# Patient Record
Sex: FEMALE | Race: WHITE | NOT HISPANIC OR LATINO | Employment: PART TIME | URBAN - METROPOLITAN AREA
[De-identification: names, ages, dates, MRNs, and addresses within clinical notes are randomized per-mention and may not be internally consistent; named-entity substitution may affect disease eponyms.]

---

## 2021-11-15 ENCOUNTER — OFFICE VISIT (OUTPATIENT)
Dept: FAMILY MEDICINE CLINIC | Facility: CLINIC | Age: 71
End: 2021-11-15
Payer: COMMERCIAL

## 2021-11-15 ENCOUNTER — TELEPHONE (OUTPATIENT)
Dept: ADMINISTRATIVE | Facility: OTHER | Age: 71
End: 2021-11-15

## 2021-11-15 ENCOUNTER — TELEPHONE (OUTPATIENT)
Dept: FAMILY MEDICINE CLINIC | Facility: CLINIC | Age: 71
End: 2021-11-15

## 2021-11-15 VITALS
DIASTOLIC BLOOD PRESSURE: 76 MMHG | RESPIRATION RATE: 16 BRPM | BODY MASS INDEX: 27.87 KG/M2 | WEIGHT: 147.6 LBS | HEART RATE: 68 BPM | TEMPERATURE: 97.8 F | OXYGEN SATURATION: 96 % | HEIGHT: 61 IN | SYSTOLIC BLOOD PRESSURE: 128 MMHG

## 2021-11-15 DIAGNOSIS — M25.571 BILATERAL ANKLE PAIN, UNSPECIFIED CHRONICITY: ICD-10-CM

## 2021-11-15 DIAGNOSIS — E11.40 TYPE 2 DIABETES MELLITUS WITH CHRONIC PAINFUL DIABETIC NEUROPATHY (HCC): Primary | ICD-10-CM

## 2021-11-15 DIAGNOSIS — J43.9 PULMONARY EMPHYSEMA, UNSPECIFIED EMPHYSEMA TYPE (HCC): ICD-10-CM

## 2021-11-15 DIAGNOSIS — M25.572 BILATERAL ANKLE PAIN, UNSPECIFIED CHRONICITY: ICD-10-CM

## 2021-11-15 DIAGNOSIS — I25.10 CORONARY ARTERY DISEASE INVOLVING NATIVE CORONARY ARTERY OF NATIVE HEART WITHOUT ANGINA PECTORIS: ICD-10-CM

## 2021-11-15 DIAGNOSIS — E78.49 OTHER HYPERLIPIDEMIA: ICD-10-CM

## 2021-11-15 PROBLEM — I27.20 PULMONARY HYPERTENSION (HCC): Status: ACTIVE | Noted: 2019-03-22

## 2021-11-15 PROBLEM — E78.00 PURE HYPERCHOLESTEROLEMIA: Status: ACTIVE | Noted: 2019-03-22

## 2021-11-15 PROBLEM — K51.90 ULCERATIVE COLITIS (HCC): Status: ACTIVE | Noted: 2021-11-15

## 2021-11-15 PROBLEM — I10 HYPERTENSION: Status: ACTIVE | Noted: 2021-11-15

## 2021-11-15 PROBLEM — I25.5 ISCHEMIC CARDIOMYOPATHY: Status: ACTIVE | Noted: 2019-03-22

## 2021-11-15 PROBLEM — M81.0 OSTEOPOROSIS: Status: ACTIVE | Noted: 2021-11-15

## 2021-11-15 LAB — SL AMB POCT HEMOGLOBIN AIC: 9 (ref ?–6.5)

## 2021-11-15 PROCEDURE — 1160F RVW MEDS BY RX/DR IN RCRD: CPT | Performed by: FAMILY MEDICINE

## 2021-11-15 PROCEDURE — 3725F SCREEN DEPRESSION PERFORMED: CPT | Performed by: FAMILY MEDICINE

## 2021-11-15 PROCEDURE — 3052F HG A1C>EQUAL 8.0%<EQUAL 9.0%: CPT | Performed by: FAMILY MEDICINE

## 2021-11-15 PROCEDURE — 83036 HEMOGLOBIN GLYCOSYLATED A1C: CPT | Performed by: FAMILY MEDICINE

## 2021-11-15 PROCEDURE — 4010F ACE/ARB THERAPY RXD/TAKEN: CPT | Performed by: FAMILY MEDICINE

## 2021-11-15 PROCEDURE — 3008F BODY MASS INDEX DOCD: CPT | Performed by: FAMILY MEDICINE

## 2021-11-15 PROCEDURE — 99204 OFFICE O/P NEW MOD 45 MIN: CPT | Performed by: FAMILY MEDICINE

## 2021-11-15 RX ORDER — LOSARTAN POTASSIUM 50 MG/1
50 TABLET ORAL DAILY
COMMUNITY
End: 2021-11-15 | Stop reason: CLARIF

## 2021-11-15 RX ORDER — PREGABALIN 100 MG/1
175 CAPSULE ORAL 2 TIMES DAILY
COMMUNITY
Start: 2021-09-17 | End: 2021-11-15 | Stop reason: SDUPTHER

## 2021-11-15 RX ORDER — CLOPIDOGREL BISULFATE 75 MG/1
75 TABLET ORAL DAILY
COMMUNITY

## 2021-11-15 RX ORDER — INSULIN GLARGINE 100 [IU]/ML
33 INJECTION, SOLUTION SUBCUTANEOUS 2 TIMES DAILY
COMMUNITY
End: 2022-08-09

## 2021-11-15 RX ORDER — ASPIRIN 81 MG/1
81 TABLET ORAL DAILY
COMMUNITY

## 2021-11-15 RX ORDER — CLONIDINE HYDROCHLORIDE 0.1 MG/1
0.1 TABLET ORAL 2 TIMES DAILY
COMMUNITY
Start: 2021-09-09 | End: 2022-02-28 | Stop reason: SDUPTHER

## 2021-11-15 RX ORDER — CARVEDILOL 25 MG/1
25 TABLET ORAL 2 TIMES DAILY
COMMUNITY
Start: 2021-09-09

## 2021-11-15 RX ORDER — FOLIC ACID 1 MG/1
1 TABLET ORAL DAILY
COMMUNITY
End: 2022-03-30

## 2021-11-15 RX ORDER — VALSARTAN 320 MG/1
320 TABLET ORAL DAILY
COMMUNITY
End: 2021-11-15 | Stop reason: SDUPTHER

## 2021-11-15 RX ORDER — PHENOL 1.4 %
600 AEROSOL, SPRAY (ML) MUCOUS MEMBRANE DAILY
COMMUNITY

## 2021-11-15 RX ORDER — TOLTERODINE 4 MG/1
4 CAPSULE, EXTENDED RELEASE ORAL DAILY
COMMUNITY
Start: 2021-09-17 | End: 2022-03-30 | Stop reason: SDUPTHER

## 2021-11-15 RX ORDER — PREGABALIN 75 MG/1
75 CAPSULE ORAL 2 TIMES DAILY
Qty: 60 CAPSULE | Refills: 0 | Status: SHIPPED | OUTPATIENT
Start: 2021-11-15 | End: 2021-12-28 | Stop reason: SDUPTHER

## 2021-11-15 RX ORDER — SITAGLIPTIN AND METFORMIN HYDROCHLORIDE 1000; 50 MG/1; MG/1
50-1000 TABLET, FILM COATED ORAL 2 TIMES DAILY
COMMUNITY
Start: 2021-09-14 | End: 2022-08-09

## 2021-11-15 RX ORDER — ATORVASTATIN CALCIUM 40 MG/1
40 TABLET, FILM COATED ORAL DAILY
COMMUNITY
End: 2022-03-30 | Stop reason: SDUPTHER

## 2021-11-15 RX ORDER — GLIMEPIRIDE 2 MG/1
2 TABLET ORAL DAILY
COMMUNITY
End: 2022-08-04

## 2021-12-28 DIAGNOSIS — M25.572 BILATERAL ANKLE PAIN, UNSPECIFIED CHRONICITY: ICD-10-CM

## 2021-12-28 DIAGNOSIS — M25.571 BILATERAL ANKLE PAIN, UNSPECIFIED CHRONICITY: ICD-10-CM

## 2021-12-28 LAB
ALBUMIN SERPL-MCNC: 4.5 G/DL (ref 3.8–4.8)
ALBUMIN/GLOB SERPL: 1.7 {RATIO} (ref 1.2–2.2)
ALP SERPL-CCNC: 109 IU/L (ref 44–121)
ALT SERPL-CCNC: 22 IU/L (ref 0–32)
AST SERPL-CCNC: 20 IU/L (ref 0–40)
BILIRUB SERPL-MCNC: 0.2 MG/DL (ref 0–1.2)
BUN SERPL-MCNC: 28 MG/DL (ref 8–27)
BUN/CREAT SERPL: 19 (ref 12–28)
CALCIUM SERPL-MCNC: 10.7 MG/DL (ref 8.7–10.3)
CHLORIDE SERPL-SCNC: 103 MMOL/L (ref 96–106)
CHOLEST SERPL-MCNC: 143 MG/DL (ref 100–199)
CO2 SERPL-SCNC: 24 MMOL/L (ref 20–29)
CREAT SERPL-MCNC: 1.48 MG/DL (ref 0.57–1)
GLOBULIN SER-MCNC: 2.7 G/DL (ref 1.5–4.5)
GLUCOSE SERPL-MCNC: 185 MG/DL (ref 65–99)
HDLC SERPL-MCNC: 40 MG/DL
LDLC SERPL CALC-MCNC: 67 MG/DL (ref 0–99)
MICRODELETION SYND BLD/T FISH: NORMAL
MICRODELETION SYND BLD/T FISH: NORMAL
POTASSIUM SERPL-SCNC: 6.5 MMOL/L (ref 3.5–5.2)
PROT SERPL-MCNC: 7.2 G/DL (ref 6–8.5)
SL AMB EGFR AFRICAN AMERICAN: 41 ML/MIN/1.73
SL AMB EGFR NON AFRICAN AMERICAN: 36 ML/MIN/1.73
SL AMB VLDL CHOLESTEROL CALC: 36 MG/DL (ref 5–40)
SODIUM SERPL-SCNC: 138 MMOL/L (ref 134–144)
TRIGL SERPL-MCNC: 221 MG/DL (ref 0–149)

## 2021-12-28 RX ORDER — PREGABALIN 75 MG/1
75 CAPSULE ORAL 2 TIMES DAILY
Qty: 60 CAPSULE | Refills: 0 | Status: SHIPPED | OUTPATIENT
Start: 2021-12-28 | End: 2022-01-27 | Stop reason: SDUPTHER

## 2021-12-29 ENCOUNTER — APPOINTMENT (OUTPATIENT)
Dept: LAB | Facility: CLINIC | Age: 71
End: 2021-12-29
Payer: COMMERCIAL

## 2021-12-29 ENCOUNTER — TELEPHONE (OUTPATIENT)
Dept: FAMILY MEDICINE CLINIC | Facility: CLINIC | Age: 71
End: 2021-12-29

## 2021-12-29 DIAGNOSIS — E87.5 HYPERKALEMIA: ICD-10-CM

## 2021-12-29 LAB
ALBUMIN SERPL BCP-MCNC: 3.9 G/DL (ref 3.5–5)
ALP SERPL-CCNC: 102 U/L (ref 46–116)
ALT SERPL W P-5'-P-CCNC: 37 U/L (ref 12–78)
ANION GAP SERPL CALCULATED.3IONS-SCNC: 2 MMOL/L (ref 4–13)
AST SERPL W P-5'-P-CCNC: 29 U/L (ref 5–45)
BILIRUB SERPL-MCNC: 0.49 MG/DL (ref 0.2–1)
BUN SERPL-MCNC: 31 MG/DL (ref 5–25)
CALCIUM SERPL-MCNC: 10.4 MG/DL (ref 8.3–10.1)
CHLORIDE SERPL-SCNC: 105 MMOL/L (ref 100–108)
CO2 SERPL-SCNC: 27 MMOL/L (ref 21–32)
CREAT SERPL-MCNC: 1.48 MG/DL (ref 0.6–1.3)
GFR SERPL CREATININE-BSD FRML MDRD: 35 ML/MIN/1.73SQ M
GLUCOSE SERPL-MCNC: 211 MG/DL (ref 65–140)
POTASSIUM SERPL-SCNC: 5.8 MMOL/L (ref 3.5–5.3)
PROT SERPL-MCNC: 7.7 G/DL (ref 6.4–8.2)
SODIUM SERPL-SCNC: 134 MMOL/L (ref 136–145)

## 2021-12-29 PROCEDURE — 80053 COMPREHEN METABOLIC PANEL: CPT

## 2021-12-29 PROCEDURE — 36415 COLL VENOUS BLD VENIPUNCTURE: CPT

## 2022-01-03 ENCOUNTER — APPOINTMENT (OUTPATIENT)
Dept: LAB | Facility: CLINIC | Age: 72
End: 2022-01-03
Payer: COMMERCIAL

## 2022-01-03 DIAGNOSIS — E87.5 HYPERKALEMIA: ICD-10-CM

## 2022-01-03 LAB
ANION GAP SERPL CALCULATED.3IONS-SCNC: 4 MMOL/L (ref 4–13)
BUN SERPL-MCNC: 35 MG/DL (ref 5–25)
CALCIUM SERPL-MCNC: 9.9 MG/DL (ref 8.3–10.1)
CHLORIDE SERPL-SCNC: 105 MMOL/L (ref 100–108)
CO2 SERPL-SCNC: 25 MMOL/L (ref 21–32)
CREAT SERPL-MCNC: 1.61 MG/DL (ref 0.6–1.3)
GFR SERPL CREATININE-BSD FRML MDRD: 32 ML/MIN/1.73SQ M
GLUCOSE SERPL-MCNC: 317 MG/DL (ref 65–140)
POTASSIUM SERPL-SCNC: 5.6 MMOL/L (ref 3.5–5.3)
SODIUM SERPL-SCNC: 134 MMOL/L (ref 136–145)

## 2022-01-03 PROCEDURE — 36415 COLL VENOUS BLD VENIPUNCTURE: CPT

## 2022-01-03 PROCEDURE — 80048 BASIC METABOLIC PNL TOTAL CA: CPT

## 2022-01-27 DIAGNOSIS — M25.571 BILATERAL ANKLE PAIN, UNSPECIFIED CHRONICITY: ICD-10-CM

## 2022-01-27 DIAGNOSIS — E11.40 TYPE 2 DIABETES MELLITUS WITH CHRONIC PAINFUL DIABETIC NEUROPATHY (HCC): ICD-10-CM

## 2022-01-27 DIAGNOSIS — M25.572 BILATERAL ANKLE PAIN, UNSPECIFIED CHRONICITY: ICD-10-CM

## 2022-01-27 RX ORDER — PREGABALIN 75 MG/1
75 CAPSULE ORAL 2 TIMES DAILY
Qty: 60 CAPSULE | Refills: 0 | Status: SHIPPED | OUTPATIENT
Start: 2022-01-27 | End: 2022-02-28 | Stop reason: SDUPTHER

## 2022-01-27 RX ORDER — VALSARTAN 320 MG/1
320 TABLET ORAL DAILY
Qty: 90 TABLET | Refills: 1 | Status: SHIPPED | OUTPATIENT
Start: 2022-01-27 | End: 2022-04-08 | Stop reason: SDUPTHER

## 2022-02-28 ENCOUNTER — OFFICE VISIT (OUTPATIENT)
Dept: FAMILY MEDICINE CLINIC | Facility: CLINIC | Age: 72
End: 2022-02-28
Payer: COMMERCIAL

## 2022-02-28 ENCOUNTER — APPOINTMENT (OUTPATIENT)
Dept: LAB | Facility: CLINIC | Age: 72
End: 2022-02-28
Payer: COMMERCIAL

## 2022-02-28 VITALS
DIASTOLIC BLOOD PRESSURE: 62 MMHG | BODY MASS INDEX: 27.38 KG/M2 | HEART RATE: 68 BPM | TEMPERATURE: 97.7 F | SYSTOLIC BLOOD PRESSURE: 120 MMHG | RESPIRATION RATE: 16 BRPM | WEIGHT: 145 LBS | HEIGHT: 61 IN

## 2022-02-28 DIAGNOSIS — N18.31 STAGE 3A CHRONIC KIDNEY DISEASE (HCC): ICD-10-CM

## 2022-02-28 DIAGNOSIS — I10 HYPERTENSION, UNSPECIFIED TYPE: ICD-10-CM

## 2022-02-28 DIAGNOSIS — M25.572 BILATERAL ANKLE PAIN, UNSPECIFIED CHRONICITY: ICD-10-CM

## 2022-02-28 DIAGNOSIS — R21 RASH: ICD-10-CM

## 2022-02-28 DIAGNOSIS — M25.571 BILATERAL ANKLE PAIN, UNSPECIFIED CHRONICITY: ICD-10-CM

## 2022-02-28 DIAGNOSIS — Z00.00 MEDICARE ANNUAL WELLNESS VISIT, INITIAL: ICD-10-CM

## 2022-02-28 DIAGNOSIS — E87.5 HYPERKALEMIA: ICD-10-CM

## 2022-02-28 DIAGNOSIS — L85.3 DRY SKIN: Primary | ICD-10-CM

## 2022-02-28 DIAGNOSIS — E11.40 TYPE 2 DIABETES MELLITUS WITH CHRONIC PAINFUL DIABETIC NEUROPATHY (HCC): ICD-10-CM

## 2022-02-28 DIAGNOSIS — E78.00 PURE HYPERCHOLESTEROLEMIA: ICD-10-CM

## 2022-02-28 LAB
ANION GAP SERPL CALCULATED.3IONS-SCNC: 7 MMOL/L (ref 4–13)
BUN SERPL-MCNC: 41 MG/DL (ref 5–25)
CALCIUM SERPL-MCNC: 10.3 MG/DL (ref 8.3–10.1)
CHLORIDE SERPL-SCNC: 104 MMOL/L (ref 100–108)
CO2 SERPL-SCNC: 23 MMOL/L (ref 21–32)
CREAT SERPL-MCNC: 1.81 MG/DL (ref 0.6–1.3)
GFR SERPL CREATININE-BSD FRML MDRD: 28 ML/MIN/1.73SQ M
GLUCOSE SERPL-MCNC: 363 MG/DL (ref 65–140)
POTASSIUM SERPL-SCNC: 6.1 MMOL/L (ref 3.5–5.3)
SL AMB POCT HEMOGLOBIN AIC: 9.9 (ref ?–6.5)
SODIUM SERPL-SCNC: 134 MMOL/L (ref 136–145)

## 2022-02-28 PROCEDURE — 83036 HEMOGLOBIN GLYCOSYLATED A1C: CPT | Performed by: FAMILY MEDICINE

## 2022-02-28 PROCEDURE — 99214 OFFICE O/P EST MOD 30 MIN: CPT | Performed by: FAMILY MEDICINE

## 2022-02-28 PROCEDURE — 36415 COLL VENOUS BLD VENIPUNCTURE: CPT

## 2022-02-28 PROCEDURE — G0438 PPPS, INITIAL VISIT: HCPCS | Performed by: FAMILY MEDICINE

## 2022-02-28 PROCEDURE — 80048 BASIC METABOLIC PNL TOTAL CA: CPT

## 2022-02-28 RX ORDER — INSULIN GLARGINE 100 [IU]/ML
30 INJECTION, SOLUTION SUBCUTANEOUS 2 TIMES DAILY
COMMUNITY
Start: 2022-01-19 | End: 2022-02-28

## 2022-02-28 RX ORDER — PREGABALIN 75 MG/1
75 CAPSULE ORAL 2 TIMES DAILY
Qty: 60 CAPSULE | Refills: 0 | Status: SHIPPED | OUTPATIENT
Start: 2022-02-28 | End: 2022-03-29

## 2022-02-28 RX ORDER — CLONIDINE HYDROCHLORIDE 0.1 MG/1
0.1 TABLET ORAL 2 TIMES DAILY
Qty: 180 TABLET | Refills: 2 | Status: SHIPPED | OUTPATIENT
Start: 2022-02-28 | End: 2022-08-04

## 2022-02-28 RX ORDER — PEN NEEDLE, DIABETIC 29 G X1/2"
NEEDLE, DISPOSABLE MISCELLANEOUS 4 TIMES DAILY
Qty: 200 EACH | Refills: 2 | Status: SHIPPED | OUTPATIENT
Start: 2022-02-28

## 2022-02-28 RX ORDER — INSULIN LISPRO 100 [IU]/ML
INJECTION, SOLUTION INTRAVENOUS; SUBCUTANEOUS
Qty: 15 ML | Refills: 11 | Status: SHIPPED | OUTPATIENT
Start: 2022-02-28 | End: 2022-08-09

## 2022-02-28 RX ORDER — FLUCONAZOLE 150 MG/1
150 TABLET ORAL
Qty: 2 TABLET | Refills: 0 | Status: SHIPPED | OUTPATIENT
Start: 2022-02-28 | End: 2022-03-04

## 2022-02-28 RX ORDER — TOLTERODINE TARTRATE 1 MG/1
TABLET, EXTENDED RELEASE ORAL
COMMUNITY
End: 2022-02-28

## 2022-02-28 NOTE — PROGRESS NOTES
7 Assessment and Plan:     Problem List Items Addressed This Visit        Endocrine    Type 2 diabetes mellitus with chronic painful diabetic neuropathy (HCC)    Relevant Medications    insulin lispro (HumaLOG KwikPen) 100 units/mL injection pen    Insulin Pen Needle (UltiCare Mini Pen Needles) 31G X 6 MM MISC    Other Relevant Orders    Ambulatory referral to Endocrinology    Ambulatory referral to Diabetic Education       Cardiovascular and Mediastinum    Hypertension    Relevant Medications    cloNIDine (CATAPRES) 0 1 mg tablet       Other    Pure hypercholesterolemia      Other Visit Diagnoses     Dry skin    -  Primary    Relevant Orders    Ambulatory Referral to Dermatology    Bilateral ankle pain, unspecified chronicity        Relevant Medications    pregabalin (LYRICA) 75 mg capsule    Rash        Relevant Medications    fluconazole (DIFLUCAN) 150 mg tablet    Stage 3a chronic kidney disease (HCC)        Hyperkalemia        Relevant Orders    Basic metabolic panel           Preventive health issues were discussed with patient, and age appropriate screening tests were ordered as noted in patient's After Visit Summary  Personalized health advice and appropriate referrals for health education or preventive services given if needed, as noted in patient's After Visit Summary       History of Present Illness:     Patient presents for Medicare Annual Wellness visit    Patient Care Team:  Klye Thompson MD as PCP - General (Family Medicine)  Teresa Villarreal MD as PCP - [address] [place] [address] (RTE)     Problem List:     Patient Active Problem List   Diagnosis    Coronary artery disease involving native coronary artery of native heart without angina pectoris    Emphysema, unspecified ([address] )    Ischemic cardiomyopathy    Hypertension    [facility]    Pure hypercholesterolemia    Pulmonary hypertension ([facility]    Other hyperlipidemia    Osteoporosis    Type 2 diabetes mellitus with chronic painful diabetic neuropathy [facility])      Past Medical and Surgical History:     Past Medical History:   Diagnosis Date    Diabetes mellitus [facility] [facility]     Neuropathy      Past Surgical History:   Procedure Laterality Date    ANKLE ARTHROPLASTY        Family History:     Family History   Problem Relation Age of Onset    Stroke Mother     Heart disease Father       Social History:     Social History     Socioeconomic History    Marital status: Single     Spouse name: None    Number of children: None    Years of education: None    Highest education level: None   Occupational History    None   Tobacco Use    Smoking status: Former Smoker    Smokeless tobacco: Never Used   Substance and Sexual Activity    Alcohol use:  Yes     Alcohol/week: 1 0 standard drink     Types: 1 Cans of beer per week    Drug use: Never    Sexual activity: Not Currently   Other Topics Concern    None   Social History Narrative    None     Social Determinants of Health     Financial Resource Strain: Not on file   Food Insecurity: Not on file   Transportation Needs: Not on file   Physical Activity: Not on file   Stress: Not on file   Social Connections: Not on file   Intimate Partner Violence: Not on file   Housing Stability: Not on file      Medications and Allergies:     Current Outpatient Medications   Medication Sig Dispense Refill    aspirin (ECOTRIN LOW STRENGTH) 81 mg EC tablet Take 81 mg by mouth daily      atorvastatin (LIPITOR) 40 mg tablet Take 40 mg by mouth daily      calcium carbonate (OS-JAKOB) 600 MG tablet Take 600 mg by mouth daily      carvedilol (COREG) 25 mg tablet Take 25 mg by mouth 2 (two) times a day      cloNIDine (CATAPRES) 0 1 mg tablet Take 1 tablet (0 1 mg total) by mouth 2 (two) times a day 180 tablet 2    clopidogrel (PLAVIX) 75 mg tablet Take 75 mg by mouth daily      Empagliflozin 10 MG TABS Take 10 mg by mouth daily      folic acid (FOLVITE) 1 mg tablet Take 1 mg by mouth daily      glimepiride (AMARYL) 2 mg tablet Take 2 mg by mouth daily      insulin glargine (LANTUS) 100 units/mL subcutaneous injection Inject 30 Units under the skin 2 (two) times a day      Janumet  MG per tablet Take 50-1,000 tablets by mouth 2 (two) times a day      pregabalin (LYRICA) 75 mg capsule Take 1 capsule (75 mg total) by mouth 2 (two) times a day 60 capsule 0    tolterodine (DETROL LA) 4 mg 24 hr capsule Take 4 mg by mouth daily      valsartan (DIOVAN) 320 MG tablet Take 1 tablet (320 mg total) by mouth daily 90 tablet 1    fluconazole (DIFLUCAN) 150 mg tablet Take 1 tablet (150 mg total) by mouth every 3 (three) days for 2 doses 2 tablet 0    insulin lispro (HumaLOG KwikPen) 100 units/mL injection pen Please check you Blood Sugars prior to meals and follow instructions below 150-200 1 unit,201-250 2 units;251-300 3 units;301-350 4 units;351-400 5 units;451-450 6 units;500+ 7 units 15 mL 11    Insulin Pen Needle (UltiCare Mini Pen Needles) 31G X 6 MM MISC Use 4 (four) times a day 200 each 2     No current facility-administered medications for this visit  Allergies   Allergen Reactions    Other Rash      Immunizations:     Immunization History   Administered Date(s) Administered    COVID-19 MODERNA VACC 0 5 ML IM 03/10/2021, 04/07/2021, 10/29/2021    INFLUENZA 10/29/2021      Health Maintenance:         Topic Date Due    Hepatitis C Screening  Never done    Breast Cancer Screening: Mammogram  Never done    Colorectal Cancer Screening  01/18/2021         Topic Date Due    Pneumococcal Vaccine: 65+ Years (1 of 2 - PPSV23) Never done    DTaP,Tdap,and Td Vaccines (1 - Tdap) Never done      Medicare Health Risk Assessment:     /62 (BP Location: Left arm, Patient Position: Sitting, Cuff Size: Adult)   Pulse 68   Temp 97 7 °F (36 5 °C) (Temporal)   Resp 16   Ht 5' 1" (1 549 m)   Wt 65 8 kg (145 lb)   BMI 27 40 kg/m²      T[place]on is here for her Initial Wellness visit       Health Risk Assessment:   Patient rates overall health as fair  Patient feels that their physical health rating is slightly better  Patient is satisfied with their life  Eyesight was rated as same  Hearing was rated as same  Patient feels that their emotional and mental health rating is same  Patients states they are sometimes angry  Patient states they are sometimes unusually tired/fatigued  Pain experienced in the last 7 days has been some  Patient's pain rating has been 5/10  Patient states that she has experienced no weight loss or gain in last 6 months  Depression Screening:   PHQ-2 Score: 1      Fall Risk Screening: In the past year, patient has experienced: no history of falling in past year      Urinary Incontinence Screening:   Patient has not leaked urine accidently in the last six months  Home Safety:  Patient does not have trouble with stairs inside or outside of their home  Patient has working smoke alarms and has working carbon monoxide detector  Home safety hazards include: none  Nutrition:   Current diet is Diabetic  Medications:   Patient is not currently taking any over-the-counter supplements  Patient is able to manage medications       Activities of Daily Living (ADLs)/Instrumental Activities of Daily Living (IADLs):   Walk and transfer into and out of bed and chair?: Yes  Dress and groom yourself?: Yes    Bathe or shower yourself?: Yes    Do your laundry/housekeeping?: Yes  Manage your money, pay your bills and track your expenses?: Yes  Make your own meals?: Yes    Do your own shopping?: Yes    Previous Hospitalizations:   Any hospitalizations or ED visits within the last 12 months?: No      Advance Care Planning:   Living will: No    Durable POA for healthcare: No    Advanced directive: No      Cognitive Screening:   Provider or family/friend/caregiver concerned regarding cognition?: No    PREVENTIVE SCREENINGS      Cardiovascular Screening:    General: Screening Not Indicated and History Lipid Disorder Diabetes Screening:     General: Screening Not Indicated and History Diabetes      Colorectal Cancer Screening:     General: Risks and Benefits Discussed and Screening Current      Breast Cancer Screening:     General: Patient Declines      Cervical Cancer Screening:    General: Screening Not Indicated      Osteoporosis Screening:    General: Screening Not Indicated, History Osteoporosis and Patient Declines      Abdominal Aortic Aneurysm (AAA) Screening:        General: Screening Not Indicated      Lung Cancer Screening:     General: Screening Not Indicated      Hepatitis C Screening:    General: Screening Not Indicated    Screening, Brief Intervention, and Referral to Treatment (SBIRT)    Screening  Typical number of drinks in a day: 1  Typical number of drinks in a week: 6  Interpretation: Low risk drinking behavior      Single Item Drug Screening:  How often have you used an illegal drug (including marijuana) or a prescription medication for non-medical reasons in the past year? never    Single Item Drug Screen Score: 0  Interpretation: Negative screen for possible drug use disorder      Panchito Rogers MD

## 2022-02-28 NOTE — PROGRESS NOTES
[facility]  DM Type 2 check  ASSESSMENT/PLAN  Marcela Tellez is a 73 y o  female presents to the office for     Diagnoses and all orders for this visit:    Dry skin  -     Ambulatory Referral to Dermatology; Future    Bilateral ankle pain, unspecified chronicity  -     pregabalin (LYRICA) 75 mg capsule; Take 1 capsule (75 mg total) by mouth 2 (two) times a day    Rash  -     fluconazole (DIFLUCAN) 150 mg tablet; Take 1 tablet (150 mg total) by mouth every 3 (three) days for 2 doses    Hypertension, unspecified type  -     cloNIDine (CATAPRES) 0 1 mg tablet; Take 1 tablet (0 1 mg total) by mouth 2 (two) times a day    Type 2 diabetes mellitus with chronic painful diabetic neuropathy (HCC)  -     insulin lispro (HumaLOG KwikPen) 100 units/mL injection pen; Please check you Blood Sugars prior to meals and follow instructions below 150-200 1 unit,201-250 2 units;251-300 3 units;301-350 4 units;351-400 5 units;451-450 6 units;500+ 7 units  -     Ambulatory referral to Endocrinology; Future  -     Ambulatory referral to Diabetic Education; Future  -     Insulin Pen Needle (UltiCare Mini Pen Needles) 31G X 6 MM MISC; Use 4 (four) times a day  -     POCT hemoglobin A1c    Stage 3a chronic kidney disease (HCC)    Pure hypercholesterolemia    Hyperkalemia  -     Basic metabolic panel; Future    Other orders  -     Discontinue: Lantus SoloStar 100 units/mL injection pen;  Inject 30 Units under the skin 2 (two) times a day (Patient not taking: Reported on 2/28/2022 )  -     Discontinue: tolterodine (DETROL) 1 mg tablet; tolterodine Take No date recorded No form recorded No frequency recorded No route recorded No set duration recorded No set duration amount recorded active No dosage strength recorded No dosage strength units of measure recorded (Patient not taking: Reported on 2/28/2022)        1) Diabetes Type 2  - Controlled/ Uncontrolled  -Most recent lab testing:   Results from last 6 Months   Lab Units 01/03/22  0924 12/29/21  0929 12/27/21  0823 11/15/21  1117   HEMOGLOBIN A1C   --   --   --  9 0*   LDL CALC mg/dL  --   --  67  --    CREATININE mg/dL 1 61*   < > 1 48*  --    EGFR ml/min/1 73sq m 32   < >  --   --     < > = values in this interval not displayed  - increase Lantus to 33 units twice a day/start Humalog  Did advise the patient of the risk of taking fast acting  But I do believe it is necessary at this time  Patient understands the risk of having such elevated glucose  - Opthamology: Will be due in the future  - Podiatry/ Foot exam:  Up-to-date  - Vaccines:  Immunization History   Administered Date(s) Administered    COVID-19 MODERNA VACC 0 5 ML IM 03/10/2021, 04/07/2021, 10/29/2021    INFLUENZA 10/29/2021   - Diabetes education:Encourage the patient to continue lifestyle changes  2  Hyperlipidemia continue medications as prescribed  3  Stage III A chronic kidney disease repeat potassium levels  If it continues to be elevated will be referred to Nephrology  4  Hypertension stable  5  Diflucan ordered  6  Dry skin:  Referred to dermatology  7  Refilled Lyrica    Future Appointments   Date Time Provider Martina Raymundo   3/15/2022 11:15 AM Kelly Snellen, MD [facility]        Disposition: Return to the clinic in 2 weeks           SUBJECTIVE  CC: Follow-up (labs) and Medicare Wellness Visit (AWV)      HPI:  Jane Painting is a 73 y o  female presenting to the office for Diabetes check with Chronic conditions check  Patient states that she has been sort of used to having her A1c in the nines  She states that she is worried but does not get too concerned any more  Patient does not check her sugars on a regular basis  Does believe that she has a history of being on Humalog at 1 point but has not been on it in a while  Patient states that 36 units of Lantus twice a day and of causing her to have hypoglycemia    She does have a history of a diabetic coma from having sugars high of a 800  She fears this  Patient states that her levels have improved because she was constantly taking Gatorade and Powerade which was full of sugar  Patient's blood pressure is always well been controlled  Patient has dry skin over her scalp that she would like to discuss  She also has a rash in her vaginal area that is itchy  She states that is not a rash is more like her hair follicles are very sensitive to touch  She would like a refill on her Lyrica if possible    Review of Systems   Constitutional: Negative for activity change, appetite change, chills, fatigue and fever  HENT: Negative for congestion  Respiratory: Negative for cough, chest tightness and shortness of breath  Cardiovascular: Negative for chest pain and leg swelling  Gastrointestinal: Negative for abdominal distention, abdominal pain, constipation, diarrhea, nausea and vomiting  Skin: Positive for rash  All other systems reviewed and are negative        Historical Information   The patient history was reviewed as follows:    Past Medical History:   Diagnosis Date    Diabetes mellitus [facility] [address] )     Neuropathy      Past Surgical History:   Procedure Laterality Date    ANKLE ARTHROPLASTY       Family History   Problem Relation Age of Onset    Stroke Mother     Heart disease Father       Social History   Social History     Substance and Sexual Activity   Alcohol Use Yes    Alcohol/week: 1 0 standard drink    Types: 1 Cans of beer per week     Social History     Substance and Sexual Activity   Drug Use Never     Social History     Tobacco Use   Smoking Status Former Smoker   Smokeless Tobacco Never Used       Medications:     Current Outpatient Medications:     aspirin (ECOTRIN LOW STRENGTH) 81 mg EC tablet, Take 81 mg by mouth daily, Disp: , Rfl:     atorvastatin (LIPITOR) 40 mg tablet, Take 40 mg by mouth daily, Disp: , Rfl:     calcium carbonate (OS-JAKOB) 600 MG tablet, Take 600 mg by mouth daily, Disp: , Rfl:     carvedilol (COREG) 25 mg tablet, Take 25 mg by mouth 2 (two) times a day, Disp: , Rfl:     cloNIDine (CATAPRES) 0 1 mg tablet, Take 1 tablet (0 1 mg total) by mouth 2 (two) times a day, Disp: 180 tablet, Rfl: 2    clopidogrel (PLAVIX) 75 mg tablet, Take 75 mg by mouth daily, Disp: , Rfl:     Empagliflozin 10 MG TABS, Take 10 mg by mouth daily, Disp: , Rfl:     folic acid (FOLVITE) 1 mg tablet, Take 1 mg by mouth daily, Disp: , Rfl:     glimepiride (AMARYL) 2 mg tablet, Take 2 mg by mouth daily, Disp: , Rfl:     insulin glargine (LANTUS) 100 units/mL subcutaneous injection, Inject 30 Units under the skin 2 (two) times a day, Disp: , Rfl:     Janumet  MG per tablet, Take 50-1,000 tablets by mouth 2 (two) times a day, Disp: , Rfl:     pregabalin (LYRICA) 75 mg capsule, Take 1 capsule (75 mg total) by mouth 2 (two) times a day, Disp: 60 capsule, Rfl: 0    tolterodine (DETROL LA) 4 mg 24 hr capsule, Take 4 mg by mouth daily, Disp: , Rfl:     valsartan (DIOVAN) 320 MG tablet, Take 1 tablet (320 mg total) by mouth daily, Disp: 90 tablet, Rfl: 1    fluconazole (DIFLUCAN) 150 mg tablet, Take 1 tablet (150 mg total) by mouth every 3 (three) days for 2 doses, Disp: 2 tablet, Rfl: 0    insulin lispro (HumaLOG KwikPen) 100 units/mL injection pen, Please check you Blood Sugars prior to meals and follow instructions below 150-200 1 unit,201-250 2 units;251-300 3 units;301-350 4 units;351-400 5 units;451-450 6 units;500+ 7 units, Disp: 15 mL, Rfl: 11    Insulin Pen Needle (UltiCare Mini Pen Needles) 31G X 6 MM MISC, Use 4 (four) times a day, Disp: 200 each, Rfl: 2  Allergies   Allergen Reactions    Other Rash       OBJECTIVE    Vitals:   Vitals:    02/28/22 1040   BP: 120/62   BP Location: Left arm   Patient Position: Sitting   Cuff Size: Adult   Pulse: 68   Resp: 16   Temp: 97 7 °F (36 5 °C)   TempSrc: Temporal   Weight: 65 8 kg (145 lb)   Height: 5' 1" (1 549 m)           Physical Exam  Vitals reviewed  Constitutional:       Appearance: She is well-developed  HENT:      Head: Normocephalic and atraumatic  Eyes:      Conjunctiva/sclera: Conjunctivae normal       Pupils: Pupils are equal, round, and reactive to light  Cardiovascular:      Rate and Rhythm: Normal rate and regular rhythm  Heart sounds: Normal heart sounds  Pulmonary:      Effort: Pulmonary effort is normal  No respiratory distress  Breath sounds: Normal breath sounds  Musculoskeletal:         General: Normal range of motion  Cervical back: Normal range of motion and neck supple  Skin:     General: Skin is warm  Capillary Refill: Capillary refill takes less than 2 seconds  Neurological:      Mental Status: She is alert and oriented to person, place, and time                 Neeta Toure MD  [place] [id number]

## 2022-02-28 NOTE — PATIENT INSTRUCTIONS
Please check you Blood Sugars prior to meals and follow instructions below  150-200 1 unit  201-250 2 units  251-300 3 units  301-350 4 units  351-400 5 units  451-450 6 units  500+ 7 units        Medicare Preventive Visit Patient Instructions  Thank you for completing your Welcome to Medicare Visit or Medicare Annual Wellness Visit today  Your next wellness visit will be due in one year (3/1/2023)  The screening/preventive services that you may require over the next 5-10 years are detailed below  Some tests may not apply to you based off risk factors and/or age  Screening tests ordered at today's visit but not completed yet may show as past due  Also, please note that scanned in results may not display below  Preventive Screenings:  Service Recommendations Previous Testing/Comments   Colorectal Cancer Screening  * Colonoscopy    * Fecal Occult Blood Test (FOBT)/Fecal Immunochemical Test (FIT)  * Fecal DNA/Cologuard Test  * Flexible Sigmoidoscopy Age: 54-65 years old   Colonoscopy: every 10 years (may be performed more frequently if at higher risk)  OR  FOBT/FIT: every 1 year  OR  Cologuard: every 3 years  OR  Sigmoidoscopy: every 5 years  Screening may be recommended earlier than age 48 if at higher risk for colorectal cancer  Also, an individualized decision between you and your healthcare provider will decide whether screening between the ages of 74-80 would be appropriate  Colonoscopy: 03/22/2019  FOBT/FIT: Not on file  Cologuard: Not on file  Sigmoidoscopy: Not on file          Breast Cancer Screening Age: 36 years old  Frequency: every 1-2 years  Not required if history of left and right mastectomy Mammogram: Not on file        Cervical Cancer Screening Between the ages of 21-29, pap smear recommended once every 3 years  Between the ages of 33-67, can perform pap smear with HPV co-testing every 5 years     Recommendations may differ for women with a history of total hysterectomy, cervical cancer, or abnormal pap smears in past  Pap Smear: Not on file    Screening Not Indicated   Hepatitis C Screening Once for adults born between 1945 and 1965  More frequently in patients at high risk for Hepatitis C Hep C Antibody: Not on file        Diabetes Screening 1-2 times per year if you're at risk for diabetes or have pre-diabetes Fasting glucose: No results in last 5 years   A1C: 9 0    Screening Not Indicated  History Diabetes   Cholesterol Screening Once every 5 years if you don't have a lipid disorder  May order more often based on risk factors  Lipid panel: 12/27/2021    Screening Not Indicated  History Lipid Disorder     Other Preventive Screenings Covered by Medicare:  1  Abdominal Aortic Aneurysm (AAA) Screening: covered once if your at risk  You're considered to be at risk if you have a family history of AAA  2  Lung Cancer Screening: covers low dose CT scan once per year if you meet all of the following conditions: (1) Age 50-69; (2) No signs or symptoms of lung cancer; (3) Current smoker or have quit smoking within the last 15 years; (4) You have a tobacco smoking history of at least 30 pack years (packs per day multiplied by number of years you smoked); (5) You get a written order from a healthcare provider  3  Glaucoma Screening: covered annually if you're considered high risk: (1) You have diabetes OR (2) Family history of glaucoma OR (3)  aged 48 and older OR (3)  American aged 72 and older  3  Osteoporosis Screening: covered every 2 years if you meet one of the following conditions: (1) You're estrogen deficient and at risk for osteoporosis based off medical history and other findings; (2) Have a vertebral abnormality; (3) On glucocorticoid therapy for more than 3 months; (4) Have primary hyperparathyroidism; (5) On osteoporosis medications and need to assess response to drug therapy  · Last bone density test (DXA Scan): Not on file    5  HIV Screening: covered annually if you're between the age of 12-76  Also covered annually if you are younger than 13 and older than 72 with risk factors for HIV infection  For pregnant patients, it is covered up to 3 times per pregnancy  Immunizations:  Immunization Recommendations   Influenza Vaccine Annual influenza vaccination during flu season is recommended for all persons aged >= 6 months who do not have contraindications   Pneumococcal Vaccine (Prevnar and Pneumovax)  * Prevnar = PCV13  * Pneumovax = PPSV23   Adults 25-60 years old: 1-3 doses may be recommended based on certain risk factors  Adults 72 years old: Prevnar (PCV13) vaccine recommended followed by Pneumovax (PPSV23) vaccine  If already received PPSV23 since turning 65, then PCV13 recommended at least one year after PPSV23 dose  Hepatitis B Vaccine 3 dose series if at intermediate or high risk (ex: diabetes, end stage renal disease, liver disease)   Tetanus (Td) Vaccine - COST NOT COVERED BY MEDICARE PART B Following completion of primary series, a booster dose should be given every 10 years to maintain immunity against tetanus  Td may also be given as tetanus wound prophylaxis  Tdap Vaccine - COST NOT COVERED BY MEDICARE PART B Recommended at least once for all adults  For pregnant patients, recommended with each pregnancy  Shingles Vaccine (Shingrix) - COST NOT COVERED BY MEDICARE PART B  2 shot series recommended in those aged 48 and above     Health Maintenance Due:      Topic Date Due    Hepatitis C Screening  Never done    Breast Cancer Screening: Mammogram  Never done    Colorectal Cancer Screening  01/18/2021     Immunizations Due:      Topic Date Due    Pneumococcal Vaccine: 65+ Years (1 of 2 - PPSV23) Never done    DTaP,Tdap,and Td Vaccines (1 - Tdap) Never done     Advance Directives   What are advance directives? Advance directives are legal documents that state your wishes and plans for medical care   These plans are made ahead of time in case you lose your ability to make decisions for yourself  Advance directives can apply to any medical decision, such as the treatments you want, and if you want to donate organs  What are the types of advance directives? There are many types of advance directives, and each state has rules about how to use them  You may choose a combination of any of the following:  · Living will: This is a written record of the treatment you want  You can also choose which treatments you do not want, which to limit, and which to stop at a certain time  This includes surgery, medicine, IV fluid, and tube feedings  · Durable power of  for healthcare [facility]): This is a written record that states who you want to make healthcare choices for you when you are unable to make them for yourself  This person, called a proxy, is usually a family member or a friend  You may choose more than 1 proxy  · Do not resuscitate (DNR) order:  A DNR order is used in case your heart stops beating or you stop breathing  It is a request not to have certain forms of treatment, such as CPR  A DNR order may be included in other types of advance directives  · Medical directive: This covers the care that you want if you are in a coma, near death, or unable to make decisions for yourself  You can list the treatments you want for each condition  Treatment may include pain medicine, surgery, blood transfusions, dialysis, IV or tube feedings, and a ventilator (breathing machine)  · Values history: This document has questions about your views, beliefs, and how you feel and think about life  This information can help others choose the care that you would choose  Why are advance directives important? An advance directive helps you control your care  Although spoken wishes may be used, it is better to have your wishes written down  Spoken wishes can be misunderstood, or not followed  Treatments may be given even if you do not want them   An advance directive may make it easier for your family to make difficult choices about your care  Weight Management   Why it is important to manage your weight:  Being overweight increases your risk of health conditions such as heart disease, high blood pressure, type 2 diabetes, and certain types of cancer  It can also increase your risk for osteoarthritis, sleep apnea, and other respiratory problems  Aim for a slow, steady weight loss  Even a small amount of weight loss can lower your risk of health problems  How to lose weight safely:  A safe and healthy way to lose weight is to eat fewer calories and get regular exercise  You can lose up about 1 pound a week by decreasing the number of calories you eat by 500 calories each day  Healthy meal plan for weight management:  A healthy meal plan includes a variety of foods, contains fewer calories, and helps you stay healthy  A healthy meal plan includes the following:  · Eat whole-grain foods more often  A healthy meal plan should contain fiber  Fiber is the part of grains, fruits, and vegetables that is not broken down by your body  Whole-grain foods are healthy and provide extra fiber in your diet  Some examples of whole-grain foods are whole-wheat breads and pastas, oatmeal, brown rice, and bulgur  · Eat a variety of vegetables every day  Include dark, leafy greens such as spinach, kale, desiree greens, and mustard greens  Eat yellow and orange vegetables such as carrots, sweet potatoes, and winter squash  · Eat a variety of fruits every day  Choose fresh or canned fruit (canned in its own juice or light syrup) instead of juice  Fruit juice has very little or no fiber  · Eat low-fat dairy foods  Drink fat-free (skim) milk or 1% milk  Eat fat-free yogurt and low-fat cottage cheese  Try low-fat cheeses such as mozzarella and other reduced-fat cheeses  · Choose meat and other protein foods that are low in fat  Choose beans or other legumes such as split peas or lentils   Choose fish, skinless poultry (chicken or turkey), or lean cuts of red meat (beef or pork)  Before you cook meat or poultry, cut off any visible fat  · Use less fat and oil  Try baking foods instead of frying them  Add less fat, such as margarine, sour cream, regular salad dressing and mayonnaise to foods  Eat fewer high-fat foods  Some examples of high-fat foods include french fries, doughnuts, ice cream, and cakes  · Eat fewer sweets  Limit foods and drinks that are high in sugar  This includes candy, cookies, regular soda, and sweetened drinks  Exercise:  Exercise at least 30 minutes per day on most days of the week  Some examples of exercise include walking, biking, dancing, and swimming  You can also fit in more physical activity by taking the stairs instead of the elevator or parking farther away from stores  Ask your healthcare provider about the best exercise plan for you  © Copyright [employer] 2018 Information is for End User's use only and may not be sold, redistributed or otherwise used for commercial purposes   All illustrations and images included in CareNotes® are the copyrighted property of [employer]  or [address] [place] [address]

## 2022-03-01 ENCOUNTER — TELEPHONE (OUTPATIENT)
Dept: FAMILY MEDICINE CLINIC | Facility: CLINIC | Age: 72
End: 2022-03-01

## 2022-03-01 NOTE — TELEPHONE ENCOUNTER
Patient is aware you want her to see nephrology but right now she is seeing a gynecologist and can not do that right now tc/cma----- Message from Lizzie Yost MD sent at 3/1/2022  8:11 AM EST -----  Advise the patient that her potassium remains HIGH, therefore I need her to be seen by Nephrology to help coordinate care  Please see if she is OK with us trying to get her in ASAP with them

## 2022-03-01 NOTE — TELEPHONE ENCOUNTER
Dr Mitra Gonzalez:    Patient called to let you know that she thinks the reason her potassium is high is because she drinks diet snapple  She will start drinking more water and less iced tea

## 2022-03-03 NOTE — TELEPHONE ENCOUNTER
I know that she has a lot of follow ups   But I am very concerned given high levels of Potassium, so I would prefer her to see kidney ASAP, even if its virtual  Can we please help arrange this for her with Dr Ganesh Syed team if she says yes

## 2022-03-24 ENCOUNTER — RA CDI HCC (OUTPATIENT)
Dept: OTHER | Facility: HOSPITAL | Age: 72
End: 2022-03-24

## 2022-03-24 NOTE — PROGRESS NOTES
[address]  coding opportunities     E11 22 and E11 65     Chart Reviewed number of suggestions sent to Provider: 2     Patients Insurance     Medicare Insurance: Medicare

## 2022-03-30 ENCOUNTER — LAB (OUTPATIENT)
Dept: LAB | Facility: CLINIC | Age: 72
End: 2022-03-30
Payer: COMMERCIAL

## 2022-03-30 ENCOUNTER — OFFICE VISIT (OUTPATIENT)
Dept: FAMILY MEDICINE CLINIC | Facility: CLINIC | Age: 72
End: 2022-03-30
Payer: COMMERCIAL

## 2022-03-30 VITALS
RESPIRATION RATE: 16 BRPM | BODY MASS INDEX: 28.13 KG/M2 | HEART RATE: 70 BPM | WEIGHT: 149 LBS | DIASTOLIC BLOOD PRESSURE: 70 MMHG | HEIGHT: 61 IN | TEMPERATURE: 97.8 F | SYSTOLIC BLOOD PRESSURE: 150 MMHG

## 2022-03-30 DIAGNOSIS — E87.5 HYPERKALEMIA: ICD-10-CM

## 2022-03-30 DIAGNOSIS — I10 HYPERTENSION, UNSPECIFIED TYPE: ICD-10-CM

## 2022-03-30 DIAGNOSIS — E78.00 PURE HYPERCHOLESTEROLEMIA: Primary | ICD-10-CM

## 2022-03-30 DIAGNOSIS — N32.81 OVERACTIVE BLADDER: ICD-10-CM

## 2022-03-30 DIAGNOSIS — K51.919 ULCERATIVE COLITIS WITH COMPLICATION, UNSPECIFIED LOCATION (HCC): ICD-10-CM

## 2022-03-30 DIAGNOSIS — E11.40 TYPE 2 DIABETES MELLITUS WITH CHRONIC PAINFUL DIABETIC NEUROPATHY (HCC): ICD-10-CM

## 2022-03-30 PROCEDURE — 3008F BODY MASS INDEX DOCD: CPT | Performed by: FAMILY MEDICINE

## 2022-03-30 PROCEDURE — 99214 OFFICE O/P EST MOD 30 MIN: CPT | Performed by: FAMILY MEDICINE

## 2022-03-30 RX ORDER — ATORVASTATIN CALCIUM 40 MG/1
40 TABLET, FILM COATED ORAL DAILY
Qty: 90 TABLET | Refills: 2 | Status: SHIPPED | OUTPATIENT
Start: 2022-03-30

## 2022-03-30 RX ORDER — INSULIN GLARGINE 100 [IU]/ML
30 INJECTION, SOLUTION SUBCUTANEOUS 2 TIMES DAILY
COMMUNITY
Start: 2022-03-21 | End: 2022-03-30

## 2022-03-30 RX ORDER — PENICILLIN V POTASSIUM 500 MG/1
TABLET ORAL
COMMUNITY
Start: 2022-03-30 | End: 2022-08-04

## 2022-03-30 RX ORDER — TOLTERODINE 4 MG/1
4 CAPSULE, EXTENDED RELEASE ORAL DAILY
Qty: 90 CAPSULE | Refills: 2 | Status: SHIPPED | OUTPATIENT
Start: 2022-03-30

## 2022-03-30 RX ORDER — SULFASALAZINE 500 MG/1
500 TABLET, DELAYED RELEASE ORAL 4 TIMES DAILY
Qty: 120 TABLET | Refills: 0 | Status: SHIPPED | OUTPATIENT
Start: 2022-03-30 | End: 2022-08-09

## 2022-03-30 NOTE — PROGRESS NOTES
Mendel Larsen 1950 female MRN: [id number]    FAMILY PRACTICE OFFICE VISIT  [facility] - [address]      ASSESSMENT/PLAN  Mendle Larsen is a 73 y o  female presents to the office for    Diagnoses and all orders for this visit:    Pure hypercholesterolemia  -     atorvastatin (LIPITOR) 40 mg tablet; Take 1 tablet (40 mg total) by mouth daily    Hyperkalemia  -     Basic metabolic panel; Future    Hypertension, unspecified type    Type 2 diabetes mellitus with chronic painful diabetic neuropathy (HCC)    Overactive bladder  -     tolterodine (DETROL LA) 4 mg 24 hr capsule; Take 1 capsule (4 mg total) by mouth daily    Ulcerative colitis with complication, unspecified location (HCC)  -     sulfaSALAzine (AZULFIDINE-EN) 500 mg EC tablet; Take 1 tablet (500 mg total) by mouth 4 (four) times a day    Other orders  -     Discontinue: Lantus SoloStar 100 units/mL injection pen; Inject 30 Units under the skin 2 (two) times a day (Patient not taking: Reported on 3/30/2022 )  -     penicillin V potassium (VEETID) 500 mg tablet     will send for repeat BMP given that the patient was just recently taken office prolactin  I did advise her that this was not on her medication list   We have done extensive medication review and have added medications that she did not demonstrate her last appointments  Ulcerative colitis gave her 1 month supply until she is able to be seen by her specialist   Refilled her overactive bladder that has been controlled on the medication  Type 2 diabetes improving reviewed numbers for the patient  Hypertension currently slightly elevated  Would recommend her to see the cardiologist for direction given that he discontinued her spironolactone with no other additional meds             Future Appointments   Date Time Provider Martina Raymundo   6/13/2022  9:20 AM MD [facility]          SUBJECTIVE  CC: Follow-up (diabetes see BS log)      HPI:  Henri Rosa is a 73 y o  female who presents for a follow-up appointment  Patient just recently went to Cardiology who discontinued office prolactin  Patient did not have this medication on her list   Patient would like a refill of her overactive bladder  Was advised that is prolactin was the cause of her potassium elevation  Patient would like a refill on all her medications  States that her blood sugars have improved since she has been modifying her diet  Blood pressure was slightly elevated today  States that her cardiologist will be monitoring this  Would like a refill of her ulcerative colitis medication if possible  Hyperlipidemia taking her medications as prescribed  Review of Systems   Constitutional: Negative for activity change, appetite change, chills, fatigue and fever  HENT: Negative for congestion  Respiratory: Negative for cough, chest tightness and shortness of breath  Cardiovascular: Negative for chest pain and leg swelling  Gastrointestinal: Negative for abdominal distention, abdominal pain, constipation, diarrhea, nausea and vomiting  All other systems reviewed and are negative        Historical Information   The patient history was reviewed as follows:  Past Medical History:   Diagnosis Date    [facility]     Neuropathy          Medications:     Current Outpatient Medications:     aspirin (ECOTRIN LOW STRENGTH) 81 mg EC tablet, Take 81 mg by mouth daily, Disp: , Rfl:     atorvastatin (LIPITOR) 40 mg tablet, Take 1 tablet (40 mg total) by mouth daily, Disp: 90 tablet, Rfl: 2    calcium carbonate (OS-JAKOB) 600 MG tablet, Take 600 mg by mouth daily, Disp: , Rfl:     carvedilol (COREG) 25 mg tablet, Take 25 mg by mouth 2 (two) times a day, Disp: , Rfl:     cloNIDine (CATAPRES) 0 1 mg tablet, Take 1 tablet (0 1 mg total) by mouth 2 (two) times a day, Disp: 180 tablet, Rfl: 2    clopidogrel (PLAVIX) 75 mg tablet, Take 75 mg by mouth daily, Disp: , Rfl:     Empagliflozin 10 MG TABS, Take 10 mg by mouth daily, Disp: , Rfl:     glimepiride (AMARYL) 2 mg tablet, Take 2 mg by mouth daily, Disp: , Rfl:     insulin glargine (LANTUS) 100 units/mL subcutaneous injection, Inject 33 Units under the skin 2 (two) times a day , Disp: , Rfl:     insulin lispro (HumaLOG KwikPen) 100 units/mL injection pen, Please check you Blood Sugars prior to meals and follow instructions below 150-200 1 unit,201-250 2 units;251-300 3 units;301-350 4 units;351-400 5 units;451-450 6 units;500+ 7 units, Disp: 15 mL, Rfl: 11    Insulin Pen Needle (UltiCare Mini Pen Needles) 31G X 6 MM MISC, Use 4 (four) times a day, Disp: 200 each, Rfl: 2    Janumet  MG per tablet, Take 50-1,000 tablets by mouth 2 (two) times a day, Disp: , Rfl:     penicillin V potassium (VEETID) 500 mg tablet, , Disp: , Rfl:     pregabalin (LYRICA) 75 mg capsule, Take 1 capsule by mouth twice daily, Disp: 60 capsule, Rfl: 5    tolterodine (DETROL LA) 4 mg 24 hr capsule, Take 1 capsule (4 mg total) by mouth daily, Disp: 90 capsule, Rfl: 2    valsartan (DIOVAN) 320 MG tablet, Take 1 tablet (320 mg total) by mouth daily, Disp: 90 tablet, Rfl: 1    sulfaSALAzine (AZULFIDINE-EN) 500 mg EC tablet, Take 1 tablet (500 mg total) by mouth 4 (four) times a day, Disp: 120 tablet, Rfl: 0    Allergies   Allergen Reactions    Other Rash       OBJECTIVE  Vitals:   Vitals:    03/30/22 1141   BP: 150/70   BP Location: Left arm   Patient Position: Sitting   Cuff Size: Adult   Pulse: 70   Resp: 16   Temp: 97 8 °F (36 6 °C)   TempSrc: Temporal   Weight: 67 6 kg (149 lb)   Height: 5' 1" (1 549 m)         Physical Exam  Vitals reviewed  Constitutional:       Appearance: She is well-developed  HENT:      Head: Normocephalic and atraumatic  Eyes:      Conjunctiva/sclera: Conjunctivae normal       Pupils: Pupils are equal, round, and reactive to light     Cardiovascular:      Rate and Rhythm: Normal rate and regular rhythm  Heart sounds: Normal heart sounds  Pulmonary:      Effort: Pulmonary effort is normal  No respiratory distress  Breath sounds: Normal breath sounds  Musculoskeletal:         General: Normal range of motion  Cervical back: Normal range of motion and neck supple  Skin:     General: Skin is warm  Capillary Refill: Capillary refill takes less than 2 seconds  Neurological:      Mental Status: She is alert and oriented to person, place, and time                      Meredith Salguero MD,   [facility]  3/30/2022

## 2022-03-30 NOTE — PATIENT INSTRUCTIONS
10% - bad control"> 10% - bad control,Hemoglobin A1c (HbA1c) greater than 10% indicating poor diabetic control,Haemoglobin A1c greater than 10% indicating poor diabetic control">   Diabetes Mellitus Type 2 in Adults, Ambulatory Care   GENERAL INFORMATION:   Diabetes mellitus type 2  is a disease that affects how your body uses glucose (sugar)  Insulin helps move sugar out of the blood so it can be used for energy  Normally, when the blood sugar level increases, the pancreas makes more insulin  Type 2 diabetes develops because either the body cannot make enough insulin, or it cannot use the insulin correctly  After many years, your pancreas may stop making insulin  Common symptoms include the following:   · More hunger or thirst than usual     · Frequent urination     · Weight loss without trying     · Blurred vision  Seek immediate care for the following symptoms:   · Severe abdominal pain, or pain that spreads to your back  You may also be vomiting  · Trouble staying awake or focusing    · Shaking or sweating    · Blurred or double vision    · Breath has a fruity, sweet smell    · Breathing is deep and labored, or rapid and shallow    · Heartbeat is fast and weak  Treatment for diabetes mellitus type 2  includes keeping your blood sugar at a normal level  You must eat the right foods, and exercise regularly  You may also need medicine if you cannot control your blood sugar level with nutrition and exercise  Manage diabetes mellitus type 2:   · Check your blood sugar level  You will be taught how to check a small drop of blood in a glucose monitor  Ask your healthcare provider when and how often to check during the day  Ask your healthcare provider what your blood sugar levels should be when you check them  · Keep track of carbohydrates (sugar and starchy foods)  Your blood sugar level can get too high if you eat too many carbohydrates   Your dietitian will help you plan meals and snacks that have the right amount of carbohydrates  · Eat low-fat foods  Some examples are skinless chicken and low-fat milk  · Eat less sodium (salt)  Some examples of high-sodium foods to limit are soy sauce, potato chips, and soup  Do not add salt to food you cook  Limit your use of table salt  · Eat high-fiber foods  Foods that are a good source of fiber include vegetables, whole grain bread, and beans  · Limit alcohol  Alcohol affects your blood sugar level and can make it harder to manage your diabetes  Women should limit alcohol to 1 drink a day  Men should limit alcohol to 2 drinks a day  A drink of alcohol is 12 ounces of beer, 5 ounces of wine, or 1½ ounces of liquor  · Get regular exercise  Exercise can help keep your blood sugar level steady, decrease your risk of heart disease, and help you lose weight  Exercise for at least 30 minutes, 5 days a week  Include muscle strengthening activities 2 days each week  Work with your healthcare provider to create an exercise plan  · Check your feet each day  for injuries or open sores  Ask your healthcare provider for activities you can do if you have an open sore  · Quit smoking  If you smoke, it is never too late to quit  Smoking can worsen the problems that may occur with diabetes  Ask your healthcare provider for information about how to stop smoking if you are having trouble quitting  · Ask about your weight:  Ask healthcare providers if you need to lose weight, and how much to lose  Ask them to help you with a weight loss program  Even a 10 to 15 pound weight loss can help you manage your blood sugar level  · Carry medical alert identification  Wear medical alert jewelry or carry a card that says you have diabetes  Ask your healthcare provider where to get these items  · Ask about vaccines  Diabetes puts you at risk of serious illness if you get the flu, pneumonia, or hepatitis   Ask your healthcare provider if you should get a flu, pneumonia, or hepatitis B vaccine, and when to get the vaccine  Follow up with your healthcare provider as directed:  Write down your questions so you remember to ask them during your visits  CARE AGREEMENT:   You have the right to help plan your care  Learn about your health condition and how it may be treated  Discuss treatment options with your caregivers to decide what care you want to receive  You always have the right to refuse treatment  The above information is an  only  It is not intended as medical advice for individual conditions or treatments  Talk to your doctor, nurse or pharmacist before following any medical regimen to see if it is safe and effective for you  © 2014 [address] is for End User's use only and may not be sold, redistributed or otherwise used for commercial purposes  All illustrations and images included in CareNotes® are the copyrighted property of A D A M , Inc  or Adria Lanier

## 2022-04-04 ENCOUNTER — APPOINTMENT (OUTPATIENT)
Dept: LAB | Facility: CLINIC | Age: 72
End: 2022-04-04
Payer: COMMERCIAL

## 2022-04-04 DIAGNOSIS — E11.40 TYPE 2 DIABETES MELLITUS WITH CHRONIC PAINFUL DIABETIC NEUROPATHY (HCC): ICD-10-CM

## 2022-04-04 LAB
ANION GAP SERPL CALCULATED.3IONS-SCNC: 2 MMOL/L (ref 4–13)
BUN SERPL-MCNC: 29 MG/DL (ref 5–25)
CALCIUM SERPL-MCNC: 9.7 MG/DL (ref 8.3–10.1)
CHLORIDE SERPL-SCNC: 108 MMOL/L (ref 100–108)
CO2 SERPL-SCNC: 27 MMOL/L (ref 21–32)
CREAT SERPL-MCNC: 1.59 MG/DL (ref 0.6–1.3)
GFR SERPL CREATININE-BSD FRML MDRD: 32 ML/MIN/1.73SQ M
GLUCOSE P FAST SERPL-MCNC: 121 MG/DL (ref 65–99)
POTASSIUM SERPL-SCNC: 5 MMOL/L (ref 3.5–5.3)
SODIUM SERPL-SCNC: 137 MMOL/L (ref 136–145)

## 2022-04-04 PROCEDURE — 36415 COLL VENOUS BLD VENIPUNCTURE: CPT

## 2022-04-04 PROCEDURE — 80048 BASIC METABOLIC PNL TOTAL CA: CPT

## 2022-04-08 DIAGNOSIS — E11.40 TYPE 2 DIABETES MELLITUS WITH CHRONIC PAINFUL DIABETIC NEUROPATHY (HCC): ICD-10-CM

## 2022-04-08 PROCEDURE — 4010F ACE/ARB THERAPY RXD/TAKEN: CPT | Performed by: FAMILY MEDICINE

## 2022-04-08 RX ORDER — VALSARTAN 320 MG/1
320 TABLET ORAL DAILY
Qty: 90 TABLET | Refills: 1 | Status: SHIPPED | OUTPATIENT
Start: 2022-04-08

## 2022-06-14 DIAGNOSIS — R21 RASH: ICD-10-CM

## 2022-06-14 RX ORDER — FLUCONAZOLE 150 MG/1
TABLET ORAL
Qty: 2 TABLET | Refills: 0 | OUTPATIENT
Start: 2022-06-14

## 2022-08-04 ENCOUNTER — HOSPITAL ENCOUNTER (INPATIENT)
Facility: HOSPITAL | Age: 72
LOS: 5 days | Discharge: HOME | DRG: 638 | End: 2022-08-09
Attending: EMERGENCY MEDICINE | Admitting: INTERNAL MEDICINE
Payer: COMMERCIAL

## 2022-08-04 DIAGNOSIS — K21.00 GASTROESOPHAGEAL REFLUX DISEASE WITH ESOPHAGITIS, UNSPECIFIED WHETHER HEMORRHAGE: ICD-10-CM

## 2022-08-04 DIAGNOSIS — E86.0 DEHYDRATION: ICD-10-CM

## 2022-08-04 DIAGNOSIS — Z79.4 TYPE 2 DIABETES MELLITUS WITH HYPERGLYCEMIA, WITH LONG-TERM CURRENT USE OF INSULIN (HCC): Primary | ICD-10-CM

## 2022-08-04 DIAGNOSIS — K20.90 ESOPHAGITIS: ICD-10-CM

## 2022-08-04 DIAGNOSIS — E11.9 TYPE II DIABETES MELLITUS (HCC): ICD-10-CM

## 2022-08-04 DIAGNOSIS — E11.65 TYPE 2 DIABETES MELLITUS WITH HYPERGLYCEMIA, WITH LONG-TERM CURRENT USE OF INSULIN (HCC): Primary | ICD-10-CM

## 2022-08-04 DIAGNOSIS — N39.0 UTI (URINARY TRACT INFECTION): ICD-10-CM

## 2022-08-04 DIAGNOSIS — R73.9 HYPERGLYCEMIA: ICD-10-CM

## 2022-08-04 DIAGNOSIS — N17.9 AKI (ACUTE KIDNEY INJURY) (HCC): ICD-10-CM

## 2022-08-04 DIAGNOSIS — R77.8 ELEVATED TROPONIN: ICD-10-CM

## 2022-08-04 LAB
2HR DELTA HS TROPONIN: -9 NG/L
ALBUMIN SERPL BCP-MCNC: 3.1 G/DL (ref 3.5–5)
ALP SERPL-CCNC: 128 U/L (ref 46–116)
ALT SERPL W P-5'-P-CCNC: 23 U/L (ref 12–78)
ANION GAP SERPL CALCULATED.3IONS-SCNC: 14 MMOL/L (ref 4–13)
AST SERPL W P-5'-P-CCNC: 32 U/L (ref 5–45)
BACTERIA UR QL AUTO: ABNORMAL /HPF
BASE EX.OXY STD BLDV CALC-SCNC: 88.1 % (ref 60–80)
BASE EXCESS BLDV CALC-SCNC: -0.7 MMOL/L
BASOPHILS # BLD AUTO: 0.03 THOUSANDS/ΜL (ref 0–0.1)
BASOPHILS NFR BLD AUTO: 0 % (ref 0–1)
BETA-HYDROXYBUTYRATE: 1.4 MMOL/L
BILIRUB SERPL-MCNC: 0.72 MG/DL (ref 0.2–1)
BILIRUB UR QL STRIP: NEGATIVE
BUN SERPL-MCNC: 77 MG/DL (ref 5–25)
CALCIUM ALBUM COR SERPL-MCNC: 9.8 MG/DL (ref 8.3–10.1)
CALCIUM SERPL-MCNC: 9.1 MG/DL (ref 8.3–10.1)
CARDIAC TROPONIN I PNL SERPL HS: 68 NG/L
CARDIAC TROPONIN I PNL SERPL HS: 77 NG/L
CHLORIDE SERPL-SCNC: 87 MMOL/L (ref 96–108)
CLARITY UR: ABNORMAL
CO2 SERPL-SCNC: 25 MMOL/L (ref 21–32)
COLOR UR: ABNORMAL
CREAT SERPL-MCNC: 2.03 MG/DL (ref 0.6–1.3)
EOSINOPHIL # BLD AUTO: 0.05 THOUSAND/ΜL (ref 0–0.61)
EOSINOPHIL NFR BLD AUTO: 0 % (ref 0–6)
ERYTHROCYTE [DISTWIDTH] IN BLOOD BY AUTOMATED COUNT: 12.2 % (ref 11.6–15.1)
GFR SERPL CREATININE-BSD FRML MDRD: 24 ML/MIN/1.73SQ M
GLUCOSE SERPL-MCNC: 395 MG/DL (ref 65–140)
GLUCOSE SERPL-MCNC: 455 MG/DL (ref 65–140)
GLUCOSE SERPL-MCNC: 514 MG/DL (ref 65–140)
GLUCOSE UR STRIP-MCNC: ABNORMAL MG/DL
HCO3 BLDV-SCNC: 23.9 MMOL/L (ref 24–30)
HCT VFR BLD AUTO: 49.9 % (ref 34.8–46.1)
HGB BLD-MCNC: 17.1 G/DL (ref 11.5–15.4)
HGB UR QL STRIP.AUTO: ABNORMAL
IMM GRANULOCYTES # BLD AUTO: 0.05 THOUSAND/UL (ref 0–0.2)
IMM GRANULOCYTES NFR BLD AUTO: 0 % (ref 0–2)
KETONES UR STRIP-MCNC: ABNORMAL MG/DL
LEUKOCYTE ESTERASE UR QL STRIP: ABNORMAL
LIPASE SERPL-CCNC: 205 U/L (ref 73–393)
LYMPHOCYTES # BLD AUTO: 2.1 THOUSANDS/ΜL (ref 0.6–4.47)
LYMPHOCYTES NFR BLD AUTO: 17 % (ref 14–44)
MCH RBC QN AUTO: 28.3 PG (ref 26.8–34.3)
MCHC RBC AUTO-ENTMCNC: 34.3 G/DL (ref 31.4–37.4)
MCV RBC AUTO: 83 FL (ref 82–98)
MONOCYTES # BLD AUTO: 1.24 THOUSAND/ΜL (ref 0.17–1.22)
MONOCYTES NFR BLD AUTO: 10 % (ref 4–12)
NEUTROPHILS # BLD AUTO: 9.21 THOUSANDS/ΜL (ref 1.85–7.62)
NEUTS SEG NFR BLD AUTO: 73 % (ref 43–75)
NITRITE UR QL STRIP: NEGATIVE
NON-SQ EPI CELLS URNS QL MICRO: ABNORMAL /HPF
NRBC BLD AUTO-RTO: 0 /100 WBCS
O2 CT BLDV-SCNC: 21.7 ML/DL
OTHER STN SPEC: ABNORMAL
PCO2 BLDV: 39.6 MM HG (ref 42–50)
PH BLDV: 7.4 [PH] (ref 7.3–7.4)
PH UR STRIP.AUTO: 6 [PH]
PLATELET # BLD AUTO: 229 THOUSANDS/UL (ref 149–390)
PMV BLD AUTO: 10.2 FL (ref 8.9–12.7)
PO2 BLDV: 55.5 MM HG (ref 35–45)
POTASSIUM SERPL-SCNC: 4 MMOL/L (ref 3.5–5.3)
PROT SERPL-MCNC: 7.3 G/DL (ref 6.4–8.4)
PROT UR STRIP-MCNC: ABNORMAL MG/DL
RBC # BLD AUTO: 6.04 MILLION/UL (ref 3.81–5.12)
RBC #/AREA URNS AUTO: ABNORMAL /HPF
SARS-COV-2 RNA RESP QL NAA+PROBE: NEGATIVE
SODIUM SERPL-SCNC: 126 MMOL/L (ref 135–147)
SP GR UR STRIP.AUTO: 1.01 (ref 1–1.03)
UROBILINOGEN UR QL STRIP.AUTO: 0.2 E.U./DL
WBC # BLD AUTO: 12.68 THOUSAND/UL (ref 4.31–10.16)
WBC #/AREA URNS AUTO: ABNORMAL /HPF

## 2022-08-04 PROCEDURE — 87040 BLOOD CULTURE FOR BACTERIA: CPT | Performed by: EMERGENCY MEDICINE

## 2022-08-04 PROCEDURE — 36415 COLL VENOUS BLD VENIPUNCTURE: CPT

## 2022-08-04 PROCEDURE — U0005 INFEC AGEN DETEC AMPLI PROBE: HCPCS | Performed by: EMERGENCY MEDICINE

## 2022-08-04 PROCEDURE — 87340 HEPATITIS B SURFACE AG IA: CPT | Performed by: EMERGENCY MEDICINE

## 2022-08-04 PROCEDURE — U0003 INFECTIOUS AGENT DETECTION BY NUCLEIC ACID (DNA OR RNA); SEVERE ACUTE RESPIRATORY SYNDROME CORONAVIRUS 2 (SARS-COV-2) (CORONAVIRUS DISEASE [COVID-19]), AMPLIFIED PROBE TECHNIQUE, MAKING USE OF HIGH THROUGHPUT TECHNOLOGIES AS DESCRIBED BY CMS-2020-01-R: HCPCS | Performed by: EMERGENCY MEDICINE

## 2022-08-04 PROCEDURE — 96365 THER/PROPH/DIAG IV INF INIT: CPT

## 2022-08-04 PROCEDURE — 81001 URINALYSIS AUTO W/SCOPE: CPT | Performed by: EMERGENCY MEDICINE

## 2022-08-04 PROCEDURE — 82010 KETONE BODYS QUAN: CPT

## 2022-08-04 PROCEDURE — 87086 URINE CULTURE/COLONY COUNT: CPT | Performed by: EMERGENCY MEDICINE

## 2022-08-04 PROCEDURE — 80053 COMPREHEN METABOLIC PANEL: CPT

## 2022-08-04 PROCEDURE — 85025 COMPLETE CBC W/AUTO DIFF WBC: CPT

## 2022-08-04 PROCEDURE — 93005 ELECTROCARDIOGRAM TRACING: CPT

## 2022-08-04 PROCEDURE — 86803 HEPATITIS C AB TEST: CPT | Performed by: EMERGENCY MEDICINE

## 2022-08-04 PROCEDURE — 99285 EMERGENCY DEPT VISIT HI MDM: CPT | Performed by: EMERGENCY MEDICINE

## 2022-08-04 PROCEDURE — 82805 BLOOD GASES W/O2 SATURATION: CPT | Performed by: EMERGENCY MEDICINE

## 2022-08-04 PROCEDURE — 82948 REAGENT STRIP/BLOOD GLUCOSE: CPT

## 2022-08-04 PROCEDURE — 84484 ASSAY OF TROPONIN QUANT: CPT | Performed by: EMERGENCY MEDICINE

## 2022-08-04 PROCEDURE — 83690 ASSAY OF LIPASE: CPT

## 2022-08-04 PROCEDURE — 87154 CUL TYP ID BLD PTHGN 6+ TRGT: CPT | Performed by: EMERGENCY MEDICINE

## 2022-08-04 PROCEDURE — 87806 HIV AG W/HIV1&2 ANTB W/OPTIC: CPT | Performed by: EMERGENCY MEDICINE

## 2022-08-04 PROCEDURE — 99284 EMERGENCY DEPT VISIT MOD MDM: CPT

## 2022-08-04 PROCEDURE — 96361 HYDRATE IV INFUSION ADD-ON: CPT

## 2022-08-04 PROCEDURE — 99223 1ST HOSP IP/OBS HIGH 75: CPT

## 2022-08-04 RX ORDER — SODIUM CHLORIDE 9 MG/ML
1000 INJECTION, SOLUTION INTRAVENOUS ONCE
Status: COMPLETED | OUTPATIENT
Start: 2022-08-04 | End: 2022-08-04

## 2022-08-04 RX ORDER — SODIUM CHLORIDE 9 MG/ML
200 INJECTION, SOLUTION INTRAVENOUS CONTINUOUS
Status: DISCONTINUED | OUTPATIENT
Start: 2022-08-04 | End: 2022-08-05

## 2022-08-04 RX ORDER — CALCIUM CARBONATE 200(500)MG
500 TABLET,CHEWABLE ORAL 2 TIMES DAILY PRN
Status: DISCONTINUED | OUTPATIENT
Start: 2022-08-04 | End: 2022-08-06

## 2022-08-04 RX ORDER — CARVEDILOL 12.5 MG/1
25 TABLET ORAL 2 TIMES DAILY WITH MEALS
Status: DISCONTINUED | OUTPATIENT
Start: 2022-08-05 | End: 2022-08-04

## 2022-08-04 RX ORDER — CEFTRIAXONE 1 G/50ML
1000 INJECTION, SOLUTION INTRAVENOUS ONCE
Status: COMPLETED | OUTPATIENT
Start: 2022-08-04 | End: 2022-08-04

## 2022-08-04 RX ORDER — CARVEDILOL 25 MG/1
25 TABLET ORAL 2 TIMES DAILY WITH MEALS
Status: DISCONTINUED | OUTPATIENT
Start: 2022-08-04 | End: 2022-08-05

## 2022-08-04 RX ADMIN — CARVEDILOL 25 MG: 12.5 TABLET, FILM COATED ORAL at 23:24

## 2022-08-04 RX ADMIN — SODIUM CHLORIDE 1000 ML/HR: 0.9 INJECTION, SOLUTION INTRAVENOUS at 20:09

## 2022-08-04 RX ADMIN — CEFTRIAXONE 1000 MG: 1 INJECTION, SOLUTION INTRAVENOUS at 23:08

## 2022-08-04 RX ADMIN — ANTACID TABLETS 500 MG: 500 TABLET, CHEWABLE ORAL at 23:24

## 2022-08-04 RX ADMIN — SODIUM CHLORIDE 200 ML/HR: 0.9 INJECTION, SOLUTION INTRAVENOUS at 22:38

## 2022-08-04 RX ADMIN — SODIUM CHLORIDE 10 UNITS/HR: 9 INJECTION, SOLUTION INTRAVENOUS at 21:43

## 2022-08-04 NOTE — LETTER
[address] [facility] [address]  [place]  [place] [address]  Dept: [phone]    August 9, 2022     Patient: Shannon Lay   YOB: 1950   Date of Visit: 8/4/2022       To Whom it May Concern:    Shannon Lay was admitted to the hospital from 8/4/2022 to 08/09/22  If you have any questions or concerns, please don't hesitate to call           Sincerely,          Yong Stinson MD

## 2022-08-05 PROBLEM — Z79.4 TYPE 2 DIABETES MELLITUS WITH HYPERGLYCEMIA, WITH LONG-TERM CURRENT USE OF INSULIN (HCC): Status: ACTIVE | Noted: 2022-02-28

## 2022-08-05 PROBLEM — R77.8 ELEVATED TROPONIN: Status: ACTIVE | Noted: 2022-08-05

## 2022-08-05 PROBLEM — R79.89 ELEVATED TROPONIN: Status: ACTIVE | Noted: 2022-08-05

## 2022-08-05 PROBLEM — N17.9 AKI (ACUTE KIDNEY INJURY) (HCC): Status: ACTIVE | Noted: 2022-08-05

## 2022-08-05 PROBLEM — E11.65 TYPE 2 DIABETES MELLITUS WITH HYPERGLYCEMIA, WITH LONG-TERM CURRENT USE OF INSULIN (HCC): Status: ACTIVE | Noted: 2022-02-28

## 2022-08-05 PROBLEM — N39.0 UTI (URINARY TRACT INFECTION): Status: ACTIVE | Noted: 2022-08-05

## 2022-08-05 PROBLEM — E87.6 HYPOKALEMIA: Status: ACTIVE | Noted: 2022-08-05

## 2022-08-05 LAB
4HR DELTA HS TROPONIN: -12 NG/L
ANION GAP SERPL CALCULATED.3IONS-SCNC: 6 MMOL/L (ref 4–13)
ANION GAP SERPL CALCULATED.3IONS-SCNC: 7 MMOL/L (ref 4–13)
ANION GAP SERPL CALCULATED.3IONS-SCNC: 9 MMOL/L (ref 4–13)
BUN SERPL-MCNC: 56 MG/DL (ref 5–25)
BUN SERPL-MCNC: 62 MG/DL (ref 5–25)
BUN SERPL-MCNC: 69 MG/DL (ref 5–25)
CALCIUM SERPL-MCNC: 7.8 MG/DL (ref 8.3–10.1)
CALCIUM SERPL-MCNC: 7.9 MG/DL (ref 8.3–10.1)
CALCIUM SERPL-MCNC: 8.2 MG/DL (ref 8.3–10.1)
CARDIAC TROPONIN I PNL SERPL HS: 65 NG/L
CHLORIDE SERPL-SCNC: 100 MMOL/L (ref 96–108)
CHLORIDE SERPL-SCNC: 102 MMOL/L (ref 96–108)
CHLORIDE SERPL-SCNC: 97 MMOL/L (ref 96–108)
CO2 SERPL-SCNC: 24 MMOL/L (ref 21–32)
CO2 SERPL-SCNC: 25 MMOL/L (ref 21–32)
CO2 SERPL-SCNC: 27 MMOL/L (ref 21–32)
CREAT SERPL-MCNC: 1.44 MG/DL (ref 0.6–1.3)
CREAT SERPL-MCNC: 1.44 MG/DL (ref 0.6–1.3)
CREAT SERPL-MCNC: 1.56 MG/DL (ref 0.6–1.3)
ERYTHROCYTE [DISTWIDTH] IN BLOOD BY AUTOMATED COUNT: 12.2 % (ref 11.6–15.1)
EST. AVERAGE GLUCOSE BLD GHB EST-MCNC: 341 MG/DL
GFR SERPL CREATININE-BSD FRML MDRD: 33 ML/MIN/1.73SQ M
GFR SERPL CREATININE-BSD FRML MDRD: 36 ML/MIN/1.73SQ M
GFR SERPL CREATININE-BSD FRML MDRD: 36 ML/MIN/1.73SQ M
GLUCOSE SERPL-MCNC: 138 MG/DL (ref 65–140)
GLUCOSE SERPL-MCNC: 139 MG/DL (ref 65–140)
GLUCOSE SERPL-MCNC: 143 MG/DL (ref 65–140)
GLUCOSE SERPL-MCNC: 144 MG/DL (ref 65–140)
GLUCOSE SERPL-MCNC: 166 MG/DL (ref 65–140)
GLUCOSE SERPL-MCNC: 183 MG/DL (ref 65–140)
GLUCOSE SERPL-MCNC: 207 MG/DL (ref 65–140)
GLUCOSE SERPL-MCNC: 227 MG/DL (ref 65–140)
GLUCOSE SERPL-MCNC: 232 MG/DL (ref 65–140)
GLUCOSE SERPL-MCNC: 252 MG/DL (ref 65–140)
GLUCOSE SERPL-MCNC: 253 MG/DL (ref 65–140)
GLUCOSE SERPL-MCNC: 350 MG/DL (ref 65–140)
HBA1C MFR BLD: 13.5 %
HBV SURFACE AG SER QL: NORMAL
HCT VFR BLD AUTO: 40.4 % (ref 34.8–46.1)
HCV AB SER QL: NORMAL
HGB BLD-MCNC: 13.7 G/DL (ref 11.5–15.4)
MAGNESIUM SERPL-MCNC: 2.4 MG/DL (ref 1.6–2.6)
MCH RBC QN AUTO: 28.6 PG (ref 26.8–34.3)
MCHC RBC AUTO-ENTMCNC: 34.2 G/DL (ref 31.4–37.4)
MCV RBC AUTO: 84 FL (ref 82–98)
PLATELET # BLD AUTO: 145 THOUSANDS/UL (ref 149–390)
PMV BLD AUTO: 10.2 FL (ref 8.9–12.7)
POTASSIUM SERPL-SCNC: 3.4 MMOL/L (ref 3.5–5.3)
POTASSIUM SERPL-SCNC: 3.5 MMOL/L (ref 3.5–5.3)
POTASSIUM SERPL-SCNC: 4.3 MMOL/L (ref 3.5–5.3)
RBC # BLD AUTO: 4.83 MILLION/UL (ref 3.81–5.12)
SODIUM SERPL-SCNC: 130 MMOL/L (ref 135–147)
SODIUM SERPL-SCNC: 133 MMOL/L (ref 135–147)
SODIUM SERPL-SCNC: 134 MMOL/L (ref 135–147)
WBC # BLD AUTO: 10.04 THOUSAND/UL (ref 4.31–10.16)

## 2022-08-05 PROCEDURE — 80048 BASIC METABOLIC PNL TOTAL CA: CPT

## 2022-08-05 PROCEDURE — 83036 HEMOGLOBIN GLYCOSYLATED A1C: CPT

## 2022-08-05 PROCEDURE — 83735 ASSAY OF MAGNESIUM: CPT | Performed by: INTERNAL MEDICINE

## 2022-08-05 PROCEDURE — 85025 COMPLETE CBC W/AUTO DIFF WBC: CPT

## 2022-08-05 PROCEDURE — 99223 1ST HOSP IP/OBS HIGH 75: CPT | Performed by: INTERNAL MEDICINE

## 2022-08-05 PROCEDURE — 82948 REAGENT STRIP/BLOOD GLUCOSE: CPT

## 2022-08-05 PROCEDURE — 80048 BASIC METABOLIC PNL TOTAL CA: CPT | Performed by: INTERNAL MEDICINE

## 2022-08-05 PROCEDURE — 87505 NFCT AGENT DETECTION GI: CPT | Performed by: INTERNAL MEDICINE

## 2022-08-05 PROCEDURE — 94664 DEMO&/EVAL PT USE INHALER: CPT

## 2022-08-05 PROCEDURE — 36415 COLL VENOUS BLD VENIPUNCTURE: CPT | Performed by: EMERGENCY MEDICINE

## 2022-08-05 PROCEDURE — 84484 ASSAY OF TROPONIN QUANT: CPT | Performed by: EMERGENCY MEDICINE

## 2022-08-05 PROCEDURE — 99232 SBSQ HOSP IP/OBS MODERATE 35: CPT | Performed by: INTERNAL MEDICINE

## 2022-08-05 RX ORDER — ACETAMINOPHEN 325 MG/1
650 TABLET ORAL EVERY 6 HOURS PRN
Status: DISCONTINUED | OUTPATIENT
Start: 2022-08-05 | End: 2022-08-09 | Stop reason: HOSPADM

## 2022-08-05 RX ORDER — ATORVASTATIN CALCIUM 40 MG/1
40 TABLET, FILM COATED ORAL DAILY
Status: DISCONTINUED | OUTPATIENT
Start: 2022-08-05 | End: 2022-08-09 | Stop reason: HOSPADM

## 2022-08-05 RX ORDER — SODIUM CHLORIDE 9 MG/ML
100 INJECTION, SOLUTION INTRAVENOUS CONTINUOUS
Status: DISCONTINUED | OUTPATIENT
Start: 2022-08-05 | End: 2022-08-06

## 2022-08-05 RX ORDER — INSULIN GLARGINE 100 [IU]/ML
30 INJECTION, SOLUTION SUBCUTANEOUS EVERY 12 HOURS SCHEDULED
Status: DISCONTINUED | OUTPATIENT
Start: 2022-08-05 | End: 2022-08-07

## 2022-08-05 RX ORDER — CARVEDILOL 25 MG/1
25 TABLET ORAL 2 TIMES DAILY
Status: DISCONTINUED | OUTPATIENT
Start: 2022-08-05 | End: 2022-08-09 | Stop reason: HOSPADM

## 2022-08-05 RX ORDER — CEFTRIAXONE 1 G/50ML
1000 INJECTION, SOLUTION INTRAVENOUS EVERY 24 HOURS
Status: DISCONTINUED | OUTPATIENT
Start: 2022-08-05 | End: 2022-08-09

## 2022-08-05 RX ORDER — INSULIN LISPRO 100 [IU]/ML
2-12 INJECTION, SOLUTION INTRAVENOUS; SUBCUTANEOUS
Status: DISCONTINUED | OUTPATIENT
Start: 2022-08-05 | End: 2022-08-08

## 2022-08-05 RX ORDER — OXYBUTYNIN CHLORIDE 5 MG/1
10 TABLET, EXTENDED RELEASE ORAL DAILY
Status: DISCONTINUED | OUTPATIENT
Start: 2022-08-05 | End: 2022-08-09 | Stop reason: HOSPADM

## 2022-08-05 RX ORDER — HEPARIN SODIUM 5000 [USP'U]/ML
5000 INJECTION, SOLUTION INTRAVENOUS; SUBCUTANEOUS EVERY 8 HOURS SCHEDULED
Status: DISCONTINUED | OUTPATIENT
Start: 2022-08-05 | End: 2022-08-09 | Stop reason: HOSPADM

## 2022-08-05 RX ORDER — PREGABALIN 75 MG/1
75 CAPSULE ORAL 2 TIMES DAILY
Status: DISCONTINUED | OUTPATIENT
Start: 2022-08-05 | End: 2022-08-09 | Stop reason: HOSPADM

## 2022-08-05 RX ORDER — ONDANSETRON 2 MG/ML
4 INJECTION INTRAMUSCULAR; INTRAVENOUS EVERY 6 HOURS PRN
Status: DISCONTINUED | OUTPATIENT
Start: 2022-08-05 | End: 2022-08-09 | Stop reason: HOSPADM

## 2022-08-05 RX ORDER — CLONIDINE HYDROCHLORIDE 0.1 MG/1
0.1 TABLET ORAL 2 TIMES DAILY
Status: DISCONTINUED | OUTPATIENT
Start: 2022-08-05 | End: 2022-08-09 | Stop reason: HOSPADM

## 2022-08-05 RX ORDER — CLOPIDOGREL BISULFATE 75 MG/1
75 TABLET ORAL DAILY
Status: DISCONTINUED | OUTPATIENT
Start: 2022-08-05 | End: 2022-08-09 | Stop reason: HOSPADM

## 2022-08-05 RX ORDER — ASPIRIN 81 MG/1
81 TABLET ORAL DAILY
Status: DISCONTINUED | OUTPATIENT
Start: 2022-08-05 | End: 2022-08-09 | Stop reason: HOSPADM

## 2022-08-05 RX ORDER — POTASSIUM CHLORIDE 20 MEQ/1
40 TABLET, EXTENDED RELEASE ORAL
Status: COMPLETED | OUTPATIENT
Start: 2022-08-05 | End: 2022-08-05

## 2022-08-05 RX ADMIN — ANTACID TABLETS 500 MG: 500 TABLET, CHEWABLE ORAL at 09:16

## 2022-08-05 RX ADMIN — HEPARIN SODIUM 5000 UNITS: 5000 INJECTION INTRAVENOUS; SUBCUTANEOUS at 21:42

## 2022-08-05 RX ADMIN — ASPIRIN 81 MG: 81 TABLET, COATED ORAL at 08:05

## 2022-08-05 RX ADMIN — PREGABALIN 75 MG: 75 CAPSULE ORAL at 17:08

## 2022-08-05 RX ADMIN — INSULIN LISPRO 4 UNITS: 100 INJECTION, SOLUTION INTRAVENOUS; SUBCUTANEOUS at 17:06

## 2022-08-05 RX ADMIN — CEFTRIAXONE 1000 MG: 1 INJECTION, SOLUTION INTRAVENOUS at 22:08

## 2022-08-05 RX ADMIN — SODIUM CHLORIDE 100 ML/HR: 0.9 INJECTION, SOLUTION INTRAVENOUS at 13:44

## 2022-08-05 RX ADMIN — CLOPIDOGREL BISULFATE 75 MG: 75 TABLET ORAL at 08:06

## 2022-08-05 RX ADMIN — CARVEDILOL 25 MG: 25 TABLET, FILM COATED ORAL at 21:41

## 2022-08-05 RX ADMIN — INSULIN GLARGINE 30 UNITS: 100 INJECTION, SOLUTION SUBCUTANEOUS at 21:47

## 2022-08-05 RX ADMIN — CLONIDINE HYDROCHLORIDE 0.1 MG: 0.1 TABLET ORAL at 21:41

## 2022-08-05 RX ADMIN — CARVEDILOL 25 MG: 25 TABLET, FILM COATED ORAL at 08:06

## 2022-08-05 RX ADMIN — SODIUM CHLORIDE 100 ML/HR: 0.9 INJECTION, SOLUTION INTRAVENOUS at 22:09

## 2022-08-05 RX ADMIN — PREGABALIN 75 MG: 75 CAPSULE ORAL at 08:07

## 2022-08-05 RX ADMIN — INSULIN GLARGINE 30 UNITS: 100 INJECTION, SOLUTION SUBCUTANEOUS at 13:42

## 2022-08-05 RX ADMIN — HEPARIN SODIUM 5000 UNITS: 5000 INJECTION INTRAVENOUS; SUBCUTANEOUS at 06:24

## 2022-08-05 RX ADMIN — POTASSIUM CHLORIDE 40 MEQ: 1500 TABLET, EXTENDED RELEASE ORAL at 09:15

## 2022-08-05 RX ADMIN — ATORVASTATIN CALCIUM 40 MG: 40 TABLET, FILM COATED ORAL at 08:05

## 2022-08-05 RX ADMIN — POTASSIUM CHLORIDE 40 MEQ: 1500 TABLET, EXTENDED RELEASE ORAL at 11:40

## 2022-08-05 RX ADMIN — HEPARIN SODIUM 5000 UNITS: 5000 INJECTION INTRAVENOUS; SUBCUTANEOUS at 13:42

## 2022-08-05 RX ADMIN — SODIUM CHLORIDE 100 ML/HR: 0.9 INJECTION, SOLUTION INTRAVENOUS at 00:45

## 2022-08-05 RX ADMIN — CLONIDINE HYDROCHLORIDE 0.1 MG: 0.1 TABLET ORAL at 08:09

## 2022-08-05 RX ADMIN — OXYBUTYNIN 10 MG: 5 TABLET, FILM COATED, EXTENDED RELEASE ORAL at 08:07

## 2022-08-05 RX ADMIN — SODIUM CHLORIDE 100 ML/HR: 0.9 INJECTION, SOLUTION INTRAVENOUS at 06:25

## 2022-08-05 NOTE — PROGRESS NOTES
[facility] Progress Note  Patient: Rain Dorado 81 y o  female   MRN: [id number]  PCP: Nicole Milian MD  Unit/Bed#: [address] [place] [address] Encounter: [id number]  Date Of Visit: 08/05/22    Problem List:    Principal Problem:    Type 2 diabetes mellitus with hyperglycemia, with long-term current use of insulin ([place] [address] )  Active Problems:    Emphysema, unspecified ([place] [address] )    Hypertension    Ulcerative colitis ([place] [address] )    Pure hypercholesterolemia    Elevated troponin    UTI (urinary tract infection)    LATASHA (acute kidney injury) ([place] [address] )    Hypokalemia      Assessment & Plan:    LATASHA (acute kidney injury) ([place] [address] )  Assessment & Plan  Creatine 2 03, baseline 1 5   Suspect due to dehydration   Start IVFs   Avoid nephrotoxins, hold valsartan   Monitor with BMP    8/5 Cr down to 1 44, continue to monitor       UTI (urinary tract infection)  Assessment & Plan  UA with moderate bacteria, trace leukocytes  · Continue ceftriaxone  · Urine culture pending    Elevated troponin  Assessment & Plan  Troponin initially elevated to 77, repeat 68  · History of multiple stents in place per patient  · EKG NSR, HR 80  · No chest pain  · Continue aspirin, carvedilol  · Monitor on telemetry  · Cardiology consult    8/5 No chest pain, monitor tele, follow cardio consult    Pure hypercholesterolemia  Assessment & Plan  Continue atorvastatin    Ulcerative colitis ([place] [address] )  Assessment & Plan  History of ulcerative colitis not on any medication currently    Hypertension  Assessment & Plan  Continue carvedilol, hold losartan due to LATASHA    Emphysema, unspecified (HCC)  Assessment & Plan  No evidence of acute exacerbation    * Type 2 diabetes mellitus with hyperglycemia, with long-term current use of insulin [facility])  Assessment & Plan  Lab Results   Component Value Date    HGBA1C 13 5 (H) 08/05/2022       Recent Labs     08/05/22  0558 08/05/22  0756 08/05/22  1003 08/05/22  1150   POCGLU 139 138 252* 232*       Blood Sugar Average: Last 72 hrs:  (P) 434 [id number]     Patient presenting with generalized weakness, fatigue, blood sugar 514  · Patient on Lantus 33 units b i d  at home, did not take today  · , AG 14, serum bicarb 25   Check HgA1c   Diabetic diet   Continue insulin drip   IVFs   Recheck BMP     AG 7, BS in the 100s, DC insulin gtt, bridge to long acting insulin patients home dose,  patient tolerating PO diet, continue monitoring closely               VTE Pharmacologic Prophylaxis:   Pharmacologic: Heparin  Mechanical VTE Prophylaxis in Place: Yes    Patient Centered Rounds: I have performed bedside rounds with nursing staff today  Discussions with Specialists or Other Care Team Provider;yes    Education and Discussions with Family / Patient: Yes    Time Spent for Care: 30 minutes  More than 50% of total time spent on counseling and coordination of care as described above  Current Length of Stay: 1 day(s)    Current Patient Status: Inpatient   Certification Statement: The patient will continue to require additional inpatient hospital stay due to hyperglycemia    Discharge Plan: Home    Code Status: Level 1 - Full Code      Subjective:   Patient seen and examined at bedside  NAD  Patient denies any abdominal pain, nausea, vomiting, diarrhe  Reports feeling improved overall  Continue to monitor sugars     Objective:     Vitals:   Temp (24hrs), Av 9 °F (36 6 °C), Min:97 3 °F (36 3 °C), Max:98 2 °F (36 8 °C)    Temp:  [97 3 °F (36 3 °C)-98 2 °F (36 8 °C)] 98 2 °F (36 8 °C)  HR:  [59-87] 61  Resp:  [15-22] 19  BP: ()/(47-77) 134/62  SpO2:  [93 %-99 %] 94 %  Body mass index is 27 21 kg/m²  Input and Output Summary (last 24 hours):        Intake/Output Summary (Last 24 hours) at 2022 1357  Last data filed at 2022 0872  Gross per 24 hour   Intake 1616 67 ml   Output 300 ml   Net 1316 67 ml       Physical Exam:   Gen- NAD  CVS - s1 , s2 RRR,   Lungs - CTA b/L  Abd - soft, nontender, nondistended +BS  Neuro  Cn 2-12intact, 5/5 stregnth, sensation intact      Additional Data:     Labs:    Results from last 7 days   Lab Units 08/05/22  0607 08/04/22 2011   WBC Thousand/uL 10 04 12 68*   HEMOGLOBIN g/dL 13 7 17 1*   HEMATOCRIT % 40 4 49 9*   PLATELETS Thousands/uL 145* 229   NEUTROS PCT %  --  73   LYMPHS PCT %  --  17   MONOS PCT %  --  10   EOS PCT %  --  0     Results from last 7 days   Lab Units 08/05/22  0607 08/05/22  0207 08/04/22 2011   POTASSIUM mmol/L 3 4*   < > 4 0   CHLORIDE mmol/L 100   < > 87*   CO2 mmol/L 27   < > 25   BUN mg/dL 62*   < > 77*   CREATININE mg/dL 1 44*   < > 2 03*   CALCIUM mg/dL 7 8*   < > 9 1   ALK PHOS U/L  --   --  128*   ALT U/L  --   --  23   AST U/L  --   --  32    < > = values in this interval not displayed  * I Have Reviewed All Lab Data Listed Above  * Additional Pertinent Lab Tests Reviewed: All Labs Within Last 24 Hours Reviewed        Recent Cultures (last 7 days):     Results from last 7 days   Lab Units 08/04/22 2054   BLOOD CULTURE  Received in Microbiology Lab  Culture in Progress  Received in Microbiology Lab  Culture in Progress         Last 24 Hours Medication List:   Current Facility-Administered Medications   Medication Dose Route Frequency Provider Last Rate    acetaminophen  650 mg Oral Q6H PRN Aris Bun, PA-C      aspirin  81 mg Oral Daily Aris Bun, PA-C      atorvastatin  40 mg Oral Daily Jackie Vecwinsome, PA-C      calcium carbonate  500 mg Oral BID PRN Aris Bun, PA-C      carvedilol  25 mg Oral BID Tongn Bun, PA-C      cefTRIAXone  1,000 mg Intravenous Q24H Aris Garduno, PA-C      cloNIDine  0 1 mg Oral BID Aris Garduno, PA-C      clopidogrel  75 mg Oral Daily Jackie Vecroxanneo, PA-C      heparin (porcine)  5,000 Units Subcutaneous Q8H [address] Jackie Vecwinsome, PA-C      insulin glargine  30 Units Subcutaneous Q12H [address] Letta Curling, MD      insulin lispro  2-12 Units Subcutaneous TID [facility] Letta Curling, MD  ondansetron  4 mg Intravenous Q6H PRN Darleene Stack, PA-C      oxybutynin  10 mg Oral Daily Darleene Stack, PA-C      pregabalin  75 mg Oral BID Darleene Stack, PA-C      sodium chloride  100 mL/hr Intravenous Continuous Darleene Stack, PA-C 100 mL/hr (08/05/22 1344)          Today, Patient Was Seen By: Danni Heath MD    ** Please Note: "This note has been constructed using a voice recognition system  Therefore there may be syntax, spelling, and/or grammatical errors   Please call if you have any questions  "**

## 2022-08-05 NOTE — ED NOTES
Pt assisted to bedside commode + void, repositioned in bed for comfort  Insulin drip infusing without difficulty        Waqar Roche RN  08/04/22 6831

## 2022-08-05 NOTE — ASSESSMENT & PLAN NOTE
Creatine 2 03, baseline 1 5   Suspect due to dehydration   Start IVFs   Avoid nephrotoxins, hold valsartan   Monitor with BMP    8/5 Cr down to 1 44, continue to monitor

## 2022-08-05 NOTE — ASSESSMENT & PLAN NOTE
Creatine 2 03, baseline 1 5   Suspect due to dehydration   Start IVFs   Avoid nephrotoxins, hold valsartan   Monitor with BMP

## 2022-08-05 NOTE — CONSULTS
Consultation - Cardiology   Haleigh Jeffery 81 y o  female MRN: [id number]  Unit/Bed#: [address]  Encounter: [id number]    Assessment/Plan     Assessment:  1  Acute kidney injury secondary to nausea, vomiting and diarrhea  2  Non MI troponin elevation secondary to #1   3  Diabetes:  Hemoglobin A1c 9 9  4  Coronary artery disease:  Previous stenting in 2019 of all 3 coronary vessels  5  Hypertension  6  History of ischemic cardiomyopathy:  Last EF 30% in 2019     Plan:  Patient has been admitted to the hospitalist service  1  Continue IV hydration per primary team with close monitoring of electrolytes    2  Continue Coreg 25 mg b i d     3  IV antibiotics per primary team    4  Continue dual anti-platelet therapy with aspirin and Plavix    5  Continue statin therapy    6  Patient is on Jardiance 10 mg at home, this is currently held as it is not on formulary  7  Patient's valsartan 320 mg was also held due to acute kidney injury  Will continue to monitor and reintroduce medications as patient's condition tolerates      History of Present Illness   Physician Requesting Consult: Joya Muse MD  Reason for Consult / Principal Problem:  Abnormal high sensitivity troponins in the setting of acute kidney injury, nausea, vomiting and uncontrolled diabetes/blood sugar      HPI: Haleigh Jeffery is a 81y o  year old female who presented to the emergency room for evaluation of persistent nausea, vomiting and diarrhea which occurred 2 days prior to admission after eating [place] food  She became alarmed because on the evening prior to admission as she got up to go the bathroom she became extremely lightheaded and lost her balance falling and hitting her nose on the wall  She was concerned that she had fractured her nose  She continued to feel weak and thought she needed to be evaluated  Patient has been admitted with acute kidney injury secondary to nausea and vomiting and dehydration      Labs in the emergency room including high sensitivity troponins were obtain  High sensitivity troponins were 77, 68 and 65  Patient's EKG demonstrates sinus rhythm without acute changes  She was admitted for IV hydration and further treatment  Patient has a history for hypertension, diabetes with hemoglobin A1c of around 9  Ulcerative colitis, coronary artery disease for which she follows with Dr Zoya Gonsalez, for [facility]  She had stenting of her LAD and left circumflex in April 2019 and stenting of her RCA in December of 2019  She is also documented in his notes to have ischemic cardiomyopathy with an EF last noted in 2019 of 30%    Patient at this time denies any chest pain, pressure, palpitations  She states her nausea, vomiting and diarrhea has resolved  Creatinine is slowly improving with IV hydration  She does note after vomiting 2 days ago she had this sensation of heartburn in the center of her chest but this has resolved  Inpatient consult to Cardiology  Consult performed by: CYNDIE Poon  Consult ordered by: Corey Donaldson PA-C          Review of Systems   Constitutional: Positive for activity change, appetite change and fatigue  Negative for fever  HENT: Negative  Negative for congestion, ear discharge, mouth sores, sinus pressure and trouble swallowing  Eyes: Negative for photophobia and visual disturbance  Respiratory: Negative  Negative for chest tightness and shortness of breath  Cardiovascular: Negative  Negative for chest pain, palpitations and leg swelling  Gastrointestinal: Positive for abdominal pain, diarrhea, nausea and vomiting  Complain of heartburn   Endocrine: Negative  Negative for polydipsia, polyphagia and polyuria  Genitourinary: Negative  Negative for difficulty urinating  Musculoskeletal: Negative  Skin: Negative  Neurological: Negative  Negative for dizziness, syncope, weakness and light-headedness  Hematological: Negative  Psychiatric/Behavioral: Negative          Historical Information   Past Medical History:   Diagnosis Date    Diabetes mellitus ([address] )     Neuropathy      Past Surgical History:   Procedure Laterality Date    ANKLE ARTHROPLASTY       Social History     Substance and Sexual Activity   Alcohol Use Not Currently     Social History     Substance and Sexual Activity   Drug Use Never     E-Cigarette/Vaping     E-Cigarette/Vaping Substances     Social History     Tobacco Use   Smoking Status Former Smoker   Smokeless Tobacco Never Used     Family History:   Family History   Problem Relation Age of Onset    Stroke Mother     Heart disease Father        Meds/Allergies   all current active meds have been reviewed, current meds:   Current Facility-Administered Medications   Medication Dose Route Frequency    acetaminophen (TYLENOL) tablet 650 mg  650 mg Oral Q6H PRN    aspirin (ECOTRIN LOW STRENGTH) EC tablet 81 mg  81 mg Oral Daily    atorvastatin (LIPITOR) tablet 40 mg  40 mg Oral Daily    calcium carbonate (TUMS) chewable tablet 500 mg  500 mg Oral BID PRN    carvedilol (COREG) tablet 25 mg  25 mg Oral BID    cefTRIAXone (ROCEPHIN) IVPB (premix in dextrose) 1,000 mg 50 mL  1,000 mg Intravenous Q24H    cloNIDine (CATAPRES) tablet 0 1 mg  0 1 mg Oral BID    clopidogrel (PLAVIX) tablet 75 mg  75 mg Oral Daily    heparin (porcine) subcutaneous injection 5,000 Units  5,000 Units Subcutaneous Q8H [address]    insulin glargine (LANTUS) subcutaneous injection 30 Units 0 3 mL  30 Units Subcutaneous Q12H [address]    insulin lispro (HumaLOG) 100 units/mL subcutaneous injection 2-12 Units  2-12 Units Subcutaneous TID AC    ondansetron (ZOFRAN) injection 4 mg  4 mg Intravenous Q6H PRN    oxybutynin (DITROPAN-XL) 24 hr tablet 10 mg  10 mg Oral Daily    pregabalin (LYRICA) capsule 75 mg  75 mg Oral BID    sodium chloride 0 9 % infusion  100 mL/hr Intravenous Continuous    and PTA meds:   Prior to Admission Medications Prescriptions Last Dose Informant Patient Reported? Taking?    Empagliflozin 10 MG TABS Past Week at Unknown time  Yes Yes   Sig: Take 10 mg by mouth daily   Insulin Pen Needle (UltiCare Mini Pen Needles) 31G X 6 MM MISC 8/4/2022 at Unknown time  No Yes   Sig: Use 4 (four) times a day   Janumet  MG per tablet Past Month at Unknown time  Yes Yes   Sig: Take 50-1,000 tablets by mouth 2 (two) times a day   aspirin (ECOTRIN LOW STRENGTH) 81 mg EC tablet 8/4/2022 at Unknown time  Yes Yes   Sig: Take 81 mg by mouth daily   atorvastatin (LIPITOR) 40 mg tablet Past Week at Unknown time  No Yes   Sig: Take 1 tablet (40 mg total) by mouth daily   calcium carbonate (OS-JAKOB) 600 MG tablet 8/3/2022 at Unknown time  Yes Yes   Sig: Take 600 mg by mouth daily   carvedilol (COREG) 25 mg tablet 8/4/2022 at Unknown time  Yes Yes   Sig: Take 25 mg by mouth 2 (two) times a day   cloNIDine (CATAPRES) 0 1 mg tablet 8/3/2022 at Unknown time  No Yes   Sig: Take 1 tablet (0 1 mg total) by mouth 2 (two) times a day   clopidogrel (PLAVIX) 75 mg tablet 8/4/2022 at Unknown time  Yes Yes   Sig: Take 75 mg by mouth daily   insulin glargine (LANTUS) 100 units/mL subcutaneous injection 8/3/2022 at Unknown time  Yes Yes   Sig: Inject 33 Units under the skin 2 (two) times a day    insulin lispro (HumaLOG KwikPen) 100 units/mL injection pen Past Week at Unknown time  No Yes   Sig: Please check you Blood Sugars prior to meals and follow instructions below 150-200 1 unit,201-250 2 units;251-300 3 units;301-350 4 units;351-400 5 units;451-450 6 units;500+ 7 units   pregabalin (LYRICA) 75 mg capsule 8/3/2022 at Unknown time  No Yes   Sig: Take 1 capsule by mouth twice daily   sulfaSALAzine (AZULFIDINE-EN) 500 mg EC tablet Not Taking at Unknown time  No No   Sig: Take 1 tablet (500 mg total) by mouth 4 (four) times a day   Patient not taking: Reported on 8/4/2022   tolterodine (DETROL LA) 4 mg 24 hr capsule 8/3/2022 at Unknown time  No Yes   Sig: Take 1 capsule (4 mg total) by mouth daily   valsartan (DIOVAN) 320 MG tablet 8/3/2022 at Unknown time  No Yes   Sig: Take 1 tablet (320 mg total) by mouth daily      Facility-Administered Medications: None     Allergies   Allergen Reactions    Other Rash       Objective   Vitals: Blood pressure 135/62, pulse 66, temperature 98 2 °F (36 8 °C), resp  rate 16, height 5' 1" (1 549 m), weight 65 3 kg (144 lb), SpO2 93 %  Orthostatic Blood Pressures    Flowsheet Row Most Recent Value   Blood Pressure 135/62 filed at 08/05/2022 2366   Patient Position - Orthostatic VS Lying filed at 08/05/2022 0309            Intake/Output Summary (Last 24 hours) at 8/5/2022 0818  Last data filed at 8/5/2022 3249  Gross per 24 hour   Intake 1616 67 ml   Output 300 ml   Net 1316 67 ml       Invasive Devices  Report    Peripheral Intravenous Line  Duration           Peripheral IV 08/04/22 Left Forearm <1 day                Physical Exam  Vitals and nursing note reviewed  Constitutional:       General: She is not in acute distress  Appearance: Normal appearance  She is obese  HENT:      Right Ear: External ear normal       Left Ear: External ear normal       Nose: Nose normal    Eyes:      General: No scleral icterus  Right eye: No discharge  Left eye: No discharge  Cardiovascular:      Rate and Rhythm: Normal rate and regular rhythm  Pulses: Normal pulses  Heart sounds: Normal heart sounds  Pulmonary:      Effort: Pulmonary effort is normal  No respiratory distress  Breath sounds: Normal breath sounds  No wheezing or rales  Abdominal:      General: Bowel sounds are normal  There is no distension  Palpations: Abdomen is soft  Musculoskeletal:      Right lower leg: No edema  Left lower leg: No edema  Skin:     General: Skin is warm and dry  Capillary Refill: Capillary refill takes less than 2 seconds  Neurological:      General: No focal deficit present        Mental Status: She is alert and oriented to person, place, and time  Mental status is at baseline  Psychiatric:         Mood and Affect: Mood normal          Lab Results:   I have personally reviewed pertinent lab results  CBC with diff:   Results from last 7 days   Lab Units 08/05/22  0607   WBC Thousand/uL 10 04   RBC Million/uL 4 83   HEMOGLOBIN g/dL 13 7   HEMATOCRIT % 40 4   MCV fL 84   MCH pg 28 6   MCHC g/dL 34 2   RDW % 12 2   MPV fL 10 2   PLATELETS Thousands/uL 145*     CMP:   Results from last 7 days   Lab Units 08/05/22  0607 08/05/22  0207 08/04/22 2011   SODIUM mmol/L 134*   < > 126*   CHLORIDE mmol/L 100   < > 87*   CO2 mmol/L 27   < > 25   BUN mg/dL 62*   < > 77*   CREATININE mg/dL 1 44*   < > 2 03*   CALCIUM mg/dL 7 8*   < > 9 1   AST U/L  --   --  32   ALT U/L  --   --  23   ALK PHOS U/L  --   --  128*   EGFR ml/min/1 73sq m 36   < > 24    < > = values in this interval not displayed  HS Troponin:   0   Lab Value Date/Time    HSTNI0 77 (H) 08/04/2022 2012    HSTNI2 68 (H) 08/04/2022 2210    HSTNI4 65 (H) 08/05/2022 0006     BNP:   Results from last 7 days   Lab Units 08/05/22  0607   POTASSIUM mmol/L 3 4*   CHLORIDE mmol/L 100   CO2 mmol/L 27   BUN mg/dL 62*   CREATININE mg/dL 1 44*   CALCIUM mg/dL 7 8*   EGFR ml/min/1 73sq m 36     Coags:     TSH:     Magnesium:     Lipid Profile:     Imaging: I have personally reviewed pertinent reports      EKG:  Sinus rhythm  VTE Prophylaxis: Sequential compression device Jax Bertrand     Code Status: Level 1 - Full Code  Advance Directive and Living Will:      Power of :    POLST:      Josy Sparrow, [address]

## 2022-08-05 NOTE — H&P
[address]  H&P- Darreld Cyndy 1950, 81 y o  female MRN: [id number]  Unit/Bed#: ED 06 Encounter: [id number]  Primary Care Provider: Rubio White MD   Date and time admitted to hospital: 8/4/2022  7:47 PM    * Type 2 diabetes mellitus with hyperglycemia, with long-term current use of insulin [facility])  Assessment & Plan  Lab Results   Component Value Date    HGBA1C 9 9 (A) 02/28/2022       Recent Labs     08/04/22  2137 08/04/22  2259   POCGLU 455* 395*       Blood Sugar Average: Last 72 hrs:  (P) 425     Patient presenting with generalized weakness, fatigue, blood sugar 514  · Patient on Lantus 33 units b i d  at home, did not take today  · , AG 14, serum bicarb 25   Check HgA1c   Diabetic diet   Continue insulin drip   IVFs   Recheck BMP        LATASHA (acute kidney injury) ([facility] [address] )  Assessment & Plan  Creatine 2 03, baseline 1 5   Suspect due to dehydration   Start IVFs   Avoid nephrotoxins, hold valsartan   Monitor with BMP      UTI (urinary tract infection)  Assessment & Plan  UA with moderate bacteria, trace leukocytes  · Continue ceftriaxone  · Urine culture pending    Elevated troponin  Assessment & Plan  Troponin initially elevated to 77, repeat 68  · History of multiple stents in place per patient  · EKG NSR, HR 80  · No chest pain  · Continue aspirin, carvedilol  · Monitor on telemetry  · Cardiology consult    Pure hypercholesterolemia  Assessment & Plan  Continue atorvastatin    Ulcerative colitis ([facility] [address] )  Assessment & Plan  History of ulcerative colitis not on any medication currently    Hypertension  Assessment & Plan  Continue carvedilol, hold losartan due to LATASHA    Emphysema, unspecified (HCC)  Assessment & Plan  No evidence of acute exacerbation    VTE Pharmacologic Prophylaxis: VTE Score: 4 Moderate Risk (Score 3-4) - Pharmacological DVT Prophylaxis Ordered: heparin  Code Status: Full code  Discussion with family: Patient declined call to   Anticipated Length of Stay: Patient will be admitted on an inpatient basis with an anticipated length of stay of greater than 2 midnights secondary to Hyperglycemia, LATASHA  Total Time for Visit, including Counseling / Coordination of Care: 45 minutes Greater than 50% of this total time spent on direct patient counseling and coordination of care  Chief Complaint:  Generalized weakness    History of Present Illness:  Lynn Carrero is a 81 y o  female with a PMH of diabetes type 2, hypertension, ulcerative colitis, overactive bladder who presents with generalized weakness and fatigue  She states her symptoms started 2 days ago after eating [place] food  She had multiple episodes of nonbloody vomiting and diarrhea 2 days ago, which is now resolved  Last night she got up to use the bathroom, became lightheaded, and hit her nose on the wall and slid down the wall  No LOC, neck pain, headache  Today she states that she is still feeling weak, fatigued, and now feels feverish  She did not take any of her medications today as she has been been feeling well  Denies any chest pain, palpitations, shortness of breath, cough, abdominal pain, dysuria hematuria  Review of Systems:  Review of Systems   Constitutional: Positive for fatigue and fever  Negative for chills  HENT: Negative for ear pain and sore throat  Eyes: Negative for pain and visual disturbance  Respiratory: Negative for cough and shortness of breath  Cardiovascular: Negative for chest pain and palpitations  Gastrointestinal: Positive for diarrhea and vomiting  Negative for abdominal pain  Genitourinary: Negative for dysuria and hematuria  Musculoskeletal: Negative for arthralgias and back pain  Skin: Negative for color change and rash  Neurological: Negative for dizziness, light-headedness and headaches  All other systems reviewed and are negative        Past Medical and Surgical History:   Past Medical History:   Diagnosis Date    Diabetes mellitus ([facility] [address] )     Neuropathy        Past Surgical History:   Procedure Laterality Date    ANKLE ARTHROPLASTY         Meds/Allergies:  Prior to Admission medications    Medication Sig Start Date End Date Taking?  Authorizing Provider   aspirin (ECOTRIN LOW STRENGTH) 81 mg EC tablet Take 81 mg by mouth daily   Yes Historical Provider, MD   atorvastatin (LIPITOR) 40 mg tablet Take 1 tablet (40 mg total) by mouth daily 3/30/22  Yes Aisha Colon MD   calcium carbonate (OS-JAKOB) 600 MG tablet Take 600 mg by mouth daily   Yes Historical Provider, MD   carvedilol (COREG) 25 mg tablet Take 25 mg by mouth 2 (two) times a day 9/9/21  Yes Historical Provider, MD   cloNIDine (CATAPRES) 0 1 mg tablet Take 1 tablet (0 1 mg total) by mouth 2 (two) times a day 2/28/22 8/4/22 Yes Aisha Colon MD   clopidogrel (PLAVIX) 75 mg tablet Take 75 mg by mouth daily   Yes Historical Provider, MD   Empagliflozin 10 MG TABS Take 10 mg by mouth daily   Yes Historical Provider, MD   insulin glargine (LANTUS) 100 units/mL subcutaneous injection Inject 33 Units under the skin 2 (two) times a day    Yes Historical Provider, MD   insulin lispro (HumaLOG KwikPen) 100 units/mL injection pen Please check you Blood Sugars prior to meals and follow instructions below 150-200 1 unit,201-250 2 units;251-300 3 units;301-350 4 units;351-400 5 units;451-450 6 units;500+ 7 units 2/28/22  Yes Aisha Colon MD   Insulin Pen Needle (UltiCare Mini Pen Needles) 31G X 6 MM MISC Use 4 (four) times a day 2/28/22  Yes Aisha Colon MD   Janumet  MG per tablet Take 50-1,000 tablets by mouth 2 (two) times a day 9/14/21  Yes Historical Provider, MD   pregabalin (LYRICA) 75 mg capsule Take 1 capsule by mouth twice daily 3/29/22  Yes Aisha Colon MD   tolterodine (DETROL LA) 4 mg 24 hr capsule Take 1 capsule (4 mg total) by mouth daily 3/30/22  Yes Aisha Colon MD   valsartan (DIOVAN) 320 MG tablet Take 1 tablet (320 mg total) by mouth daily 4/8/22  Yes Traci Easton MD   sulfaSALAzine (AZULFIDINE-EN) 500 mg EC tablet Take 1 tablet (500 mg total) by mouth 4 (four) times a day  Patient not taking: Reported on 8/4/2022 3/30/22 4/29/22  Traci Easton MD   glimepiride (AMARYL) 2 mg tablet Take 2 mg by mouth daily  Patient not taking: Reported on 8/4/2022 8/4/22  Historical Provider, MD   penicillin V potassium (VEETID) 500 mg tablet  3/30/22 8/4/22  Historical Provider, MD BARRIGA have reviewed home medications with patient personally  Allergies: Allergies   Allergen Reactions    Other Rash       Social History:  Marital Status: Single   Occupation:   Patient Pre-hospital Living Situation: Home  Patient Pre-hospital Level of Mobility: walks  Patient Pre-hospital Diet Restrictions: none  Substance Use History:   Social History     Substance and Sexual Activity   Alcohol Use Yes    Alcohol/week: 1 0 standard drink    Types: 1 Cans of beer per week     Social History     Tobacco Use   Smoking Status Former Smoker   Smokeless Tobacco Never Used     Social History     Substance and Sexual Activity   Drug Use Never       Family History:  Family History   Problem Relation Age of Onset    Stroke Mother     Heart disease Father        Physical Exam:     Vitals:   Blood Pressure: 144/65 (08/05/22 0000)  Pulse: 80 (08/05/22 0000)  Temperature: (!) 97 3 °F (36 3 °C) (08/04/22 1941)  Temp Source: Tympanic (08/04/22 1941)  Respirations: 16 (08/05/22 0000)  Weight - Scale: 67 6 kg (149 lb) (08/04/22 1941)  SpO2: 97 % (08/05/22 0000)    Physical Exam  Vitals reviewed  Constitutional:       General: She is not in acute distress  Appearance: She is well-developed  HENT:      Head: Normocephalic and atraumatic  Nose: Nose normal    Eyes:      Conjunctiva/sclera: Conjunctivae normal    Cardiovascular:      Rate and Rhythm: Normal rate and regular rhythm  Heart sounds: No murmur heard    Pulmonary:      Effort: Pulmonary effort is normal  No respiratory distress  Breath sounds: Normal breath sounds  Abdominal:      Palpations: Abdomen is soft  Tenderness: There is no abdominal tenderness  Skin:     General: Skin is warm and dry  Neurological:      Mental Status: She is alert and oriented to person, place, and time  Additional Data:     Lab Results:  Results from last 7 days   Lab Units 08/04/22 2011   WBC Thousand/uL 12 68*   HEMOGLOBIN g/dL 17 1*   HEMATOCRIT % 49 9*   PLATELETS Thousands/uL 229   NEUTROS PCT % 73   LYMPHS PCT % 17   MONOS PCT % 10   EOS PCT % 0     Results from last 7 days   Lab Units 08/04/22 2011   SODIUM mmol/L 126*   POTASSIUM mmol/L 4 0   CHLORIDE mmol/L 87*   CO2 mmol/L 25   BUN mg/dL 77*   CREATININE mg/dL 2 03*   ANION GAP mmol/L 14*   CALCIUM mg/dL 9 1   ALBUMIN g/dL 3 1*   TOTAL BILIRUBIN mg/dL 0 72   ALK PHOS U/L 128*   ALT U/L 23   AST U/L 32   GLUCOSE RANDOM mg/dL 514*         Results from last 7 days   Lab Units 08/04/22  2259 08/04/22  2137   POC GLUCOSE mg/dl 395* 455*               Imaging: No pertinent imaging reviewed  No orders to display       EKG and Other Studies Reviewed on Admission:   · EKG: NSR  HR 80     ** Please Note: This note has been constructed using a voice recognition system   **

## 2022-08-05 NOTE — CASE MANAGEMENT
Case Management Assessment & Discharge Planning Note    Patient name Halina Mott  Location [address] MRN [id number]  : 1950 Date 2022       Current Admission Date: 2022  Current Admission Diagnosis:Type 2 diabetes mellitus with hyperglycemia, with long-term current use of insulin [facility])   Patient Active Problem List    Diagnosis Date Noted    Elevated troponin 2022    UTI (urinary tract infection) 2022    LATASHA (acute kidney injury) ([facility] [address] ) 2022    Type 2 diabetes mellitus with hyperglycemia, with long-term current use of insulin ([facility] ) 2022    Hypertension 11/15/2021    Ulcerative colitis ([facility] ) 11/15/2021    Other hyperlipidemia 11/15/2021    Osteoporosis 11/15/2021    Coronary artery disease involving native coronary artery of native heart without angina pectoris 2019    Ischemic cardiomyopathy 2019    Pure hypercholesterolemia 2019    [facility] 2019    Emphysema, unspecified ([facility] ) 2019      LOS (days): 1  Geometric Mean LOS (GMLOS) (days): 2 90  Days to GMLOS:2 3     OBJECTIVE:    Risk of Unplanned Readmission Score: 13 97      Current admission status: Inpatient     Preferred Pharmacy:   [address] [facility],[address] [place] [address]  Phone: [phone] Fax: [phone]    Primary Care Provider: Ashlyn Watkins MD    Primary Insurance: [facility]  Secondary Insurance:     ASSESSMENT:  Agatha Mejia Proxies    There are no active Health Care Proxies on file      Readmission Root Cause  30 Day Readmission: No    Patient Information  Admitted from[de-identified] Home  Mental Status: Alert  During Assessment patient was accompanied by: Not accompanied during assessment  Assessment information provided by[de-identified] Patient  Primary Caregiver: Self  Support Systems: Daughter, [place] of Residence: [address] [place] [address] do you live in?: [address] 45 entry access options   Select all that apply : Stairs  Number of steps to enter home : 2  Type of Current Residence: Apartment  Floor Level: 1  Upon entering residence, is there a bedroom on the main floor (no further steps)?: Yes  Upon entering residence, is there a bathroom on the main floor (no further steps)?: Yes  In the last 12 months, was there a time when you were not able to pay the mortgage or rent on time?: No  In the last 12 months, how many places have you lived?: 1  In the last 12 months, was there a time when you did not have a steady place to sleep or slept in a shelter (including now)?: No  Homeless/housing insecurity resource given?: N/A  Living Arrangements: Lives Alone  Is patient a ?: No    Activities of Daily Living Prior to Admission  Functional Status: Independent  Completes ADLs independently?: Yes  Ambulates independently?: Yes  Does patient use assisted devices?: No  Does patient currently own DME?: Yes  What DME does the patient currently own?: [facility]  Does patient have a history of Outpatient Therapy (PT/OT)?: No  Does the patient have a history of Short-Term Rehab?: No  Does patient have a history of HHC?: No  [facility] No     Patient Information Continued  Income Source: Pension/[employer]  Does patient have prescription coverage?: Yes (Uses [facility] in [place], Denies issues with OOP costs)  Within the past 12 months, you worried that your food would run out before you got the money to buy more : Never true  Within the past 12 months, the food you bought just didn't last and you didn't have money to get more : Never true  Food insecurity resource given?: N/A  Does patient receive dialysis treatments?: No  Does patient have a history of substance abuse?: No  Does patient have a history of Mental Health Diagnosis?: No     Means of Transportation  Means of Transport to Appts[de-identified] Drives Self  In the past 12 months, has lack of transportation kept you from medical appointments or from getting medications?: No  In the past 12 months, has lack of transportation kept you from meetings, work, or from getting things needed for daily living?: No  Was application for public transport provided?: N/A    DISCHARGE DETAILS:    Discharge planning discussed with[de-identified] Patient     CM contacted family/caregiver?: Yes  Were Treatment Team discharge recommendations reviewed with patient/caregiver?: Yes  Did patient/caregiver verbalize understanding of patient care needs?: Yes  Were patient/caregiver advised of the risks associated with not following Treatment Team discharge recommendations?: Yes    Contacts  Patient Contacts: Araceli Castillo (dtr)  Relationship to Patient[de-identified] Family  Contact Method: Phone  Phone Number: [phone]  Reason/Outcome: Emergency [address]         Is the patient interested in [facility] at discharge?: No    DME Referral Provided  Referral made for DME?: No     Would you like to participate in our [address] service program?  : No - Declined    Treatment Team Recommendation: Home  Discharge Destination Plan[de-identified] Home  Transport at Discharge : Family (Dtr or Friend)     IMM Given (Date):: 08/05/22  IMM Given to[de-identified] Patient (IMM#2 reviewed with patient  Patient gave verbal understanding  Original provided  Copy placed in scan bin )    SW met with patient to introduce role, complete assessment and discuss discharge planning needs  Patient reports that she lives alone in a 1st floor apartment  She is fully independent with mobility and ADLs including driving  She owns a RW but does not use it  Pt is supported by her dtr Kiko Davenport and friend Rocio Shruti  Kiko Davenport lives approx  45min away in [facility]  Patient states her dtr or friend will transport home  Patient confirmed that she has all needed DM supplies at home and denies issues with getting any of her meds from the pharmacy   She follows with PCP Dr Nhung Quinn but does not have an Endocrinologist  Patient has no questions/concerns for SW at this time  SW contacted dtr Nichole Hough reports that she works for [employer]  She confirmed all information provided above by patient  SW discussed establishment with an Endocrinologist  Nichole Hough states she has been working with patient on getting an appointment  SW advised of lengthy delay with new patient appts at [facility] office due to challenges with provider availability  Nichole Hough is already aware and already has spoken with office  Pt to be on waitlist for appointment  Nichole Hough has no concerns re discharge plan  Advised friend Luci Herring likely will transport home  Nichole Hough provided with contact information for nurses station and SW

## 2022-08-05 NOTE — PLAN OF CARE
Problem: SAFETY ADULT  Goal: Patient will remain free of falls  Description: INTERVENTIONS:  - Educate patient/family on patient safety including physical limitations  - Instruct patient to call for assistance with activity   - Consult OT/PT to assist with strengthening/mobility   - Keep Call bell within reach  - Keep bed low and locked with side rails adjusted as appropriate  - Keep care items and personal belongings within reach  - Initiate and maintain comfort rounds  - Make Fall Risk Sign visible to staff  - Offer Toileting every 2 Hours, in advance of need  - Initiate/Maintain bed alarm  - Obtain necessary fall risk management equipment: walker  - Apply yellow socks and bracelet for high fall risk patients  - Consider moving patient to room near nurses station  Outcome: Progressing  Goal: Maintain or return to baseline ADL function  Description: INTERVENTIONS:  -  Assess patient's ability to carry out ADLs; assess patient's baseline for ADL function and identify physical deficits which impact ability to perform ADLs (bathing, care of mouth/teeth, toileting, grooming, dressing, etc )  - Assess/evaluate cause of self-care deficits   - Assess range of motion  - Assess patient's mobility; develop plan if impaired  - Assess patient's need for assistive devices and provide as appropriate  - Encourage maximum independence but intervene and supervise when necessary  - Involve family in performance of ADLs  - Assess for home care needs following discharge   - Consider OT consult to assist with ADL evaluation and planning for discharge  - Provide patient education as appropriate  Outcome: Progressing  Goal: Maintains/Returns to pre admission functional level  Description: INTERVENTIONS:  - Perform BMAT or MOVE assessment daily    - Set and communicate daily mobility goal to care team and patient/family/caregiver     - Collaborate with rehabilitation services on mobility goals if consulted    - Stand patient 3 times a day  - Ambulate patient 3 times a day  - Out of bed to chair 3 times a day   - Out of bed for meals 3 times a day  - Out of bed for toileting  - Record patient progress and toleration of activity level   Outcome: Progressing     Problem: METABOLIC, FLUID AND ELECTROLYTES - ADULT  Goal: Electrolytes maintained within normal limits  Description: INTERVENTIONS:  - Monitor labs and assess patient for signs and symptoms of electrolyte imbalances  - Administer electrolyte replacement as ordered  - Monitor response to electrolyte replacements, including repeat lab results as appropriate  - Instruct patient on fluid and nutrition as appropriate  Outcome: Progressing  Goal: Glucose maintained within target range  Description: INTERVENTIONS:  - Monitor Blood Glucose as ordered  - Assess for signs and symptoms of hyperglycemia and hypoglycemia  - Administer ordered medications to maintain glucose within target range  - Assess nutritional intake and initiate nutrition service referral as needed  Outcome: Progressing     Problem: CARDIOVASCULAR - ADULT  Goal: Maintains optimal cardiac output and hemodynamic stability  Description: INTERVENTIONS:  - Monitor I/O, vital signs and rhythm  - Monitor for S/S and trends of decreased cardiac output  - Administer and titrate ordered vasoactive medications to optimize hemodynamic stability  - Assess quality of pulses, skin color and temperature  - Assess for signs of decreased coronary artery perfusion  - Instruct patient to report change in severity of symptoms  Outcome: Progressing  Goal: Absence of cardiac dysrhythmias or at baseline rhythm  Description: INTERVENTIONS:  - Continuous cardiac monitoring, vital signs, obtain 12 lead EKG if ordered  - Administer antiarrhythmic and heart rate control medications as ordered  - Monitor electrolytes and administer replacement therapy as ordered  Outcome: Progressing

## 2022-08-05 NOTE — ED NOTES
Pt reports on Tuesday had multiple episodes of diarrhea with bright red blood no clots, hx of colitis  Also reports multiple episodes of non bloody vomiting  Pt also reports fell into wall yesterday am "because my sugar was low" causing nasal injury (did not seek treatment at that time) did not take blood sugar, pt has not had insulin today and minimal PO intake of water and propel drink  Pt reports today was persistently clammy so came to ed for eval  + diaphoresis noted  Pt denies sob no CP or SOB no headache dizziness or visual disturbances  Pt lives alone, arrived to ed with friend   Blood sugar 500 in triage      Rafael Courtney RN  08/04/22 2021

## 2022-08-05 NOTE — QUICK NOTE
Progress Note - Triage Assessment   Augusta Wang 81 y o  female MRN: [id number]    Time Called: 2119  Date Called: 08/04/22  Room#: ED6  Time Evaluated: 2120  Person requesting evaluation: Dr Rajeev Wolff to ED to evaluate patient for hyperglycemia and to assist in determining insulin drip requirements, chart reviewed  Currently with , AG 14 and serum bicarb of 25  The patient is hemodynamically stable but labs show evidence of dehydration and LATASHA  No significant acidosis  Would recommend IV hydration and initiation of regular insulin drip, algorithm 1  After review it was felt the patient is appropriate for admission to a general medical floor  Discussed case with Dr Veronica Lopez via TT and primary RN in ED  Triage Assessment:     Patient appropriate to be admitted to med-surg level of care  If any questions or concerns please call the critical care team at ext [phone]

## 2022-08-05 NOTE — ASSESSMENT & PLAN NOTE
Lab Results   Component Value Date    HGBA1C 13 5 (H) 08/05/2022       Recent Labs     08/05/22  0558 08/05/22  0756 08/05/22  1003 08/05/22  1150   POCGLU 139 138 252* 232*       Blood Sugar Average: Last 72 hrs:  (P) 216 [id number]     Patient presenting with generalized weakness, fatigue, blood sugar 514  · Patient on Lantus 33 units b i d  at home, did not take today  · , AG 14, serum bicarb 25   Check HgA1c   Diabetic diet   Continue insulin drip   IVFs   Recheck BMP    8/5 AG 7, BS in the 100s, DC insulin gtt, bridge to long acting insulin patients home dose,  patient tolerating PO diet, continue monitoring closely

## 2022-08-05 NOTE — ED NOTES
Pt made aware another RN stuck self with needle during her blood draw, pt verbally consented to have additional labs drawn for hep / hiv panel      Vu Kimble RN  08/04/22 7699

## 2022-08-05 NOTE — ED PROVIDER NOTES
History  Chief Complaint   Patient presents with    Chills     Pt states she's clammy; vomited; diarrhea  Began on Tuesday morning    Fall     Pt states she fell Wednesday morning and broke her nose; on blood thinners     80 yo female from home had vomiting and diarrhea 2 days ago   + associated generalized weakness and has been sweating and clammy and having chills off and on  Has been in bed for last 3 days  Got up to use bathroom yesterday and fell and hit nose on the wall  No LOC  No chest or belly pain  + cough  Did not take temp or blood sugar at home  She hasn't taken her insulin or meds today  History provided by:  Patient   used: No        Prior to Admission Medications   Prescriptions Last Dose Informant Patient Reported? Taking?    Empagliflozin 10 MG TABS Past Week at Unknown time  Yes Yes   Sig: Take 10 mg by mouth daily   Insulin Pen Needle (UltiCare Mini Pen Needles) 31G X 6 MM MISC 8/4/2022 at Unknown time  No Yes   Sig: Use 4 (four) times a day   Janumet  MG per tablet Past Month at Unknown time  Yes Yes   Sig: Take 50-1,000 tablets by mouth 2 (two) times a day   aspirin (ECOTRIN LOW STRENGTH) 81 mg EC tablet 8/3/2022 at Unknown time  Yes Yes   Sig: Take 81 mg by mouth daily   atorvastatin (LIPITOR) 40 mg tablet 8/2/2022 at Unknown time  No Yes   Sig: Take 1 tablet (40 mg total) by mouth daily   calcium carbonate (OS-JAKOB) 600 MG tablet 8/3/2022 at Unknown time  Yes Yes   Sig: Take 600 mg by mouth daily   carvedilol (COREG) 25 mg tablet 8/3/2022 at Unknown time  Yes Yes   Sig: Take 25 mg by mouth 2 (two) times a day   cloNIDine (CATAPRES) 0 1 mg tablet 8/3/2022 at Unknown time  No Yes   Sig: Take 1 tablet (0 1 mg total) by mouth 2 (two) times a day   clopidogrel (PLAVIX) 75 mg tablet 8/3/2022 at Unknown time  Yes Yes   Sig: Take 75 mg by mouth daily   glimepiride (AMARYL) 2 mg tablet Not Taking at Unknown time  Yes No   Sig: Take 2 mg by mouth daily   Patient not taking: Reported on 8/4/2022   insulin glargine (LANTUS) 100 units/mL subcutaneous injection 8/3/2022 at Unknown time  Yes Yes   Sig: Inject 33 Units under the skin 2 (two) times a day    insulin lispro (HumaLOG KwikPen) 100 units/mL injection pen Past Week at Unknown time  No Yes   Sig: Please check you Blood Sugars prior to meals and follow instructions below 150-200 1 unit,201-250 2 units;251-300 3 units;301-350 4 units;351-400 5 units;451-450 6 units;500+ 7 units   penicillin V potassium (VEETID) 500 mg tablet Not Taking at Unknown time  Yes No   Patient not taking: Reported on 8/4/2022   pregabalin (LYRICA) 75 mg capsule 8/3/2022 at Unknown time  No Yes   Sig: Take 1 capsule by mouth twice daily   sulfaSALAzine (AZULFIDINE-EN) 500 mg EC tablet Not Taking at Unknown time  No No   Sig: Take 1 tablet (500 mg total) by mouth 4 (four) times a day   Patient not taking: Reported on 8/4/2022   tolterodine (DETROL LA) 4 mg 24 hr capsule 8/3/2022 at Unknown time  No Yes   Sig: Take 1 capsule (4 mg total) by mouth daily   valsartan (DIOVAN) 320 MG tablet 8/3/2022 at Unknown time  No Yes   Sig: Take 1 tablet (320 mg total) by mouth daily      Facility-Administered Medications: None       Past Medical History:   Diagnosis Date    Diabetes mellitus (HCC)     Neuropathy        Past Surgical History:   Procedure Laterality Date    ANKLE ARTHROPLASTY         Family History   Problem Relation Age of Onset    Stroke Mother     Heart disease Father      I have reviewed and agree with the history as documented  E-Cigarette/Vaping     E-Cigarette/Vaping Substances     Social History     Tobacco Use    Smoking status: Former Smoker    Smokeless tobacco: Never Used   Substance Use Topics    Alcohol use: Yes     Alcohol/week: 1 0 standard drink     Types: 1 Cans of beer per week    Drug use: Never       Review of Systems   Constitutional: Positive for diaphoresis and fatigue  Negative for fever  HENT: Negative  Eyes: Negative  Respiratory: Negative  Negative for cough and shortness of breath  Cardiovascular: Negative  Negative for chest pain  Gastrointestinal: Positive for diarrhea and vomiting  Negative for abdominal pain and nausea  Genitourinary: Negative  Negative for dysuria and flank pain  Musculoskeletal: Negative  Negative for back pain and myalgias  Skin: Negative  Negative for rash  Neurological: Positive for weakness  Negative for dizziness and headaches  Hematological: Does not bruise/bleed easily  Psychiatric/Behavioral: Negative  All other systems reviewed and are negative  Physical Exam  Physical Exam  Vitals and nursing note reviewed  Constitutional:       General: She is not in acute distress  Appearance: She is well-developed  She is not ill-appearing or diaphoretic  HENT:      Head: Normocephalic and atraumatic  Right Ear: External ear normal       Left Ear: External ear normal       Nose:      Comments: No obvious swelling or deformity but is ttp on anterior nasal bone     Mouth/Throat:      Mouth: Mucous membranes are dry  Eyes:      Extraocular Movements: Extraocular movements intact  Conjunctiva/sclera: Conjunctivae normal    Cardiovascular:      Rate and Rhythm: Normal rate and regular rhythm  Heart sounds: Normal heart sounds  No murmur heard  Pulmonary:      Effort: Pulmonary effort is normal  No respiratory distress  Breath sounds: Normal breath sounds  Abdominal:      General: Bowel sounds are normal  There is no distension  Palpations: Abdomen is soft  Tenderness: There is no abdominal tenderness  Musculoskeletal:         General: No deformity  Normal range of motion  Cervical back: Normal range of motion and neck supple  Right lower leg: No edema  Left lower leg: No edema  Skin:     General: Skin is warm and dry  Coloration: Skin is not pale  Findings: No rash     Neurological: General: No focal deficit present  Mental Status: She is alert and oriented to person, place, and time  Cranial Nerves: No cranial nerve deficit  Motor: No weakness     Psychiatric:         Mood and Affect: Mood normal          Behavior: Behavior normal          Vital Signs  ED Triage Vitals   Temperature Pulse Respirations Blood Pressure SpO2   08/04/22 1941 08/04/22 1941 08/04/22 1941 08/04/22 1941 08/04/22 1941   (!) 97 3 °F (36 3 °C) 84 18 102/60 97 %      Temp Source Heart Rate Source Patient Position - Orthostatic VS BP Location FiO2 (%)   08/04/22 1941 08/04/22 2000 08/04/22 2000 08/04/22 2000 --   Tympanic Monitor Sitting Right arm       Pain Score       08/04/22 1941       No Pain           Vitals:    08/04/22 1941 08/04/22 2000 08/04/22 2130 08/04/22 2145   BP: 102/60 122/64 169/77    Pulse: 84 80 78 79   Patient Position - Orthostatic VS:  Sitting           Visual Acuity      ED Medications  Medications   insulin regular (HumuLIN R,NovoLIN R) 1 Units/mL in sodium chloride 0 9 % 100 mL infusion (9 Units/hr Intravenous Rate/Dose Change 8/4/22 2301)   sodium chloride 0 9 % infusion (200 mL/hr Intravenous New Bag 8/4/22 2238)   cefTRIAXone (ROCEPHIN) IVPB (premix in dextrose) 1,000 mg 50 mL (has no administration in time range)   carvedilol (COREG) tablet 25 mg (has no administration in time range)   calcium carbonate (TUMS) chewable tablet 500 mg (has no administration in time range)   sodium chloride 0 9 % infusion (0 mL/hr Intravenous Stopped 8/4/22 2130)       Diagnostic Studies  Results Reviewed     Procedure Component Value Units Date/Time    Fingerstick Glucose (POCT) [[id number]]  (Abnormal) Collected: 08/04/22 2259    Lab Status: Final result Updated: 08/04/22 2300     POC Glucose 395 mg/dl     Fingerstick Glucose (POCT) [[id number]]  (Abnormal) Collected: 08/04/22 2137    Lab Status: Final result Updated: 08/04/22 2259     POC Glucose 455 mg/dl     Rapid HIV 1/2 AB-AG Combo [[id number]] (Normal) Collected: 08/04/22 2210    Lab Status: Final result Specimen: Blood from Arm, Left Updated: 08/04/22 2254     Rapid HIV 1 AND 2 Non-Reactive     HIV-1 P24 Ag Screen Non-Reactive    Narrative:      Negative for HIV-1 p24 Antigen  Negative for HIV-1 and/or HIV-2 Antibody  HS Troponin I 2hr [[id number]]  (Abnormal) Collected: 08/04/22 2210    Lab Status: Final result Specimen: Blood from Arm, Left Updated: 08/04/22 2243     hs TnI 2hr 68 ng/L      Delta 2hr hsTnI -9 ng/L     Urine Microscopic [[id number]]  (Abnormal) Collected: 08/04/22 2218    Lab Status: Final result Specimen: Urine, Clean Catch Updated: 08/04/22 2239     RBC, UA None Seen /hpf      WBC, UA 20-30 /hpf      Epithelial Cells Moderate /hpf      Bacteria, UA Moderate /hpf      OTHER OBSERVATIONS Renal Tubule Epithelial Cells Present  Yeast Cells Present    UA (URINE) with reflex to Scope [[id number]]  (Abnormal) Collected: 08/04/22 2218    Lab Status: Final result Specimen: Urine, Clean Catch Updated: 08/04/22 2227     Color, UA Light Yellow     Clarity, UA Slightly Cloudy     Specific [place], UA 1 010     pH, UA 6 0     Leukocytes, UA Trace     Nitrite, UA Negative     Protein, UA 30 (1+) mg/dl      Glucose, UA >=1000 (1%) mg/dl      Ketones, UA 15 (1+) mg/dl      Urobilinogen, UA 0 2 E U /dl      Bilirubin, UA Negative     Occult Blood, UA Small    Urine culture [[id number]] Collected: 08/04/22 2219    Lab Status: In process Specimen: Urine, Clean Catch Updated: 08/04/22 2222    Hepatitis B surface antigen [[id number]] Collected: 08/04/22 2210    Lab Status: In process Specimen: Blood from Arm, Left Updated: 08/04/22 2216    Hepatitis C antibody [[id number]] Collected: 08/04/22 2210    Lab Status:  In process Specimen: Blood from Arm, Left Updated: 08/04/22 2216    HS Troponin I 4hr [[id number]]     Lab Status: No result Specimen: Blood     COVID only [[id number]]  (Normal) Collected: 08/04/22 2054    Lab Status: Final result Specimen: Nares from Nose Updated: 08/04/22 2156     SARS-CoV-2 Negative    Narrative:      FOR PEDIATRIC PATIENTS - copy/paste COVID Guidelines URL to browser: [email or web address]    SARS-CoV-2 assay is a Nucleic Acid Amplification assay intended for the  qualitative detection of nucleic acid from SARS-CoV-2 in nasopharyngeal  swabs  Results are for the presumptive identification of SARS-CoV-2 RNA  Positive results are indicative of infection with SARS-CoV-2, the virus  causing COVID-19, but do not rule out bacterial infection or co-infection  with other viruses  Laboratories within the [place] and its  territories are required to report all positive results to the appropriate  public health authorities  Negative results do not preclude SARS-CoV-2  infection and should not be used as the sole basis for treatment or other  patient management decisions  Negative results must be combined with  clinical observations, patient history, and epidemiological information  This test has not been FDA cleared or approved  This test has been authorized by FDA under an Emergency Use Authorization  (EUA)  This test is only authorized for the duration of time the  declaration that circumstances exist justifying the authorization of the  emergency use of an in vitro diagnostic tests for detection of SARS-CoV-2  virus and/or diagnosis of COVID-19 infection under section 564(b)(1) of  the Act, 21 U  S C  956DLY-8(E)(3), unless the authorization is terminated  or revoked sooner  The test has been validated but independent review by FDA  and CLIA is pending  Test performed using [employer] GeneXpert: This RT-PCR assay targets N2,  a region unique to SARS-CoV-2  A conserved region in the E-gene was chosen  for pan-Sarbecovirus detection which includes SARS-CoV-2      Blood gas, venous [[id number]]  (Abnormal) Collected: 08/04/22 2140    Lab Status: Final result Specimen: Blood from Arm, Left Updated: 08/04/22 2149     pH, Rodrigo 7 399     pCO2, Rodrigo 39 6 mm Hg      pO2, Rodrigo 55 5 mm Hg      HCO3, Rodrigo 23 9 mmol/L      Base Excess, Rodrigo -0 7 mmol/L      O2 Content, Rodrigo 21 7 ml/dL      O2 HGB, VENOUS 88 1 %     HS Troponin 0hr (reflex protocol) [[id number]]  (Abnormal) Collected: 08/04/22 2012    Lab Status: Final result Specimen: Blood from Arm, Left Updated: 08/04/22 2102     hs TnI 0hr 77 ng/L     Blood culture #1 [[id number]] Collected: 08/04/22 2054    Lab Status: In process Specimen: Blood from Hand, Right Updated: 08/04/22 2100    Blood culture #2 [[id number]] Collected: 08/04/22 2054    Lab Status:  In process Specimen: Blood from Arm, Left Updated: 08/04/22 2100    Comprehensive metabolic panel [[id number]]  (Abnormal) Collected: 08/04/22 2011    Lab Status: Final result Specimen: Blood from Arm, Left Updated: 08/04/22 2045     Sodium 126 mmol/L      Potassium 4 0 mmol/L      Chloride 87 mmol/L      CO2 25 mmol/L      ANION GAP 14 mmol/L      BUN 77 mg/dL      Creatinine 2 03 mg/dL      Glucose 514 mg/dL      Calcium 9 1 mg/dL      Corrected Calcium 9 8 mg/dL      AST 32 U/L      ALT 23 U/L      Alkaline Phosphatase 128 U/L      Total Protein 7 3 g/dL      Albumin 3 1 g/dL      Total Bilirubin 0 72 mg/dL      eGFR 24 ml/min/1 73sq m     Narrative:      [place] guidelines for Chronic Kidney Disease (CKD):     Stage 1 with normal or high GFR (GFR > 90 mL/min/1 73 square meters)    Stage 2 Mild CKD (GFR = 60-89 mL/min/1 73 square meters)    Stage 3A Moderate CKD (GFR = 45-59 mL/min/1 73 square meters)    Stage 3B Moderate CKD (GFR = 30-44 mL/min/1 73 square meters)    Stage 4 Severe CKD (GFR = 15-29 mL/min/1 73 square meters)    Stage 5 End Stage CKD (GFR <15 mL/min/1 73 square meters)  Note: GFR calculation is accurate only with a steady state creatinine    Lipase [[id number]]  (Normal) Collected: 08/04/22 2011    Lab Status: Final result Specimen: Blood from Arm, Left Updated: 08/04/22 2043     Lipase 205 u/L     Beta Hydroxybutyrate [[id number]]  (Abnormal) Collected: 08/04/22 2012    Lab Status: Final result Specimen: Blood from Arm, Left Updated: 08/04/22 2019     BETA-HYDROXYBUTYRATE 1 4 mmol/L     CBC and differential [[id number]]  (Abnormal) Collected: 08/04/22 2011    Lab Status: Final result Specimen: Blood from Arm, Left Updated: 08/04/22 2016     WBC 12 68 Thousand/uL      RBC 6 04 Million/uL      Hemoglobin 17 1 g/dL      Hematocrit 49 9 %      MCV 83 fL      MCH 28 3 pg      MCHC 34 3 g/dL      RDW 12 2 %      MPV 10 2 fL      Platelets 178 Thousands/uL      nRBC 0 /100 WBCs      Neutrophils Relative 73 %      Immat GRANS % 0 %      Lymphocytes Relative 17 %      Monocytes Relative 10 %      Eosinophils Relative 0 %      Basophils Relative 0 %      Neutrophils Absolute 9 21 Thousands/µL      Immature Grans Absolute 0 05 Thousand/uL      Lymphocytes Absolute 2 10 Thousands/µL      Monocytes Absolute 1 24 Thousand/µL      Eosinophils Absolute 0 05 Thousand/µL      Basophils Absolute 0 03 Thousands/µL                  No orders to display              Procedures  ECG 12 Lead Documentation Only    Date/Time: 8/4/2022 8:00 PM  Performed by: Lorraine Lima MD  Authorized by: Lorraine Lima MD     Indications / Diagnosis:  Weakness  ECG reviewed by me, the ED Provider: yes    Patient location:  ED  Previous ECG:     Previous ECG:  Unavailable  Interpretation:     Interpretation: abnormal    Rate:     ECG rate:  80    ECG rate assessment: normal    Rhythm:     Rhythm: sinus rhythm    Ectopy:     Ectopy: none    QRS:     QRS axis:  Normal  Conduction:     Conduction: normal    ST segments:     ST segments:  Non-specific  T waves:     T waves: normal               ED Course             HEART Risk Score    Flowsheet Row Most Recent Value   Heart Score Risk Calculator    History 1 Filed at: 08/04/2022 2301   ECG 1 Filed at: 08/04/2022 2301   Age 2 Filed at: 08/04/2022 2301   Risk Factors 2 Filed at: 08/04/2022 2301   Troponin 2 Filed at: 08/04/2022 2301   HEART Score 8 Filed at: 08/04/2022 2301                                      MDM  Number of Diagnoses or Management Options  LATASHA (acute kidney injury) ([place] [address] )  Dehydration  Hyperglycemia  UTI (urinary tract infection)  Diagnosis management comments: Discussed with ICU AP who cleared pt  For SLIM admit on insulin drip  Pt  Getting IVF as well  Noted UTI on UA, will tx with IV abx  Discussed with hospitalist   Second trop down  Disposition  Final diagnoses:   LATASHA (acute kidney injury) ([place] [address] )   Dehydration   Hyperglycemia   UTI (urinary tract infection)     Time reflects when diagnosis was documented in both MDM as applicable and the Disposition within this note     Time User Action Codes Description Comment    7/4/4730  4:42 PM Irl Lacy A Add [A45 0] LATASHA (acute kidney injury) ([place] [address] )     0/2/9140  7:73 PM [place] Broach Add [V30 2] Dehydration     9/4/7500  5:16 PM Chandni Broach Add [C81 1] Hyperglycemia     5/7/6002 59:51 PM Irl Lacy A Add [S91 3] UTI (urinary tract infection)       ED Disposition     ED Disposition   Admit    Condition   Stable    Date/Time   Thu Aug 4, 2022 10:56 PM    Comment   Case was discussed with **ICU, hospitalist* and the patient's admission status was agreed to be Admission Status: inpatient status to the service of Dr Readhospitalist**   Follow-up Information    None         Patient's Medications   Discharge Prescriptions    No medications on file       Outpatient Discharge Orders   Blood gas, venous   Standing Status: Future Standing Exp   Date: 08/04/23       PDMP Review       Value Time User    PDMP Reviewed  Yes 11/15/2021 11:25 AM Antonio Sawyer MD          ED Provider  Electronically Signed by           Carson Fajardo MD  14/75/03 9112       Carson Fajardo MD  96/97/53 [id number]

## 2022-08-05 NOTE — PLAN OF CARE
Problem: SAFETY ADULT  Goal: Patient will remain free of falls  Description: INTERVENTIONS:  - Educate patient/family on patient safety including physical limitations  - Instruct patient to call for assistance with activity   - Consult OT/PT to assist with strengthening/mobility   - Keep Call bell within reach  - Keep bed low and locked with side rails adjusted as appropriate  - Keep care items and personal belongings within reach  - Initiate and maintain comfort rounds  - Make Fall Risk Sign visible to staff  - Offer Toileting every 2 Hours, in advance of need  - Initiate/Maintain bed alarm  - Obtain necessary fall risk management equipment  - Apply yellow socks and bracelet for high fall risk patients  - Consider moving patient to room near nurses station  Outcome: Progressing  Goal: Maintain or return to baseline ADL function  Description: INTERVENTIONS:  -  Assess patient's ability to carry out ADLs; assess patient's baseline for ADL function and identify physical deficits which impact ability to perform ADLs (bathing, care of mouth/teeth, toileting, grooming, dressing, etc )  - Assess/evaluate cause of self-care deficits   - Assess range of motion  - Assess patient's mobility; develop plan if impaired  - Assess patient's need for assistive devices and provide as appropriate  - Encourage maximum independence but intervene and supervise when necessary  - Involve family in performance of ADLs  - Assess for home care needs following discharge   - Consider OT consult to assist with ADL evaluation and planning for discharge  - Provide patient education as appropriate  Outcome: Progressing  Goal: Maintains/Returns to pre admission functional level  Description: INTERVENTIONS:  - Perform BMAT or MOVE assessment daily    - Set and communicate daily mobility goal to care team and patient/family/caregiver     - Collaborate with rehabilitation services on mobility goals if consulted  - Ambulate patient 3 times a day  - Out of bed to chair   - Out of bed for meals  - Out of bed for toileting  - Record patient progress and toleration of activity level   Outcome: Progressing     Problem: METABOLIC, FLUID AND ELECTROLYTES - ADULT  Goal: Electrolytes maintained within normal limits  Description: INTERVENTIONS:  - Monitor labs and assess patient for signs and symptoms of electrolyte imbalances  - Administer electrolyte replacement as ordered  - Monitor response to electrolyte replacements, including repeat lab results as appropriate  - Instruct patient on fluid and nutrition as appropriate  Outcome: Progressing  Goal: Glucose maintained within target range  Description: INTERVENTIONS:  - Monitor Blood Glucose as ordered  - Assess for signs and symptoms of hyperglycemia and hypoglycemia  - Administer ordered medications to maintain glucose within target range  - Assess nutritional intake and initiate nutrition service referral as needed  Outcome: Progressing     Problem: CARDIOVASCULAR - ADULT  Goal: Maintains optimal cardiac output and hemodynamic stability  Description: INTERVENTIONS:  - Monitor I/O, vital signs and rhythm  - Monitor for S/S and trends of decreased cardiac output  - Administer and titrate ordered vasoactive medications to optimize hemodynamic stability  - Assess quality of pulses, skin color and temperature  - Assess for signs of decreased coronary artery perfusion  - Instruct patient to report change in severity of symptoms  Outcome: Progressing  Goal: Absence of cardiac dysrhythmias or at baseline rhythm  Description: INTERVENTIONS:  - Continuous cardiac monitoring, vital signs, obtain 12 lead EKG if ordered  - Administer antiarrhythmic and heart rate control medications as ordered  - Monitor electrolytes and administer replacement therapy as ordered  Outcome: Progressing

## 2022-08-05 NOTE — UTILIZATION REVIEW
Initial Clinical Review    Admission: Date/Time/Statement:   Admission Orders (From admission, onward)     Ordered        22 2301  INPATIENT ADMISSION  Once                      Orders Placed This Encounter   Procedures    INPATIENT ADMISSION     Standing Status:   Standing     Number of Occurrences:   1     Order Specific Question:   Level of Care     Answer:   Med Surg [16]     Order Specific Question:   Estimated length of stay     Answer:   More than 2 Midnights     Order Specific Question:   Certification     Answer:   I certify that inpatient services are medically necessary for this patient for a duration of greater than two midnights  See H&P and MD Progress Notes for additional information about the patient's course of treatment  ED Arrival Information     Expected   -    Arrival   2022 19:37    Acuity   Urgent            Means of arrival   Wheelchair    Escorted by   Family Member    Service   Hospitalist    Admission type   Urgent            Arrival complaint   Fever, chills           Chief Complaint   Patient presents with    Chills     Pt states she's clammy; vomited; diarrhea  Began on Tuesday morning    Fall     Pt states she fell Wednesday morning and broke her nose; on blood thinners       Initial Presentation: 81 y o  female presents to ed from home for evaluation and treatment of  Vomiting diarrhea and feeling clammy  She reports falling 1 day prior sustaining a broken nose ( on blood thinners)   / 39 6 / 55  9  TROP 77, , BUN 77, AN GAP 14, CR 2 03, GLUCOSE 514, B-HB 1 4,WBC 12  68  Initially treated with iv  9% ns bolus, iv insulin gtt, iv  9% ns 200/hr, iv ceftriaxone, tums, coreg  Admit to inpatient med surg for Diabetes T2 with hyperglycemia, LATASHA, UTI, ELEVATED TROPONIN   Plan for diabetic diet, insulin gtt, iv fluids, trend Bmp, continue iv ceftriaxone, follow up urine culture  Date: 22  Day 2: inpatient med surg  Map 58   bp 76/47  Continue iv fluids and iv ceftriaxone  Iv insulin gtt discontinued  Sq insulin sliding scale  Collect stool enteric bacterial panel  Follow up urine culture  Obtain PT/OT evaluations          ED Triage Vitals   08/04/22 1941 08/04/22 1941 08/04/22 1941 08/04/22 1941 08/04/22 1941   (!) 97 3 °F   (36 3 °C) 84 18 102/60 97 %      Tympanic Monitor         No Pain          08/05/22 65 3 kg (144 lb)     Additional Vital Signs:     Date/Time Temp Pulse Resp BP MAP SpO2 O2 Device   08/05/22 13:13:23 -- 61 -- 134/62 86 94 % --   08/05/22 07:26:42 98 2 °F   (36 8 °C) 66 19 135/62 86 93 % None (Room air)   08/05/22 03:09:48 98 °F   (36 7 °C) 59 16 116/56 81 99 % None (Room air)   08/05/22 0102 -- 69 -- 105/52 75 -- --   08/05/22 00:43:08 98 °F   (36 7 °C) 70 16 75/47   Abnormal  56   Abnormal  98 % None (Room air)   08/05/22 0000 -- 80 16 144/65 -- 97 % None (Room air)   08/04/22 2330 -- 82 18 149/73 104 96 % None (Room air)   08/04/22 2324 -- 81 -- 148/73 -- -- --   08/04/22 2300 -- 83 16 148/73 104 95 % None (Room air)   08/04/22 2145 -- 79 19 -- -- 97 % --   08/04/22 2130 -- 78 20 169/77 110 96 % --   08/04/22 2000 -- 80 22 122/64 87 96 % None (Room air)   08/04/22 1941 97 3 °F   (36 3 °C)   Abnormal  84 18 102/60 -- 97 % None (Room air)               Pertinent Labs/Diagnostic Test Results:     Results from last 7 days   Lab Units 08/04/22 2054   SARS-COV-2  Negative     Results from last 7 days   Lab Units 08/05/22  0607 08/04/22 2011   WBC Thousand/uL 10 04 12 68*   HEMOGLOBIN g/dL 13 7 17 1*   HEMATOCRIT % 40 4 49 9*   PLATELETS Thousands/uL 145* 229   NEUTROS ABS Thousands/µL  --  9 21*         Results from last 7 days   Lab Units 08/05/22  0607 08/05/22  0207 08/04/22 2011   SODIUM mmol/L 134* 130* 126*   POTASSIUM mmol/L 3 4* 3 5 4 0   CHLORIDE mmol/L 100 97 87*   CO2 mmol/L 27 24 25   ANION GAP mmol/L 7 9 14*   BUN mg/dL 62* 69* 77*   CREATININE mg/dL 1 44* 1 56* 2 03*   EGFR ml/min/1 73sq m 36 33 24   CALCIUM mg/dL 7 8* 7 9* 9 1     Results from last 7 days   Lab Units 08/04/22 2011   AST U/L 32   ALT U/L 23   ALK PHOS U/L 128*   TOTAL PROTEIN g/dL 7 3   ALBUMIN g/dL 3 1*   TOTAL BILIRUBIN mg/dL 0 72     Results from last 7 days   Lab Units 08/05/22  1150 08/05/22  1003 08/05/22  0756 08/05/22  0558 08/05/22  0352 08/05/22  0149 08/05/22  0008 08/04/22  2259 08/04/22  2137   POC GLUCOSE mg/dl 232* 252* 138 139 166* 227* 350* 395* 455*     Results from last 7 days   Lab Units 08/05/22  0607 08/05/22  0207 08/04/22 2011   GLUCOSE RANDOM mg/dL 143* 253* 514*         Results from last 7 days   Lab Units 08/05/22  0607   HEMOGLOBIN A1C % 13 5*   EAG mg/dl 341     BETA-HYDROXYBUTYRATE   Date Value Ref Range Status   08/04/2022 1 4 (H) <0 6 mmol/L Final          Results from last 7 days   Lab Units 08/04/22 2140   PH USHA  7 399   PCO2 USHA mm Hg 39 6*   PO2 USHA mm Hg 55 5*   HCO3 USHA mmol/L 23 9*   BASE EXC USHA mmol/L -0 7   O2 CONTENT USHA ml/dL 21 7   O2 HGB, VENOUS % 88 1*       Results from last 7 days   Lab Units 08/05/22  0006 08/04/22  2210 08/04/22 2012   HS TNI 0HR ng/L  --   --  77*   HS TNI 2HR ng/L  --  68*  --    HSTNI D2 ng/L  --  -9  --    HS TNI 4HR ng/L 65*  --   --    HSTNI D4 ng/L -12  --   --        Results from last 7 days   Lab Units 08/04/22 2210   HEP B S AG  Non-reactive   HEP C AB  Non-reactive     Results from last 7 days   Lab Units 08/04/22 2011   LIPASE u/L 205       Results from last 7 days   Lab Units 08/04/22 2218   CLARITY UA  Slightly Cloudy   COLOR UA  Light Yellow   SPEC GRAV UA  1 010   PH UA  6 0   GLUCOSE UA mg/dl >=1000 (1%)*   KETONES UA mg/dl 15 (1+)*   BLOOD UA  Small*   PROTEIN UA mg/dl 30 (1+)*   NITRITE UA  Negative   BILIRUBIN UA  Negative   UROBILINOGEN UA E U /dl 0 2   LEUKOCYTES UA  Trace*   WBC UA /hpf 20-30*   RBC UA /hpf None Seen   BACTERIA UA /hpf Moderate*   EPITHELIAL CELLS WET PREP /hpf Moderate*       Results from last 7 days   Lab Units 08/04/22  4927   BLOOD CULTURE  Received in Microbiology Lab  Culture in Progress  Received in Microbiology Lab  Culture in Progress                 ED Treatment:   Medication Administration from 08/04/2022 1937 to 08/05/2022 0029       Date/Time Order Dose Route Action     08/04/2022 2009 sodium chloride 0 9 % infusion 1,000 mL/hr Intravenous New Bag     08/05/2022 0008 insulin regular (HumuLIN R,NovoLIN R) 1 Units/mL in sodium chloride 0 9 % 100 mL infusion 7 Units/hr Intravenous Rate/Dose Change     08/04/2022 2301 insulin regular (HumuLIN R,NovoLIN R) 1 Units/mL in sodium chloride 0 9 % 100 mL infusion 9 Units/hr Intravenous Rate/Dose Change     08/04/2022 2143 insulin regular (HumuLIN R,NovoLIN R) 1 Units/mL in sodium chloride 0 9 % 100 mL infusion 10 Units/hr Intravenous New Bag     08/04/2022 2238 sodium chloride 0 9 % infusion 200 mL/hr Intravenous New Bag     08/04/2022 2308 cefTRIAXone (ROCEPHIN) IVPB (premix in dextrose) 1,000 mg 50 mL 1,000 mg Intravenous New Bag     08/04/2022 2324 calcium carbonate (TUMS) chewable tablet 500 mg 500 mg Oral Given     08/04/2022 2324 carvedilol (COREG) tablet 25 mg 25 mg Oral Given        Past Medical History:   Diagnosis    Diabetes mellitus ([facility] )    Neuropathy     Present on Admission:   Emphysema, unspecified ([facility] )   Hypertension   Pure hypercholesterolemia   Ulcerative colitis ([facility] )      Admitting Diagnosis:     Dehydration [E86 0]  Chills [R68 83]  UTI (urinary tract infection) [N39 0]  Hyperglycemia [R73 9]  Elevated troponin [R77 8]  [facility]ASHA (acute [facility]) ([facility] ) [N17 9]    Age/Sex: 81 y o  female    Scheduled Medications:    aspirin, 81 mg, Oral, Daily  atorvastatin, 40 mg, Oral, Daily  carvedilol, 25 mg, Oral, BID  cefTRIAXone, 1,000 mg, Intravenous, Q24H  cloNIDine, 0 1 mg, Oral, BID  clopidogrel, 75 mg, Oral, Daily  heparin (porcine), 5,000 Units, Subcutaneous, Q8H [address]  insulin glargine, 30 Units, Subcutaneous, Q12H [address]  insulin lispro, 2-12 Units, Subcutaneous, TID AC  oxybutynin, 10 mg, Oral, Daily  pregabalin, 75 mg, Oral, BID      Continuous IV Infusions:  sodium chloride, 100 mL/hr, Intravenous, Continuous      PRN Meds:  acetaminophen, 650 mg, Oral, Q6H PRN  calcium carbonate, 500 mg, Oral, BID PRN  ondansetron, 4 mg, Intravenous, Q6H PRN        IP CONSULT TO CARDIOLOGY    Network Utilization Review Department  ATTENTION: Please call with any questions or concerns to [phone] and carefully listen to the prompts so that you are directed to the right person  All voicemails are confidential   Abdelrahman Delude all requests for admission clinical reviews, approved or denied determinations and any other requests to dedicated fax number below belonging to the campus where the patient is receiving treatment   List of dedicated fax numbers for the Facilities:  [address] DENIALS (Administrative/Medical Necessity) [phone]   [address] (Maternity/NICU/Pediatrics) [phone] [address]  [address] [id number] [place] [address] [address] [phone]

## 2022-08-05 NOTE — ASSESSMENT & PLAN NOTE
Troponin initially elevated to 77, repeat 68  · History of multiple stents in place per patient  · EKG NSR, HR 80  · No chest pain  · Continue aspirin, carvedilol  · Monitor on telemetry  · Cardiology consult

## 2022-08-05 NOTE — ED NOTES
[facility] spoke with dr Camille Taveras and discussed pt with RN, instructed to use algorithm 1 for insulin order   Witnessed by RN Kayy Abreu RN  08/04/22 7952

## 2022-08-05 NOTE — ASSESSMENT & PLAN NOTE
Lab Results   Component Value Date    HGBA1C 9 9 (A) 02/28/2022       Recent Labs     08/04/22 2137 08/04/22  2259   POCGLU 455* 395*       Blood Sugar Average: Last 72 hrs:  (P) 425     Patient presenting with generalized weakness, fatigue, blood sugar 514  · Patient on Lantus 33 units b i d  at home, did not take today  · , AG 14, serum bicarb 25   Check HgA1c   Diabetic diet   Continue insulin drip   IVFs   Recheck BMP

## 2022-08-05 NOTE — ASSESSMENT & PLAN NOTE
Troponin initially elevated to 77, repeat 68  · History of multiple stents in place per patient  · EKG NSR, HR 80  · No chest pain  · Continue aspirin, carvedilol  · Monitor on telemetry  · Cardiology consult    8/5 No chest pain, monitor tele, follow cardio consult

## 2022-08-06 LAB
2HR DELTA HS TROPONIN: -1 NG/L
ALBUMIN SERPL BCP-MCNC: 2.2 G/DL (ref 3.5–5)
ALP SERPL-CCNC: 75 U/L (ref 46–116)
ALT SERPL W P-5'-P-CCNC: 32 U/L (ref 12–78)
ANION GAP SERPL CALCULATED.3IONS-SCNC: 6 MMOL/L (ref 4–13)
AST SERPL W P-5'-P-CCNC: 43 U/L (ref 5–45)
BASOPHILS # BLD AUTO: 0.01 THOUSANDS/ΜL (ref 0–0.1)
BASOPHILS NFR BLD AUTO: 0 % (ref 0–1)
BILIRUB SERPL-MCNC: 0.28 MG/DL (ref 0.2–1)
BUN SERPL-MCNC: 40 MG/DL (ref 5–25)
CALCIUM ALBUM COR SERPL-MCNC: 9.1 MG/DL (ref 8.3–10.1)
CALCIUM SERPL-MCNC: 7.7 MG/DL (ref 8.3–10.1)
CAMPYLOBACTER DNA SPEC NAA+PROBE: NORMAL
CARDIAC TROPONIN I PNL SERPL HS: 19 NG/L
CARDIAC TROPONIN I PNL SERPL HS: 20 NG/L
CHLORIDE SERPL-SCNC: 106 MMOL/L (ref 96–108)
CO2 SERPL-SCNC: 24 MMOL/L (ref 21–32)
CREAT SERPL-MCNC: 1.24 MG/DL (ref 0.6–1.3)
EOSINOPHIL # BLD AUTO: 0.19 THOUSAND/ΜL (ref 0–0.61)
EOSINOPHIL NFR BLD AUTO: 3 % (ref 0–6)
ERYTHROCYTE [DISTWIDTH] IN BLOOD BY AUTOMATED COUNT: 12.5 % (ref 11.6–15.1)
GFR SERPL CREATININE-BSD FRML MDRD: 44 ML/MIN/1.73SQ M
GLUCOSE SERPL-MCNC: 111 MG/DL (ref 65–140)
GLUCOSE SERPL-MCNC: 113 MG/DL (ref 65–140)
GLUCOSE SERPL-MCNC: 123 MG/DL (ref 65–140)
GLUCOSE SERPL-MCNC: 125 MG/DL (ref 65–140)
GLUCOSE SERPL-MCNC: 177 MG/DL (ref 65–140)
HCT VFR BLD AUTO: 38.7 % (ref 34.8–46.1)
HGB BLD-MCNC: 12.8 G/DL (ref 11.5–15.4)
IMM GRANULOCYTES # BLD AUTO: 0.07 THOUSAND/UL (ref 0–0.2)
IMM GRANULOCYTES NFR BLD AUTO: 1 % (ref 0–2)
LYMPHOCYTES # BLD AUTO: 1.86 THOUSANDS/ΜL (ref 0.6–4.47)
LYMPHOCYTES NFR BLD AUTO: 25 % (ref 14–44)
MCH RBC QN AUTO: 28.5 PG (ref 26.8–34.3)
MCHC RBC AUTO-ENTMCNC: 33.1 G/DL (ref 31.4–37.4)
MCV RBC AUTO: 86 FL (ref 82–98)
MONOCYTES # BLD AUTO: 0.86 THOUSAND/ΜL (ref 0.17–1.22)
MONOCYTES NFR BLD AUTO: 12 % (ref 4–12)
NEUTROPHILS # BLD AUTO: 4.46 THOUSANDS/ΜL (ref 1.85–7.62)
NEUTS SEG NFR BLD AUTO: 59 % (ref 43–75)
NRBC BLD AUTO-RTO: 0 /100 WBCS
PLATELET # BLD AUTO: 131 THOUSANDS/UL (ref 149–390)
PMV BLD AUTO: 10.2 FL (ref 8.9–12.7)
POTASSIUM SERPL-SCNC: 4.2 MMOL/L (ref 3.5–5.3)
PROCALCITONIN SERPL-MCNC: 0.07 NG/ML
PROT SERPL-MCNC: 5.2 G/DL (ref 6.4–8.4)
RBC # BLD AUTO: 4.49 MILLION/UL (ref 3.81–5.12)
SALMONELLA DNA SPEC QL NAA+PROBE: NORMAL
SHIGA TOXIN STX GENE SPEC NAA+PROBE: NORMAL
SHIGELLA DNA SPEC QL NAA+PROBE: NORMAL
SODIUM SERPL-SCNC: 136 MMOL/L (ref 135–147)
WBC # BLD AUTO: 7.45 THOUSAND/UL (ref 4.31–10.16)

## 2022-08-06 PROCEDURE — 99232 SBSQ HOSP IP/OBS MODERATE 35: CPT | Performed by: FAMILY MEDICINE

## 2022-08-06 PROCEDURE — 84145 PROCALCITONIN (PCT): CPT | Performed by: INTERNAL MEDICINE

## 2022-08-06 PROCEDURE — 80053 COMPREHEN METABOLIC PANEL: CPT | Performed by: INTERNAL MEDICINE

## 2022-08-06 PROCEDURE — 82948 REAGENT STRIP/BLOOD GLUCOSE: CPT

## 2022-08-06 PROCEDURE — 87040 BLOOD CULTURE FOR BACTERIA: CPT | Performed by: INTERNAL MEDICINE

## 2022-08-06 PROCEDURE — 93005 ELECTROCARDIOGRAM TRACING: CPT

## 2022-08-06 PROCEDURE — 99233 SBSQ HOSP IP/OBS HIGH 50: CPT | Performed by: INTERNAL MEDICINE

## 2022-08-06 PROCEDURE — 84484 ASSAY OF TROPONIN QUANT: CPT | Performed by: FAMILY MEDICINE

## 2022-08-06 PROCEDURE — 85025 COMPLETE CBC W/AUTO DIFF WBC: CPT | Performed by: INTERNAL MEDICINE

## 2022-08-06 PROCEDURE — 97162 PT EVAL MOD COMPLEX 30 MIN: CPT | Performed by: PHYSICAL THERAPIST

## 2022-08-06 RX ORDER — MAGNESIUM HYDROXIDE/ALUMINUM HYDROXICE/SIMETHICONE 120; 1200; 1200 MG/30ML; MG/30ML; MG/30ML
30 SUSPENSION ORAL EVERY 4 HOURS PRN
Status: DISCONTINUED | OUTPATIENT
Start: 2022-08-06 | End: 2022-08-09 | Stop reason: HOSPADM

## 2022-08-06 RX ORDER — LOSARTAN POTASSIUM 50 MG/1
50 TABLET ORAL DAILY
Status: DISCONTINUED | OUTPATIENT
Start: 2022-08-06 | End: 2022-08-09 | Stop reason: HOSPADM

## 2022-08-06 RX ORDER — PANTOPRAZOLE SODIUM 40 MG/1
40 TABLET, DELAYED RELEASE ORAL
Status: DISCONTINUED | OUTPATIENT
Start: 2022-08-06 | End: 2022-08-09 | Stop reason: HOSPADM

## 2022-08-06 RX ORDER — LOPERAMIDE HYDROCHLORIDE 2 MG/1
4 CAPSULE ORAL ONCE
Status: COMPLETED | OUTPATIENT
Start: 2022-08-06 | End: 2022-08-06

## 2022-08-06 RX ORDER — HYDRALAZINE HYDROCHLORIDE 20 MG/ML
10 INJECTION INTRAMUSCULAR; INTRAVENOUS EVERY 6 HOURS PRN
Status: DISCONTINUED | OUTPATIENT
Start: 2022-08-06 | End: 2022-08-09 | Stop reason: HOSPADM

## 2022-08-06 RX ADMIN — LOPERAMIDE HYDROCHLORIDE 4 MG: 2 CAPSULE ORAL at 18:33

## 2022-08-06 RX ADMIN — INSULIN LISPRO 2 UNITS: 100 INJECTION, SOLUTION INTRAVENOUS; SUBCUTANEOUS at 11:33

## 2022-08-06 RX ADMIN — LOSARTAN POTASSIUM 50 MG: 50 TABLET, FILM COATED ORAL at 14:48

## 2022-08-06 RX ADMIN — INSULIN GLARGINE 30 UNITS: 100 INJECTION, SOLUTION SUBCUTANEOUS at 22:23

## 2022-08-06 RX ADMIN — CARVEDILOL 25 MG: 25 TABLET, FILM COATED ORAL at 09:35

## 2022-08-06 RX ADMIN — HYDRALAZINE HYDROCHLORIDE 10 MG: 20 INJECTION INTRAMUSCULAR; INTRAVENOUS at 15:11

## 2022-08-06 RX ADMIN — CLONIDINE HYDROCHLORIDE 0.1 MG: 0.1 TABLET ORAL at 09:35

## 2022-08-06 RX ADMIN — CLONIDINE HYDROCHLORIDE 0.1 MG: 0.1 TABLET ORAL at 22:23

## 2022-08-06 RX ADMIN — HEPARIN SODIUM 5000 UNITS: 5000 INJECTION INTRAVENOUS; SUBCUTANEOUS at 22:23

## 2022-08-06 RX ADMIN — HEPARIN SODIUM 5000 UNITS: 5000 INJECTION INTRAVENOUS; SUBCUTANEOUS at 07:00

## 2022-08-06 RX ADMIN — ALUMINA, MAGNESIA, AND SIMETHICONE ORAL SUSPENSION REGULAR STRENGTH 30 ML: 1200; 1200; 120 SUSPENSION ORAL at 14:49

## 2022-08-06 RX ADMIN — PREGABALIN 75 MG: 75 CAPSULE ORAL at 18:33

## 2022-08-06 RX ADMIN — ASPIRIN 81 MG: 81 TABLET, COATED ORAL at 09:35

## 2022-08-06 RX ADMIN — PANTOPRAZOLE SODIUM 40 MG: 40 TABLET, DELAYED RELEASE ORAL at 14:48

## 2022-08-06 RX ADMIN — INSULIN GLARGINE 30 UNITS: 100 INJECTION, SOLUTION SUBCUTANEOUS at 09:35

## 2022-08-06 RX ADMIN — CLOPIDOGREL BISULFATE 75 MG: 75 TABLET ORAL at 09:35

## 2022-08-06 RX ADMIN — OXYBUTYNIN 10 MG: 5 TABLET, FILM COATED, EXTENDED RELEASE ORAL at 09:35

## 2022-08-06 RX ADMIN — PREGABALIN 75 MG: 75 CAPSULE ORAL at 09:35

## 2022-08-06 RX ADMIN — ATORVASTATIN CALCIUM 40 MG: 40 TABLET, FILM COATED ORAL at 09:35

## 2022-08-06 RX ADMIN — ANTACID TABLETS 500 MG: 500 TABLET, CHEWABLE ORAL at 11:29

## 2022-08-06 RX ADMIN — CARVEDILOL 25 MG: 25 TABLET, FILM COATED ORAL at 22:22

## 2022-08-06 RX ADMIN — CEFTRIAXONE 1000 MG: 1 INJECTION, SOLUTION INTRAVENOUS at 23:23

## 2022-08-06 RX ADMIN — HEPARIN SODIUM 5000 UNITS: 5000 INJECTION INTRAVENOUS; SUBCUTANEOUS at 15:11

## 2022-08-06 NOTE — PROGRESS NOTES
Progress Note - Cardiology   [facility]     Tish Tenorio 81 y o  female MRN: [id number]  : 1950  Unit/Bed#: 2 [place] [address] Encounter: [id number]    Assessment and Plan:   1  Acute kidney injury secondary to nausea, vomiting and diarrhea:  Resolved    -  managed per primary team    2  Non MI troponin elevation secondary to #1:  Peak troponin was 77 and relatively flat    -  patient follows with Dr Amalia Branham in [facility]    -  continue dual anti-platelet therapy with aspirin and Plavix    -  continue Coreg 25 mg b i d     -  continue Lipitor 40 mg daily    -  patient wishes follow-up with her primary cardiologist for further treatment      3  Diabetes:  Hemoglobin A1c 13 5  Managed per primary team   Needs better blood sugar control    4  Coronary artery disease:  Previous stenting in 2019 of all 3 coronary vessels     -  medication as per #2    5  Hypertension:  Blood pressure beginning to elevate now the patient is euvolemic       -  Her Diovan was held on admission, will resume with hospital formulary equivalent of Cozaar 50 mg daily  -  And monitor blood pressure and renal function    6  History of ischemic cardiomyopathy:   documented EF of 30% in 2019    Subjective / Objective:   Patient seen and examined  Blood pressure noted to be elevated  Her Diovan from home has been held due to acute kidney injury  Creatinine normalized after receiving IV fluids and treatment for her nausea vomiting and diarrhea  Diovan is not on formulary, will start Cozaar 50 mg once day and continue to monitor blood pressure and renal function  Vitals: Blood pressure 166/61, pulse 70, temperature 97 8 °F (36 6 °C), temperature source Oral, resp  rate 18, height 5' 1" (1 549 m), weight 65 3 kg (144 lb), SpO2 98 %  Vitals:    22 1941 22 0035   Weight: 67 6 kg (149 lb) 65 3 kg (144 lb)     Body mass index is 27 21 kg/m²    BP Readings from Last 3 Encounters:   22 166/61 03/30/22 150/70   02/28/22 120/62     Orthostatic Blood Pressures    Flowsheet Row Most Recent Value   Blood Pressure 166/61 filed at 08/06/2022 0819   Patient Position - Orthostatic VS Lying filed at 08/06/2022 0819        I/O       08/04 0701  08/05 0700 08/05 0701  08/06 0700 08/06 0701  08/07 0700    I V  (mL/kg) 1566 7 (24)      IV Piggyback 50      Total Intake(mL/kg) 1616 7 (24 8)      Urine (mL/kg/hr) 300      Total Output 300      Net +1316 7                 Invasive Devices  Report    Peripheral Intravenous Line  Duration           Peripheral IV 08/04/22 Left Forearm 1 day                No intake or output data in the 24 hours ending 08/06/22 1104      Physical Exam:   Physical Exam  Vitals and nursing note reviewed  Constitutional:       Appearance: Normal appearance  She is obese  She is ill-appearing  HENT:      Right Ear: External ear normal       Left Ear: External ear normal       Nose: Nose normal    Eyes:      General: No scleral icterus  Right eye: No discharge  Left eye: No discharge  Cardiovascular:      Rate and Rhythm: Normal rate and regular rhythm  Pulses: Normal pulses  Heart sounds: Normal heart sounds  Pulmonary:      Effort: Pulmonary effort is normal  No respiratory distress  Breath sounds: Normal breath sounds  Abdominal:      General: Bowel sounds are normal       Palpations: Abdomen is soft  Musculoskeletal:      Right lower leg: No edema  Skin:     General: Skin is warm and dry  Capillary Refill: Capillary refill takes less than 2 seconds  Neurological:      General: No focal deficit present  Mental Status: She is alert and oriented to person, place, and time  Mental status is at baseline     Psychiatric:         Mood and Affect: Mood normal                  Medications/ Allergies:     Current Facility-Administered Medications   Medication Dose Route Frequency Provider Last Rate    acetaminophen  650 mg Oral Q6H SANGEETA Klein Derrick, KIA      aspirin  81 mg Oral Daily Mozell Cones, PA-C      atorvastatin  40 mg Oral Daily Mozell Cones, PA-C      calcium carbonate  500 mg Oral BID PRN Mozell Cones, PA-C      carvedilol  25 mg Oral BID Mozell Cones, PA-C      cefTRIAXone  1,000 mg Intravenous Q24H Mozell Cones, PA-C 1,000 mg (08/05/22 2208)    cloNIDine  0 1 mg Oral BID Mozell Cones, PA-C      clopidogrel  75 mg Oral Daily Jackie Meza, PA-ANGÉLICA      heparin (porcine)  5,000 Units Subcutaneous Q8H [facility] Jackie Meza, PA-ANGÉLICA      insulin glargine  30 Units Subcutaneous Q12H [facility] Christin Douglass MD      insulin lispro  2-12 Units Subcutaneous TID  Christin Douglass MD      ondansetron  4 mg Intravenous Q6H PRN [facility] Con[facility], PA-C      oxybutynin  10 mg Oral Daily Mozell Cones, PA-C      pregabalin  75 mg Oral BID Mozell Cones, PA-C      sodium chloride  100 mL/hr Intravenous Continuous [facility], PA-C 100 mL/hr (08/05/22 2209)     acetaminophen, 650 mg, Q6H PRN  calcium carbonate, 500 mg, BID PRN  ondansetron, 4 mg, Q6H PRN      Allergies   Allergen Reactions    Other Rash       VTE Pharmacologic Prophylaxis:   Sequential compression device (Venodyne)     Labs:   Troponins:  Results from last 7 days   Lab Units 08/05/22  0006 08/04/22  2210   HSTNI D2 ng/L  --  -9   HSTNI D4 ng/L -12  --      CBC with diff:  Results from last 7 days   Lab Units 08/06/22 0621 08/05/22  0607 08/04/22 2011   WBC Thousand/uL 7 45 10 04 12 68*   HEMOGLOBIN g/dL 12 8 13 7 17 1*   HEMATOCRIT % 38 7 40 4 49 9*   MCV fL 86 84 83   PLATELETS Thousands/uL 131* 145* 229   MCH pg 28 5 28 6 28 3   MCHC g/dL 33 1 34 2 34 3   RDW % 12 5 12 2 12 2   MPV fL 10 2 10 2 10 2   NRBC AUTO /100 WBCs 0  --  0     CMP:  Results from last 7 days   Lab Units 08/06/22 0621 08/05/22  1408 08/05/22  0607 08/05/22  0207 08/04/22 2011   SODIUM mmol/L 136 133* 134* 130* 126*   POTASSIUM mmol/L 4 2 4 3 3 4* 3 5 4 0   CHLORIDE mmol/L 106 102 100 97 87*   CO2 mmol/L 24 25 27 24 25   ANION GAP mmol/L 6 6 7 9 14*   BUN mg/dL 40* 56* 62* 69* 77*   CREATININE mg/dL 1 24 1 44* 1 44* 1 56* 2 03*   CALCIUM mg/dL 7 7* 8 2* 7 8* 7 9* 9 1   AST U/L 43  --   --   --  32   ALT U/L 32  --   --   --  23   ALK PHOS U/L 75  --   --   --  128*   TOTAL PROTEIN g/dL 5 2*  --   --   --  7 3   ALBUMIN g/dL 2 2*  --   --   --  3 1*   TOTAL BILIRUBIN mg/dL 0 28  --   --   --  0 72   EGFR ml/min/1 73sq m 44 36 36 33 24     Magnesium:  Results from last 7 days   Lab Units 08/05/22  1408   MAGNESIUM mg/dL 2 4     Hgb A1c:  Results from last 7 days   Lab Units 08/05/22  0607   HEMOGLOBIN A1C % 13 5*         Imaging & Testing   I have personally reviewed pertinent reports  No results found  Toi Martinez, [address]   Cardiology      "This note has been constructed using a voice recognition system  Therefore there may be syntax, spelling, and/or grammatical errors   Please call if you have any questions  "

## 2022-08-06 NOTE — PLAN OF CARE
Problem: PHYSICAL THERAPY ADULT  Goal: Performs mobility at highest level of function for planned discharge setting  See evaluation for individualized goals  Description: Treatment/Interventions: ADL retraining, Functional transfer training, LE strengthening/ROM, Endurance training, Therapeutic exercise, Bed mobility, Gait training, Spoke to nursing          See flowsheet documentation for full assessment, interventions and recommendations  Note: Prognosis: Good  Problem List: Decreased strength, Decreased range of motion, Decreased endurance, Impaired balance  Assessment: Patient seen for Physical Therapy evaluation  Patient admitted with Type 2 diabetes mellitus with hyperglycemia, with long-term current use of insulin ([facility] )  Comorbidities affecting patient's physical performance include: LATASHA, diabetes, hypertension and UTI  Personal factors affecting patient at time of initial evaluation include: stairs to enter home, inability to ambulate household distances, inability to navigate community distances, limited home support, positive fall history, inability to perform current job functions, inability to perform ADLS and inability to perform IADLS   Prior to admission, patient was independent with functional mobility without assistive device, independent with ADLS, independent with IADLS and works part time  Please find objective findings from Physical Therapy assessment regarding body systems outlined above with impairments and limitations including weakness, decreased ROM, impaired balance, decreased endurance, decreased activity tolerance, decreased functional mobility tolerance and fall risk  The Barthel Index was used as a functional outcome tool presenting with a score of Barthel Index Score: 70 today indicating moderate limitations of functional mobility and ADLS    Patient's clinical presentation is currently evolving as seen in patient's presentation of increased fall risk, new onset of impairment of functional mobility, decreased endurance and new onset of weakness  Pt would benefit from continued Physical Therapy treatment to address deficits as defined above and maximize level of functional mobility  As demonstrated by objective findings, the assigned level of complexity for this evaluation is moderate  The patient's AM-[facility] Basic Mobility Inpatient Short Form Raw Score is 18  A Raw score of greater than 16 suggests the patient may benefit from discharge to home  Please also refer to the recommendation of the Physical Therapist for safe discharge planning  Barriers to Discharge: None     PT Discharge Recommendation: No rehabilitation needs    See flowsheet documentation for full assessment

## 2022-08-06 NOTE — ASSESSMENT & PLAN NOTE
Lab Results   Component Value Date    HGBA1C 13 5 (H) 08/05/2022       Recent Labs     08/05/22  1629 08/05/22  2128 08/06/22  0707 08/06/22  1132   POCGLU 207* 144* 111 177*       Blood Sugar Average: Last 72 hrs:  (P) 524 [id number]     Patient presenting with generalized weakness, fatigue, blood sugar 514  · Patient on Lantus 33 units b i d  at home, did not take today  · , AG 14, serum bicarb 25   Check HgA1c   Diabetic diet   Continue insulin drip   IVFs   Recheck BMP    8/5 AG 7, BS in the 100s, DC insulin gtt, bridge to long acting insulin patients home dose,  patient tolerating PO diet, continue monitoring closely   8/6 Continue to monitor blood sugar levels

## 2022-08-06 NOTE — PHYSICAL THERAPY NOTE
PT EVALUATION     Time In: 5029  Time Out: 0840 08/06/22 0840   PT Last Visit   PT Visit Date 08/06/22   Note Type   Note type Evaluation   Pain Assessment   Pain Assessment Tool 0-10   Pain Score No Pain   Hospital Pain Intervention(s) Ambulation/increased activity;Repositioned; Emotional support   Restrictions/Precautions   Other Precautions Fall Risk;Pain; Chair Alarm; Bed Alarm   Home Living   Type of [address] One level  (2 GIL)   Bathroom Shower/Tub Tub/shower unit   Bathroom Toilet Standard   Bathroom Equipment Shower chair   [address] [place]   Prior Function   Level of [address] with ADLs and functional mobility   Lives With Alone   Receives Help From Friend(s)   ADL Assistance Independent   IADLs Independent   Falls in the last 6 months 1 to 4   Vocational   (Pt  at [employer])   General   Additional Pertinent History Pt admitted following episode of dizziness at home where patient hit her nose and fell  Pt also having episodes of vomiting and diarrhea   Family/Caregiver Present No   Cognition   Overall Cognitive Status WFL   Arousal/Participation Cooperative   Orientation Level Oriented X4   Memory Within functional limits   Following Commands Follows all commands and directions without difficulty   Subjective   Subjective "I'm okay, just went to the bathroom about 10 minutes ago "   RLE Assessment   RLE Assessment WNL  (4/5)   LLE Assessment   LLE Assessment WNL  (4/5)   Bed Mobility   Supine to Sit 5  Supervision   Additional items Increased time required; Bedrails   Sit to Supine 5  Supervision   Additional items Bedrails; Increased time required   Transfers   Sit to Stand 5  Supervision   Additional items Bedrails   Stand to Sit 5  Supervision   Additional items Bedrails   Toilet transfer 5  Supervision   Ambulation/Elevation   Gait pattern Wide JUANITO   Gait Assistance 5  Supervision   Assistive Device None   Distance 20 feet x 2 within room   Balance   Static Sitting Good   Dynamic Sitting Fair +   Static Standing Fair +   Dynamic Standing [address] [id number]   Activity Tolerance   Activity Tolerance Patient tolerated treatment well   Nurse Made Aware RN Miranda Anand   Assessment   Prognosis Good   Problem List Decreased strength;Decreased range of motion;Decreased endurance; Impaired balance   Assessment Patient seen for Physical Therapy evaluation  Patient admitted with Type 2 diabetes mellitus with hyperglycemia, with long-term current use of insulin ([facility] [address] )  Comorbidities affecting patient's physical performance include: LATASHA, diabetes, hypertension and UTI  Personal factors affecting patient at time of initial evaluation include: stairs to enter home, inability to ambulate household distances, inability to navigate community distances, limited home support, positive fall history, inability to perform current job functions, inability to perform ADLS and inability to perform IADLS   Prior to admission, patient was independent with functional mobility without assistive device, independent with ADLS, independent with IADLS and works part time  Please find objective findings from Physical Therapy assessment regarding body systems outlined above with impairments and limitations including weakness, decreased ROM, impaired balance, decreased endurance, decreased activity tolerance, decreased functional mobility tolerance and fall risk  The Barthel Index was used as a functional outcome tool presenting with a score of Barthel Index Score: 70 today indicating moderate limitations of functional mobility and ADLS  Patient's clinical presentation is currently evolving as seen in patient's presentation of increased fall risk, new onset of impairment of functional mobility, decreased endurance and new onset of weakness   Pt would benefit from continued Physical Therapy treatment to address deficits as defined above and maximize level of functional mobility  As demonstrated by objective findings, the assigned level of complexity for this evaluation is moderate  The patient's AM-PAC Basic Mobility Inpatient Short Form Raw Score is 18  A Raw score of greater than 16 suggests the patient may benefit from discharge to home  Please also refer to the recommendation of the Physical Therapist for safe discharge planning  Barriers to Discharge None   Goals   Patient Goals "to go home"   STG Expiration Date 08/13/22   Short Term Goal #1 Pt will improve MMT in LE by 1/2 grade   Short Term Goal #2 Pt will perform all transfers mod I   LTG Expiration Date 08/20/22   Long Term Goal #1 Pt will be able to ambulate 200 feet with no AD   Long Term Goal #2 Pt will be independent with HEP   Plan   Treatment/Interventions ADL retraining;Functional transfer training;LE strengthening/ROM; Endurance training; Therapeutic exercise; Bed mobility;Gait training;Spoke to nursing   PT Frequency   (5x/wk)   Recommendation   PT Discharge Recommendation No rehabilitation needs   AM-PAC Basic Mobility Inpatient   Turning in Bed Without Bedrails 3   Lying on Back to Sitting on Edge of Flat Bed 3   Moving Bed to Chair 3   Standing Up From Chair 3   Walk in Room 3   Climb 3-5 Stairs 3   Basic Mobility Inpatient Raw Score 18   Basic Mobility Standardized Score 41 05   Highest Level Of Mobility   JH-HLM Goal 6: Walk 10 steps or more   JH-HLM Achieved 7: Walk 25 feet or more   Barthel Index   Feeding 10   Bathing 5   Grooming Score 5   Dressing Score 10   Bladder Score 10   Bowels Score 10   Toilet Use Score 10   Transfers (Bed/Chair) Score 10   Mobility (Level Surface) Score 0   Stairs Score 0   Barthel Index Score 70   End of Consult   Patient Position at End of Consult Supine; All needs within reach   [facility] Insurance Number  Georgina Reddy PT, DPT [id number]

## 2022-08-06 NOTE — ASSESSMENT & PLAN NOTE
Creatine 2 03, baseline 1 5   Suspect due to dehydration   Start IVFs   Avoid nephrotoxins, hold valsartan   Monitor with BMP    8/5 Cr down to 1 44, continue to monitor   8/6 Creatinine 1 24 today  Will continue to monitor

## 2022-08-06 NOTE — PLAN OF CARE
Problem: SAFETY ADULT  Goal: Patient will remain free of falls  Description: INTERVENTIONS:  - Educate patient/family on patient safety including physical limitations  - Instruct patient to call for assistance with activity   - Consult OT/PT to assist with strengthening/mobility   - Keep Call bell within reach  - Keep bed low and locked with side rails adjusted as appropriate  - Keep care items and personal belongings within reach  - Initiate and maintain comfort rounds  - Make Fall Risk Sign visible to staff  - Offer Toileting every 2 Hours, in advance of need  - Initiate/Maintain bed alarm  -   - Apply yellow socks and bracelet for high fall risk patients  - Consider moving patient to room near nurses station  Outcome: Progressing     Problem: METABOLIC, FLUID AND ELECTROLYTES - ADULT  Goal: Electrolytes maintained within normal limits  Description: INTERVENTIONS:  - Monitor labs and assess patient for signs and symptoms of electrolyte imbalances  - Administer electrolyte replacement as ordered  - Monitor response to electrolyte replacements, including repeat lab results as appropriate  - Instruct patient on fluid and nutrition as appropriate  Outcome: Progressing  Goal: Glucose maintained within target range  Description: INTERVENTIONS:  - Monitor Blood Glucose as ordered  - Assess for signs and symptoms of hyperglycemia and hypoglycemia  - Administer ordered medications to maintain glucose within target range  - Assess nutritional intake and initiate nutrition service referral as needed  Outcome: Progressing     Problem: CARDIOVASCULAR - ADULT  Goal: Maintains optimal cardiac output and hemodynamic stability  Description: INTERVENTIONS:  - Monitor I/O, vital signs and rhythm  - Monitor for S/S and trends of decreased cardiac output  - Administer and titrate ordered vasoactive medications to optimize hemodynamic stability  - Assess quality of pulses, skin color and temperature  - Assess for signs of decreased coronary artery perfusion  - Instruct patient to report change in severity of symptoms  Outcome: Progressing  Goal: Absence of cardiac dysrhythmias or at baseline rhythm  Description: INTERVENTIONS:  - Continuous cardiac monitoring, vital signs, obtain 12 lead EKG if ordered  - Administer antiarrhythmic and heart rate control medications as ordered  - Monitor electrolytes and administer replacement therapy as ordered  Outcome: Progressing     Problem: Potential for Falls  Goal: Patient will remain free of falls  Description: INTERVENTIONS:  - Educate patient/family on patient safety including physical limitations  - Instruct patient to call for assistance with activity   - Consult OT/PT to assist with strengthening/mobility   - Keep Call bell within reach  - Keep bed low and locked with side rails adjusted as appropriate  - Keep care items and personal belongings within reach  - Initiate and maintain comfort rounds  - Make Fall Risk Sign visible to staff  - Offer Toileting every 2 Hours, in advance of need  - Initiate/Maintain bed alarm  -   - Apply yellow socks and bracelet for high fall risk patients  - Consider moving patient to room near nurses station  Outcome: Progressing

## 2022-08-06 NOTE — PLAN OF CARE
Problem: SAFETY ADULT  Goal: Patient will remain free of falls  Description: INTERVENTIONS:  - Educate patient/family on patient safety including physical limitations  - Instruct patient to call for assistance with activity   - Consult OT/PT to assist with strengthening/mobility   - Keep Call bell within reach  - Keep bed low and locked with side rails adjusted as appropriate  - Keep care items and personal belongings within reach  - Initiate and maintain comfort rounds  - Make Fall Risk Sign visible to staff  - Offer Toileting every 2 Hours, in advance of need  - Initiate/Maintain bed alarm  -   - Apply yellow socks and bracelet for high fall risk patients  - Consider moving patient to room near nurses station  Outcome: Progressing     Problem: CARDIOVASCULAR - ADULT  Goal: Maintains optimal cardiac output and hemodynamic stability  Description: INTERVENTIONS:  - Monitor I/O, vital signs and rhythm  - Monitor for S/S and trends of decreased cardiac output  - Administer and titrate ordered vasoactive medications to optimize hemodynamic stability  - Assess quality of pulses, skin color and temperature  - Assess for signs of decreased coronary artery perfusion  - Instruct patient to report change in severity of symptoms  Outcome: Progressing     Problem: MOBILITY - ADULT  Goal: Maintain or return to baseline ADL function  Description: INTERVENTIONS:  -  Assess patient's ability to carry out ADLs; assess patient's baseline for ADL function and identify physical deficits which impact ability to perform ADLs (bathing, care of mouth/teeth, toileting, grooming, dressing, etc )  - Assess/evaluate cause of self-care deficits   - Assess range of motion  - Assess patient's mobility; develop plan if impaired  - Assess patient's need for assistive devices and provide as appropriate  - Encourage maximum independence but intervene and supervise when necessary  - Involve family in performance of ADLs  - Assess for home care needs following discharge   - Consider OT consult to assist with ADL evaluation and planning for discharge  - Provide patient education as appropriate  Outcome: Progressing     Problem: Potential for Falls  Goal: Patient will remain free of falls  Description: INTERVENTIONS:  - Educate patient/family on patient safety including physical limitations  - Instruct patient to call for assistance with activity   - Consult OT/PT to assist with strengthening/mobility   - Keep Call bell within reach  - Keep bed low and locked with side rails adjusted as appropriate  - Keep care items and personal belongings within reach  - Initiate and maintain comfort rounds  - Make Fall Risk Sign visible to staff  - Offer Toileting every 2 Hours, in advance of need  - Initiate/Maintain bed alarm  -   - Apply yellow socks and bracelet for high fall risk patients  - Consider moving patient to room near nurses station  Outcome: Progressing

## 2022-08-06 NOTE — ASSESSMENT & PLAN NOTE
Troponin initially elevated to 77, repeat 68  · History of multiple stents in place per patient  · EKG NSR, HR 80  · No chest pain  · Continue aspirin, carvedilol  · Monitor on telemetry  · Cardiology consult    8/5 No chest pain, monitor tele, follow cardio consult  8/6 Cardiology following  Patient with some burning chest discomfort today which is likely secondary to GERD  Protonix and Maalox ordered  Check EKG

## 2022-08-06 NOTE — UTILIZATION REVIEW
Continued Stay Review    Date:  8/6/22                         Current Patient Class:  inpatient   Current Level of Care: acute    HPI:72 y o  female initially admitted on  8/4/22    Assessment/Plan:   Non MI minimal troponin elevation in the setting of acute kidney injury- she currently denies having chest heaviness  She has a primary cardiologist which she follows closely with  She sick declines any further workup  We will keep her on optimal medical therapy     Elevated BP Diovan not on formulary [place] tart Cozaar 50mg q d and continue monitor BP with renal function    Acute kidney injury currently on IV fluids       Poorly controlled diabetes mellitus with A1c 13 5       Urinary tract infection currently receiving IV ceftriaxone   Vital Signs:   08/06/22 1450 -- 63 -- 197/90 Abnormal  126 -- -- --   08/06/22 14:47:49 -- 65 -- 197/90 Abnormal  126 98 % -- --   08/06/22 08:19:42 97 8 °F (36 6 °C) 70 18 166/61 96 98 % None (Room air) Lying   08/06/22 08:19:22 97 8 °F (36 6 °C) 70 -- 166/61 96 98 %         Pertinent Labs/Diagnostic Results:   Results from last 7 days   Lab Units 08/04/22 2054   SARS-COV-2  Negative     Results from last 7 days   Lab Units 08/06/22  0621 08/05/22  0607 08/04/22 2011   WBC Thousand/uL 7 45 10 04 12 68*   HEMOGLOBIN g/dL 12 8 13 7 17 1*   HEMATOCRIT % 38 7 40 4 49 9*   PLATELETS Thousands/uL 131* 145* 229   NEUTROS ABS Thousands/µL 4 46  --  9 21*     Results from last 7 days   Lab Units 08/06/22  0621 08/05/22  1408 08/05/22  0607 08/05/22  0207 08/04/22 2011   SODIUM mmol/L 136 133* 134* 130* 126*   POTASSIUM mmol/L 4 2 4 3 3 4* 3 5 4 0   CHLORIDE mmol/L 106 102 100 97 87*   CO2 mmol/L 24 25 27 24 25   ANION GAP mmol/L 6 6 7 9 14*   BUN mg/dL 40* 56* 62* 69* 77*   CREATININE mg/dL 1 24 1 44* 1 44* 1 56* 2 03*   EGFR ml/min/1 73sq m 44 36 36 33 24   CALCIUM mg/dL 7 7* 8 2* 7 8* 7 9* 9 1   MAGNESIUM mg/dL  --  2 4  --   --   --      Results from last 7 days   Lab Units 08/06/22  0621 08/04/22 2011   AST U/L 43 32   ALT U/L 32 23   ALK PHOS U/L 75 128*   TOTAL PROTEIN g/dL 5 2* 7 3   ALBUMIN g/dL 2 2* 3 1*   TOTAL BILIRUBIN mg/dL 0 28 0 72     Results from last 7 days   Lab Units 08/06/22  1132 08/06/22  0707 08/05/22  2128 08/05/22  1629 08/05/22  1150 08/05/22  1003 08/05/22  0756 08/05/22  0558 08/05/22  0352 08/05/22  0149 08/05/22  0008 08/04/22  2259   POC GLUCOSE mg/dl 177* 111 144* 207* 232* 252* 138 139 166* 227* 350* 395*     Results from last 7 days   Lab Units 08/06/22  0621 08/05/22  1408 08/05/22  0607 08/05/22  0207 08/04/22 2011   GLUCOSE RANDOM mg/dL 123 183* 143* 253* 514*     Results from last 7 days   Lab Units 08/05/22  0607   HEMOGLOBIN A1C % 13 5*   EAG mg/dl 341       Results from last 7 days   Lab Units 08/05/22  0006 08/04/22  2210 08/04/22 2012   HS TNI 0HR ng/L  --   --  77*   HS TNI 2HR ng/L  --  68*  --    HSTNI D2 ng/L  --  -9  --    HS TNI 4HR ng/L 65*  --   --    HSTNI D4 ng/L -12  --   --      Results from last 7 days   Lab Units 08/04/22 2210   HEP B S AG  Non-reactive   HEP C AB  Non-reactive     Results from last 7 days   Lab Units 08/04/22 2011   LIPASE u/L 205         Results from last 7 days   Lab Units 08/05/22  1342   SALMONELLA SP PCR  None Detected   SHIGELLA SP/ENTEROINVASIVE E  COLI (EIEC)  None Detected   CAMPYLOBACTER SP (JEJUNI AND COLI)  None Detected   SHIGA TOXIN 1/SHIGA TOXIN 2  None Detected         Results from last 7 days   Lab Units 08/04/22 2219 08/04/22 2054   BLOOD CULTURE   --  No Growth at 24 hrs  No Growth at 24 hrs     URINE CULTURE  Culture too young- will reincubate  --        gmf  Bmp cbc diff mg   Hs troponin stat  Medications:   Scheduled Medications:  aspirin, 81 mg, Oral, Daily  atorvastatin, 40 mg, Oral, Daily  carvedilol, 25 mg, Oral, BID  cefTRIAXone, 1,000 mg, Intravenous, Q24H  cloNIDine, 0 1 mg, Oral, BID  clopidogrel, 75 mg, Oral, Daily  heparin (porcine), 5,000 Units, Subcutaneous, Q8H [facility]  insulin glargine, 30 Units, Subcutaneous, Q12H [facility]  insulin lispro, 2-12 Units, Subcutaneous, TID AC  losartan, 50 mg, Oral, Daily  oxybutynin, 10 mg, Oral, Daily  pantoprazole, 40 mg, Oral, Early Morning  pregabalin, 75 mg, Oral, BID      Continuous IV Infusions:     PRN Meds:  acetaminophen, 650 mg, Oral, Q6H PRN  aluminum-magnesium hydroxide-simethicone, 30 mL, Oral, Q4H PRN  hydrALAZINE, 10 mg, Intravenous, Q6H PRN  ondansetron, 4 mg, Intravenous, Q6H PRN        Discharge Plan: tbd    Network Utilization Review Department  ATTENTION: Please call with any questions or concerns to [phone] and carefully listen to the prompts so that you are directed to the right person  All voicemails are confidential   Marilee Raza all requests for admission clinical reviews, approved or denied determinations and any other requests to dedicated fax number below belonging to the campus where the patient is receiving treatment   List of dedicated fax numbers for the Facilities:  [address] DENIALS (Administrative/Medical Necessity) [phone]   [address] (Maternity/NICU/Pediatrics) [phone] [address]  [address] [facility] [place] [id number] [place] [address] [address] [facility] [phone]

## 2022-08-06 NOTE — PROGRESS NOTES
Noemi 47  Progress Note - Raghav Mendoza 1950, 81 y o  female MRN: [id number]  Unit/Bed#: [address] [place] [address] Encounter: [id number]  Primary Care Provider: Makeda Earl MD   Date and time admitted to hospital: 8/4/2022  7:47 PM    LATASHA (acute kidney injury) [facility])  Assessment & Plan  Creatine 2 03, baseline 1 5   Suspect due to dehydration   Start IVFs   Avoid nephrotoxins, hold valsartan   Monitor with BMP    8/5 Cr down to 1 44, continue to monitor   8/6 Creatinine 1 24 today  Will continue to monitor  UTI (urinary tract infection)  Assessment & Plan  UA with moderate bacteria, trace leukocytes  · Continue ceftriaxone  · Urine culture pending    Elevated troponin  Assessment & Plan  Troponin initially elevated to 77, repeat 68  · History of multiple stents in place per patient  · EKG NSR, HR 80  · No chest pain  · Continue aspirin, carvedilol  · Monitor on telemetry  · Cardiology consult    8/5 No chest pain, monitor tele, follow cardio consult  8/6 Cardiology following  Patient with some burning chest discomfort today which is likely secondary to GERD  Protonix and Maalox ordered  Pure hypercholesterolemia  Assessment & Plan  Continue atorvastatin  Ulcerative colitis ([facility] [address] )  Assessment & Plan  History of ulcerative colitis not on any medication currently  Hypertension  Assessment & Plan  Continue carvedilol  Losartan ordered  Emphysema, unspecified ([facility] [address] )  Assessment & Plan  No evidence of acute exacerbation  Will continue to monitor      * Type 2 diabetes mellitus with hyperglycemia, with long-term current use of insulin [facility])  Assessment & Plan  Lab Results   Component Value Date    HGBA1C 13 5 (H) 08/05/2022       Recent Labs     08/05/22  1629 08/05/22  2128 08/06/22  0707 08/06/22  1132   POCGLU 207* 144* 111 177*       Blood Sugar Average: Last 72 hrs:  (P) 230 [id number]     Patient presenting with generalized weakness, fatigue, blood sugar 514  · Patient on Lantus 33 units b i d  at home, did not take today  · , AG 14, serum bicarb 25   Check HgA1c   Diabetic diet   Continue insulin drip   IVFs   Recheck BMP     AG 7, BS in the 100s, DC insulin gtt, bridge to long acting insulin patients home dose,  patient tolerating PO diet, continue monitoring closely    Continue to monitor blood sugar levels  VTE Pharmacologic Prophylaxis: VTE Score: 4 Heparin prophylaxis  Code Status: Level 1 - Full Code    Subjective:   Patient seen and examined at bedside  No acute events overnight  Patient reports some burning chest discomfort this morning  Denies any abdominal pain or shortness of breath  Objective:     Vitals:   Temp (24hrs), Av 8 °F (36 6 °C), Min:97 4 °F (36 3 °C), Max:98 7 °F (37 1 °C)    Temp:  [97 4 °F (36 3 °C)-98 7 °F (37 1 °C)] 97 8 °F (36 6 °C)  HR:  [57-71] 63  Resp:  [18-20] 18  BP: (121-197)/(56-90) 197/90  SpO2:  [95 %-98 %] 98 %  Body mass index is 27 21 kg/m²  Input and Output Summary (last 24 hours):   No intake or output data in the 24 hours ending 22 1503    Physical Exam:   Physical Exam  HENT:      Head: Normocephalic  Mouth/Throat:      Mouth: Mucous membranes are moist    Eyes:      Extraocular Movements: Extraocular movements intact  Cardiovascular:      Rate and Rhythm: Normal rate  Pulmonary:      Effort: Pulmonary effort is normal    Abdominal:      Palpations: Abdomen is soft  Tenderness: There is no abdominal tenderness  Skin:     General: Skin is warm  Neurological:      Mental Status: She is alert and oriented to person, place, and time     Psychiatric:         Mood and Affect: Mood normal          Behavior: Behavior normal        Additional Data:     Labs:  Results from last 7 days   Lab Units 22  0621   WBC Thousand/uL 7 45   HEMOGLOBIN g/dL 12 8   HEMATOCRIT % 38 7   PLATELETS Thousands/uL 131*   NEUTROS PCT % 59   LYMPHS PCT % 25   MONOS PCT % 12   EOS PCT % 3     Results from last 7 days   Lab Units 08/06/22  0621   SODIUM mmol/L 136   POTASSIUM mmol/L 4 2   CHLORIDE mmol/L 106   CO2 mmol/L 24   BUN mg/dL 40*   CREATININE mg/dL 1 24   ANION GAP mmol/L 6   CALCIUM mg/dL 7 7*   ALBUMIN g/dL 2 2*   TOTAL BILIRUBIN mg/dL 0 28   ALK PHOS U/L 75   ALT U/L 32   AST U/L 43   GLUCOSE RANDOM mg/dL 123         Results from last 7 days   Lab Units 08/06/22  1132 08/06/22  0707 08/05/22  2128 08/05/22  1629 08/05/22  1150 08/05/22  1003 08/05/22  0756 08/05/22  0558 08/05/22  0352 08/05/22  0149 08/05/22  0008 08/04/22  2259   POC GLUCOSE mg/dl 177* 111 144* 207* 232* 252* 138 139 166* 227* 350* 395*     Results from last 7 days   Lab Units 08/05/22  0607   HEMOGLOBIN A1C % 13 5*     Lines/Drains:  Invasive Devices  Report    Peripheral Intravenous Line  Duration           Peripheral IV 08/04/22 Left Forearm 1 day              Imaging: Reviewed  Recent Cultures (last 7 days):   Results from last 7 days   Lab Units 08/04/22  2219 08/04/22 2054   BLOOD CULTURE   --  No Growth at 24 hrs  No Growth at 24 hrs     URINE CULTURE  Culture too young- will reincubate  --        Last 24 Hours Medication List:   Current Facility-Administered Medications   Medication Dose Route Frequency Provider Last Rate    acetaminophen  650 mg Oral Q6H PRN Matt Stinson PA-C      aluminum-magnesium hydroxide-simethicone  30 mL Oral Q4H PRN Hussain Talbot MD      aspirin  81 mg Oral Daily Matt Stinson PA-C      atorvastatin  40 mg Oral Daily Matt Stinson PA-C      carvedilol  25 mg Oral BID Matt KIA Stinson      cefTRIAXone  1,000 mg Intravenous Q24H Jackie KIA Meza 1,000 mg (08/05/22 2208)    cloNIDine  0 1 mg Oral BID Matt Stinson PA-C      clopidogrel  75 mg Oral Daily Jackie KIA Meza      heparin (porcine)  5,000 Units Subcutaneous Q8H [facility] Jackie Meza PA-C      hydrALAZINE  10 mg Intravenous Q6H PRN Hussain Talbot MD      insulin glargine  30 Units Subcutaneous Q12H [address] Reyes Groves MD      insulin lispro  2-12 Units Subcutaneous TID [facility] Reyes Groves MD      losartan  50 mg Oral Daily CYNDIE Poon      ondansetron  4 mg Intravenous Q6H PRN Corey Donaldson PA-C      oxybutynin  10 mg Oral Daily Corey Donaldson PA-C      pantoprazole  40 mg Oral Early Morning Dilip Pina MD      pregabalin  75 mg Oral BID Corey Donaldson PA-C          Today, Patient Was Seen By: Dilip Pina MD    **Please Note: This note may have been constructed using a voice recognition system  **

## 2022-08-06 NOTE — ASSESSMENT & PLAN NOTE
Addended by: KIRA BRIGHT on: 1/25/2021 09:10 AM     Modules accepted: Orders     Continue carvedilol  Losartan ordered

## 2022-08-07 PROBLEM — K21.9 GERD (GASTROESOPHAGEAL REFLUX DISEASE): Status: ACTIVE | Noted: 2022-08-07

## 2022-08-07 PROBLEM — R78.81 BACTEREMIA: Status: ACTIVE | Noted: 2022-08-07

## 2022-08-07 LAB
ANION GAP SERPL CALCULATED.3IONS-SCNC: 4 MMOL/L (ref 4–13)
ATRIAL RATE: 64 BPM
ATRIAL RATE: 80 BPM
BACTERIA UR CULT: NORMAL
BASOPHILS # BLD AUTO: 0.01 THOUSANDS/ΜL (ref 0–0.1)
BASOPHILS NFR BLD AUTO: 0 % (ref 0–1)
BUN SERPL-MCNC: 22 MG/DL (ref 5–25)
CALCIUM SERPL-MCNC: 7.8 MG/DL (ref 8.3–10.1)
CHLORIDE SERPL-SCNC: 102 MMOL/L (ref 96–108)
CO2 SERPL-SCNC: 27 MMOL/L (ref 21–32)
CREAT SERPL-MCNC: 1.15 MG/DL (ref 0.6–1.3)
EOSINOPHIL # BLD AUTO: 0.17 THOUSAND/ΜL (ref 0–0.61)
EOSINOPHIL NFR BLD AUTO: 2 % (ref 0–6)
ERYTHROCYTE [DISTWIDTH] IN BLOOD BY AUTOMATED COUNT: 12.4 % (ref 11.6–15.1)
GFR SERPL CREATININE-BSD FRML MDRD: 48 ML/MIN/1.73SQ M
GLUCOSE SERPL-MCNC: 118 MG/DL (ref 65–140)
GLUCOSE SERPL-MCNC: 147 MG/DL (ref 65–140)
GLUCOSE SERPL-MCNC: 180 MG/DL (ref 65–140)
GLUCOSE SERPL-MCNC: 190 MG/DL (ref 65–140)
GLUCOSE SERPL-MCNC: 242 MG/DL (ref 65–140)
GLUCOSE SERPL-MCNC: 270 MG/DL (ref 65–140)
GLUCOSE SERPL-MCNC: 302 MG/DL (ref 65–140)
GLUCOSE SERPL-MCNC: 58 MG/DL (ref 65–140)
GLUCOSE SERPL-MCNC: 79 MG/DL (ref 65–140)
GLUCOSE SERPL-MCNC: 85 MG/DL (ref 65–140)
GLUCOSE SERPL-MCNC: 91 MG/DL (ref 65–140)
GLUCOSE SERPL-MCNC: 96 MG/DL (ref 65–140)
HCT VFR BLD AUTO: 38 % (ref 34.8–46.1)
HGB BLD-MCNC: 12.7 G/DL (ref 11.5–15.4)
HIV 1+2 AB+HIV1 P24 AG SERPL QL IA: NORMAL
HIV1 P24 AG SER QL: NORMAL
IMM GRANULOCYTES # BLD AUTO: 0.07 THOUSAND/UL (ref 0–0.2)
IMM GRANULOCYTES NFR BLD AUTO: 1 % (ref 0–2)
LYMPHOCYTES # BLD AUTO: 1.84 THOUSANDS/ΜL (ref 0.6–4.47)
LYMPHOCYTES NFR BLD AUTO: 25 % (ref 14–44)
MAGNESIUM SERPL-MCNC: 2.1 MG/DL (ref 1.6–2.6)
MCH RBC QN AUTO: 28.6 PG (ref 26.8–34.3)
MCHC RBC AUTO-ENTMCNC: 33.4 G/DL (ref 31.4–37.4)
MCV RBC AUTO: 86 FL (ref 82–98)
MONOCYTES # BLD AUTO: 0.77 THOUSAND/ΜL (ref 0.17–1.22)
MONOCYTES NFR BLD AUTO: 10 % (ref 4–12)
NEUTROPHILS # BLD AUTO: 4.51 THOUSANDS/ΜL (ref 1.85–7.62)
NEUTS SEG NFR BLD AUTO: 62 % (ref 43–75)
NRBC BLD AUTO-RTO: 0 /100 WBCS
P AXIS: 45 DEGREES
P AXIS: 54 DEGREES
PLATELET # BLD AUTO: 137 THOUSANDS/UL (ref 149–390)
PMV BLD AUTO: 10.4 FL (ref 8.9–12.7)
POTASSIUM SERPL-SCNC: 4 MMOL/L (ref 3.5–5.3)
PR INTERVAL: 124 MS
PR INTERVAL: 140 MS
QRS AXIS: 20 DEGREES
QRS AXIS: 39 DEGREES
QRSD INTERVAL: 80 MS
QRSD INTERVAL: 92 MS
QT INTERVAL: 422 MS
QT INTERVAL: 424 MS
QTC INTERVAL: 437 MS
QTC INTERVAL: 486 MS
RBC # BLD AUTO: 4.44 MILLION/UL (ref 3.81–5.12)
SODIUM SERPL-SCNC: 133 MMOL/L (ref 135–147)
T WAVE AXIS: 135 DEGREES
T WAVE AXIS: 77 DEGREES
VENTRICULAR RATE: 64 BPM
VENTRICULAR RATE: 80 BPM
WBC # BLD AUTO: 7.37 THOUSAND/UL (ref 4.31–10.16)

## 2022-08-07 PROCEDURE — 82948 REAGENT STRIP/BLOOD GLUCOSE: CPT

## 2022-08-07 PROCEDURE — 83735 ASSAY OF MAGNESIUM: CPT | Performed by: FAMILY MEDICINE

## 2022-08-07 PROCEDURE — 93010 ELECTROCARDIOGRAM REPORT: CPT | Performed by: INTERNAL MEDICINE

## 2022-08-07 PROCEDURE — 99232 SBSQ HOSP IP/OBS MODERATE 35: CPT | Performed by: FAMILY MEDICINE

## 2022-08-07 PROCEDURE — 85025 COMPLETE CBC W/AUTO DIFF WBC: CPT | Performed by: FAMILY MEDICINE

## 2022-08-07 PROCEDURE — 80048 BASIC METABOLIC PNL TOTAL CA: CPT | Performed by: FAMILY MEDICINE

## 2022-08-07 RX ORDER — INSULIN GLARGINE 100 [IU]/ML
15 INJECTION, SOLUTION SUBCUTANEOUS EVERY 12 HOURS SCHEDULED
Status: DISCONTINUED | OUTPATIENT
Start: 2022-08-07 | End: 2022-08-08

## 2022-08-07 RX ORDER — INSULIN GLARGINE 100 [IU]/ML
20 INJECTION, SOLUTION SUBCUTANEOUS EVERY 12 HOURS SCHEDULED
Status: DISCONTINUED | OUTPATIENT
Start: 2022-08-07 | End: 2022-08-07

## 2022-08-07 RX ORDER — DEXTROSE MONOHYDRATE 25 G/50ML
25 INJECTION, SOLUTION INTRAVENOUS ONCE
Status: DISCONTINUED | OUTPATIENT
Start: 2022-08-07 | End: 2022-08-07

## 2022-08-07 RX ADMIN — INSULIN LISPRO 2 UNITS: 100 INJECTION, SOLUTION INTRAVENOUS; SUBCUTANEOUS at 11:30

## 2022-08-07 RX ADMIN — CARVEDILOL 25 MG: 25 TABLET, FILM COATED ORAL at 08:04

## 2022-08-07 RX ADMIN — PREGABALIN 75 MG: 75 CAPSULE ORAL at 17:09

## 2022-08-07 RX ADMIN — CLONIDINE HYDROCHLORIDE 0.1 MG: 0.1 TABLET ORAL at 22:04

## 2022-08-07 RX ADMIN — CLOPIDOGREL BISULFATE 75 MG: 75 TABLET ORAL at 08:04

## 2022-08-07 RX ADMIN — ASPIRIN 81 MG: 81 TABLET, COATED ORAL at 08:04

## 2022-08-07 RX ADMIN — ATORVASTATIN CALCIUM 40 MG: 40 TABLET, FILM COATED ORAL at 08:04

## 2022-08-07 RX ADMIN — ALUMINA, MAGNESIA, AND SIMETHICONE ORAL SUSPENSION REGULAR STRENGTH 30 ML: 1200; 1200; 120 SUSPENSION ORAL at 05:08

## 2022-08-07 RX ADMIN — CARVEDILOL 25 MG: 25 TABLET, FILM COATED ORAL at 22:04

## 2022-08-07 RX ADMIN — HYDRALAZINE HYDROCHLORIDE 10 MG: 20 INJECTION INTRAMUSCULAR; INTRAVENOUS at 19:37

## 2022-08-07 RX ADMIN — HYDRALAZINE HYDROCHLORIDE 10 MG: 20 INJECTION INTRAMUSCULAR; INTRAVENOUS at 08:04

## 2022-08-07 RX ADMIN — Medication 16 G: at 02:51

## 2022-08-07 RX ADMIN — OXYBUTYNIN 10 MG: 5 TABLET, FILM COATED, EXTENDED RELEASE ORAL at 08:04

## 2022-08-07 RX ADMIN — INSULIN GLARGINE 30 UNITS: 100 INJECTION, SOLUTION SUBCUTANEOUS at 08:04

## 2022-08-07 RX ADMIN — HEPARIN SODIUM 5000 UNITS: 5000 INJECTION INTRAVENOUS; SUBCUTANEOUS at 14:02

## 2022-08-07 RX ADMIN — INSULIN LISPRO 8 UNITS: 100 INJECTION, SOLUTION INTRAVENOUS; SUBCUTANEOUS at 08:03

## 2022-08-07 RX ADMIN — PANTOPRAZOLE SODIUM 40 MG: 40 TABLET, DELAYED RELEASE ORAL at 05:08

## 2022-08-07 RX ADMIN — ALUMINA, MAGNESIA, AND SIMETHICONE ORAL SUSPENSION REGULAR STRENGTH 30 ML: 1200; 1200; 120 SUSPENSION ORAL at 11:30

## 2022-08-07 RX ADMIN — ALUMINA, MAGNESIA, AND SIMETHICONE ORAL SUSPENSION REGULAR STRENGTH 30 ML: 1200; 1200; 120 SUSPENSION ORAL at 22:27

## 2022-08-07 RX ADMIN — CLONIDINE HYDROCHLORIDE 0.1 MG: 0.1 TABLET ORAL at 08:04

## 2022-08-07 RX ADMIN — HEPARIN SODIUM 5000 UNITS: 5000 INJECTION INTRAVENOUS; SUBCUTANEOUS at 22:04

## 2022-08-07 RX ADMIN — HEPARIN SODIUM 5000 UNITS: 5000 INJECTION INTRAVENOUS; SUBCUTANEOUS at 05:08

## 2022-08-07 RX ADMIN — CEFTRIAXONE 1000 MG: 1 INJECTION, SOLUTION INTRAVENOUS at 22:27

## 2022-08-07 RX ADMIN — LOSARTAN POTASSIUM 50 MG: 50 TABLET, FILM COATED ORAL at 08:04

## 2022-08-07 RX ADMIN — PREGABALIN 75 MG: 75 CAPSULE ORAL at 08:04

## 2022-08-07 NOTE — ASSESSMENT & PLAN NOTE
Lab Results   Component Value Date    HGBA1C 13 5 (H) 08/05/2022       Recent Labs     08/07/22  0342 08/07/22  0604 08/07/22  0754 08/07/22  1122   POCGLU 118 242* 302* 180*       Blood Sugar Average: Last 72 hrs:  (P) 325 [id number]     Patient presenting with generalized weakness, fatigue, blood sugar 514  · Patient on Lantus 33 units b i d  at home, did not take today  · , AG 14, serum bicarb 25   Check HgA1c   Diabetic diet   Continue insulin drip   IVFs   Recheck BMP    Blood sugar levels labile  Hypoglycemic this morning  Will decrease Lantus to 15 units Q12H  Endocrinology consulted

## 2022-08-07 NOTE — PLAN OF CARE
Pt is currently tolerating some food/liquids but only small amounts, she does not want any supplements, no changes desired, continue to follow for progress per protocol I have reviewed and confirmed nurses' notes...

## 2022-08-07 NOTE — ASSESSMENT & PLAN NOTE
UA with moderate bacteria, trace leukocytes  · Continue ceftriaxone  · Urine culture growing greater than 100,000 mixed contaminants

## 2022-08-07 NOTE — PLAN OF CARE
Problem: SAFETY ADULT  Goal: Patient will remain free of falls  Description: INTERVENTIONS:  - Educate patient/family on patient safety including physical limitations  - Instruct patient to call for assistance with activity   - Consult OT/PT to assist with strengthening/mobility   - Keep Call bell within reach  - Keep bed low and locked with side rails adjusted as appropriate  - Keep care items and personal belongings within reach  - Initiate and maintain comfort rounds  - Make Fall Risk Sign visible to staff  - Offer Toileting every 2 Hours, in advance of need  - Initiate/Maintain bed alarm  -   - Apply yellow socks and bracelet for high fall risk patients  - Consider moving patient to room near nurses station  Outcome: Progressing     Problem: MOBILITY - ADULT  Goal: Maintain or return to baseline ADL function  Description: INTERVENTIONS:  -  Assess patient's ability to carry out ADLs; assess patient's baseline for ADL function and identify physical deficits which impact ability to perform ADLs (bathing, care of mouth/teeth, toileting, grooming, dressing, etc )  - Assess/evaluate cause of self-care deficits   - Assess range of motion  - Assess patient's mobility; develop plan if impaired  - Assess patient's need for assistive devices and provide as appropriate  - Encourage maximum independence but intervene and supervise when necessary  - Involve family in performance of ADLs  - Assess for home care needs following discharge   - Consider OT consult to assist with ADL evaluation and planning for discharge  - Provide patient education as appropriate  Outcome: Progressing     Problem: INFECTION - ADULT  Goal: Absence or prevention of progression during hospitalization  Description: INTERVENTIONS:  - Assess and monitor for signs and symptoms of infection  - Monitor lab/diagnostic results  - Administer medications as ordered  - Instruct and encourage patient and family to use good hand hygiene technique  Outcome: Progressing

## 2022-08-07 NOTE — ASSESSMENT & PLAN NOTE
Blood sugar levels have been labile  Will decrease lantus to 20 units Q12  Patient hypoglycemic this morning  Will also consult endocrinology

## 2022-08-07 NOTE — ASSESSMENT & PLAN NOTE
Troponin initially elevated to 77, repeat 68  · History of multiple stents in place per patient  · EKG NSR, HR 80  · No chest pain  · Continue aspirin, carvedilol  · Monitor on telemetry  · Cardiology consult    8/7 Cardiology following  Will continue to monitor

## 2022-08-07 NOTE — PROGRESS NOTES
Noemi 47  Progress Note - Julissa Argue 1950, 81 y o  female MRN: [id number]  Unit/Bed#: [address] [address] Encounter: [id number]  Primary Care Provider: Adams Richard MD   Date and time admitted to hospital: 8/4/2022  7:47 PM    Bacteremia  Assessment & Plan  1/2 bottles growing gram positive cocci in cluster  Suspected contaminant  Repeat blood cultures pending  GERD (gastroesophageal reflux disease)  Assessment & Plan  Patient reports a lot of burning discomfort in her esophagus  Also reports that it feels like food is "getting stuck" in her stomach  Will consult GI  Hyperglycemia  Assessment & Plan  Blood sugar levels have been labile  Will decrease lantus to 20 units Q12  Patient hypoglycemic this morning  Will also consult endocrinology  Hypokalemia  Assessment & Plan  Potassium 4 0  Will continue to monitor  LATASHA (acute kidney injury) ([facility] [address] )  Assessment & Plan  Creatine 2 03 on admission  Noted to be 1 15 today  UTI (urinary tract infection)  Assessment & Plan  UA with moderate bacteria, trace leukocytes  · Continue ceftriaxone  · Urine culture growing greater than 100,000 mixed contaminants  Elevated troponin  Assessment & Plan  Troponin initially elevated to 77, repeat 68  · History of multiple stents in place per patient  · EKG NSR, HR 80  · No chest pain  · Continue aspirin, carvedilol  · Monitor on telemetry  · Cardiology consult    8/7 Cardiology following  Will continue to monitor  Pure hypercholesterolemia  Assessment & Plan  Continue atorvastatin  Ulcerative colitis ([facility] [address] )  Assessment & Plan  History of ulcerative colitis not on any medication currently  Hypertension  Assessment & Plan  Continue carvedilol, losartan and catapres in addition to as needed hydralazine  Emphysema, unspecified ([facility] [address] )  Assessment & Plan  No evidence of acute exacerbation  Will continue to monitor      * Type 2 diabetes mellitus with hyperglycemia, with long-term current use of insulin [facility]  Assessment & Plan  Lab Results   Component Value Date    HGBA1C 13 5 (H) 2022       Recent Labs     22  0342 22  0604 22  0754 22  1122   POCGLU 118 242* 302* 180*       Blood Sugar Average: Last 72 hrs:  (P) 536 [id number]     Patient presenting with generalized weakness, fatigue, blood sugar 514  · Patient on Lantus 33 units b i d  at home, did not take today  · , AG 14, serum bicarb 25   Check HgA1c   Diabetic diet   Continue insulin drip   IVFs   Recheck BMP    Blood sugar levels labile  Hypoglycemic this morning  Will decrease Lantus to 15 units Q12H  Endocrinology consulted  VTE Pharmacologic Prophylaxis: VTE Score: 4 Heparin prophylaxis  Patient Centered Rounds: I performed bedside rounds with nursing staff today  Discussions with Specialists or Other Care Team Provider: Yes  Education and Discussions with Family / Patient: Yes  Time Spent for Care: 30 minutes  More than 50% of total time spent on counseling and coordination of care as described above  Current Length of Stay: 3 day(s)  Current Patient Status: Inpatient   Certification Statement: The patient will continue to require additional inpatient hospital stay due to above  Discharge Plan: Home  Code Status: Level 1 - Full Code    Subjective:   Patient seen and examined at bedside  Blood sugar level low this morning  Patient reports that she is still having esophageal discomfort  Protonix has not helped  1/2 blood cultures also noted to be positive for gram positive cocci  Objective:     Vitals:   Temp (24hrs), Av 1 °F (36 7 °C), Min:97 7 °F (36 5 °C), Max:98 2 °F (36 8 °C)    Temp:  [97 7 °F (36 5 °C)-98 2 °F (36 8 °C)] 98 2 °F (36 8 °C)  HR:  [62-73] 73  Resp:  [16-18] 16  BP: (111-216)/(50-93) 150/63  SpO2:  [96 %-98 %] 96 %  Body mass index is 27 21 kg/m²       Input and Output Summary (last 24 hours):   No intake or output data in the 24 hours ending 08/07/22 1347    Physical Exam:   Physical Exam  HENT:      Head: Normocephalic  Mouth/Throat:      Mouth: Mucous membranes are moist    Eyes:      Extraocular Movements: Extraocular movements intact  Cardiovascular:      Rate and Rhythm: Normal rate  Pulmonary:      Effort: Pulmonary effort is normal  No respiratory distress  Abdominal:      Palpations: Abdomen is soft  Tenderness: There is no abdominal tenderness  Skin:     General: Skin is warm  Neurological:      Mental Status: She is alert and oriented to person, place, and time     Psychiatric:         Mood and Affect: Mood normal          Behavior: Behavior normal        Additional Data:     Labs:  Results from last 7 days   Lab Units 08/07/22  0746   WBC Thousand/uL 7 37   HEMOGLOBIN g/dL 12 7   HEMATOCRIT % 38 0   PLATELETS Thousands/uL 137*   NEUTROS PCT % 62   LYMPHS PCT % 25   MONOS PCT % 10   EOS PCT % 2     Results from last 7 days   Lab Units 08/07/22  0746 08/06/22  0621   SODIUM mmol/L 133* 136   POTASSIUM mmol/L 4 0 4 2   CHLORIDE mmol/L 102 106   CO2 mmol/L 27 24   BUN mg/dL 22 40*   CREATININE mg/dL 1 15 1 24   ANION GAP mmol/L 4 6   CALCIUM mg/dL 7 8* 7 7*   ALBUMIN g/dL  --  2 2*   TOTAL BILIRUBIN mg/dL  --  0 28   ALK PHOS U/L  --  75   ALT U/L  --  32   AST U/L  --  43   GLUCOSE RANDOM mg/dL 270* 123         Results from last 7 days   Lab Units 08/07/22  1122 08/07/22  0754 08/07/22  0604 08/07/22  0342 08/07/22  0315 08/07/22  0247 08/06/22  2144 08/06/22  1607 08/06/22  1132 08/06/22  0707 08/05/22  2128 08/05/22  1629   POC GLUCOSE mg/dl 180* 302* 242* 118 79 58* 125 113 177* 111 144* 207*     Results from last 7 days   Lab Units 08/05/22  0607   HEMOGLOBIN A1C % 13 5*     Results from last 7 days   Lab Units 08/06/22  2107   PROCALCITONIN ng/ml 0 07       Lines/Drains:  Invasive Devices  Report    Peripheral Intravenous Line  Duration           Peripheral IV 08/04/22 Left Forearm 2 days              Imaging: Reviewed  Recent Cultures (last 7 days):   Results from last 7 days   Lab Units 08/04/22 2219 08/04/22 2054   BLOOD CULTURE   --  Staphylococcus coagulase negative*  No Growth at 48 hrs  GRAM STAIN RESULT   --  Gram positive cocci in clusters*   URINE CULTURE  >100,000 cfu/ml   --        Last 24 Hours Medication List:   Current Facility-Administered Medications   Medication Dose Route Frequency Provider Last Rate    acetaminophen  650 mg Oral Q6H PRN Jackie VALERIA Meza-C      aluminum-magnesium hydroxide-simethicone  30 mL Oral Q4H PRN Darius Chopra MD      aspirin  81 mg Oral Daily Mendez Rodriguez PA-C      atorvastatin  40 mg Oral Daily Jackie VALERIA Meza-ANGÉLICA      carvedilol  25 mg Oral BID VALERIA Gentile-ANGÉLICA      cefTRIAXone  1,000 mg Intravenous Q24H Jackie VALERIA Meza-C 1,000 mg (08/06/22 2323)    cloNIDine  0 1 mg Oral BID Mendez Rodriguez PA-C      clopidogrel  75 mg Oral Daily Jackie Derrick PA-C      heparin (porcine)  5,000 Units Subcutaneous Q8H [address] Jackielucrecia Meza PA-C      hydrALAZINE  10 mg Intravenous Q6H PRN Darius Chopra MD      insulin glargine  15 Units Subcutaneous Q12H [address] Darius Chopra MD      insulin lispro  2-12 Units Subcutaneous TID [facility] Abril Elliott MD      losartan  50 mg Oral Daily CYNDIE Grossman      ondansetron  4 mg Intravenous Q6H PRN Mendez Rodriguez PA-C      oxybutynin  10 mg Oral Daily Mendez Rodriguez PA-C      pantoprazole  40 mg Oral Early Morning Darius Chopra MD      pregabalin  75 mg Oral BID Mendez Rodriguez PA-C          Today, Patient Was Seen By: Darius Chopra MD    **Please Note: This note may have been constructed using a voice recognition system  **

## 2022-08-07 NOTE — PLAN OF CARE
Problem: SAFETY ADULT  Goal: Patient will remain free of falls  Description: INTERVENTIONS:  - Educate patient/family on patient safety including physical limitations  - Instruct patient to call for assistance with activity   - Consult OT/PT to assist with strengthening/mobility   - Keep Call bell within reach  - Keep bed low and locked with side rails adjusted as appropriate  - Keep care items and personal belongings within reach  - Initiate and maintain comfort rounds  - Make Fall Risk Sign visible to staff  - Offer Toileting every 2 Hours, in advance of need  - Initiate/Maintain bed alarm  -   - Apply yellow socks and bracelet for high fall risk patients  - Consider moving patient to room near nurses station  8/7/2022 0938 by Sedrick Moy RN  Outcome: Progressing  8/7/2022 0937 by Sedrick Moy RN  Outcome: Progressing  Goal: Maintain or return to baseline ADL function  Description: INTERVENTIONS:  -  Assess patient's ability to carry out ADLs; assess patient's baseline for ADL function and identify physical deficits which impact ability to perform ADLs (bathing, care of mouth/teeth, toileting, grooming, dressing, etc )  - Assess/evaluate cause of self-care deficits   - Assess range of motion  - Assess patient's mobility; develop plan if impaired  - Assess patient's need for assistive devices and provide as appropriate  - Encourage maximum independence but intervene and supervise when necessary  - Involve family in performance of ADLs  - Assess for home care needs following discharge   - Consider OT consult to assist with ADL evaluation and planning for discharge  - Provide patient education as appropriate  8/7/2022 0938 by Sedrick Moy RN  Outcome: Progressing  8/7/2022 0937 by Sedrick Moy RN  Outcome: Progressing  Goal: Maintains/Returns to pre admission functional level  Description: INTERVENTIONS:  - Perform BMAT or MOVE assessment daily    - Set and communicate daily mobility goal to care team and patient/family/caregiver     - Collaborate with rehabilitation services on mobility goals if consulted  - Out of bed for toileting  - Record patient progress and toleration of activity level   8/7/2022 0938 by Kamari Pires RN  Outcome: Progressing  8/7/2022 0937 by Kamari Pires RN  Outcome: Progressing     Problem: METABOLIC, FLUID AND ELECTROLYTES - ADULT  Goal: Electrolytes maintained within normal limits  Description: INTERVENTIONS:  - Monitor labs and assess patient for signs and symptoms of electrolyte imbalances  - Administer electrolyte replacement as ordered  - Monitor response to electrolyte replacements, including repeat lab results as appropriate  - Instruct patient on fluid and nutrition as appropriate  8/7/2022 0938 by Kamari Pires RN  Outcome: Progressing  8/7/2022 0937 by Kamari Pires RN  Outcome: Progressing  Goal: Glucose maintained within target range  Description: INTERVENTIONS:  - Monitor Blood Glucose as ordered  - Assess for signs and symptoms of hyperglycemia and hypoglycemia  - Administer ordered medications to maintain glucose within target range  - Assess nutritional intake and initiate nutrition service referral as needed  8/7/2022 0938 by Kamari Pires RN  Outcome: Progressing  8/7/2022 0937 by Kamari Pires RN  Outcome: Progressing     Problem: CARDIOVASCULAR - ADULT  Goal: Maintains optimal cardiac output and hemodynamic stability  Description: INTERVENTIONS:  - Monitor I/O, vital signs and rhythm  - Monitor for S/S and trends of decreased cardiac output  - Administer and titrate ordered vasoactive medications to optimize hemodynamic stability  - Assess quality of pulses, skin color and temperature  - Assess for signs of decreased coronary artery perfusion  - Instruct patient to report change in severity of symptoms  8/7/2022 0938 by Kamari Pires RN  Outcome: Progressing  8/7/2022 0937 by Kamari Pires RN  Outcome: Progressing  Goal: Absence of cardiac dysrhythmias or at baseline rhythm  Description: INTERVENTIONS:  - Continuous cardiac monitoring, vital signs, obtain 12 lead EKG if ordered  - Administer antiarrhythmic and heart rate control medications as ordered  - Monitor electrolytes and administer replacement therapy as ordered  8/7/2022 0938 by Cecy Conner RN  Outcome: Progressing  8/7/2022 0937 by Cecy Conner RN  Outcome: Progressing     Problem: MOBILITY - ADULT  Goal: Maintain or return to baseline ADL function  Description: INTERVENTIONS:  -  Assess patient's ability to carry out ADLs; assess patient's baseline for ADL function and identify physical deficits which impact ability to perform ADLs (bathing, care of mouth/teeth, toileting, grooming, dressing, etc )  - Assess/evaluate cause of self-care deficits   - Assess range of motion  - Assess patient's mobility; develop plan if impaired  - Assess patient's need for assistive devices and provide as appropriate  - Encourage maximum independence but intervene and supervise when necessary  - Involve family in performance of ADLs  - Assess for home care needs following discharge   - Consider OT consult to assist with ADL evaluation and planning for discharge  - Provide patient education as appropriate  8/7/2022 0938 by Cecy Conner RN  Outcome: Progressing  8/7/2022 0937 by Cecy Conner RN  Outcome: Progressing  Goal: Maintains/Returns to pre admission functional level  Description: INTERVENTIONS:  - Perform BMAT or MOVE assessment daily    - Set and communicate daily mobility goal to care team and patient/family/caregiver     - Collaborate with rehabilitation services on mobility goals if consulted  - Out of bed for toileting  - Record patient progress and toleration of activity level   8/7/2022 0938 by Cecy Conner RN  Outcome: Progressing  8/7/2022 0937 by Cecy Conner RN  Outcome: Progressing     Problem: Potential for Falls  Goal: Patient will remain free of falls  Description: INTERVENTIONS:  - Educate patient/family on patient safety including physical limitations  - Instruct patient to call for assistance with activity   - Consult OT/PT to assist with strengthening/mobility   - Keep Call bell within reach  - Keep bed low and locked with side rails adjusted as appropriate  - Keep care items and personal belongings within reach  - Initiate and maintain comfort rounds  - Make Fall Risk Sign visible to staff  - Offer Toileting every 2 Hours, in advance of need  - Initiate/Maintain bed alarm  -   - Apply yellow socks and bracelet for high fall risk patients  - Consider moving patient to room near nurses station  8/7/2022 0938 by Geneva Howard RN  Outcome: Progressing  8/7/2022 0937 by Geneva Howard RN  Outcome: Progressing     Problem: INFECTION - ADULT  Goal: Absence or prevention of progression during hospitalization  Description: INTERVENTIONS:  - Assess and monitor for signs and symptoms of infection  - Monitor lab/diagnostic results  - Monitor all insertion sites, i e  indwelling lines, tubes, and drains  - Monitor endotracheal if appropriate and nasal secretions for changes in amount and color  - [place] appropriate cooling/warming therapies per order  - Administer medications as ordered  - Instruct and encourage patient and family to use good hand hygiene technique  - Identify and instruct in appropriate isolation precautions for identified infection/condition  8/7/2022 0938 by Geneva Howard RN  Outcome: Progressing  8/7/2022 0937 by Geneva Howard RN  Outcome: Progressing

## 2022-08-07 NOTE — ASSESSMENT & PLAN NOTE
1/2 bottles growing gram positive cocci in cluster  Suspected contaminant  Repeat blood cultures pending

## 2022-08-07 NOTE — PLAN OF CARE
Problem: SAFETY ADULT  Goal: Patient will remain free of falls  Description: INTERVENTIONS:  - Educate patient/family on patient safety including physical limitations  - Instruct patient to call for assistance with activity   - Consult OT/PT to assist with strengthening/mobility   - Keep Call bell within reach  - Keep bed low and locked with side rails adjusted as appropriate  - Keep care items and personal belongings within reach  - Initiate and maintain comfort rounds  - Make Fall Risk Sign visible to staff  - Offer Toileting every 2 Hours, in advance of need  - Initiate/Maintain bed alarm  -   - Apply yellow socks and bracelet for high fall risk patients  - Consider moving patient to room near nurses station  Outcome: Progressing  Goal: Maintain or return to baseline ADL function  Description: INTERVENTIONS:  -  Assess patient's ability to carry out ADLs; assess patient's baseline for ADL function and identify physical deficits which impact ability to perform ADLs (bathing, care of mouth/teeth, toileting, grooming, dressing, etc )  - Assess/evaluate cause of self-care deficits   - Assess range of motion  - Assess patient's mobility; develop plan if impaired  - Assess patient's need for assistive devices and provide as appropriate  - Encourage maximum independence but intervene and supervise when necessary  - Involve family in performance of ADLs  - Assess for home care needs following discharge   - Consider OT consult to assist with ADL evaluation and planning for discharge  - Provide patient education as appropriate  Outcome: Progressing  Goal: Maintains/Returns to pre admission functional level  Description: INTERVENTIONS:  - Perform BMAT or MOVE assessment daily    - Set and communicate daily mobility goal to care team and patient/family/caregiver     - Collaborate with rehabilitation services on mobility goals if consulted  - Out of bed for toileting  - Record patient progress and toleration of activity level   Outcome: Progressing     Problem: METABOLIC, FLUID AND ELECTROLYTES - ADULT  Goal: Electrolytes maintained within normal limits  Description: INTERVENTIONS:  - Monitor labs and assess patient for signs and symptoms of electrolyte imbalances  - Administer electrolyte replacement as ordered  - Monitor response to electrolyte replacements, including repeat lab results as appropriate  - Instruct patient on fluid and nutrition as appropriate  Outcome: Progressing  Goal: Glucose maintained within target range  Description: INTERVENTIONS:  - Monitor Blood Glucose as ordered  - Assess for signs and symptoms of hyperglycemia and hypoglycemia  - Administer ordered medications to maintain glucose within target range  - Assess nutritional intake and initiate nutrition service referral as needed  Outcome: Progressing     Problem: CARDIOVASCULAR - ADULT  Goal: Maintains optimal cardiac output and hemodynamic stability  Description: INTERVENTIONS:  - Monitor I/O, vital signs and rhythm  - Monitor for S/S and trends of decreased cardiac output  - Administer and titrate ordered vasoactive medications to optimize hemodynamic stability  - Assess quality of pulses, skin color and temperature  - Assess for signs of decreased coronary artery perfusion  - Instruct patient to report change in severity of symptoms  Outcome: Progressing  Goal: Absence of cardiac dysrhythmias or at baseline rhythm  Description: INTERVENTIONS:  - Continuous cardiac monitoring, vital signs, obtain 12 lead EKG if ordered  - Administer antiarrhythmic and heart rate control medications as ordered  - Monitor electrolytes and administer replacement therapy as ordered  Outcome: Progressing     Problem: MOBILITY - ADULT  Goal: Maintain or return to baseline ADL function  Description: INTERVENTIONS:  -  Assess patient's ability to carry out ADLs; assess patient's baseline for ADL function and identify physical deficits which impact ability to perform ADLs (bathing, care of mouth/teeth, toileting, grooming, dressing, etc )  - Assess/evaluate cause of self-care deficits   - Assess range of motion  - Assess patient's mobility; develop plan if impaired  - Assess patient's need for assistive devices and provide as appropriate  - Encourage maximum independence but intervene and supervise when necessary  - Involve family in performance of ADLs  - Assess for home care needs following discharge   - Consider OT consult to assist with ADL evaluation and planning for discharge  - Provide patient education as appropriate  Outcome: Progressing  Goal: Maintains/Returns to pre admission functional level  Description: INTERVENTIONS:  - Perform BMAT or MOVE assessment daily    - Set and communicate daily mobility goal to care team and patient/family/caregiver     - Collaborate with rehabilitation services on mobility goals if consulted  - Out of bed for toileting  - Record patient progress and toleration of activity level   Outcome: Progressing     Problem: Potential for Falls  Goal: Patient will remain free of falls  Description: INTERVENTIONS:  - Educate patient/family on patient safety including physical limitations  - Instruct patient to call for assistance with activity   - Consult OT/PT to assist with strengthening/mobility   - Keep Call bell within reach  - Keep bed low and locked with side rails adjusted as appropriate  - Keep care items and personal belongings within reach  - Initiate and maintain comfort rounds  - Make Fall Risk Sign visible to staff  - Offer Toileting every 2 Hours, in advance of need  - Initiate/Maintain bed alarm  -   - Apply yellow socks and bracelet for high fall risk patients  - Consider moving patient to room near nurses station  Outcome: Progressing     Problem: INFECTION - ADULT  Goal: Absence or prevention of progression during hospitalization  Description: INTERVENTIONS:  - Assess and monitor for signs and symptoms of infection  - Monitor lab/diagnostic results  - Monitor all insertion sites, i e  indwelling lines, tubes, and drains  - Monitor endotracheal if appropriate and nasal secretions for changes in amount and color  - [place] appropriate cooling/warming therapies per order  - Administer medications as ordered  - Instruct and encourage patient and family to use good hand hygiene technique  - Identify and instruct in appropriate isolation precautions for identified infection/condition  Outcome: Progressing

## 2022-08-07 NOTE — ASSESSMENT & PLAN NOTE
Patient reports a lot of burning discomfort in her esophagus  Also reports that it feels like food is "getting stuck" in her stomach  Will consult GI

## 2022-08-08 ENCOUNTER — ANESTHESIA EVENT (INPATIENT)
Dept: PERIOP | Facility: HOSPITAL | Age: 72
DRG: 638 | End: 2022-08-08
Payer: COMMERCIAL

## 2022-08-08 ENCOUNTER — APPOINTMENT (INPATIENT)
Dept: PERIOP | Facility: HOSPITAL | Age: 72
DRG: 638 | End: 2022-08-08
Payer: COMMERCIAL

## 2022-08-08 ENCOUNTER — ANESTHESIA (INPATIENT)
Dept: PERIOP | Facility: HOSPITAL | Age: 72
DRG: 638 | End: 2022-08-08
Payer: COMMERCIAL

## 2022-08-08 LAB
ANION GAP SERPL CALCULATED.3IONS-SCNC: 7 MMOL/L (ref 4–13)
BACTERIA BLD CULT: ABNORMAL
BASOPHILS # BLD AUTO: 0.03 THOUSANDS/ΜL (ref 0–0.1)
BASOPHILS NFR BLD AUTO: 0 % (ref 0–1)
BUN SERPL-MCNC: 18 MG/DL (ref 5–25)
CALCIUM SERPL-MCNC: 7.9 MG/DL (ref 8.3–10.1)
CHLORIDE SERPL-SCNC: 104 MMOL/L (ref 96–108)
CO2 SERPL-SCNC: 26 MMOL/L (ref 21–32)
CREAT SERPL-MCNC: 1.23 MG/DL (ref 0.6–1.3)
EOSINOPHIL # BLD AUTO: 0.23 THOUSAND/ΜL (ref 0–0.61)
EOSINOPHIL NFR BLD AUTO: 3 % (ref 0–6)
ERYTHROCYTE [DISTWIDTH] IN BLOOD BY AUTOMATED COUNT: 12.6 % (ref 11.6–15.1)
GFR SERPL CREATININE-BSD FRML MDRD: 44 ML/MIN/1.73SQ M
GLUCOSE SERPL-MCNC: 108 MG/DL (ref 65–140)
GLUCOSE SERPL-MCNC: 150 MG/DL (ref 65–140)
GLUCOSE SERPL-MCNC: 153 MG/DL (ref 65–140)
GLUCOSE SERPL-MCNC: 207 MG/DL (ref 65–140)
GLUCOSE SERPL-MCNC: 254 MG/DL (ref 65–140)
GLUCOSE SERPL-MCNC: 70 MG/DL (ref 65–140)
GLUCOSE SERPL-MCNC: 81 MG/DL (ref 65–140)
GLUCOSE SERPL-MCNC: 89 MG/DL (ref 65–140)
GLUCOSE SERPL-MCNC: 95 MG/DL (ref 65–140)
GRAM STN SPEC: ABNORMAL
HCT VFR BLD AUTO: 35.4 % (ref 34.8–46.1)
HGB BLD-MCNC: 11.7 G/DL (ref 11.5–15.4)
IMM GRANULOCYTES # BLD AUTO: 0.07 THOUSAND/UL (ref 0–0.2)
IMM GRANULOCYTES NFR BLD AUTO: 1 % (ref 0–2)
LYMPHOCYTES # BLD AUTO: 2.47 THOUSANDS/ΜL (ref 0.6–4.47)
LYMPHOCYTES NFR BLD AUTO: 32 % (ref 14–44)
MAGNESIUM SERPL-MCNC: 2.1 MG/DL (ref 1.6–2.6)
MCH RBC QN AUTO: 28.7 PG (ref 26.8–34.3)
MCHC RBC AUTO-ENTMCNC: 33.1 G/DL (ref 31.4–37.4)
MCV RBC AUTO: 87 FL (ref 82–98)
MONOCYTES # BLD AUTO: 0.85 THOUSAND/ΜL (ref 0.17–1.22)
MONOCYTES NFR BLD AUTO: 11 % (ref 4–12)
NEUTROPHILS # BLD AUTO: 4.06 THOUSANDS/ΜL (ref 1.85–7.62)
NEUTS SEG NFR BLD AUTO: 53 % (ref 43–75)
NRBC BLD AUTO-RTO: 0 /100 WBCS
PLATELET # BLD AUTO: 146 THOUSANDS/UL (ref 149–390)
PMV BLD AUTO: 10.1 FL (ref 8.9–12.7)
POTASSIUM SERPL-SCNC: 4.2 MMOL/L (ref 3.5–5.3)
RBC # BLD AUTO: 4.08 MILLION/UL (ref 3.81–5.12)
S AUREUS+CONS DNA BLD POS NAA+NON-PROBE: DETECTED
SODIUM SERPL-SCNC: 137 MMOL/L (ref 135–147)
WBC # BLD AUTO: 7.71 THOUSAND/UL (ref 4.31–10.16)

## 2022-08-08 PROCEDURE — 99221 1ST HOSP IP/OBS SF/LOW 40: CPT | Performed by: INTERNAL MEDICINE

## 2022-08-08 PROCEDURE — 80048 BASIC METABOLIC PNL TOTAL CA: CPT | Performed by: FAMILY MEDICINE

## 2022-08-08 PROCEDURE — 43239 EGD BIOPSY SINGLE/MULTIPLE: CPT | Performed by: INTERNAL MEDICINE

## 2022-08-08 PROCEDURE — 0DB68ZX EXCISION OF STOMACH, VIA NATURAL OR ARTIFICIAL OPENING ENDOSCOPIC, DIAGNOSTIC: ICD-10-PCS | Performed by: INTERNAL MEDICINE

## 2022-08-08 PROCEDURE — 83735 ASSAY OF MAGNESIUM: CPT | Performed by: FAMILY MEDICINE

## 2022-08-08 PROCEDURE — 88305 TISSUE EXAM BY PATHOLOGIST: CPT | Performed by: PATHOLOGY

## 2022-08-08 PROCEDURE — 97165 OT EVAL LOW COMPLEX 30 MIN: CPT

## 2022-08-08 PROCEDURE — 99232 SBSQ HOSP IP/OBS MODERATE 35: CPT | Performed by: FAMILY MEDICINE

## 2022-08-08 PROCEDURE — 82948 REAGENT STRIP/BLOOD GLUCOSE: CPT

## 2022-08-08 PROCEDURE — 99223 1ST HOSP IP/OBS HIGH 75: CPT | Performed by: INTERNAL MEDICINE

## 2022-08-08 PROCEDURE — 85025 COMPLETE CBC W/AUTO DIFF WBC: CPT | Performed by: FAMILY MEDICINE

## 2022-08-08 RX ORDER — SODIUM CHLORIDE, SODIUM LACTATE, POTASSIUM CHLORIDE, CALCIUM CHLORIDE 600; 310; 30; 20 MG/100ML; MG/100ML; MG/100ML; MG/100ML
75 INJECTION, SOLUTION INTRAVENOUS CONTINUOUS
Status: DISCONTINUED | OUTPATIENT
Start: 2022-08-08 | End: 2022-08-09

## 2022-08-08 RX ORDER — PANTOPRAZOLE SODIUM 40 MG/1
40 TABLET, DELAYED RELEASE ORAL
Status: DISCONTINUED | OUTPATIENT
Start: 2022-08-09 | End: 2022-08-08

## 2022-08-08 RX ORDER — INSULIN LISPRO 100 [IU]/ML
1-5 INJECTION, SOLUTION INTRAVENOUS; SUBCUTANEOUS
Status: DISCONTINUED | OUTPATIENT
Start: 2022-08-08 | End: 2022-08-09 | Stop reason: HOSPADM

## 2022-08-08 RX ORDER — SUCRALFATE 1 G/1
1 TABLET ORAL
Status: DISCONTINUED | OUTPATIENT
Start: 2022-08-08 | End: 2022-08-09 | Stop reason: HOSPADM

## 2022-08-08 RX ORDER — SODIUM CHLORIDE, SODIUM LACTATE, POTASSIUM CHLORIDE, CALCIUM CHLORIDE 600; 310; 30; 20 MG/100ML; MG/100ML; MG/100ML; MG/100ML
125 INJECTION, SOLUTION INTRAVENOUS CONTINUOUS
Status: CANCELLED | OUTPATIENT
Start: 2022-08-08

## 2022-08-08 RX ORDER — LIDOCAINE HYDROCHLORIDE 10 MG/ML
INJECTION, SOLUTION EPIDURAL; INFILTRATION; INTRACAUDAL; PERINEURAL AS NEEDED
Status: DISCONTINUED | OUTPATIENT
Start: 2022-08-08 | End: 2022-08-08

## 2022-08-08 RX ORDER — SODIUM CHLORIDE, SODIUM LACTATE, POTASSIUM CHLORIDE, CALCIUM CHLORIDE 600; 310; 30; 20 MG/100ML; MG/100ML; MG/100ML; MG/100ML
INJECTION, SOLUTION INTRAVENOUS CONTINUOUS PRN
Status: DISCONTINUED | OUTPATIENT
Start: 2022-08-08 | End: 2022-08-08

## 2022-08-08 RX ORDER — PROPOFOL 10 MG/ML
INJECTION, EMULSION INTRAVENOUS AS NEEDED
Status: DISCONTINUED | OUTPATIENT
Start: 2022-08-08 | End: 2022-08-08

## 2022-08-08 RX ADMIN — HEPARIN SODIUM 5000 UNITS: 5000 INJECTION INTRAVENOUS; SUBCUTANEOUS at 21:26

## 2022-08-08 RX ADMIN — PREGABALIN 75 MG: 75 CAPSULE ORAL at 17:26

## 2022-08-08 RX ADMIN — CLONIDINE HYDROCHLORIDE 0.1 MG: 0.1 TABLET ORAL at 21:28

## 2022-08-08 RX ADMIN — CARVEDILOL 25 MG: 25 TABLET, FILM COATED ORAL at 08:21

## 2022-08-08 RX ADMIN — SUCRALFATE 1 G: 1 TABLET ORAL at 16:18

## 2022-08-08 RX ADMIN — PROPOFOL 10 MG: 10 INJECTION, EMULSION INTRAVENOUS at 15:01

## 2022-08-08 RX ADMIN — CLONIDINE HYDROCHLORIDE 0.1 MG: 0.1 TABLET ORAL at 08:21

## 2022-08-08 RX ADMIN — ATORVASTATIN CALCIUM 40 MG: 40 TABLET, FILM COATED ORAL at 08:21

## 2022-08-08 RX ADMIN — SODIUM CHLORIDE, SODIUM LACTATE, POTASSIUM CHLORIDE, AND CALCIUM CHLORIDE 75 ML/HR: .6; .31; .03; .02 INJECTION, SOLUTION INTRAVENOUS at 14:04

## 2022-08-08 RX ADMIN — PROPOFOL 50 MG: 10 INJECTION, EMULSION INTRAVENOUS at 14:56

## 2022-08-08 RX ADMIN — ASPIRIN 81 MG: 81 TABLET, COATED ORAL at 08:21

## 2022-08-08 RX ADMIN — LOSARTAN POTASSIUM 50 MG: 50 TABLET, FILM COATED ORAL at 08:21

## 2022-08-08 RX ADMIN — CLOPIDOGREL BISULFATE 75 MG: 75 TABLET ORAL at 08:21

## 2022-08-08 RX ADMIN — PREGABALIN 75 MG: 75 CAPSULE ORAL at 08:21

## 2022-08-08 RX ADMIN — OXYBUTYNIN 10 MG: 5 TABLET, FILM COATED, EXTENDED RELEASE ORAL at 08:21

## 2022-08-08 RX ADMIN — INSULIN LISPRO 2 UNITS: 100 INJECTION, SOLUTION INTRAVENOUS; SUBCUTANEOUS at 21:27

## 2022-08-08 RX ADMIN — CARVEDILOL 25 MG: 25 TABLET, FILM COATED ORAL at 21:28

## 2022-08-08 RX ADMIN — SUCRALFATE 1 G: 1 TABLET ORAL at 21:28

## 2022-08-08 RX ADMIN — PROPOFOL 10 MG: 10 INJECTION, EMULSION INTRAVENOUS at 14:59

## 2022-08-08 RX ADMIN — HEPARIN SODIUM 5000 UNITS: 5000 INJECTION INTRAVENOUS; SUBCUTANEOUS at 05:27

## 2022-08-08 RX ADMIN — SODIUM CHLORIDE, SODIUM LACTATE, POTASSIUM CHLORIDE, AND CALCIUM CHLORIDE: .6; .31; .03; .02 INJECTION, SOLUTION INTRAVENOUS at 14:47

## 2022-08-08 RX ADMIN — PANTOPRAZOLE SODIUM 40 MG: 40 TABLET, DELAYED RELEASE ORAL at 05:27

## 2022-08-08 RX ADMIN — LIDOCAINE HYDROCHLORIDE 30 MG: 10 INJECTION, SOLUTION EPIDURAL; INFILTRATION; INTRACAUDAL; PERINEURAL at 14:56

## 2022-08-08 NOTE — ANESTHESIA POSTPROCEDURE EVALUATION
Post-Op Assessment Note    CV Status:  Stable  Pain Score: 0    Pain management: adequate     Mental Status:  Alert and awake   Hydration Status:  Euvolemic   PONV Controlled:  Controlled   Airway Patency:  Patent      Post Op Vitals Reviewed: Yes      Staff: Anesthesiologist, CRNA   Comments: vss        No complications documented      BP  147/70   Temp      Pulse  56   Resp   15   SpO2   99

## 2022-08-08 NOTE — ASSESSMENT & PLAN NOTE
Patient reports a lot of burning discomfort in her esophagus  Also reports that it feels like food is "getting stuck" in her stomach  GI following, input appreciated   Status post EGD, 8/8-noted to have significant esophagitis and ulceration in lower 3rd  Of esophagus , mild gastritis  Reports improvement in symptoms  Tolerating soft diet without any nausea vomiting or pain  · Continue PPI b i d    · Carafate with meals and at bedtime   · Follow-up biopsy which GI  · Follow-up with GI after discharge  · Patient was advised to follow-up with GI for repeat EGD in 8 weeks and also colonoscopy

## 2022-08-08 NOTE — OCCUPATIONAL THERAPY NOTE
OT EVALUATION       08/08/22 1040   Note Type   Note type Evaluation   Restrictions/Precautions   Other Precautions Chair Alarm; Bed Alarm; Fall Risk   Pain Assessment   Pain Assessment Tool 0-10   Pain Score No Pain   Home Living   Type of Home Apartment   Home Layout Two level  (2 GIL)   Bathroom Shower/Tub Tub/shower unit   Bathroom Toilet Standard   Bathroom Equipment Shower chair   Home Equipment Walker   Prior Function   Level of [place] Independent with ADLs and functional mobility   Lives With Alone   Receives Help From Friend(s)   ADL Assistance Independent   IADLs Independent   Vocational Part time employment  ([employer])   Comments Patient admitted with generalized weakness and fatigue   ADL   Eating Assistance 7  Independent   Grooming Assistance 7  Independent   UB Bathing Assistance 7  Independent   LB [address]  [address]   Supine to Sit 7  Independent   Sit to Supine 7  Independent   Transfers   Sit to Stand 7  Independent   Stand to [address] transfer [facility] 7  Independent   Additional Comments functional household distance to and from bathroom   Balance   Static Sitting Good   Dynamic Sitting Good   Static Standing Good   Dynamic Standing Fair +   Activity Tolerance   Activity Tolerance Patient tolerated treatment well   RUE Assessment   RUE Assessment WFL   LUE Assessment   LUE Assessment WFL   Cognition   Overall Cognitive Status WFL   Arousal/Participation Cooperative   Attention Within functional limits   Orientation Level Oriented X4   Following Commands Follows all commands and directions without difficulty   Assessment   Assessment Patient evaluated by Occupational Therapy    Patient admitted with Type 2 diabetes mellitus with hyperglycemia, with long-term current use of insulin [facility])   The patients occupational profile, medical and therapy history includes a extensive additional review of physical, cognitive, or psychosocial history related to current functional performance  Comorbidities affecting functional mobility and ADLS include: diabetes and neuropathy  Prior to admission, patient was independent with functional mobility without assistive device, independent with ADLS and independent with IADLS  The evaluation identifies the following performance deficits: no deficits, that result in activity limitations and/or participation restrictions  This evaluation requires clinical decision making of low complexity, because the patients presents with no comorbidities that affect occupational performance and required no modification of tasks or assistance with consideration of a limited number of treatment options  The Barthel Index was used as a functional outcome tool presenting with a score of 100, indicating no limitations of functional mobility and ADLS  The patient's raw score on the AM-PAC Daily Activity inpatient short form is 24, standardized score is 57 54, greater than 39 4  Patients at this level are likely to benefit from DC to home  Please refer to the recommendation of the Occupational Therapist for safe DC planning  Patient is independent with ADLS, no skilled OT needs required at this time  DC OT     Goals   Patient Goals to go home   Recommendation   OT Discharge Recommendation No rehabilitation needs   AM-PAC Daily Activity Inpatient   Lower Body Dressing 4   Bathing 4   Toileting 4   Upper Body Dressing 4   Grooming 4   Eating 4   Daily Activity Raw Score 24   Daily Activity Standardized Score (Calc for Raw Score >=11) 57 54   AM-PAC Applied Cognition Inpatient   Following a Speech/Presentation 4   Understanding Ordinary Conversation 4   Taking Medications 4   Remembering Where Things Are Placed or Put Away 4   Remembering List of 4-5 Errands 4   Taking Care of Complicated Tasks 4   Applied Cognition Raw Score 24   Applied Cognition Standardized Score 62 21   Barthel Index   Feeding 10   Bathing 5   Grooming Score 5   Dressing Score 10   Bladder Score 10   Bowels Score 10   Toilet Use Score 10   Transfers (Bed/Chair) Score 15   Mobility (Level Surface) Score 15   Licensure   [place] License Number  Ariane Palma [address] 89 OTR/L [id number]

## 2022-08-08 NOTE — PROGRESS NOTES
Noemi 47  Progress Note - Aimee Willis 1950, 81 y o  female MRN: [id number]  Unit/Bed#: 2 [facility] 211 Encounter: [id number]  Primary Care Provider: Traci Easton MD   Date and time admitted to hospital: 8/4/2022  7:47 PM    Bacteremia  Assessment & Plan  1/2 bottles growing gram positive cocci in cluster  Suspected contaminant  Repeat blood cultures pending  GERD (gastroesophageal reflux disease)  Assessment & Plan  Patient reports a lot of burning discomfort in her esophagus  Also reports that it feels like food is "getting stuck" in her stomach  GI consulted  Possible EGD  Will follow up with their recommendations  Hyperglycemia  Assessment & Plan  Blood sugar levels have been labile  Lantus was decreased to 15 units Q12  Patient refused insulin last night and this morning  Endocrinology consult pending for today  Hypokalemia  Assessment & Plan  Potassium 4 2  Will continue to monitor  [facility]  Assessment & Plan  Creatine 2 03 on admission  Noted to be 1 23 today  Elevated troponin  Assessment & Plan  Troponin initially elevated to 77, repeat 68  · History of multiple stents in place per patient  · EKG NSR, HR 80  · No chest pain  · Continue aspirin, carvedilol  · Monitor on telemetry  · Cardiology consult    8/8 Cardiology following  Will continue to monitor  Pure hypercholesterolemia  Assessment & Plan  Continue atorvastatin  Hypertension  Assessment & Plan  Continue carvedilol, losartan and catapres in addition to as needed hydralazine  Emphysema, unspecified ([facility] )  Assessment & Plan  No evidence of acute exacerbation  Will continue to monitor      * Type 2 diabetes mellitus with hyperglycemia, with long-term current use of insulin [facility])  Assessment & Plan  Lab Results   Component Value Date    HGBA1C 13 5 (H) 08/05/2022       Recent Labs     08/08/22  0701 08/08/22  0918 08/08/22  1105 08/08/22  1336   POCGLU 108 95 89 70 Blood Sugar Average: Last 72 hrs:  (P) 497 [id number]     Patient presenting with generalized weakness, fatigue, blood sugar 514  · Patient on Lantus 33 units b i d  at home, did not take today  · , AG 14, serum bicarb 25   Check HgA1c   Diabetic diet   Continue insulin drip   IVFs   Recheck BMP    Endocrinology consult pending  VTE Pharmacologic Prophylaxis: VTE Score: 4 Heparin prophylaxis  Patient Centered Rounds: I performed bedside rounds with nursing staff today  Discussions with Specialists or Other Care Team Provider: Yes  Education and Discussions with Family / Patient: Yes  Time Spent for Care: 30 minutes  More than 50% of total time spent on counseling and coordination of care as described above  Current Length of Stay: 4 day(s)  Current Patient Status: Inpatient   Certification Statement: The patient will continue to require additional inpatient hospital stay due to above  Discharge Plan: Home  Code Status: Level 1 - Full Code    Subjective:   Patient seen and examined at bedside  Patient reports that she still feels like food is getting "stuck" in her stomach  GI evaluation pending  Patient denies any chest pain or shortness of breath  Objective:     Vitals:   Temp (24hrs), Av 3 °F (36 8 °C), Min:97 8 °F (36 6 °C), Max:99 °F (37 2 °C)    Temp:  [97 8 °F (36 6 °C)-99 °F (37 2 °C)] 97 9 °F (36 6 °C)  HR:  [53-70] 61  Resp:  [16-20] 20  BP: ()/(47-89) 152/63  SpO2:  [95 %-100 %] 100 %  Body mass index is 28 15 kg/m²  Input and Output Summary (last 24 hours): Intake/Output Summary (Last 24 hours) at 2022 1600  Last data filed at 2022 1506  Gross per 24 hour   Intake 300 ml   Output --   Net 300 ml       Physical Exam:   Physical Exam  HENT:      Head: Normocephalic  Mouth/Throat:      Mouth: Mucous membranes are moist    Eyes:      Extraocular Movements: Extraocular movements intact     Cardiovascular:      Rate and Rhythm: Normal rate    Pulmonary:      Effort: Pulmonary effort is normal  No respiratory distress  Abdominal:      Palpations: Abdomen is soft  Tenderness: There is no abdominal tenderness  Skin:     General: Skin is warm  Neurological:      Mental Status: She is alert and oriented to person, place, and time  Psychiatric:         Mood and Affect: Mood normal          Behavior: Behavior normal        Additional Data:     Labs:  Results from last 7 days   Lab Units 08/08/22  0511   WBC Thousand/uL 7 71   HEMOGLOBIN g/dL 11 7   HEMATOCRIT % 35 4   PLATELETS Thousands/uL 146*   NEUTROS PCT % 53   LYMPHS PCT % 32   MONOS PCT % 11   EOS PCT % 3     Results from last 7 days   Lab Units 08/08/22  0511 08/07/22  0746 08/06/22  0621   SODIUM mmol/L 137   < > 136   POTASSIUM mmol/L 4 2   < > 4 2   CHLORIDE mmol/L 104   < > 106   CO2 mmol/L 26   < > 24   BUN mg/dL 18   < > 40*   CREATININE mg/dL 1 23   < > 1 24   ANION GAP mmol/L 7   < > 6   CALCIUM mg/dL 7 9*   < > 7 7*   ALBUMIN g/dL  --   --  2 2*   TOTAL BILIRUBIN mg/dL  --   --  0 28   ALK PHOS U/L  --   --  75   ALT U/L  --   --  32   AST U/L  --   --  43   GLUCOSE RANDOM mg/dL 150*   < > 123    < > = values in this interval not displayed  Results from last 7 days   Lab Units 08/08/22  1336 08/08/22  1105 08/08/22  0918 08/08/22  0701 08/08/22  0434 08/08/22  0155 08/07/22  2325 08/07/22  2156 08/07/22  2009 08/07/22  1800 08/07/22  1540 08/07/22  1122   POC GLUCOSE mg/dl 70 89 95 108 153* 207* 190* 85 96 147* 91 180*     Results from last 7 days   Lab Units 08/05/22  0607   HEMOGLOBIN A1C % 13 5*     Results from last 7 days   Lab Units 08/06/22  2107   PROCALCITONIN ng/ml 0 07       Lines/Drains:  Invasive Devices  Report    Peripheral Intravenous Line  Duration           Peripheral IV 08/07/22 Dorsal (posterior); Left Forearm <1 day              Imaging: Reviewed      Recent Cultures (last 7 days):   Results from last 7 days   Lab Units 08/06/22 2108 08/04/22 2219 08/04/22 2054   BLOOD CULTURE  No Growth at 24 hrs  No Growth at 24 hrs   --  No Growth at 72 hrs  Staphylococcus coagulase negative*   GRAM STAIN RESULT   --   --  Gram positive cocci in clusters*   URINE CULTURE   --  >100,000 cfu/ml   --        Last 24 Hours Medication List:   Current Facility-Administered Medications   Medication Dose Route Frequency Provider Last Rate    acetaminophen  650 mg Oral Q6H PRN Jackie Vecellio, PA-C      aluminum-magnesium hydroxide-simethicone  30 mL Oral Q4H PRN Latisha Schafer MD      aspirin  81 mg Oral Daily Dewane Speck, PA-C      atorvastatin  40 mg Oral Daily Jackie Vecellio, PA-C      carvedilol  25 mg Oral BID Dewane Marjorie, PA-C      cefTRIAXone  1,000 mg Intravenous Q24H Jackie Vecroxanneo, PA-C 1,000 mg (08/07/22 2227)    cloNIDine  0 1 mg Oral BID Cristopher Amador, PA-C      clopidogrel  75 mg Oral Daily Jackie Vecroxanneo, PA-C      heparin (porcine)  5,000 Units Subcutaneous [facility] Jackie Vecroxanneo, PA-C      hydrALAZINE  10 mg Intravenous Q6H PRN Latisha Schafer MD      insulin glargine  15 Units Subcutaneous Q12H [address] Latisha Schafer MD      insulin lispro  2-12 Units Subcutaneous TID [facility] Barbara Oliveros MD      lactated ringers  75 mL/hr Intravenous Continuous Cooper Edmondson MD 75 mL/hr (08/08/22 1404)    losartan  50 mg Oral Daily CYNDIE Perales      ondansetron  4 mg Intravenous Q6H PRN Dewadennys Amador, PA-C      oxybutynin  10 mg Oral Daily Cristopher Amador, PA-C      pantoprazole  40 mg Oral Early Morning MD Hunter Small ON 8/9/2022] pantoprazole  40 mg Oral Early Morning Venu Noel MD      pregabalin  75 mg Oral BID Cristopher Amador, PA-C      sucralfate  1 g Oral 4x Daily (AC & HS) Venu Noel MD          Today, Patient Was Seen By: Latsiha Schafer MD    **Please Note: This note may have been constructed using a voice recognition system  **

## 2022-08-08 NOTE — CONSULTS
Consultation -[facility]   Marcela  81 y o  female MRN: [id number]    Unit/Bed#: 2 [place] [address] Encounter: [id number]      Physician Requesting Consult: Dr Clinton Winn      Reason for Consult / Principal Problem: n/v    HPI: This is a 81 y o  female with a PMH of diabetes type 2, hypertension, ulcerative colitis, overactive bladder who presents with generalized weakness and fatigue and admitted on 8/4  She states her symptoms started 2 days ago after eating [place] food  She had multiple episodes of nonbloody vomiting and diarrhea  Prior to admission, she got up to use the bathroom, became lightheaded, and hit her nose on the wall and slid down the wall  Pt c/o burning discomfort in her esophagus  Also reports that it feels like food is "getting stuck" in her stomach  Colonoscopy was performed in 2011, which is in system  When talking to the patient she states that food does not seem to feel stuck in her stomach but rather her esophagus  She reports that she was having frequent hiccups at home and she has never had an upper endoscopy but does have a colonoscopy in the system and states that she has been unable to follow-up with her doctors for endoscopic evaluation because she cannot get a ride  Patient does have history of coronary stents back in 2019 and did have troponin elevation which was suspected to be non MRI minimal troponin elevation in the setting of acute kidney injury  Patient is on aspirin and Plavix  Patient did take these today along with her subQ heparin early this morning  She was kept NPO for potential upper endoscopy  Patient is complaining of dysphagia and even odynophagia with solids and liquids especially hot liquids  This has been ongoing for the past week  Patient was having diarrhea which subsequently resolved  Allergies:    Allergies   Allergen Reactions    Other Rash       Medications:  Current Facility-Administered Medications:    acetaminophen (TYLENOL) tablet 650 mg, 650 mg, Oral, Q6H PRN, VALERIA Mckeon-C    aluminum-magnesium hydroxide-simethicone (MYLANTA) oral suspension 30 mL, 30 mL, Oral, Q4H PRN, Jeremías Constantino MD, 30 mL at 08/07/22 2227    aspirin (ECOTRIN LOW STRENGTH) EC tablet 81 mg, 81 mg, Oral, Daily, Jackie Vecroxanneo, PA-C, 81 mg at 08/08/22 7937    atorvastatin (LIPITOR) tablet 40 mg, 40 mg, Oral, Daily, Jackie Vecellio, PA-C, 40 mg at 08/08/22 8892    carvedilol (COREG) tablet 25 mg, 25 mg, Oral, BID, Jackie Vecroxanneo, PA-C, 25 mg at 08/08/22 0123    cefTRIAXone (ROCEPHIN) IVPB (premix in dextrose) 1,000 mg 50 mL, 1,000 mg, Intravenous, Q24H, Jackie Derrick PA-C, Last Rate: 100 mL/hr at 08/07/22 2227, 1,000 mg at 08/07/22 2227    cloNIDine (CATAPRES) tablet 0 1 mg, 0 1 mg, Oral, BID, Jackie Vecroxanneo, PA-C, 0 1 mg at 08/08/22 0821    clopidogrel (PLAVIX) tablet 75 mg, 75 mg, Oral, Daily, Jackie Vecellio, PA-C, 75 mg at 08/08/22 0147    heparin (porcine) subcutaneous injection 5,000 Units, 5,000 Units, Subcutaneous, Q8H [facility], Jackie Meza PA-C, 5,000 Units at 08/08/22 0527    hydrALAZINE (APRESOLINE) injection 10 mg, 10 mg, Intravenous, Q6H PRN, Jeremías Constantino MD, 10 mg at 08/07/22 1937    insulin glargine (LANTUS) subcutaneous injection 15 Units 0 15 mL, 15 Units, Subcutaneous, Q12H [facility], Jeremías Constantino MD    insulin lispro (HumaLOG) 100 units/mL subcutaneous injection 2-12 Units, 2-12 Units, Subcutaneous, TID AC, 2 Units at 08/07/22 1130 **AND** Fingerstick Glucose (POCT), , , TID AC, Nika George MD    losartan (COZAAR) tablet 50 mg, 50 mg, Oral, Daily, CYNDIE Berman, 50 mg at 08/08/22 0821    ondansetron (ZOFRAN) injection 4 mg, 4 mg, Intravenous, Q6H PRN, Fela Espionza PA-C    oxybutynin (DITROPAN-XL) 24 hr tablet 10 mg, 10 mg, Oral, Daily, Jackie KIA Meza, 10 mg at 08/08/22 0821    pantoprazole (PROTONIX) EC tablet 40 mg, 40 mg, Oral, Early Morning, Azra Shen MD, 40 mg at 08/08/22 1253    pregabalin (LYRICA) capsule 75 mg, 75 mg, Oral, BID, Jackie Meza PA-C, 75 mg at 08/08/22 0214    Past Medical history:  Past Medical History:   Diagnosis Date    Diabetes mellitus ([address] )     Neuropathy        Past Surgical History:   Past Surgical History:   Procedure Laterality Date    ANKLE ARTHROPLASTY         Family History:   Family History   Problem Relation Age of Onset    Stroke Mother     Heart disease Father        Social history:   Social History     Socioeconomic History    Marital status: Single     Spouse name: Not on file    Number of children: Not on file    Years of education: Not on file    Highest education level: Not on file   Occupational History    Not on file   Tobacco Use    Smoking status: Former Smoker    Smokeless tobacco: Never Used   Substance and Sexual Activity    Alcohol use: Not Currently    Drug use: Never    Sexual activity: Not Currently   Other Topics Concern    Not on file   Social History Narrative    Not on file     Social Determinants of Health     Financial Resource Strain: Not on file   Food Insecurity: No Food Insecurity    Worried About [address] in the Last Year: Never true    [address] in the Last Year: Never true   Transportation Needs: No Transportation Needs    Lack of Transportation (Medical): No    Lack of Transportation (Non-Medical):  No   Physical Activity: Not on file   Stress: Not on file   Social Connections: Not on file   Intimate Partner Violence: Not on file   Housing Stability: Low Risk     Unable to Pay for Housing in the Last Year: No    Number of Places Lived in the Last Year: 1    Unstable Housing in the Last Year: No       Review of Systems: All other systems were reviewed and were negative, otherwise please refer to HPI    Physical Exam: /75   Pulse 67   Temp 97 8 °F (36 6 °C)   Resp 16   Ht 5' 1" (1 549 m)   Wt 65 3 kg (144 lb) SpO2 96%   BMI 27 21 kg/m²     General Appearance:    Alert, cooperative, no distress, appears stated age   Head:    Normocephalic, without obvious abnormality, atraumatic   Eyes:    No scleral icterus           Mouth:  Mucosa moist   Neck:   Supple, symmetrical, trachea midline, no thyromegaly       Lungs:     Clear to auscultation bilaterally, respirations unlabored       Heart:    Regular rate and rhythm, S1 and S2 normal, no murmur, rub   or gallop     Abdomen:     Soft, non-tender, bowel sounds active all four quadrants,     no masses, no organomegaly   Genitalia:   deferred   Rectal:   deferred   Extremities:   Extremities normal,no cyanosis or edema       Skin:   Skin color, texture, turgor normal, no rashes or lesions       Neurologic:   Grossly intact, no focal deficit           Lab Results:   Recent Results (from the past 24 hour(s))   Fingerstick Glucose (POCT)    Collection Time: 08/07/22 11:22 AM   Result Value Ref Range    POC Glucose 180 (H) 65 - 140 mg/dl   Fingerstick Glucose (POCT)    Collection Time: 08/07/22  3:40 PM   Result Value Ref Range    POC Glucose 91 65 - 140 mg/dl   Fingerstick Glucose (POCT)    Collection Time: 08/07/22  6:00 PM   Result Value Ref Range    POC Glucose 147 (H) 65 - 140 mg/dl   Fingerstick Glucose (POCT)    Collection Time: 08/07/22  8:09 PM   Result Value Ref Range    POC Glucose 96 65 - 140 mg/dl   Fingerstick Glucose (POCT)    Collection Time: 08/07/22  9:56 PM   Result Value Ref Range    POC Glucose 85 65 - 140 mg/dl   Fingerstick Glucose (POCT)    Collection Time: 08/07/22 11:25 PM   Result Value Ref Range    POC Glucose 190 (H) 65 - 140 mg/dl   Fingerstick Glucose (POCT)    Collection Time: 08/08/22  1:55 AM   Result Value Ref Range    POC Glucose 207 (H) 65 - 140 mg/dl   Fingerstick Glucose (POCT)    Collection Time: 08/08/22  4:34 AM   Result Value Ref Range    POC Glucose 153 (H) 65 - 140 mg/dl   Basic metabolic panel    Collection Time: 08/08/22  5:11 AM Result Value Ref Range    Sodium 137 135 - 147 mmol/L    Potassium 4 2 3 5 - 5 3 mmol/L    Chloride 104 96 - 108 mmol/L    CO2 26 21 - 32 mmol/L    ANION GAP 7 4 - 13 mmol/L    BUN 18 5 - 25 mg/dL    Creatinine 1 23 0 60 - 1 30 mg/dL    Glucose 150 (H) 65 - 140 mg/dL    Calcium 7 9 (L) 8 3 - 10 1 mg/dL    eGFR 44 ml/min/1 73sq m   CBC and differential    Collection Time: 08/08/22  5:11 AM   Result Value Ref Range    WBC 7 71 4 31 - 10 16 Thousand/uL    RBC 4 08 3 81 - 5 12 Million/uL    Hemoglobin 11 7 11 5 - 15 4 g/dL    Hematocrit 35 4 34 8 - 46 1 %    MCV 87 82 - 98 fL    MCH 28 7 26 8 - 34 3 pg    MCHC 33 1 31 4 - 37 4 g/dL    RDW 12 6 11 6 - 15 1 %    MPV 10 1 8 9 - 12 7 fL    Platelets 007 (L) 996 - 390 Thousands/uL    nRBC 0 /100 WBCs    Neutrophils Relative 53 43 - 75 %    Immat GRANS % 1 0 - 2 %    Lymphocytes Relative 32 14 - 44 %    Monocytes Relative 11 4 - 12 %    Eosinophils Relative 3 0 - 6 %    Basophils Relative 0 0 - 1 %    Neutrophils Absolute 4 06 1 85 - 7 62 Thousands/µL    Immature Grans Absolute 0 07 0 00 - 0 20 Thousand/uL    Lymphocytes Absolute 2 47 0 60 - 4 47 Thousands/µL    Monocytes Absolute 0 85 0 17 - 1 22 Thousand/µL    Eosinophils Absolute 0 23 0 00 - 0 61 Thousand/µL    Basophils Absolute 0 03 0 00 - 0 10 Thousands/µL   Magnesium    Collection Time: 08/08/22  5:11 AM   Result Value Ref Range    Magnesium 2 1 1 6 - 2 6 mg/dL   Fingerstick Glucose (POCT)    Collection Time: 08/08/22  7:01 AM   Result Value Ref Range    POC Glucose 108 65 - 140 mg/dl   Fingerstick Glucose (POCT)    Collection Time: 08/08/22  9:18 AM   Result Value Ref Range    POC Glucose 95 65 - 140 mg/dl       Imaging Studies: No results found  Assessment/Plan:   1) ODYNOPHAGIA, DYSPHAGIA  This is a 46-year-old female who presents with nausea vomiting diarrhea was noted to have acute kidney injury which subsequently has resolved  Has been having dysphagia and odynophagia    Symptoms seem to have started a week ago   Symptoms seem to be consistent with esophagitis  This could be related to recent nausea and vomiting, but also could have underlying Candida esophagitis as she does have uncontrolled diabetes with hemoglobin A1c of 13 5  Will plan for upper endoscopy for further evaluation  Patient did have aspirin and Plavix this morning along with heparin subQ early this morning so will do diagnostic EGD today  Currently on pantoprazole  Patient also at risk for diabetic gastroparesis but symptoms seem to be more esophageal   We will make further recommendation pending her course  Thank you for the consultation  Case will be discussed with Dr Dilan Andrews

## 2022-08-08 NOTE — UTILIZATION REVIEW
Continued Stay Review    Date:  8-7-22 8-8-22                         Current Patient Class: inpatient  Current Level of Care: med surg     HPI:72 y o  female initially admitted on 8-4-22    Assessment/Plan:     8-7-22  1/2 bottles growing gram positive cocci in clusters  Repeat blood cultures in process  Patient reports esophageal burning and the feeling that food is getting stuck despite po protonix  GI consulted  Lantus decreased to 20 units q12  Patient hypoglycemic this am   Consult endocrinology  Continue to monitor on telemetry  08/07/22  2325 08/07/22  2156 08/07/22  2009 08/07/22  1800 08/07/22  1540 08/07/22  1122 08/07/22  0754   190* 85 96 147* 91 180* 302*     8-8-22  Gastroenterology consult completed with plan for EGD today  NPO  Continue to closely monitor blood glucose  08/08/22  1105 08/08/22  0918 08/08/22  0701 08/08/22  0434 08/08/22  0155   89 95 108 153* 207*       Gastroenterology consult     1) ODYNOPHAGIA, DYSPHAGIA  This is a 61-year-old female who presents with nausea vomiting diarrhea was noted to have acute kidney injury which subsequently has resolved  Has been having dysphagia and odynophagia  Symptoms seem to have started a week ago  Symptoms seem to be consistent with esophagitis  This could be related to recent nausea and vomiting, but also could have underlying Candida esophagitis as she does have uncontrolled diabetes with hemoglobin A1c of 13 5  Will plan for upper endoscopy for further evaluation  Patient did have aspirin and Plavix this morning along with heparin subQ early this morning so will do diagnostic EGD today  Currently on pantoprazole  Patient also at risk for diabetic gastroparesis but symptoms seem to be more esophageal   We will make further recommendation pending her course  Thank you for the consultation       Vital Signs:     Date/Time Temp Pulse Resp BP MAP  SpO2 O2 Device   08/08/22 07:13:43 97 8 °F (36 6 °C) 67 16 163/75 104 96 % --   08/07/22 23:27:47 -- 68 -- 105/51 69 97 % --   08/07/22 23:15:52 97 9 °F (36 6 °C) 66 18 92/47 Abnormal  62 Abnormal  98 % None (Room air)   08/07/22 21:57:24 -- 70 -- 143/69 94 97 % --   08/07/22 1932 -- -- -- 184/76 Abnormal  -- -- --   08/07/22 19:24:04 99 °F (37 2 °C) 66 -- 175/89 Abnormal  118 95 % --   08/07/22 19:22:59 99 °F (37 2 °C) 66 18 179/83 Abnormal  115 97 % None (Room air)   08/07/22 14:54:24 98 1 °F (36 7 °C) 60 -- 125/63 84 97 % --   08/07/22 14:53:55 -- 62 16 125/63 84 97 % --   08/07/22 0900 -- -- -- -- -- -- None (Room air)   08/07/22 08:39:03 -- 73 16 150/63 92 96 % --   08/07/22 07:59:08 98 2 °F (36 8 °C) 68 16 216/93 Abnormal  134 98 % --   08/07/22 02:47:46 97 7 °F (36 5 °C) 66 18 144/67 93 97 % None (Room air)             Pertinent Labs/Diagnostic Results:     Results from last 7 days   Lab Units 08/04/22 2054   SARS-COV-2  Negative     Results from last 7 days   Lab Units 08/08/22 0511 08/07/22  0746 08/06/22  0621 08/05/22  0607 08/04/22 2011   WBC Thousand/uL 7 71 7 37 7 45 10 04 12 68*   HEMOGLOBIN g/dL 11 7 12 7 12 8 13 7 17 1*   HEMATOCRIT % 35 4 38 0 38 7 40 4 49 9*   PLATELETS Thousands/uL 146* 137* 131* 145* 229   NEUTROS ABS Thousands/µL 4 06 4 51 4 46  --  9 21*         Results from last 7 days   Lab Units 08/08/22  0511 08/07/22  0746 08/06/22 0621 08/05/22  1408 08/05/22  0607   SODIUM mmol/L 137 133* 136 133* 134*   POTASSIUM mmol/L 4 2 4 0 4 2 4 3 3 4*   CHLORIDE mmol/L 104 102 106 102 100   CO2 mmol/L 26 27 24 25 27   ANION GAP mmol/L 7 4 6 6 7   BUN mg/dL 18 22 40* 56* 62*   CREATININE mg/dL 1 23 1 15 1 24 1 44* 1 44*   EGFR ml/min/1 73sq m 44 48 44 36 36   CALCIUM mg/dL 7 9* 7 8* 7 7* 8 2* 7 8*   MAGNESIUM mg/dL 2 1 2 1  --  2 4  --      Results from last 7 days   Lab Units 08/06/22  0621 08/04/22 2011   AST U/L 43 32   ALT U/L 32 23   ALK PHOS U/L 75 128*   TOTAL PROTEIN g/dL 5 2* 7 3   ALBUMIN g/dL 2 2* 3 1*   TOTAL BILIRUBIN mg/dL 0 28 0 72     Results from last 7 days   Lab Units 08/08/22  1105 08/08/22  0918 08/08/22  0701 08/08/22  0434 08/08/22  0155 08/07/22  2325 08/07/22  2156 08/07/22  2009 08/07/22  1800 08/07/22  1540 08/07/22  1122 08/07/22  0754   POC GLUCOSE mg/dl 89 95 108 153* 207* 190* 85 96 147* 91 180* 302*     Results from last 7 days   Lab Units 08/08/22  0511 08/07/22  0746 08/06/22  0621 08/05/22  1408 08/05/22  0607 08/05/22  0207 08/04/22 2011   GLUCOSE RANDOM mg/dL 150* 270* 123 183* 143* 253* 514*         Results from last 7 days   Lab Units 08/05/22  0607   HEMOGLOBIN A1C % 13 5*   EAG mg/dl 341     BETA-HYDROXYBUTYRATE   Date Value Ref Range Status   08/04/2022 1 4 (H) <0 6 mmol/L Final          Results from last 7 days   Lab Units 08/04/22  2140   PH USHA  7 399   PCO2 USHA mm Hg 39 6*   PO2 USHA mm Hg 55 5*   HCO3 USHA mmol/L 23 9*   BASE EXC USHA mmol/L -0 7   O2 CONTENT USHA ml/dL 21 7   O2 HGB, VENOUS % 88 1*             Results from last 7 days   Lab Units 08/06/22  1830 08/06/22  1442 08/05/22  0006 08/04/22  2210 08/04/22 2012   HS TNI 0HR ng/L  --  20  --   --  77*   HS TNI 2HR ng/L 19  --   --  68*  --    HSTNI D2 ng/L -1  --   --  -9  --    HS TNI 4HR ng/L  --   --  65*  --   --    HSTNI D4 ng/L  --   --  -12  --   --        Results from last 7 days   Lab Units 08/06/22  2107   PROCALCITONIN ng/ml 0 07         Results from last 7 days   Lab Units 08/04/22  2210   HEP B S AG  Non-reactive   HEP C AB  Non-reactive     Results from last 7 days   Lab Units 08/04/22 2011   LIPASE u/L 205     Results from last 7 days   Lab Units 08/04/22  2218   CLARITY UA  Slightly Cloudy   COLOR UA  Light Yellow   SPEC GRAV UA  1 010   PH UA  6 0   GLUCOSE UA mg/dl >=1000 (1%)*   KETONES UA mg/dl 15 (1+)*   BLOOD UA  Small*   PROTEIN UA mg/dl 30 (1+)*   NITRITE UA  Negative   BILIRUBIN UA  Negative   UROBILINOGEN UA E U /dl 0 2   LEUKOCYTES UA  Trace*   WBC UA /hpf 20-30*   RBC UA /hpf None Seen   BACTERIA UA /hpf Moderate*   EPITHELIAL CELLS WET PREP /hpf Moderate*       Results from last 7 days   Lab Units 08/05/22  1342   SALMONELLA SP PCR  None Detected   SHIGELLA SP/ENTEROINVASIVE E  COLI (EIEC)  None Detected   CAMPYLOBACTER SP (JEJUNI AND COLI)  None Detected   SHIGA TOXIN 1/SHIGA TOXIN 2  None Detected         Results from last 7 days   Lab Units 08/06/22  2108 08/04/22  2219 08/04/22  2054   BLOOD CULTURE  Received in Microbiology Lab  Culture in Progress  Received in Microbiology Lab  Culture in Progress  --  No Growth at 72 hrs  Staphylococcus coagulase negative*   GRAM STAIN RESULT   --   --  Gram positive cocci in clusters*   URINE CULTURE   --  >100,000 cfu/ml   --        Scheduled Medications:    aspirin, 81 mg, Oral, Daily  atorvastatin, 40 mg, Oral, Daily  carvedilol, 25 mg, Oral, BID  cefTRIAXone, 1,000 mg, Intravenous, Q24H  cloNIDine, 0 1 mg, Oral, BID  clopidogrel, 75 mg, Oral, Daily  heparin (porcine), 5,000 Units, Subcutaneous, Q8H MARITZA  insulin glargine, 15 Units, Subcutaneous, Q12H MARITZA  insulin lispro, 2-12 Units, Subcutaneous, TID AC  losartan, 50 mg, Oral, Daily  oxybutynin, 10 mg, Oral, Daily  pantoprazole, 40 mg, Oral, Early Morning  pregabalin, 75 mg, Oral, BID      Continuous IV Infusions:     PRN Meds:  acetaminophen, 650 mg, Oral, Q6H PRN  aluminum-magnesium hydroxide-simethicone, 30 mL, Oral, Q4H PRN  hydrALAZINE, 10 mg, Intravenous, Q6H PRN  ondansetron, 4 mg, Intravenous, Q6H PRN        Discharge Plan: to be determined     Network Utilization Review Department  ATTENTION: Please call with any questions or concerns to [phone] and carefully listen to the prompts so that you are directed to the right person  All voicemails are confidential   Guilherme Tyler all requests for admission clinical reviews, approved or denied determinations and any other requests to dedicated fax number below belonging to the campus where the patient is receiving treatment   List of dedicated fax numbers for the Facilities:  [facility] ADMISSION DENIALS (Administrative/Medical Necessity) [phone]   [address] (Maternity/NICU/Pediatrics) 261 [facility],7Th Floor [facility] [address] 125 [facility]  [phone]   Edison Brown [address] Medical [place] [address] [id number] [place] [address] [address] [place] [address] [facility] [address] [employer] [phone]

## 2022-08-08 NOTE — PLAN OF CARE
Problem: SAFETY ADULT  Goal: Patient will remain free of falls  Description: INTERVENTIONS:  - Educate patient/family on patient safety including physical limitations  - Instruct patient to call for assistance with activity   - Consult OT/PT to assist with strengthening/mobility   - Keep Call bell within reach  - Keep bed low and locked with side rails adjusted as appropriate  - Keep care items and personal belongings within reach  - Initiate and maintain comfort rounds  - Make Fall Risk Sign visible to staff  - Offer Toileting every 2 Hours, in advance of need  - Initiate/Maintain bed alarm    - Apply yellow socks and bracelet for high fall risk patients  Outcome: Progressing     Problem: MOBILITY - ADULT  Goal: Maintain or return to baseline ADL function  Description: INTERVENTIONS:  -  Assess patient's ability to carry out ADLs; assess patient's baseline for ADL function and identify physical deficits which impact ability to perform ADLs (bathing, care of mouth/teeth, toileting, grooming, dressing, etc )  - Assess/evaluate cause of self-care deficits   - Assess range of motion  - Assess patient's mobility; develop plan if impaired  - Assess patient's need for assistive devices and provide as appropriate  - Encourage maximum independence but intervene and supervise when necessary  - Involve family in performance of ADLs  - Assess for home care needs following discharge   - Consider OT consult to assist with ADL evaluation and planning for discharge  - Provide patient education as appropriate  Outcome: Progressing     Problem: INFECTION - ADULT  Goal: Absence or prevention of progression during hospitalization  Description: INTERVENTIONS:  - Assess and monitor for signs and symptoms of infection  - Monitor lab/diagnostic results  - Administer medications as ordered  - Instruct and encourage patient and family to use good hand hygiene technique  Outcome: Progressing

## 2022-08-08 NOTE — ASSESSMENT & PLAN NOTE
Lab Results   Component Value Date    HGBA1C 13 5 (H) 08/05/2022       Recent Labs     08/08/22  2047 08/09/22  0156 08/09/22  0715 08/09/22  1110   POCGLU 254* 143* 120 266*       Blood Sugar Average: Last 72 hrs:  (P) 681 [id number]     Presented with generalized weakness, fatigue, blood sugar 514, anion gap 14, sodium bicarb 25  Previously on Lantus 33 units b i d  at home, reports compliance but did not take it prior to admission due to GI symptoms and poor p o  Tolerance  Reports dietary noncompliance and does not check blood glucose at home  Initially required insulin infusion, subsequently improved  Seen by Endocrinology, input appreciated  Blood glucose appears to be better controlled with diabetic diet  Morning glucose was 120 mg/dL despite not getting Lantus after procedure yesterday  Tolerating diet, postprandial hyperglycemia noted  Patient reports that she plans to follow soft diabetic cardiac diet similar to the hospital after discharge  Declines blood work today and adamant of going home but Agrees to comply with medication treatment recommendation and blood glucose monitoring at home  Discussed with endocrinology regarding discharge home regimen, will start on Lantus 10 units q h s   Plus 3 units of Humalog with meals   Counseled extensively regarding compliance and monitoring   Continue diabetic diet after discharge   Recommended to follow-up with endocrinology after discharge   Recommended to follow with PCP regarding persistent hypo or hyperglycemia and further adjustment in insulin regimen until seen by endocrinology   Considered Jamila Sykes but hold off at present due to history of UTI

## 2022-08-08 NOTE — PLAN OF CARE
Problem: SAFETY ADULT  Goal: Patient will remain free of falls  Description: INTERVENTIONS:  - Educate patient/family on patient safety including physical limitations  - Instruct patient to call for assistance with activity   - Consult OT/PT to assist with strengthening/mobility   - Keep Call bell within reach  - Keep bed low and locked with side rails adjusted as appropriate  - Keep care items and personal belongings within reach  - Initiate and maintain comfort rounds  - Make Fall Risk Sign visible to staff  - Offer Toileting every 2 Hours, in advance of need  - Initiate/Maintain bed alarm  -   - Apply yellow socks and bracelet for high fall risk patients  - Consider moving patient to room near nurses station  Outcome: Progressing  Goal: Maintain or return to baseline ADL function  Description: INTERVENTIONS:  -  Assess patient's ability to carry out ADLs; assess patient's baseline for ADL function and identify physical deficits which impact ability to perform ADLs (bathing, care of mouth/teeth, toileting, grooming, dressing, etc )  - Assess/evaluate cause of self-care deficits   - Assess range of motion  - Assess patient's mobility; develop plan if impaired  - Assess patient's need for assistive devices and provide as appropriate  - Encourage maximum independence but intervene and supervise when necessary  - Involve family in performance of ADLs  - Assess for home care needs following discharge   - Consider OT consult to assist with ADL evaluation and planning for discharge  - Provide patient education as appropriate  Outcome: Progressing  Goal: Maintains/Returns to pre admission functional level  Description: INTERVENTIONS:  - Perform BMAT or MOVE assessment daily    - Set and communicate daily mobility goal to care team and patient/family/caregiver     - Collaborate with rehabilitation services on mobility goals if consulted  - Out of bed for toileting  - Record patient progress and toleration of activity level   Outcome: Progressing     Problem: METABOLIC, FLUID AND ELECTROLYTES - ADULT  Goal: Electrolytes maintained within normal limits  Description: INTERVENTIONS:  - Monitor labs and assess patient for signs and symptoms of electrolyte imbalances  - Administer electrolyte replacement as ordered  - Monitor response to electrolyte replacements, including repeat lab results as appropriate  - Instruct patient on fluid and nutrition as appropriate  Outcome: Progressing  Goal: Glucose maintained within target range  Description: INTERVENTIONS:  - Monitor Blood Glucose as ordered  - Assess for signs and symptoms of hyperglycemia and hypoglycemia  - Administer ordered medications to maintain glucose within target range  - Assess nutritional intake and initiate nutrition service referral as needed  Outcome: Progressing     Problem: CARDIOVASCULAR - ADULT  Goal: Maintains optimal cardiac output and hemodynamic stability  Description: INTERVENTIONS:  - Monitor I/O, vital signs and rhythm  - Monitor for S/S and trends of decreased cardiac output  - Administer and titrate ordered vasoactive medications to optimize hemodynamic stability  - Assess quality of pulses, skin color and temperature  - Assess for signs of decreased coronary artery perfusion  - Instruct patient to report change in severity of symptoms  Outcome: Progressing  Goal: Absence of cardiac dysrhythmias or at baseline rhythm  Description: INTERVENTIONS:  - Continuous cardiac monitoring, vital signs, obtain 12 lead EKG if ordered  - Administer antiarrhythmic and heart rate control medications as ordered  - Monitor electrolytes and administer replacement therapy as ordered  Outcome: Progressing     Problem: MOBILITY - ADULT  Goal: Maintain or return to baseline ADL function  Description: INTERVENTIONS:  -  Assess patient's ability to carry out ADLs; assess patient's baseline for ADL function and identify physical deficits which impact ability to perform ADLs (bathing, care of mouth/teeth, toileting, grooming, dressing, etc )  - Assess/evaluate cause of self-care deficits   - Assess range of motion  - Assess patient's mobility; develop plan if impaired  - Assess patient's need for assistive devices and provide as appropriate  - Encourage maximum independence but intervene and supervise when necessary  - Involve family in performance of ADLs  - Assess for home care needs following discharge   - Consider OT consult to assist with ADL evaluation and planning for discharge  - Provide patient education as appropriate  Outcome: Progressing  Goal: Maintains/Returns to pre admission functional level  Description: INTERVENTIONS:  - Perform BMAT or MOVE assessment daily    - Set and communicate daily mobility goal to care team and patient/family/caregiver     - Collaborate with rehabilitation services on mobility goals if consulted  - Out of bed for toileting  - Record patient progress and toleration of activity level   Outcome: Progressing     Problem: Potential for Falls  Goal: Patient will remain free of falls  Description: INTERVENTIONS:  - Educate patient/family on patient safety including physical limitations  - Instruct patient to call for assistance with activity   - Consult OT/PT to assist with strengthening/mobility   - Keep Call bell within reach  - Keep bed low and locked with side rails adjusted as appropriate  - Keep care items and personal belongings within reach  - Initiate and maintain comfort rounds  - Make Fall Risk Sign visible to staff  - Offer Toileting every 2 Hours, in advance of need  - Initiate/Maintain bed alarm  -   - Apply yellow socks and bracelet for high fall risk patients  - Consider moving patient to room near nurses station  Outcome: Progressing     Problem: INFECTION - ADULT  Goal: Absence or prevention of progression during hospitalization  Description: INTERVENTIONS:  - Assess and monitor for signs and symptoms of infection  - Monitor lab/diagnostic results  - Monitor all insertion sites, i e  indwelling lines, tubes, and drains  - Monitor endotracheal if appropriate and nasal secretions for changes in amount and color  - [place] appropriate cooling/warming therapies per order  - Administer medications as ordered  - Instruct and encourage patient and family to use good hand hygiene technique  - Identify and instruct in appropriate isolation precautions for identified infection/condition  Outcome: Progressing     Problem: Nutrition/Hydration-ADULT  Goal: Nutrient/Hydration intake appropriate for improving, restoring or maintaining nutritional needs  Description: Monitor and assess patient's nutrition/hydration status for malnutrition  Collaborate with interdisciplinary team and initiate plan and interventions as ordered  Monitor patient's weight and dietary intake as ordered or per policy  Utilize nutrition screening tool and intervene as necessary  Determine patient's food preferences and provide high-protein, high-caloric foods as appropriate       INTERVENTIONS:  - Monitor oral intake, urinary output, labs, and treatment plans  - Assess nutrition and hydration status and recommend course of action  - Evaluate amount of meals eaten  - Assist patient with eating if necessary   - Allow adequate time for meals  - Recommend/ encourage appropriate diets, oral nutritional supplements, and vitamin/mineral supplements  - Order, calculate, and assess calorie counts as needed  - Recommend, monitor, and adjust tube feedings and TPN/PPN based on assessed needs  - Assess need for intravenous fluids  - Provide specific nutrition/hydration education as appropriate  - Include patient/family/caregiver in decisions related to nutrition  Outcome: Progressing

## 2022-08-08 NOTE — H&P (VIEW-ONLY)
Consultation -[facility]   Tish Keys 81 y o  female MRN: [id number]    Unit/Bed#: [address] [address] Encounter: [id number]      Physician Requesting Consult: Dr Dequan Michel      Reason for Consult / Principal Problem: n/v    HPI: This is a 81 y o  female with a PMH of diabetes type 2, hypertension, ulcerative colitis, overactive bladder who presents with generalized weakness and fatigue and admitted on 8/4  She states her symptoms started 2 days ago after eating [place] food  She had multiple episodes of nonbloody vomiting and diarrhea  Prior to admission, she got up to use the bathroom, became lightheaded, and hit her nose on the wall and slid down the wall  Pt c/o burning discomfort in her esophagus  Also reports that it feels like food is "getting stuck" in her stomach  Colonoscopy was performed in 2011, which is in system  When talking to the patient she states that food does not seem to feel stuck in her stomach but rather her esophagus  She reports that she was having frequent hiccups at home and she has never had an upper endoscopy but does have a colonoscopy in the system and states that she has been unable to follow-up with her doctors for endoscopic evaluation because she cannot get a ride  Patient does have history of coronary stents back in 2019 and did have troponin elevation which was suspected to be non MRI minimal troponin elevation in the setting of acute kidney injury  Patient is on aspirin and Plavix  Patient did take these today along with her subQ heparin early this morning  She was kept NPO for potential upper endoscopy  Patient is complaining of dysphagia and even odynophagia with solids and liquids especially hot liquids  This has been ongoing for the past week  Patient was having diarrhea which subsequently resolved  Allergies:    Allergies   Allergen Reactions    Other Rash       Medications:  Current Facility-Administered Medications:    acetaminophen (TYLENOL) tablet 650 mg, 650 mg, Oral, Q6H PRN, Randall Kwongin PA-C    aluminum-magnesium hydroxide-simethicone (MYLANTA) oral suspension 30 mL, 30 mL, Oral, Q4H PRN, Bc Busch MD, 30 mL at 08/07/22 2227    aspirin (ECOTRIN LOW STRENGTH) EC tablet 81 mg, 81 mg, Oral, Daily, Jackie Vecellio, PA-C, 81 mg at 08/08/22 0582    atorvastatin (LIPITOR) tablet 40 mg, 40 mg, Oral, Daily, Jackie Vecellio, PA-C, 40 mg at 08/08/22 7260    carvedilol (COREG) tablet 25 mg, 25 mg, Oral, BID, Jackie Vecellio, PA-C, 25 mg at 08/08/22 2933    cefTRIAXone (ROCEPHIN) IVPB (premix in dextrose) 1,000 mg 50 mL, 1,000 mg, Intravenous, Q24H, Jackie Vecroxanneo, PA-C, Last Rate: 100 mL/hr at 08/07/22 2227, 1,000 mg at 08/07/22 2227    cloNIDine (CATAPRES) tablet 0 1 mg, 0 1 mg, Oral, BID, Jackie Vecellio, PA-C, 0 1 mg at 08/08/22 0821    clopidogrel (PLAVIX) tablet 75 mg, 75 mg, Oral, Daily, Jackie Vecellio, PA-C, 75 mg at 08/08/22 4371    heparin (porcine) subcutaneous injection 5,000 Units, 5,000 Units, Subcutaneous, Q8H [facility], Jackie Vecroxanneo, PA-C, 5,000 Units at 08/08/22 0527    hydrALAZINE (APRESOLINE) injection 10 mg, 10 mg, Intravenous, Q6H PRN, Bc Busch MD, 10 mg at 08/07/22 1937    insulin glargine (LANTUS) subcutaneous injection 15 Units 0 15 mL, 15 Units, Subcutaneous, Q12H [facility], Bc Busch MD    insulin lispro (HumaLOG) 100 units/mL subcutaneous injection 2-12 Units, 2-12 Units, Subcutaneous, TID AC, 2 Units at 08/07/22 1130 **AND** Fingerstick Glucose (POCT), , , TID AC, Perfecto Fleischer, MD    losartan (COZAAR) tablet 50 mg, 50 mg, Oral, Daily, Danica Gabriel, CYNDIE, 50 mg at 08/08/22 0821    ondansetron (ZOFRAN) injection 4 mg, 4 mg, Intravenous, Q6H PRN, Randall Marcus PA-C    oxybutynin (DITROPAN-XL) 24 hr tablet 10 mg, 10 mg, Oral, Daily, Jackie KIA Meza, 10 mg at 08/08/22 0821    pantoprazole (PROTONIX) EC tablet 40 mg, 40 mg, Oral, Early Morning, Phoebe Renee MD, 40 mg at 08/08/22 4691    pregabalin (LYRICA) capsule 75 mg, 75 mg, Oral, BID, Jackie Meza PA-C, 75 mg at 08/08/22 8492    Past Medical history:  Past Medical History:   Diagnosis Date    Diabetes mellitus ([address] )     Neuropathy        Past Surgical History:   Past Surgical History:   Procedure Laterality Date    ANKLE ARTHROPLASTY         Family History:   Family History   Problem Relation Age of Onset    Stroke Mother     Heart disease Father        Social history:   Social History     Socioeconomic History    Marital status: Single     Spouse name: Not on file    Number of children: Not on file    Years of education: Not on file    Highest education level: Not on file   Occupational History    Not on file   Tobacco Use    Smoking status: Former Smoker    Smokeless tobacco: Never Used   Substance and Sexual Activity    Alcohol use: Not Currently    Drug use: Never    Sexual activity: Not Currently   Other Topics Concern    Not on file   Social History Narrative    Not on file     Social Determinants of Health     Financial Resource Strain: Not on file   Food Insecurity: No Food Insecurity    Worried About [address] in the Last Year: Never true    [address] [employer] in the Last Year: Never true   Transportation Needs: No Transportation Needs    Lack of Transportation (Medical): No    Lack of Transportation (Non-Medical):  No   Physical Activity: Not on file   Stress: Not on file   Social Connections: Not on file   Intimate Partner Violence: Not on file   Housing Stability: Low Risk     Unable to Pay for Housing in the Last Year: No    Number of Places Lived in the Last Year: 1    Unstable Housing in the Last Year: No       Review of Systems: All other systems were reviewed and were negative, otherwise please refer to HPI    Physical Exam: /75   Pulse 67   Temp 97 8 °F (36 6 °C)   Resp 16   Ht 5' 1" (1 549 m)   Wt 65 3 kg (144 lb) SpO2 96%   BMI 27 21 kg/m²     General Appearance:    Alert, cooperative, no distress, appears stated age   Head:    Normocephalic, without obvious abnormality, atraumatic   Eyes:    No scleral icterus           Mouth:  Mucosa moist   Neck:   Supple, symmetrical, trachea midline, no thyromegaly       Lungs:     Clear to auscultation bilaterally, respirations unlabored       Heart:    Regular rate and rhythm, S1 and S2 normal, no murmur, rub   or gallop     Abdomen:     Soft, non-tender, bowel sounds active all four quadrants,     no masses, no organomegaly   Genitalia:   deferred   Rectal:   deferred   Extremities:   Extremities normal,no cyanosis or edema       Skin:   Skin color, texture, turgor normal, no rashes or lesions       Neurologic:   Grossly intact, no focal deficit           Lab Results:   Recent Results (from the past 24 hour(s))   Fingerstick Glucose (POCT)    Collection Time: 08/07/22 11:22 AM   Result Value Ref Range    POC Glucose 180 (H) 65 - 140 mg/dl   Fingerstick Glucose (POCT)    Collection Time: 08/07/22  3:40 PM   Result Value Ref Range    POC Glucose 91 65 - 140 mg/dl   Fingerstick Glucose (POCT)    Collection Time: 08/07/22  6:00 PM   Result Value Ref Range    POC Glucose 147 (H) 65 - 140 mg/dl   Fingerstick Glucose (POCT)    Collection Time: 08/07/22  8:09 PM   Result Value Ref Range    POC Glucose 96 65 - 140 mg/dl   Fingerstick Glucose (POCT)    Collection Time: 08/07/22  9:56 PM   Result Value Ref Range    POC Glucose 85 65 - 140 mg/dl   Fingerstick Glucose (POCT)    Collection Time: 08/07/22 11:25 PM   Result Value Ref Range    POC Glucose 190 (H) 65 - 140 mg/dl   Fingerstick Glucose (POCT)    Collection Time: 08/08/22  1:55 AM   Result Value Ref Range    POC Glucose 207 (H) 65 - 140 mg/dl   Fingerstick Glucose (POCT)    Collection Time: 08/08/22  4:34 AM   Result Value Ref Range    POC Glucose 153 (H) 65 - 140 mg/dl   Basic metabolic panel    Collection Time: 08/08/22  5:11 AM Result Value Ref Range    Sodium 137 135 - 147 mmol/L    Potassium 4 2 3 5 - 5 3 mmol/L    Chloride 104 96 - 108 mmol/L    CO2 26 21 - 32 mmol/L    ANION GAP 7 4 - 13 mmol/L    BUN 18 5 - 25 mg/dL    Creatinine 1 23 0 60 - 1 30 mg/dL    Glucose 150 (H) 65 - 140 mg/dL    Calcium 7 9 (L) 8 3 - 10 1 mg/dL    eGFR 44 ml/min/1 73sq m   CBC and differential    Collection Time: 08/08/22  5:11 AM   Result Value Ref Range    WBC 7 71 4 31 - 10 16 Thousand/uL    RBC 4 08 3 81 - 5 12 Million/uL    Hemoglobin 11 7 11 5 - 15 4 g/dL    Hematocrit 35 4 34 8 - 46 1 %    MCV 87 82 - 98 fL    MCH 28 7 26 8 - 34 3 pg    MCHC 33 1 31 4 - 37 4 g/dL    RDW 12 6 11 6 - 15 1 %    MPV 10 1 8 9 - 12 7 fL    Platelets 173 (L) 814 - 390 Thousands/uL    nRBC 0 /100 WBCs    Neutrophils Relative 53 43 - 75 %    Immat GRANS % 1 0 - 2 %    Lymphocytes Relative 32 14 - 44 %    Monocytes Relative 11 4 - 12 %    Eosinophils Relative 3 0 - 6 %    Basophils Relative 0 0 - 1 %    Neutrophils Absolute 4 06 1 85 - 7 62 Thousands/µL    Immature Grans Absolute 0 07 0 00 - 0 20 Thousand/uL    Lymphocytes Absolute 2 47 0 60 - 4 47 Thousands/µL    Monocytes Absolute 0 85 0 17 - 1 22 Thousand/µL    Eosinophils Absolute 0 23 0 00 - 0 61 Thousand/µL    Basophils Absolute 0 03 0 00 - 0 10 Thousands/µL   Magnesium    Collection Time: 08/08/22  5:11 AM   Result Value Ref Range    Magnesium 2 1 1 6 - 2 6 mg/dL   Fingerstick Glucose (POCT)    Collection Time: 08/08/22  7:01 AM   Result Value Ref Range    POC Glucose 108 65 - 140 mg/dl   Fingerstick Glucose (POCT)    Collection Time: 08/08/22  9:18 AM   Result Value Ref Range    POC Glucose 95 65 - 140 mg/dl       Imaging Studies: No results found  Assessment/Plan:   1) ODYNOPHAGIA, DYSPHAGIA  This is a 57-year-old female who presents with nausea vomiting diarrhea was noted to have acute kidney injury which subsequently has resolved  Has been having dysphagia and odynophagia    Symptoms seem to have started a week ago   Symptoms seem to be consistent with esophagitis  This could be related to recent nausea and vomiting, but also could have underlying Candida esophagitis as she does have uncontrolled diabetes with hemoglobin A1c of 13 5  Will plan for upper endoscopy for further evaluation  Patient did have aspirin and Plavix this morning along with heparin subQ early this morning so will do diagnostic EGD today  Currently on pantoprazole  Patient also at risk for diabetic gastroparesis but symptoms seem to be more esophageal   We will make further recommendation pending her course  Thank you for the consultation  Case will be discussed with Dr Cat Waters

## 2022-08-08 NOTE — ASSESSMENT & PLAN NOTE
1/2 bottles growing coagulase-negative Staph    Suspected contaminant     Repeat blood cultures negative

## 2022-08-08 NOTE — ASSESSMENT & PLAN NOTE
Blood sugar levels have been labile  Lantus was decreased to 15 units Q12  Patient refused insulin last night and this morning  Endocrinology consult pending for today

## 2022-08-08 NOTE — INTERVAL H&P NOTE
H&P reviewed  After examining the patient I find no changes in the patients condition since the H&P had been written      Vitals:    08/08/22 1349   BP: (!) 184/73   Pulse: 61   Resp: 16   Temp: 97 9 °F (36 6 °C)   SpO2: 96%

## 2022-08-08 NOTE — ANESTHESIA PREPROCEDURE EVALUATION
Procedure:  EGD    Relevant Problems   CARDIO   (+) Coronary artery disease involving native coronary artery of native heart without angina pectoris   (+) Hypertension   (+) Other hyperlipidemia   (+) Pure hypercholesterolemia      ENDO   (+) Type 2 diabetes mellitus with hyperglycemia, with long-term current use of insulin (HCC)      GI/HEPATIC   (+) GERD (gastroesophageal reflux disease)      /RENAL   (+) LATASHA (acute kidney injury) ([facility] [address] )      PULMONARY   (+) Emphysema, unspecified (HCC)        Physical Exam    Airway    Mallampati score: II  TM Distance: >3 FB  Neck ROM: full     Dental   No notable dental hx     Cardiovascular  Cardiovascular exam normal    Pulmonary  Pulmonary exam normal     Other Findings        Anesthesia Plan  ASA Score- 3     Anesthesia Type- IV sedation with anesthesia with ASA Monitors  Additional Monitors:   Airway Plan:           Plan Factors-Exercise tolerance (METS): >4 METS  Chart reviewed  Imaging results reviewed  Existing labs reviewed  Patient summary reviewed  Patient is not a current smoker  Induction-     Postoperative Plan-     Informed Consent- Anesthetic plan and risks discussed with patient  I personally reviewed this patient with the CRNA  Discussed and agreed on the Anesthesia Plan with the CRNA  Marcio Gregory

## 2022-08-08 NOTE — ASSESSMENT & PLAN NOTE
Troponin initially elevated to 77, repeat 68  Seen by Cardiology during hospitalization, input appreciated,  Non MI troponin elevation secondary to acute kidney injury, uncontrolled diabetes mellitus  · History of multiple stents in place per patient  · EKG NSR, HR 80  · No chest pain  · Continue cardiac medication  · Compliance and better diabetic control advised  · Follow-up with Cardiology after discharge

## 2022-08-08 NOTE — CONSULTS
Consultation - Tish Tenorio 81 y o  female MRN: [id number]    Unit/Bed#: 2 [place] [address] Encounter: [id number]      Assessment/Plan   1  Type 2 diabetes mellitus not long-term insulin therapy with hyperglycemia   2  Overweight   3  Acute kidney injury on CKD-creatinine improving  Poorly controlled with A1c 13 5%   Blood sugars have been well controlled over the last 24 hours, has not received any long-acting insulin today  - Recommend continuing sliding scale insulin only for now   - Once patient resumes eating, will need to start insulin, weight based dosing, would likely start 15 units of long-acting insulin daily, 3-5 units of short-acting insulin with meals     Recommend regular follow-up with primary care, establish care with Endocrinology as an outpatient  Will benefit from diabetes education/medical nutrition therapy     4  Odynophagia/dysphagia-care per gastroenterology      CC: Diabetes Consult    History of Present Illness     HPI: Tish Tenorio is a 81y o  year old female with past medical history of type 2 diabetes mellitus, retention, hyperlipidemia, CAD status post stent placement in 2019 who presented with nausea, vomiting, hyperglycemia  Initially started on an insulin drip, has already been transitioned to subcutaneous basal bolus insulin     Diagnosed with diabetes mellitus approximately 35 years ago, follows up with her primary care, not regularly  Has been taking Lantus 33 units subcutaneously twice a day  Says that she ran out of [facility] ? Few months ago    Does not remember dose  Reports blood sugars ranging 180-mid 300s, no hypoglycemia at home   Has occasional itching, blurriness in vision   Does not follow a consistent carb diet   No history of CVA/CKD  Nausea, vomiting has resolved however has been having some odynophagia/dysphagia, has been evaluated by Gastroenterology, currently NPO for EGD later today   Oral intake has been limited due to recent odynophagia, feels like she has an appetite to eat       Inpatient consult to Endocrinology  Consult performed by: Valerie Ortega MD  Consult ordered by: Sumanth Shipman MD          Review of Systems    Historical Information   Past Medical History:   Diagnosis Date    Diabetes mellitus ([address] )     Neuropathy      Past Surgical History:   Procedure Laterality Date    ANKLE ARTHROPLASTY       Social History   Social History     Substance and Sexual Activity   Alcohol Use Not Currently     Social History     Substance and Sexual Activity   Drug Use Never     Social History     Tobacco Use   Smoking Status Former Smoker   Smokeless Tobacco Never Used     Family History:   Family History   Problem Relation Age of Onset    Stroke Mother     Heart disease Father        Meds/Allergies   Current Facility-Administered Medications   Medication Dose Route Frequency Provider Last Rate Last Admin    acetaminophen (TYLENOL) tablet 650 mg  650 mg Oral Q6H PRN Jackie Vecellio, PA-C        aluminum-magnesium hydroxide-simethicone (MYLANTA) oral suspension 30 mL  30 mL Oral Q4H PRN Sumanth Shipman MD   30 mL at 08/07/22 2227    aspirin (ECOTRIN LOW STRENGTH) EC tablet 81 mg  81 mg Oral Daily Jackie Vecellio, PA-C   81 mg at 08/08/22 3190    atorvastatin (LIPITOR) tablet 40 mg  40 mg Oral Daily Jackie Vecellio, PA-C   40 mg at 08/08/22 0471    carvedilol (COREG) tablet 25 mg  25 mg Oral BID Jackie Vecellio, PA-C   25 mg at 08/08/22 0347    cefTRIAXone (ROCEPHIN) IVPB (premix in dextrose) 1,000 mg 50 mL  1,000 mg Intravenous Q24H Jackie Vecellio, PA-C 100 mL/hr at 08/07/22 2227 1,000 mg at 08/07/22 2227    cloNIDine (CATAPRES) tablet 0 1 mg  0 1 mg Oral BID Jackie Vecellio, PA-C   0 1 mg at 08/08/22 1271    clopidogrel (PLAVIX) tablet 75 mg  75 mg Oral Daily Jackie Vecellio, PA-C   75 mg at 08/08/22 8592    heparin (porcine) subcutaneous injection 5,000 Units  5,000 Units Subcutaneous Q8H [address] Jackie Vecellio, PA-C   5,000 Units at 08/08/22 0527    hydrALAZINE (APRESOLINE) injection 10 mg  10 mg Intravenous Q6H PRN Alexx Osuna MD   10 mg at 08/07/22 1937    insulin glargine (LANTUS) subcutaneous injection 15 Units 0 15 mL  15 Units Subcutaneous Q12H [address] Alexx Osuna MD        insulin lispro (HumaLOG) 100 units/mL subcutaneous injection 2-12 Units  2-12 Units Subcutaneous TID [facility] Minesh Shea MD   2 Units at 08/07/22 1130    losartan (COZAAR) tablet 50 mg  50 mg Oral Daily CYNDIE Batista   50 mg at 08/08/22 0821    ondansetron (ZOFRAN) injection 4 mg  4 mg Intravenous Q6H PRN Melissa Keene PA-C        oxybutynin (DITROPAN-XL) 24 hr tablet 10 mg  10 mg Oral Daily Jackie Meza PA-C   10 mg at 08/08/22 3104    pantoprazole (PROTONIX) EC tablet 40 mg  40 mg Oral Early Morning Alexx Osuna MD   40 mg at 08/08/22 0527    pregabalin (LYRICA) capsule 75 mg  75 mg Oral BID Jackie Meza PA-C   75 mg at 08/08/22 5870     Allergies   Allergen Reactions    Other Rash       Objective   Vitals: Blood pressure 163/75, pulse 67, temperature 97 8 °F (36 6 °C), resp  rate 16, height 5' 1" (1 549 m), weight 65 3 kg (144 lb), SpO2 96 %  No intake or output data in the 24 hours ending 08/08/22 1244  Invasive Devices  Report    Peripheral Intravenous Line  Duration           Peripheral IV 08/07/22 Dorsal (posterior); Left Forearm <1 day                Physical Exam  Constitutional:Oriented to person, place, and time  Appears well-developed and well-nourished  Not in any acute distress  HENT:   Head: Normocephalic and atraumatic  Neck: Normal range of motion  Pulmonary/Chest: Effort normal/ breathing comfortably on room air   Musculoskeletal: Normal range of motion  Neurological: Alert and oriented to person, place, and time  Skin: Not diaphoretic  Psychiatric: Normal mood and affect  Behavior is normal      The history was obtained from the review of the chart, patient      Lab Results: Results from last 7 days   Lab Units 08/05/22  0607   HEMOGLOBIN A1C % 13 5*     Lab Results   Component Value Date    WBC 7 71 08/08/2022    HGB 11 7 08/08/2022    HCT 35 4 08/08/2022    MCV 87 08/08/2022     (L) 08/08/2022     Lab Results   Component Value Date/Time    BUN 18 08/08/2022 05:11 AM    BUN 28 (H) 12/27/2021 08:23 AM    K 4 2 08/08/2022 05:11 AM    K 6 5 (H) 12/27/2021 08:23 AM     08/08/2022 05:11 AM     12/27/2021 08:23 AM    CO2 26 08/08/2022 05:11 AM    CO2 24 12/27/2021 08:23 AM    CREATININE 1 23 08/08/2022 05:11 AM    AST 43 08/06/2022 06:21 AM    AST 20 12/27/2021 08:23 AM    ALT 32 08/06/2022 06:21 AM    ALT 22 12/27/2021 08:23 AM    ALB 2 2 (L) 08/06/2022 06:21 AM    GLOB 2 7 12/27/2021 08:23 AM     No results for input(s): CHOL, HDL, LDL, TRIG, VLDL in the last 72 hours  No results found for: Carl Pancoast  POC Glucose (mg/dl)   Date Value   08/08/2022 89   08/08/2022 95   08/08/2022 108   08/08/2022 153 (H)   08/08/2022 207 (H)   08/07/2022 190 (H)   08/07/2022 85   08/07/2022 96   08/07/2022 147 (H)   08/07/2022 91       Imaging Studies: I have personally reviewed pertinent reports  Portions of the record may have been created with voice recognition software  Occasional wrong word or "sound a like" substitutions may have occurred due to the inherent limitations of voice recognition software  Read the chart carefully and recognize, using context, where substitutions have occurred

## 2022-08-09 VITALS
HEART RATE: 71 BPM | HEIGHT: 61 IN | TEMPERATURE: 97.8 F | DIASTOLIC BLOOD PRESSURE: 58 MMHG | WEIGHT: 149 LBS | BODY MASS INDEX: 28.13 KG/M2 | SYSTOLIC BLOOD PRESSURE: 143 MMHG | OXYGEN SATURATION: 96 % | RESPIRATION RATE: 17 BRPM

## 2022-08-09 PROBLEM — N17.9 AKI (ACUTE KIDNEY INJURY) (HCC): Status: RESOLVED | Noted: 2022-08-05 | Resolved: 2022-08-09

## 2022-08-09 PROBLEM — E87.6 HYPOKALEMIA: Status: RESOLVED | Noted: 2022-08-05 | Resolved: 2022-08-09

## 2022-08-09 PROBLEM — K21.00 GASTROESOPHAGEAL REFLUX DISEASE WITH ESOPHAGITIS: Status: ACTIVE | Noted: 2022-08-07

## 2022-08-09 PROBLEM — R78.81 POSITIVE BLOOD CULTURE: Status: RESOLVED | Noted: 2022-08-07 | Resolved: 2022-08-09

## 2022-08-09 PROBLEM — N39.0 UTI (URINARY TRACT INFECTION): Status: RESOLVED | Noted: 2022-08-05 | Resolved: 2022-08-09

## 2022-08-09 LAB
GLUCOSE SERPL-MCNC: 120 MG/DL (ref 65–140)
GLUCOSE SERPL-MCNC: 143 MG/DL (ref 65–140)
GLUCOSE SERPL-MCNC: 266 MG/DL (ref 65–140)

## 2022-08-09 PROCEDURE — 82948 REAGENT STRIP/BLOOD GLUCOSE: CPT

## 2022-08-09 PROCEDURE — 99232 SBSQ HOSP IP/OBS MODERATE 35: CPT | Performed by: INTERNAL MEDICINE

## 2022-08-09 PROCEDURE — 99239 HOSP IP/OBS DSCHRG MGMT >30: CPT | Performed by: INTERNAL MEDICINE

## 2022-08-09 RX ORDER — INSULIN LISPRO 100 [IU]/ML
3 INJECTION, SOLUTION INTRAVENOUS; SUBCUTANEOUS
Refills: 0
Start: 2022-08-09 | End: 2022-08-29

## 2022-08-09 RX ORDER — SUCRALFATE 1 G/1
1 TABLET ORAL
Qty: 120 TABLET | Refills: 0 | Status: SHIPPED | OUTPATIENT
Start: 2022-08-09 | End: 2022-09-12 | Stop reason: SDUPTHER

## 2022-08-09 RX ORDER — PANTOPRAZOLE SODIUM 40 MG/1
40 TABLET, DELAYED RELEASE ORAL 2 TIMES DAILY
Qty: 60 TABLET | Refills: 0 | Status: SHIPPED | OUTPATIENT
Start: 2022-08-09 | End: 2022-09-06 | Stop reason: SDUPTHER

## 2022-08-09 RX ORDER — INSULIN GLARGINE 100 [IU]/ML
10 INJECTION, SOLUTION SUBCUTANEOUS
Status: DISCONTINUED | OUTPATIENT
Start: 2022-08-09 | End: 2022-08-09 | Stop reason: HOSPADM

## 2022-08-09 RX ORDER — INSULIN LISPRO 100 [IU]/ML
3 INJECTION, SOLUTION INTRAVENOUS; SUBCUTANEOUS
Status: DISCONTINUED | OUTPATIENT
Start: 2022-08-09 | End: 2022-08-09 | Stop reason: HOSPADM

## 2022-08-09 RX ORDER — INSULIN GLARGINE 100 [IU]/ML
10 INJECTION, SOLUTION SUBCUTANEOUS
Qty: 10 ML | Refills: 0
Start: 2022-08-09 | End: 2022-08-16 | Stop reason: SDUPTHER

## 2022-08-09 RX ADMIN — ASPIRIN 81 MG: 81 TABLET, COATED ORAL at 08:32

## 2022-08-09 RX ADMIN — PREGABALIN 75 MG: 75 CAPSULE ORAL at 08:33

## 2022-08-09 RX ADMIN — HYDRALAZINE HYDROCHLORIDE 10 MG: 20 INJECTION INTRAMUSCULAR; INTRAVENOUS at 07:43

## 2022-08-09 RX ADMIN — CLONIDINE HYDROCHLORIDE 0.1 MG: 0.1 TABLET ORAL at 08:33

## 2022-08-09 RX ADMIN — SUCRALFATE 1 G: 1 TABLET ORAL at 12:03

## 2022-08-09 RX ADMIN — SODIUM CHLORIDE, SODIUM LACTATE, POTASSIUM CHLORIDE, AND CALCIUM CHLORIDE 75 ML/HR: .6; .31; .03; .02 INJECTION, SOLUTION INTRAVENOUS at 05:34

## 2022-08-09 RX ADMIN — CEFTRIAXONE 1000 MG: 1 INJECTION, SOLUTION INTRAVENOUS at 00:00

## 2022-08-09 RX ADMIN — PANTOPRAZOLE SODIUM 40 MG: 40 TABLET, DELAYED RELEASE ORAL at 05:32

## 2022-08-09 RX ADMIN — CLOPIDOGREL BISULFATE 75 MG: 75 TABLET ORAL at 08:33

## 2022-08-09 RX ADMIN — HEPARIN SODIUM 5000 UNITS: 5000 INJECTION INTRAVENOUS; SUBCUTANEOUS at 05:32

## 2022-08-09 RX ADMIN — INSULIN LISPRO 3 UNITS: 100 INJECTION, SOLUTION INTRAVENOUS; SUBCUTANEOUS at 14:01

## 2022-08-09 RX ADMIN — ATORVASTATIN CALCIUM 40 MG: 40 TABLET, FILM COATED ORAL at 08:32

## 2022-08-09 RX ADMIN — INSULIN LISPRO 2 UNITS: 100 INJECTION, SOLUTION INTRAVENOUS; SUBCUTANEOUS at 12:03

## 2022-08-09 RX ADMIN — LOSARTAN POTASSIUM 50 MG: 50 TABLET, FILM COATED ORAL at 08:32

## 2022-08-09 RX ADMIN — CARVEDILOL 25 MG: 25 TABLET, FILM COATED ORAL at 08:32

## 2022-08-09 RX ADMIN — OXYBUTYNIN 10 MG: 5 TABLET, FILM COATED, EXTENDED RELEASE ORAL at 08:32

## 2022-08-09 RX ADMIN — SUCRALFATE 1 G: 1 TABLET ORAL at 07:43

## 2022-08-09 NOTE — ASSESSMENT & PLAN NOTE
Previous echo with EF 20-30%, as per records    Appears prior to PCI  Patient reports that she had repeat echo around 6 months ago with her cardiologist and was advised that her EF was improved  No evidence of volume overload

## 2022-08-09 NOTE — CASE MANAGEMENT
Case Management Discharge Planning Note    Patient name Tish Tenorio  Location [address] MRN [id number]  : 1950 Date 2022       Current Admission Date: 2022  Current Admission Diagnosis:Type 2 diabetes mellitus with hyperglycemia, with long-term current use of insulin [facility])   Patient Active Problem List    Diagnosis Date Noted    GERD (gastroesophageal reflux disease) 2022    Positive blood culture 2022    Dehydration     Elevated troponin 2022    UTI (urinary tract infection) 2022    LATASHA (acute kidney injury) ([facility] [address] ) 2022    Hypokalemia 2022    Type 2 diabetes mellitus with hyperglycemia, with long-term current use of insulin ([facility] [address] ) 2022    Hypertension 11/15/2021    Ulcerative colitis ([facility] [address] ) 11/15/2021    Other hyperlipidemia 11/15/2021    Osteoporosis 11/15/2021    Coronary artery disease involving native coronary artery of native heart without angina pectoris 2019    Ischemic cardiomyopathy 2019    Pure hypercholesterolemia 2019    Pulmonary hypertension ([facility] [address] ) 2019    Emphysema, unspecified ([facility] [address] ) 2019      LOS (days): 5  Geometric Mean LOS (GMLOS) (days): 2 90  Days to GMLOS:-1 7     OBJECTIVE:  Risk of Unplanned Readmission Score: 12 84     Current admission status: Inpatient   Preferred Pharmacy:   [address] [place] [address] [address] [facility],[address] [id number]  Phone: [phone] Fax: [phone]    Primary Care Provider: Ranjeet Potts MD    Primary Insurance: [facility]  Secondary Insurance:     DISCHARGE DETAILS:    Discharge planning discussed with[de-identified] Patient  Freedom of Choice: Yes  Comments - Freedom of Choice: SW met with pt to review plans and IMM  Pt confirmed that her plan is to return home and her friend will be picking her up  Pt said she is hopeful that discharge will be today    Pt had concerns about picking up her medications from [facility] [facility] (where she works)  SW offered other options including another pharmacy or pharmacy that delivers  Pt declined both due to lower costs at [employer] [facility] due to employment  Will talk with physician  No other needs expressed by pt  [facility]         [facility] No    DME Referral Provided  Referral made for DME?: No    Other Referral/Resources/Interventions Provided:  Interventions: None Indicated    Treatment Team Recommendation: Home  Discharge Destination Plan[de-identified] Home  Transport at Discharge : Automobile (friend)    IMM Given (Date):: 08/09/22  IMM Given to[de-identified] Patient (IMM reviewed with pt  Pt verbalized understanding and agrees with discharge determination  Pt signed IMM and copy given    Copy also placed in scan bin for chart )

## 2022-08-09 NOTE — NURSING NOTE
Pt discharged home, accompanied by friend, Miguel Angel Gotti  Discharge instructions reviewed with pt at bedside  Medications e-scribed to pt's confirmed pharmacy  Pt's home medications returned  Pt denies questions/concerns prior to discharge

## 2022-08-09 NOTE — PLAN OF CARE
Problem: SAFETY ADULT  Goal: Patient will remain free of falls  Description: INTERVENTIONS:  - Educate patient/family on patient safety including physical limitations  - Instruct patient to call for assistance with activity   - Consult OT/PT to assist with strengthening/mobility   - Keep Call bell within reach  - Keep bed low and locked with side rails adjusted as appropriate  - Keep care items and personal belongings within reach  - Initiate and maintain comfort rounds  - Make Fall Risk Sign visible to staff  - Offer Toileting every 2 Hours, in advance of need  - Initiate/Maintain bed alarm  -   - Apply yellow socks and bracelet for high fall risk patients  - Consider moving patient to room near nurses station  Outcome: Progressing  Goal: Maintain or return to baseline ADL function  Description: INTERVENTIONS:  -  Assess patient's ability to carry out ADLs; assess patient's baseline for ADL function and identify physical deficits which impact ability to perform ADLs (bathing, care of mouth/teeth, toileting, grooming, dressing, etc )  - Assess/evaluate cause of self-care deficits   - Assess range of motion  - Assess patient's mobility; develop plan if impaired  - Assess patient's need for assistive devices and provide as appropriate  - Encourage maximum independence but intervene and supervise when necessary  - Involve family in performance of ADLs  - Assess for home care needs following discharge   - Consider OT consult to assist with ADL evaluation and planning for discharge  - Provide patient education as appropriate  Outcome: Progressing  Goal: Maintains/Returns to pre admission functional level  Description: INTERVENTIONS:  - Perform BMAT or MOVE assessment daily    - Set and communicate daily mobility goal to care team and patient/family/caregiver     - Collaborate with rehabilitation services on mobility goals if consulted  - Record patient progress and toleration of activity level   Outcome: Progressing Problem: METABOLIC, FLUID AND ELECTROLYTES - ADULT  Goal: Electrolytes maintained within normal limits  Description: INTERVENTIONS:  - Monitor labs and assess patient for signs and symptoms of electrolyte imbalances  - Administer electrolyte replacement as ordered  - Monitor response to electrolyte replacements, including repeat lab results as appropriate  - Instruct patient on fluid and nutrition as appropriate  Outcome: Progressing  Goal: Glucose maintained within target range  Description: INTERVENTIONS:  - Monitor Blood Glucose as ordered  - Assess for signs and symptoms of hyperglycemia and hypoglycemia  - Administer ordered medications to maintain glucose within target range  - Assess nutritional intake and initiate nutrition service referral as needed  Outcome: Progressing     Problem: CARDIOVASCULAR - ADULT  Goal: Maintains optimal cardiac output and hemodynamic stability  Description: INTERVENTIONS:  - Monitor I/O, vital signs and rhythm  - Monitor for S/S and trends of decreased cardiac output  - Administer and titrate ordered vasoactive medications to optimize hemodynamic stability  - Assess quality of pulses, skin color and temperature  - Assess for signs of decreased coronary artery perfusion  - Instruct patient to report change in severity of symptoms  Outcome: Progressing  Goal: Absence of cardiac dysrhythmias or at baseline rhythm  Description: INTERVENTIONS:  - Continuous cardiac monitoring, vital signs, obtain 12 lead EKG if ordered  - Administer antiarrhythmic and heart rate control medications as ordered  - Monitor electrolytes and administer replacement therapy as ordered  Outcome: Progressing     Problem: MOBILITY - ADULT  Goal: Maintain or return to baseline ADL function  Description: INTERVENTIONS:  -  Assess patient's ability to carry out ADLs; assess patient's baseline for ADL function and identify physical deficits which impact ability to perform ADLs (bathing, care of mouth/teeth, toileting, grooming, dressing, etc )  - Assess/evaluate cause of self-care deficits   - Assess range of motion  - Assess patient's mobility; develop plan if impaired  - Assess patient's need for assistive devices and provide as appropriate  - Encourage maximum independence but intervene and supervise when necessary  - Involve family in performance of ADLs  - Assess for home care needs following discharge   - Consider OT consult to assist with ADL evaluation and planning for discharge  - Provide patient education as appropriate  Outcome: Progressing  Goal: Maintains/Returns to pre admission functional level  Description: INTERVENTIONS:  - Perform BMAT or MOVE assessment daily    - Set and communicate daily mobility goal to care team and patient/family/caregiver     - Collaborate with rehabilitation services on mobility goals if consulted  - Out of bed for toileting  - Record patient progress and toleration of activity level   Outcome: Progressing     Problem: Potential for Falls  Goal: Patient will remain free of falls  Description: INTERVENTIONS:  - Educate patient/family on patient safety including physical limitations  - Instruct patient to call for assistance with activity   - Consult OT/PT to assist with strengthening/mobility   - Keep Call bell within reach  - Keep bed low and locked with side rails adjusted as appropriate  - Keep care items and personal belongings within reach  - Initiate and maintain comfort rounds  - Make Fall Risk Sign visible to staff  - Offer Toileting every 2 Hours, in advance of need  - Initiate/Maintain bed alarm  -   - Apply yellow socks and bracelet for high fall risk patients  - Consider moving patient to room near nurses station  Outcome: Progressing     Problem: INFECTION - ADULT  Goal: Absence or prevention of progression during hospitalization  Description: INTERVENTIONS:  - Assess and monitor for signs and symptoms of infection  - Monitor lab/diagnostic results  - Monitor all insertion sites, i e  indwelling lines, tubes, and drains  - Monitor endotracheal if appropriate and nasal secretions for changes in amount and color  - [place] appropriate cooling/warming therapies per order  - Administer medications as ordered  - Instruct and encourage patient and family to use good hand hygiene technique  - Identify and instruct in appropriate isolation precautions for identified infection/condition  Outcome: Progressing     Problem: Nutrition/Hydration-ADULT  Goal: Nutrient/Hydration intake appropriate for improving, restoring or maintaining nutritional needs  Description: Monitor and assess patient's nutrition/hydration status for malnutrition  Collaborate with interdisciplinary team and initiate plan and interventions as ordered  Monitor patient's weight and dietary intake as ordered or per policy  Utilize nutrition screening tool and intervene as necessary  Determine patient's food preferences and provide high-protein, high-caloric foods as appropriate       INTERVENTIONS:  - Monitor oral intake, urinary output, labs, and treatment plans  - Assess nutrition and hydration status and recommend course of action  - Evaluate amount of meals eaten  - Assist patient with eating if necessary   - Allow adequate time for meals  - Recommend/ encourage appropriate diets, oral nutritional supplements, and vitamin/mineral supplements  - Order, calculate, and assess calorie counts as needed  - Recommend, monitor, and adjust tube feedings and TPN/PPN based on assessed needs  - Assess need for intravenous fluids  - Provide specific nutrition/hydration education as appropriate  - Include patient/family/caregiver in decisions related to nutrition  Outcome: Progressing

## 2022-08-09 NOTE — PLAN OF CARE
Problem: SAFETY ADULT  Goal: Patient will remain free of falls  Description: INTERVENTIONS:  - Educate patient/family on patient safety including physical limitations  - Instruct patient to call for assistance with activity   - Consult OT/PT to assist with strengthening/mobility   - Keep Call bell within reach  - Keep bed low and locked with side rails adjusted as appropriate  - Keep care items and personal belongings within reach  - Initiate and maintain comfort rounds  - Make Fall Risk Sign visible to staff  - Offer Toileting every 2 Hours, in advance of need  - Initiate/Maintain bed alarm  -   - Apply yellow socks and bracelet for high fall risk patients  - Consider moving patient to room near nurses station  Outcome: Progressing  Goal: Maintain or return to baseline ADL function  Description: INTERVENTIONS:  -  Assess patient's ability to carry out ADLs; assess patient's baseline for ADL function and identify physical deficits which impact ability to perform ADLs (bathing, care of mouth/teeth, toileting, grooming, dressing, etc )  - Assess/evaluate cause of self-care deficits   - Assess range of motion  - Assess patient's mobility; develop plan if impaired  - Assess patient's need for assistive devices and provide as appropriate  - Encourage maximum independence but intervene and supervise when necessary  - Involve family in performance of ADLs  - Assess for home care needs following discharge   - Consider OT consult to assist with ADL evaluation and planning for discharge  - Provide patient education as appropriate  Outcome: Progressing  Goal: Maintains/Returns to pre admission functional level  Description: INTERVENTIONS:  - Perform BMAT or MOVE assessment daily    - Set and communicate daily mobility goal to care team and patient/family/caregiver     - Collaborate with rehabilitation services on mobility goals if consulted  - Record patient progress and toleration of activity level   Outcome: Progressing Problem: METABOLIC, FLUID AND ELECTROLYTES - ADULT  Goal: Electrolytes maintained within normal limits  Description: INTERVENTIONS:  - Monitor labs and assess patient for signs and symptoms of electrolyte imbalances  - Administer electrolyte replacement as ordered  - Monitor response to electrolyte replacements, including repeat lab results as appropriate  - Instruct patient on fluid and nutrition as appropriate  Outcome: Progressing  Goal: Glucose maintained within target range  Description: INTERVENTIONS:  - Monitor Blood Glucose as ordered  - Assess for signs and symptoms of hyperglycemia and hypoglycemia  - Administer ordered medications to maintain glucose within target range  - Assess nutritional intake and initiate nutrition service referral as needed  Outcome: Progressing     Problem: CARDIOVASCULAR - ADULT  Goal: Maintains optimal cardiac output and hemodynamic stability  Description: INTERVENTIONS:  - Monitor I/O, vital signs and rhythm  - Monitor for S/S and trends of decreased cardiac output  - Administer and titrate ordered vasoactive medications to optimize hemodynamic stability  - Assess quality of pulses, skin color and temperature  - Assess for signs of decreased coronary artery perfusion  - Instruct patient to report change in severity of symptoms  Outcome: Progressing  Goal: Absence of cardiac dysrhythmias or at baseline rhythm  Description: INTERVENTIONS:  - Continuous cardiac monitoring, vital signs, obtain 12 lead EKG if ordered  - Administer antiarrhythmic and heart rate control medications as ordered  - Monitor electrolytes and administer replacement therapy as ordered  Outcome: Progressing     Problem: MOBILITY - ADULT  Goal: Maintain or return to baseline ADL function  Description: INTERVENTIONS:  -  Assess patient's ability to carry out ADLs; assess patient's baseline for ADL function and identify physical deficits which impact ability to perform ADLs (bathing, care of mouth/teeth, toileting, grooming, dressing, etc )  - Assess/evaluate cause of self-care deficits   - Assess range of motion  - Assess patient's mobility; develop plan if impaired  - Assess patient's need for assistive devices and provide as appropriate  - Encourage maximum independence but intervene and supervise when necessary  - Involve family in performance of ADLs  - Assess for home care needs following discharge   - Consider OT consult to assist with ADL evaluation and planning for discharge  - Provide patient education as appropriate  Outcome: Progressing  Goal: Maintains/Returns to pre admission functional level  Description: INTERVENTIONS:  - Perform BMAT or MOVE assessment daily    - Set and communicate daily mobility goal to care team and patient/family/caregiver     - Collaborate with rehabilitation services on mobility goals if consulted  - Out of bed for toileting  - Record patient progress and toleration of activity level   Outcome: Progressing     Problem: Potential for Falls  Goal: Patient will remain free of falls  Description: INTERVENTIONS:  - Educate patient/family on patient safety including physical limitations  - Instruct patient to call for assistance with activity   - Consult OT/PT to assist with strengthening/mobility   - Keep Call bell within reach  - Keep bed low and locked with side rails adjusted as appropriate  - Keep care items and personal belongings within reach  - Initiate and maintain comfort rounds  - Make Fall Risk Sign visible to staff  - Offer Toileting every 2 Hours, in advance of need  - Initiate/Maintain bed alarm  -   - Apply yellow socks and bracelet for high fall risk patients  - Consider moving patient to room near nurses station  Outcome: Progressing     Problem: INFECTION - ADULT  Goal: Absence or prevention of progression during hospitalization  Description: INTERVENTIONS:  - Assess and monitor for signs and symptoms of infection  - Monitor lab/diagnostic results  - Monitor all insertion sites, i e  indwelling lines, tubes, and drains  - Monitor endotracheal if appropriate and nasal secretions for changes in amount and color  - [place] appropriate cooling/warming therapies per order  - Administer medications as ordered  - Instruct and encourage patient and family to use good hand hygiene technique  - Identify and instruct in appropriate isolation precautions for identified infection/condition  Outcome: Progressing     Problem: Nutrition/Hydration-ADULT  Goal: Nutrient/Hydration intake appropriate for improving, restoring or maintaining nutritional needs  Description: Monitor and assess patient's nutrition/hydration status for malnutrition  Collaborate with interdisciplinary team and initiate plan and interventions as ordered  Monitor patient's weight and dietary intake as ordered or per policy  Utilize nutrition screening tool and intervene as necessary  Determine patient's food preferences and provide high-protein, high-caloric foods as appropriate       INTERVENTIONS:  - Monitor oral intake, urinary output, labs, and treatment plans  - Assess nutrition and hydration status and recommend course of action  - Evaluate amount of meals eaten  - Assist patient with eating if necessary   - Allow adequate time for meals  - Recommend/ encourage appropriate diets, oral nutritional supplements, and vitamin/mineral supplements  - Order, calculate, and assess calorie counts as needed  - Recommend, monitor, and adjust tube feedings and TPN/PPN based on assessed needs  - Assess need for intravenous fluids  - Provide specific nutrition/hydration education as appropriate  - Include patient/family/caregiver in decisions related to nutrition  Outcome: Progressing

## 2022-08-09 NOTE — DISCHARGE SUMMARY
Discharge Summary - [facility]    Patient Information: Carloz  81 y o  female MRN: [id number]  Unit/Bed#: [address] [place] [address] Encounter: [id number]    Discharging Physician / Practitioner: Tanna Wilkins MD  PCP: Antonio Sawyer MD  Admission Date: 8/4/2022  Discharge Date: 08/09/22    Reason for Admission: Chills (Pt states she's clammy; vomited; diarrhea  Began on Tuesday morning) and Fall (Pt states she fell Wednesday morning and broke her nose; on blood thinners)      Discharge Diagnoses:     Principal Problem:    Type 2 diabetes mellitus with hyperglycemia, with long-term current use of insulin ([facility])  Active Problems:    Coronary artery disease involving native coronary artery of native heart without angina pectoris    Emphysema, unspecified ([facility])    Ischemic cardiomyopathy    Hypertension    Elevated troponin    Gastroesophageal reflux disease with esophagitis    Ulcerative colitis ([facility] )    Pure hypercholesterolemia  Resolved Problems:    UTI (urinary tract infection)    LATASHA (acute kidney injury) ([facility] )    Positive blood culture    Hypokalemia    Dehydration        * Type 2 diabetes mellitus with hyperglycemia, with long-term current use of insulin [facility])  Assessment & Plan  Lab Results   Component Value Date    HGBA1C 13 5 (H) 08/05/2022       Recent Labs     08/08/22  2047 08/09/22  0156 08/09/22  0715 08/09/22  1110   POCGLU 254* 143* 120 266*       Blood Sugar Average: Last 72 hrs:  (P) 523 [id number]     Presented with generalized weakness, fatigue, blood sugar 514, anion gap 14, sodium bicarb 25  Previously on Lantus 33 units b i d  at home, reports compliance but did not take it prior to admission due to GI symptoms and poor p o  Tolerance  Reports dietary noncompliance and does not check blood glucose at home  Initially required insulin infusion, subsequently improved    Seen by Endocrinology, input appreciated  Blood glucose appears to be better controlled with diabetic diet   Morning glucose was 120 mg/dL despite not getting Lantus after procedure yesterday  Tolerating diet, postprandial hyperglycemia noted  Patient reports that she plans to follow soft diabetic cardiac diet similar to the hospital after discharge  Declines blood work today and adamant of going home but Agrees to comply with medication treatment recommendation and blood glucose monitoring at home  Discussed with endocrinology regarding discharge home regimen, will start on Lantus 10 units q h s  Plus 3 units of Humalog with meals   Counseled extensively regarding compliance and monitoring   Continue diabetic diet after discharge   Recommended to follow-up with endocrinology after discharge   Recommended to follow with PCP regarding persistent hypo or hyperglycemia and further adjustment in insulin regimen until seen by endocrinology   Considered Jardiance but hold off at present due to history of UTI    Gastroesophageal reflux disease with esophagitis  Assessment & Plan  Patient reports a lot of burning discomfort in her esophagus  Also reports that it feels like food is "getting stuck" in her stomach  GI following, input appreciated   Status post EGD, 8/8-noted to have significant esophagitis and ulceration in lower 3rd  Of esophagus , mild gastritis  Reports improvement in symptoms  Tolerating soft diet without any nausea vomiting or pain  · Continue PPI b i d    · Carafate with meals and at bedtime   · Follow-up biopsy which GI  · Follow-up with GI after discharge  · Patient was advised to follow-up with GI for repeat EGD in 8 weeks and also colonoscopy    Elevated troponin  Assessment & Plan  Troponin initially elevated to 77, repeat 68  Seen by Cardiology during hospitalization, input appreciated,  Non MI troponin elevation secondary to acute kidney injury, uncontrolled diabetes mellitus  · History of multiple stents in place per patient  · EKG NSR, HR 80  · No chest pain  · Continue cardiac medication  · Compliance and better diabetic control advised  · Follow-up with Cardiology after discharge    Hypertension  Assessment & Plan  Continue carvedilol, losartan and catapres  Resume home ARB    Ischemic cardiomyopathy  Assessment & Plan  Previous echo with EF 20-30%, as per records  Appears prior to PCI  Patient reports that she had repeat echo around 6 months ago with her cardiologist and was advised that her EF was improved  No evidence of volume overload      Emphysema, unspecified ([facility] )  Assessment & Plan  No evidence of acute exacerbation  Will continue to monitor  Coronary artery disease involving native coronary artery of native heart without angina pectoris  Assessment & Plan  History of CAD status post PCI x3 in 2019  Follows up with Cardiology   Patient reported that she had stress test in last 1 year by her cardiologist and she was told that it was fine  Continue aspirin, Plavix, carvedilol, Lipitor    Positive blood culture-resolved as of 8/9/2022  Assessment & Plan  1/2 bottles growing coagulase-negative Staph    Suspected contaminant  Repeat blood cultures negative    LATASHA (acute kidney injury) (HCC)-resolved as of 8/9/2022  Assessment & Plan  Secondary to nausea vomiting and hyperglycemia   Creatine 2 03 on admission  Now improved   Monitor    UTI (urinary tract infection)-resolved as of 8/9/2022  Assessment & Plan  UA with moderate bacteria, trace leukocytes  Reported dysuria on presentation, now improved  Urine culture with mixed contaminant  · Receive ceftriaxone during hospitalization  · Will discontinue further antimicrobial treatment  · Recommended to monitor symptoms and diabetic control    Pure hypercholesterolemia  Assessment & Plan  Continue atorvastatin  Ulcerative colitis ([facility] )  Assessment & Plan  History of ulcerative colitis not on any medication currently    Denies any diarrhea  Follow-up with GI after discharge for colonoscopy    Hypokalemia-resolved as of 8/9/2022  Assessment & Plan  continue to monitor  Consultations During Hospital Stay:  [address] TO ENDOCRINOLOGY  IP CONSULT TO GASTROENTEROLOGY    Procedures Performed:     · EGD    Significant Findings:     · Refer to hospital course and above listed diagnosis related plan for details    Imaging while in hospital:    EGD    Result Date: 8/8/2022  Narrative: [facility] Operating Room [address] [phone] DATE OF SERVICE: 8/08/22 PHYSICIAN(S): Attending: Jarad Romero MD Fellow: No Staff Documented INDICATION: Gastroesophageal reflux disease with esophagitis, unspecified whether hemorrhage  Dysphagia  POST-OP DIAGNOSIS: See the impression below  PREPROCEDURE: Informed consent was obtained for the procedure, including sedation  Risks of perforation, hemorrhage, adverse drug reaction and aspiration were discussed  The patient was placed in the left lateral decubitus position  Patient was explained about the risks and benefits of the procedure  Risks including but not limited to bleeding, infection, and perforation were explained in detail  Also explained about less than 100% sensitivity with the exam and other alternatives  DETAILS OF PROCEDURE: Patient was taken to the procedure room where a time out was performed to confirm correct patient and correct procedure  The patient underwent monitored anesthesia care, which was administered by an anesthesia professional  The patient's blood pressure, heart rate, level of consciousness, respirations and oxygen were monitored throughout the procedure  The scope was advanced to the second part of the duodenum  Retroflexion was performed in the fundus  The patient experienced no blood loss  The procedure was not difficult  The patient tolerated the procedure well  There were no apparent complications   ANESTHESIA INFORMATION: ASA: III Anesthesia Type: IV Sedation with Anesthesia MEDICATIONS: No administrations occurring from 1436 to 1506 on 08/08/22 FINDINGS: Irregular Z-line 39 cm from the incisors  Patient on Plavix and aspirin  Multiple biopsies not taken from the ulceration for that reason  Patient was also given heparin subQ  Severe, patchy erythematous and ulcerated mucosa in the lower third of the esophagus  Significant inflammation, edema and ulceration of the lower esophagus noted  Ulceration does extend to the lower 3rd  There was no food impaction noted in this area  Erythematous mucosa in the antrum; performed cold forceps biopsy  Single biopsy taken for evaluation of H pylori  SPECIMENS: ID Type Source Tests Collected by Time Destination 1 : antrum cold bx r/o h  pylori Tissue Stomach TISSUE EXAM Jovana Almanza MD 8/8/2022  3:04 PM      Impression: 1  Significant esophagitis and ulceration of the lower esophagus involving the lower 3rd  2  Mild gastritis  3  Significant hiatal hernia not seen  RECOMMENDATION: Await pathology results Follow up with me in clinic Follow up with PCP Schedule repeat EGD B i d  PPI and Carafate liquid t i d  P c  And HS  Jovana Almanza MD       Incidental Findings:   · None    Test Results Pending at Discharge (will require follow up):   · As per After Visit Summary     Outpatient Tests Requested:  · Glucose monitoring    Complications:  Refer to hospital course and above listed diagnosis related plan, if any    Hospital Course: As per HPI  Mitra Gonzalez is a 81 y o  female patient with history of CAD, diabetes mellitus type 2, hypertension, ascitic colitis who originally presented to the hospital on 8/4/2022 due to generalized weakness and fatigue  She stated her symptoms started 2 days ago after eating [place] food  She had multiple episodes of nonbloody vomiting and diarrhea 2 days prior to admission , which is subsequently resolved    Night prior to admission, she got up to use the bathroom, became lightheaded, and hit her nose on the wall and slid down the wall  No LOC, neck pain, headache  The day of admission, she stated that she is still feeling weak, fatigued, and now feels feverish  She did not take any of her medications today as she has been been feeling well  Denied any chest pain, palpitations, shortness of breath, cough, abdominal pain, dysuria, hematuria  Please see above list of diagnoses and related plan for additional information  Condition at Discharge: stable     Discharge Day Visit / Exam:     Subjective:    Anxious and adamant to go home today  Refused blood work earlier today    Denies any nausea vomiting reflux symptoms or odynophagia  Denies any chest pain shortness of breath or abdominal pain    Blood glucose is reasonably well controlled despite not getting Lantus yesterday after the procedure  Reports compliance with Lantus at home but does report significant dietary noncompliance and does not check blood glucose at home    Reported that she did have dysuria on presentation now improved      Discussed at length regarding need for better diabetic control  Discussed at length short and long-term complications related to diabetes with end-organ damage    Patient agrees with compliance with diabetic regimen and diet and lifestyle modification but does not want to stay in the hospital longer to monitor response to insulin  Risk of hyper and hypoglycemia discussed    Reports that she does have Lantus Humalog glucometer and diabetic supplies at home    Vitals: Blood Pressure: 143/58 (08/09/22 0834)  Pulse: 71 (08/09/22 0834)  Temperature: 97 8 °F (36 6 °C) (08/08/22 2311)  Temp Source: Oral (08/08/22 1944)  Respirations: 17 (08/08/22 1944)  Height: 5' 1" (154 9 cm) (08/08/22 1349)  Weight - Scale: 67 6 kg (149 lb) (08/08/22 1349)  SpO2: 96 % (08/09/22 0834) on room air  Exam:   Physical Exam  Constitutional:       General: She is not in acute distress  HENT:      Head: Normocephalic and atraumatic  Cardiovascular:      Rate and Rhythm: Normal rate  Pulmonary:      Effort: Pulmonary effort is normal  No respiratory distress  Breath sounds: Normal breath sounds  No wheezing or rales  Abdominal:      General: Bowel sounds are normal  There is no distension  Palpations: Abdomen is soft  Tenderness: There is no abdominal tenderness  There is no guarding or rebound  Skin:     General: Skin is warm and dry  Findings: Lesion (Small scab over the foot without any evidence of infection) present  No rash  Neurological:      General: No focal deficit present  Mental Status: She is alert and oriented to person, place, and time  Mental status is at baseline  Discharge instructions/Information to patient and family:(Discharge Medications and Follow up):   See after visit summary for information provided to patient and family  Provisions for Follow-Up Care:  See after visit summary for information related to follow-up care and any pertinent home health orders  Disposition: Home    Planned Readmission:  No     Discharge Statement:  I spent 45 minutes discharging the patient  This time was spent on the day of discharge  I had direct contact with the patient on the day of discharge  Greater than 50% of the total time was spent examining patient, answering all patient questions, arranging and discussing plan of care with patient as well as directly providing post-discharge instructions  Additional time then spent on discharge activities  Discussed with patient at length regarding need for compliance and close follow-up  Discussed with friend at bedside  Discharge Medications:  See after visit summary for reconciled discharge medications provided to patient and family  ** Please Note: "This note has been constructed using a voice recognition system  Therefore there may be syntax, spelling, and/or grammatical errors   Please call if you have any questions  "**

## 2022-08-09 NOTE — ASSESSMENT & PLAN NOTE
History of CAD status post PCI x3 in 2019  Follows up with Cardiology     Patient reported that she had stress test in last 1 year by her cardiologist and she was told that it was fine  Continue aspirin, Plavix, carvedilol, Lipitor

## 2022-08-09 NOTE — DISCHARGE INSTR - AVS FIRST PAGE
Monitor blood glucose before meals (breakfast, lunch, dinner) and at bedtime  Continue medication as advised  Continue soft cardiac diabetic diet as you are doing during hospitalization    Call physician if blood glucose level is persistently elevated more than 200 mg/dL

## 2022-08-09 NOTE — PROGRESS NOTES
Progress Note - Oral Flavors 81 y o  female MRN: [id number]    Unit/Bed#: [address] [place] [address] Encounter: [id number]        Assessment/Plan:  1) ODYNOPHAGIA, DYSPHAGIA  This is a 79-year-old female who presents with nausea vomiting diarrhea was noted to have acute kidney injury which subsequently has resolved  Has been having dysphagia and odynophagia  Symptoms seem to have started a week ago  EGD yesterday showed  significant esophagitis and ulceration of the lower esophagus involving the lower 3rd  Mild gastritis  -cont BID PPI and Carafate  -continue soft diet at home, will need repeat EGD in 8 weeks, recommend colonoscopy at that time as she has not had 1 since 2011 with history of ulcerative colitis  -okay from GI standpoint for discharge  Subjective:   Patient is lying in bed  Patient otherwise states that she wants to go home and she is eating well  Tolerating soft diet      Objective:     Vitals: /58   Pulse 71   Temp 97 8 °F (36 6 °C)   Resp 17   Ht 5' 1" (1 549 m)   Wt 67 6 kg (149 lb)   SpO2 96%   BMI 28 15 kg/m²       Physical Exam:  Gen- alert no acute distress  Abd-positive bowel sounds, nontender, nondistended, no rebound rigidity guarding       Lab, Imaging and other studies:   Recent Results (from the past 72 hour(s))   Fingerstick Glucose (POCT)    Collection Time: 08/06/22 11:32 AM   Result Value Ref Range    POC Glucose 177 (H) 65 - 140 mg/dl   ECG 12 lead    Collection Time: 08/06/22  1:39 PM   Result Value Ref Range    Ventricular Rate 64 BPM    Atrial Rate 64 BPM    ME Interval 140 ms    QRSD Interval 80 ms    QT Interval 424 ms    QTC Interval 437 ms    P Axis 45 degrees    QRS Axis 20 degrees    T [place] 135 degrees   HS Troponin 0hr (reflex protocol)    Collection Time: 08/06/22  2:42 PM   Result Value Ref Range    hs TnI 0hr 20 "Refer to ACS Flowchart"- see link ng/L   Fingerstick Glucose (POCT)    Collection Time: 08/06/22  4:07 PM   Result Value Ref Range    POC Glucose 113 65 - 140 mg/dl   HS Troponin I 2hr    Collection Time: 08/06/22  6:30 PM   Result Value Ref Range    hs TnI 2hr 19 "Refer to ACS Flowchart"- see link ng/L    Delta 2hr hsTnI -1 <20 ng/L   Procalcitonin    Collection Time: 08/06/22  9:07 PM   Result Value Ref Range    Procalcitonin 0 07 <=0 25 ng/ml   Blood culture    Collection Time: 08/06/22  9:08 PM    Specimen: Arm, Left; Blood   Result Value Ref Range    Blood Culture No Growth at 24 hrs  Blood culture    Collection Time: 08/06/22  9:08 PM    Specimen: Arm, Right; Blood   Result Value Ref Range    Blood Culture No Growth at 24 hrs      Fingerstick Glucose (POCT)    Collection Time: 08/06/22  9:44 PM   Result Value Ref Range    POC Glucose 125 65 - 140 mg/dl   Fingerstick Glucose (POCT)    Collection Time: 08/07/22  2:47 AM   Result Value Ref Range    POC Glucose 58 (L) 65 - 140 mg/dl   Fingerstick Glucose (POCT)    Collection Time: 08/07/22  3:15 AM   Result Value Ref Range    POC Glucose 79 65 - 140 mg/dl   Fingerstick Glucose (POCT)    Collection Time: 08/07/22  3:42 AM   Result Value Ref Range    POC Glucose 118 65 - 140 mg/dl   Fingerstick Glucose (POCT)    Collection Time: 08/07/22  6:04 AM   Result Value Ref Range    POC Glucose 242 (H) 65 - 140 mg/dl   Basic metabolic panel    Collection Time: 08/07/22  7:46 AM   Result Value Ref Range    Sodium 133 (L) 135 - 147 mmol/L    Potassium 4 0 3 5 - 5 3 mmol/L    Chloride 102 96 - 108 mmol/L    CO2 27 21 - 32 mmol/L    ANION GAP 4 4 - 13 mmol/L    BUN 22 5 - 25 mg/dL    Creatinine 1 15 0 60 - 1 30 mg/dL    Glucose 270 (H) 65 - 140 mg/dL    Calcium 7 8 (L) 8 3 - 10 1 mg/dL    eGFR 48 ml/min/1 73sq m   CBC and differential    Collection Time: 08/07/22  7:46 AM   Result Value Ref Range    WBC 7 37 4 31 - 10 16 Thousand/uL    RBC 4 44 3 81 - 5 12 Million/uL    Hemoglobin 12 7 11 5 - 15 4 g/dL    Hematocrit 38 0 34 8 - 46 1 %    MCV 86 82 - 98 fL    MCH 28 6 26 8 - 34 3 pg    MCHC 33 4 31 4 - 37 4 g/dL    RDW 12 4 11 6 - 15 1 %    MPV 10 4 8 9 - 12 7 fL    Platelets 429 (L) 325 - 390 Thousands/uL    nRBC 0 /100 WBCs    Neutrophils Relative 62 43 - 75 %    Immat GRANS % 1 0 - 2 %    Lymphocytes Relative 25 14 - 44 %    Monocytes Relative 10 4 - 12 %    Eosinophils Relative 2 0 - 6 %    Basophils Relative 0 0 - 1 %    Neutrophils Absolute 4 51 1 85 - 7 62 Thousands/µL    Immature Grans Absolute 0 07 0 00 - 0 20 Thousand/uL    Lymphocytes Absolute 1 84 0 60 - 4 47 Thousands/µL    Monocytes Absolute 0 77 0 17 - 1 22 Thousand/µL    Eosinophils Absolute 0 17 0 00 - 0 61 Thousand/µL    Basophils Absolute 0 01 0 00 - 0 10 Thousands/µL   Magnesium    Collection Time: 08/07/22  7:46 AM   Result Value Ref Range    Magnesium 2 1 1 6 - 2 6 mg/dL   Fingerstick Glucose (POCT)    Collection Time: 08/07/22  7:54 AM   Result Value Ref Range    POC Glucose 302 (H) 65 - 140 mg/dl   Fingerstick Glucose (POCT)    Collection Time: 08/07/22 11:22 AM   Result Value Ref Range    POC Glucose 180 (H) 65 - 140 mg/dl   Fingerstick Glucose (POCT)    Collection Time: 08/07/22  3:40 PM   Result Value Ref Range    POC Glucose 91 65 - 140 mg/dl   Fingerstick Glucose (POCT)    Collection Time: 08/07/22  6:00 PM   Result Value Ref Range    POC Glucose 147 (H) 65 - 140 mg/dl   Fingerstick Glucose (POCT)    Collection Time: 08/07/22  8:09 PM   Result Value Ref Range    POC Glucose 96 65 - 140 mg/dl   Fingerstick Glucose (POCT)    Collection Time: 08/07/22  9:56 PM   Result Value Ref Range    POC Glucose 85 65 - 140 mg/dl   Fingerstick Glucose (POCT)    Collection Time: 08/07/22 11:25 PM   Result Value Ref Range    POC Glucose 190 (H) 65 - 140 mg/dl   Fingerstick Glucose (POCT)    Collection Time: 08/08/22  1:55 AM   Result Value Ref Range    POC Glucose 207 (H) 65 - 140 mg/dl   Fingerstick Glucose (POCT)    Collection Time: 08/08/22  4:34 AM   Result Value Ref Range    POC Glucose 153 (H) 65 - 140 mg/dl   Basic metabolic panel    Collection Time: 08/08/22  5:11 AM Result Value Ref Range    Sodium 137 135 - 147 mmol/L    Potassium 4 2 3 5 - 5 3 mmol/L    Chloride 104 96 - 108 mmol/L    CO2 26 21 - 32 mmol/L    ANION GAP 7 4 - 13 mmol/L    BUN 18 5 - 25 mg/dL    Creatinine 1 23 0 60 - 1 30 mg/dL    Glucose 150 (H) 65 - 140 mg/dL    Calcium 7 9 (L) 8 3 - 10 1 mg/dL    eGFR 44 ml/min/1 73sq m   CBC and differential    Collection Time: 08/08/22  5:11 AM   Result Value Ref Range    WBC 7 71 4 31 - 10 16 Thousand/uL    RBC 4 08 3 81 - 5 12 Million/uL    Hemoglobin 11 7 11 5 - 15 4 g/dL    Hematocrit 35 4 34 8 - 46 1 %    MCV 87 82 - 98 fL    MCH 28 7 26 8 - 34 3 pg    MCHC 33 1 31 4 - 37 4 g/dL    RDW 12 6 11 6 - 15 1 %    MPV 10 1 8 9 - 12 7 fL    Platelets 510 (L) 786 - 390 Thousands/uL    nRBC 0 /100 WBCs    Neutrophils Relative 53 43 - 75 %    Immat GRANS % 1 0 - 2 %    Lymphocytes Relative 32 14 - 44 %    Monocytes Relative 11 4 - 12 %    Eosinophils Relative 3 0 - 6 %    Basophils Relative 0 0 - 1 %    Neutrophils Absolute 4 06 1 85 - 7 62 Thousands/µL    Immature Grans Absolute 0 07 0 00 - 0 20 Thousand/uL    Lymphocytes Absolute 2 47 0 60 - 4 47 Thousands/µL    Monocytes Absolute 0 85 0 17 - 1 22 Thousand/µL    Eosinophils Absolute 0 23 0 00 - 0 61 Thousand/µL    Basophils Absolute 0 03 0 00 - 0 10 Thousands/µL   Magnesium    Collection Time: 08/08/22  5:11 AM   Result Value Ref Range    Magnesium 2 1 1 6 - 2 6 mg/dL   Fingerstick Glucose (POCT)    Collection Time: 08/08/22  7:01 AM   Result Value Ref Range    POC Glucose 108 65 - 140 mg/dl   Fingerstick Glucose (POCT)    Collection Time: 08/08/22  9:18 AM   Result Value Ref Range    POC Glucose 95 65 - 140 mg/dl   Fingerstick Glucose (POCT)    Collection Time: 08/08/22 11:05 AM   Result Value Ref Range    POC Glucose 89 65 - 140 mg/dl   Fingerstick Glucose (POCT)    Collection Time: 08/08/22  1:36 PM   Result Value Ref Range    POC Glucose 70 65 - 140 mg/dl   Fingerstick Glucose (POCT)    Collection Time: 08/08/22  4:09 PM   Result Value Ref Range    POC Glucose 81 65 - 140 mg/dl   Fingerstick Glucose (POCT)    Collection Time: 08/08/22  8:47 PM   Result Value Ref Range    POC Glucose 254 (H) 65 - 140 mg/dl   Fingerstick Glucose (POCT)    Collection Time: 08/09/22  1:56 AM   Result Value Ref Range    POC Glucose 143 (H) 65 - 140 mg/dl   Fingerstick Glucose (POCT)    Collection Time: 08/09/22  7:15 AM   Result Value Ref Range    POC Glucose 120 65 - 140 mg/dl

## 2022-08-09 NOTE — ASSESSMENT & PLAN NOTE
UA with moderate bacteria, trace leukocytes    Reported dysuria on presentation, now improved  Urine culture with mixed contaminant  · Receive ceftriaxone during hospitalization  · Will discontinue further antimicrobial treatment  · Recommended to monitor symptoms and diabetic control

## 2022-08-09 NOTE — ASSESSMENT & PLAN NOTE
History of ulcerative colitis not on any medication currently    Denies any diarrhea  Follow-up with GI after discharge for colonoscopy

## 2022-08-10 ENCOUNTER — TRANSITIONAL CARE MANAGEMENT (OUTPATIENT)
Dept: FAMILY MEDICINE CLINIC | Facility: CLINIC | Age: 72
End: 2022-08-10

## 2022-08-10 ENCOUNTER — TELEPHONE (OUTPATIENT)
Dept: FAMILY MEDICINE CLINIC | Facility: CLINIC | Age: 72
End: 2022-08-10

## 2022-08-10 LAB — BACTERIA BLD CULT: NORMAL

## 2022-08-10 NOTE — TELEPHONE ENCOUNTER
Pt was released from [facility] yesterday  She is requesting an appt with you on 8/15 sometime after 11  Is there anyway you can fit her in your schedule?

## 2022-08-10 NOTE — TELEPHONE ENCOUNTER
Monday at 1:45 SHAI; just remember to please also send it to Clinical so they can perform the questions

## 2022-08-11 ENCOUNTER — TELEPHONE (OUTPATIENT)
Dept: ENDOCRINOLOGY | Facility: CLINIC | Age: 72
End: 2022-08-11

## 2022-08-11 RX ORDER — INSULIN GLARGINE 100 [IU]/ML
30 INJECTION, SOLUTION SUBCUTANEOUS 2 TIMES DAILY
COMMUNITY
Start: 2022-05-22 | End: 2022-08-16

## 2022-08-11 NOTE — TELEPHONE ENCOUNTER
Dr Alma Birch patient in hospital  She said that her blood sugar are still not regulated and would like a phone call from Dr Blair Dubon

## 2022-08-12 LAB
BACTERIA BLD CULT: NORMAL
BACTERIA BLD CULT: NORMAL

## 2022-08-16 ENCOUNTER — OFFICE VISIT (OUTPATIENT)
Dept: FAMILY MEDICINE CLINIC | Facility: CLINIC | Age: 72
End: 2022-08-16
Payer: COMMERCIAL

## 2022-08-16 VITALS
HEIGHT: 61 IN | OXYGEN SATURATION: 98 % | WEIGHT: 138 LBS | RESPIRATION RATE: 14 BRPM | SYSTOLIC BLOOD PRESSURE: 164 MMHG | DIASTOLIC BLOOD PRESSURE: 80 MMHG | HEART RATE: 68 BPM | BODY MASS INDEX: 26.06 KG/M2 | TEMPERATURE: 98.1 F

## 2022-08-16 DIAGNOSIS — D69.6 PLATELETS DECREASED (HCC): ICD-10-CM

## 2022-08-16 DIAGNOSIS — E11.40 TYPE 2 DIABETES MELLITUS WITH CHRONIC PAINFUL DIABETIC NEUROPATHY (HCC): Primary | ICD-10-CM

## 2022-08-16 DIAGNOSIS — I27.20 PULMONARY HYPERTENSION (HCC): ICD-10-CM

## 2022-08-16 DIAGNOSIS — Z12.31 ENCOUNTER FOR SCREENING MAMMOGRAM FOR BREAST CANCER: ICD-10-CM

## 2022-08-16 PROCEDURE — 99495 TRANSJ CARE MGMT MOD F2F 14D: CPT | Performed by: FAMILY MEDICINE

## 2022-08-16 RX ORDER — INSULIN GLARGINE 100 [IU]/ML
15 INJECTION, SOLUTION SUBCUTANEOUS
Qty: 10 ML | Refills: 10 | Status: SHIPPED | OUTPATIENT
Start: 2022-08-16 | End: 2022-08-29

## 2022-08-16 NOTE — PROGRESS NOTES
TRANSITION OF CARE OFFICE VISIT  [facility]    NAME: Julissa Gambino  AGE: 81 y o  SEX: female  : 1950     DATE: 2022     Assessment and Plan:     Diagnoses and all orders for this visit:    Type 2 diabetes mellitus with chronic painful diabetic neuropathy (HCC)  -     sitaGLIPtin (JANUVIA) 50 mg tablet; Take 1 tablet (50 mg total) by mouth daily  -     insulin glargine (LANTUS) 100 units/mL subcutaneous injection; Inject 15 Units under the skin daily at bedtime    Encounter for screening mammogram for breast cancer  -     Mammo screening bilateral w 3d & cad; Future    Platelets decreased ([facility] )    Pulmonary hypertension ([facility] )    Original was Janumet-> hold Metformin for now  Recommend Increase Lantus to 15 units at bedtime  Sliding scale, along with set 3 units with meals  IMPORTANT to write sugars down to help improve DM before seeing Endocrinology  CA elevated HOLD OScal for now  Repeat labs at next visit  Monitor BP given elevation  Being treated for Ulcer by GI which is stable      Counseling:     Medication Side Effects: Adverse side effects of medications were reviewed with the patient/guardian today  I have spent 30 minutes with Patient  today in which greater than 50% of this time was spent in counseling/coordination of care regarding Diagnostic results, Prognosis and Risks and benefits of tx options  Barriers to treatment include: No identified barriers     Transitional Care Management Review:     Julissa Gambino is a 81 y o  female here for TCM follow-up      During the TCM phone call patient stated:    TCM Call     Patient was hospitialized at  [address] [place] [address]    Date of Admission  22    Date of discharge  22    Diagnosis  fall elevated BS    Disposition  Home    Were the patients medications reviewed and updated  Yes    Current Symptoms  None      TCM Call     Post hospital issues  None    Should patient be enrolled in anticoag monitoring? No    Scheduled for follow up? Yes    Did you obtain your prescribed medications  Yes    Do you need help managing your prescriptions or medications  No    Is transportation to your appointment needed  No    I have advised the patient to call PCP with any new or worsening symptoms  [id number]    Living Arrangements  Alone    Are you recieving any outpatient services  No    Are you recieving home care services  No    Are you using any community resources  No    Have you fallen in the last 12 months  Yes    Interperter language line needed  No           HPI:   " I had a rude awaking" I am sorry I wasn't doing what I should have been doing"  Patient was admitted to the hospital for generalized weakness fatigue and blood sugars of higher than 500  Patient states that she did not take it the day of her admission given that she had GI symptoms  But normally takes her Lantus as prescribed  Patient had endoscopy during her admission and had shown and soft diet is an ulceration of the 3rd portion of her esophagus with mild gastritis  And was encouraged to be put on a PPI twice a day and have a repeat endoscopy in 8 weeks  Right foot ulcer improving  BS elevated at 268  Patient not feeling like her 3 untis TID aren't improving  Patient did have an elevated troponin but it was secondary to acute kidney injury and uncontrolled diabetes was cleared from a cardiac standpoint and advised to follow-up outpatient    The following portions of the patient's history were reviewed and updated as appropriate: allergies, current medications, past family history, past medical history, past social history, past surgical history and problem list      Review of Systems:     Review of Systems   Constitutional: Negative for activity change, appetite change, chills, fatigue and fever  HENT: Negative for congestion  Respiratory: Negative for cough, chest tightness and shortness of breath      Cardiovascular: Negative for chest pain and leg swelling  Gastrointestinal: Negative for abdominal distention, abdominal pain, constipation, diarrhea, nausea and vomiting  All other systems reviewed and are negative  Problem List:     Patient Active Problem List   Diagnosis    Coronary artery disease involving native coronary artery of native heart without angina pectoris    Em[facility]sema, unspecified ([facility] )    Ischemic cardiomyopathy    Hypertension    Ulcerative colitis ([facility] )    Pure hypercholesterolemia    Pulmonary hypertension ([facility] )    Other hyperlipidemia    Osteoporosis    Type 2 diabetes mellitus with hyperglycemia, with long-term current use of insulin ([facility])    Elevated troponin    Gastroesophageal reflux disease with esophagitis    Platelets decreased ([facility])        Objective:     /80 (BP Location: Left arm, Patient Position: Sitting, Cuff Size: Standard)   Pulse 68   Temp 98 1 °F (36 7 °C)   Resp 14   Ht 5' 1" (1 549 m)   Wt 62 6 kg (138 lb)   SpO2 98%   BMI 26 07 kg/m²     Physical Exam  Vitals reviewed  Constitutional:       Appearance: She is well-developed  HENT:      Head: Normocephalic and atraumatic  Eyes:      Conjunctiva/sclera: Conjunctivae normal       Pupils: Pupils are equal, round, and reactive to light  Cardiovascular:      Rate and Rhythm: Normal rate and regular rhythm  Heart sounds: Normal heart sounds  Pulmonary:      Effort: Pulmonary effort is normal  No respiratory distress  Breath sounds: Normal breath sounds  Musculoskeletal:         General: Normal range of motion  Cervical back: Normal range of motion and neck supple  Skin:     General: Skin is warm  Capillary Refill: Capillary refill takes less than 2 seconds  Findings: Erythema (Mild with no signs of infection over top of right foot) present  Neurological:      Mental Status: She is alert and oriented to person, place, and time           Laboratory Results: I have personally reviewed the pertinent laboratory results/reports     Radiology/Other Diagnostic Testing Results: I have personally reviewed pertinent reports  No results found       Current Medications:     Outpatient Medications Prior to Visit   Medication Sig Dispense Refill    aspirin (ECOTRIN LOW STRENGTH) 81 mg EC tablet Take 81 mg by mouth daily      atorvastatin (LIPITOR) 40 mg tablet Take 1 tablet (40 mg total) by mouth daily 90 tablet 2    carvedilol (COREG) 25 mg tablet Take 25 mg by mouth 2 (two) times a day      cloNIDine (CATAPRES) 0 1 mg tablet Take 1 tablet (0 1 mg total) by mouth 2 (two) times a day 180 tablet 2    clopidogrel (PLAVIX) 75 mg tablet Take 75 mg by mouth daily      insulin lispro (HumaLOG) 100 units/mL injection Inject 3 Units under the skin 3 (three) times a day with meals (Patient taking differently: Inject 3 Units under the skin 3 (three) times a day with meals Sliding scale)  0    Insulin Pen Needle (UltiCare Mini Pen Needles) 31G X 6 MM MISC Use 4 (four) times a day 200 each 2    pantoprazole (PROTONIX) 40 mg tablet Take 1 tablet (40 mg total) by mouth 2 (two) times a day 60 tablet 0    pregabalin (LYRICA) 75 mg capsule Take 1 capsule by mouth twice daily 60 capsule 5    sucralfate (CARAFATE) 1 g tablet Take 1 tablet (1 g total) by mouth 4 (four) times a day (before meals and at bedtime) 120 tablet 0    tolterodine (DETROL LA) 4 mg 24 hr capsule Take 1 capsule (4 mg total) by mouth daily 90 capsule 2    valsartan (DIOVAN) 320 MG tablet Take 1 tablet (320 mg total) by mouth daily 90 tablet 1    insulin glargine (LANTUS) 100 units/mL subcutaneous injection Inject 10 Units under the skin daily at bedtime 10 mL 0    calcium carbonate (OS-JAKOB) 600 MG tablet Take 600 mg by mouth daily (Patient not taking: Reported on 8/16/2022)      Lantus SoloStar 100 units/mL SOPN Inject 30 Units under the skin 2 (two) times a day (Patient not taking: No sig reported)       No facility-administered medications prior to visit         Mel Torres MD  [address]

## 2022-08-29 ENCOUNTER — OFFICE VISIT (OUTPATIENT)
Dept: FAMILY MEDICINE CLINIC | Facility: CLINIC | Age: 72
End: 2022-08-29
Payer: COMMERCIAL

## 2022-08-29 ENCOUNTER — LAB (OUTPATIENT)
Dept: LAB | Facility: CLINIC | Age: 72
End: 2022-08-29
Payer: COMMERCIAL

## 2022-08-29 VITALS
HEIGHT: 61 IN | BODY MASS INDEX: 25.86 KG/M2 | DIASTOLIC BLOOD PRESSURE: 70 MMHG | RESPIRATION RATE: 16 BRPM | HEART RATE: 76 BPM | WEIGHT: 137 LBS | OXYGEN SATURATION: 97 % | TEMPERATURE: 98.2 F | SYSTOLIC BLOOD PRESSURE: 116 MMHG

## 2022-08-29 DIAGNOSIS — E11.40 TYPE 2 DIABETES MELLITUS WITH CHRONIC PAINFUL DIABETIC NEUROPATHY (HCC): ICD-10-CM

## 2022-08-29 DIAGNOSIS — E11.40 TYPE 2 DIABETES MELLITUS WITH CHRONIC PAINFUL DIABETIC NEUROPATHY (HCC): Primary | ICD-10-CM

## 2022-08-29 DIAGNOSIS — R21 RASH: ICD-10-CM

## 2022-08-29 DIAGNOSIS — L73.9 FOLLICULITIS: ICD-10-CM

## 2022-08-29 LAB
BACTERIA UR QL AUTO: ABNORMAL /HPF
BILIRUB UR QL STRIP: NEGATIVE
CLARITY UR: CLEAR
COLOR UR: COLORLESS
CREAT UR-MCNC: 49.3 MG/DL
GLUCOSE UR STRIP-MCNC: ABNORMAL MG/DL
HGB UR QL STRIP.AUTO: ABNORMAL
HYALINE CASTS #/AREA URNS LPF: ABNORMAL /LPF
KETONES UR STRIP-MCNC: NEGATIVE MG/DL
LEUKOCYTE ESTERASE UR QL STRIP: ABNORMAL
MICROALBUMIN UR-MCNC: 263 MG/L (ref 0–20)
MICROALBUMIN/CREAT 24H UR: 533 MG/G CREATININE (ref 0–30)
NITRITE UR QL STRIP: NEGATIVE
NON-SQ EPI CELLS URNS QL MICRO: ABNORMAL /HPF
PH UR STRIP.AUTO: 6 [PH]
PROT UR STRIP-MCNC: ABNORMAL MG/DL
RBC #/AREA URNS AUTO: ABNORMAL /HPF
SP GR UR STRIP.AUTO: 1.01 (ref 1–1.03)
UROBILINOGEN UR STRIP-ACNC: <2 MG/DL
WBC #/AREA URNS AUTO: ABNORMAL /HPF

## 2022-08-29 PROCEDURE — 82570 ASSAY OF URINE CREATININE: CPT | Performed by: FAMILY MEDICINE

## 2022-08-29 PROCEDURE — 82043 UR ALBUMIN QUANTITATIVE: CPT | Performed by: FAMILY MEDICINE

## 2022-08-29 PROCEDURE — 99214 OFFICE O/P EST MOD 30 MIN: CPT | Performed by: FAMILY MEDICINE

## 2022-08-29 PROCEDURE — 81001 URINALYSIS AUTO W/SCOPE: CPT

## 2022-08-29 RX ORDER — CEPHALEXIN 500 MG/1
500 CAPSULE ORAL EVERY 12 HOURS SCHEDULED
Qty: 10 CAPSULE | Refills: 0 | Status: SHIPPED | OUTPATIENT
Start: 2022-08-29 | End: 2022-09-03

## 2022-08-29 RX ORDER — INSULIN LISPRO 100 [IU]/ML
4 INJECTION, SOLUTION INTRAVENOUS; SUBCUTANEOUS
Qty: 10 ML | Refills: 0
Start: 2022-08-29 | End: 2022-10-10 | Stop reason: SDUPTHER

## 2022-08-29 RX ORDER — NYSTATIN 100000 U/G
CREAM TOPICAL 2 TIMES DAILY
Qty: 30 G | Refills: 0 | Status: SHIPPED | OUTPATIENT
Start: 2022-08-29

## 2022-08-29 RX ORDER — INSULIN GLARGINE 100 [IU]/ML
20 INJECTION, SOLUTION SUBCUTANEOUS
Qty: 10 ML | Refills: 10
Start: 2022-08-29

## 2022-08-29 RX ORDER — NYSTATIN 100000 [USP'U]/G
POWDER TOPICAL 4 TIMES DAILY
Qty: 60 G | Refills: 3 | Status: SHIPPED | OUTPATIENT
Start: 2022-08-29

## 2022-08-29 NOTE — PROGRESS NOTES
Isidro Roldan 1950 female MRN: [id number]    FAMILY PRACTICE OFFICE VISIT  [facility] - [address]      ASSESSMENT/PLAN  Isidro Roldan is a 81 y o  female presents to the office for    Diagnoses and all orders for this visit:    Type 2 diabetes mellitus with chronic painful diabetic neuropathy (HCC)  -     insulin glargine (LANTUS) 100 units/mL subcutaneous injection; Inject 20 Units under the skin daily at bedtime  -     insulin lispro (HumaLOG) 100 units/mL injection; Inject 4 Units under the skin 3 (three) times a day with meals  -     Microalbumin / creatinine urine ratio  -     UA w Reflex to Microscopic w Reflex to Culture -Lab Collect; Future    Rash  -     nystatin (MYCOSTATIN) cream; Apply topically 2 (two) times a day  -     nystatin (MYCOSTATIN) powder; Apply topically 4 (four) times a day    Folliculitis  -     cephalexin (KEFLEX) 500 mg capsule; Take 1 capsule (500 mg total) by mouth every 12 (twelve) hours for 5 days   changes as shown above  Patient with likely a fungal yeast infection underneath the right breast treatment shown as above  Highly educated the patient on not shaving her upper lip  She has folliculitis present  Will send in Keflex and recommend warm compresses         Future Appointments   Date Time Provider Martina Raymundo   11/28/2022 10:45 AM Khurram Beauhcamp MD [facility]          SUBJECTIVE  CC: Diabetes      HPI:  Isidro Roldan is a 81 y o  female who presents for a follow-up appointment  Patient states that she has been taking her diabetic medications as prescribed  States that her blood sugars have only been elevated around lunch hour  Noticed that her breakfast is causing her elevations  She states that she continues to be on a soft diet and has not heard back from the specialists of 1 her procedure will be    Patient also has a very painful rash over her right breast   Patient also has a pimple that has been bothering over over left upper lip and states that is very tender to touch  Does shave her upper lip every now and then  Review of Systems   Constitutional: Negative for activity change, appetite change, chills, fatigue and fever  HENT: Negative for congestion  Respiratory: Negative for cough, chest tightness and shortness of breath  Cardiovascular: Negative for chest pain and leg swelling  Gastrointestinal: Negative for abdominal distention, abdominal pain, constipation, diarrhea, nausea and vomiting  Skin: Positive for rash  All other systems reviewed and are negative        Historical Information   The patient history was reviewed as follows:  Past Medical History:   Diagnosis Date    Diabetes mellitus ([facility] [facility] )     Esophageal ulcer     Neuropathy          Medications:     Current Outpatient Medications:     aspirin (ECOTRIN LOW STRENGTH) 81 mg EC tablet, Take 81 mg by mouth daily, Disp: , Rfl:     atorvastatin (LIPITOR) 40 mg tablet, Take 1 tablet (40 mg total) by mouth daily, Disp: 90 tablet, Rfl: 2    carvedilol (COREG) 25 mg tablet, Take 25 mg by mouth 2 (two) times a day, Disp: , Rfl:     cephalexin (KEFLEX) 500 mg capsule, Take 1 capsule (500 mg total) by mouth every 12 (twelve) hours for 5 days, Disp: 10 capsule, Rfl: 0    cloNIDine (CATAPRES) 0 1 mg tablet, Take 1 tablet (0 1 mg total) by mouth 2 (two) times a day, Disp: 180 tablet, Rfl: 2    insulin glargine (LANTUS) 100 units/mL subcutaneous injection, Inject 20 Units under the skin daily at bedtime, Disp: 10 mL, Rfl: 10    insulin lispro (HumaLOG) 100 units/mL injection, Inject 4 Units under the skin 3 (three) times a day with meals, Disp: 10 mL, Rfl: 0    Insulin Pen Needle (UltiCare Mini Pen Needles) 31G X 6 MM MISC, Use 4 (four) times a day, Disp: 200 each, Rfl: 2    nystatin (MYCOSTATIN) cream, Apply topically 2 (two) times a day, Disp: 30 g, Rfl: 0    nystatin (MYCOSTATIN) powder, Apply topically 4 (four) times a day, Disp: 60 g, Rfl: 3    pantoprazole (PROTONIX) 40 mg tablet, Take 1 tablet (40 mg total) by mouth 2 (two) times a day, Disp: 60 tablet, Rfl: 0    pregabalin (LYRICA) 75 mg capsule, Take 1 capsule by mouth twice daily, Disp: 60 capsule, Rfl: 5    sitaGLIPtin (JANUVIA) 50 mg tablet, Take 1 tablet (50 mg total) by mouth daily, Disp: 30 tablet, Rfl: 5    sucralfate (CARAFATE) 1 g tablet, Take 1 tablet (1 g total) by mouth 4 (four) times a day (before meals and at bedtime), Disp: 120 tablet, Rfl: 0    tolterodine (DETROL LA) 4 mg 24 hr capsule, Take 1 capsule (4 mg total) by mouth daily, Disp: 90 capsule, Rfl: 2    valsartan (DIOVAN) 320 MG tablet, Take 1 tablet (320 mg total) by mouth daily, Disp: 90 tablet, Rfl: 1    clopidogrel (PLAVIX) 75 mg tablet, Take 75 mg by mouth daily, Disp: , Rfl:     Allergies   Allergen Reactions    Other Rash     Latex tape       OBJECTIVE  Vitals:   Vitals:    08/29/22 1103   BP: 116/70   BP Location: Left arm   Patient Position: Sitting   Cuff Size: Standard   Pulse: 76   Resp: 16   Temp: 98 2 °F (36 8 °C)   SpO2: 97%   Weight: 62 1 kg (137 lb)   Height: 5' 1" (1 549 m)         Physical Exam  Vitals reviewed  Constitutional:       Appearance: She is well-developed  HENT:      Head: Normocephalic and atraumatic  Mouth/Throat:      Comments: Induration over the left upper lip  Eyes:      Conjunctiva/sclera: Conjunctivae normal       Pupils: Pupils are equal, round, and reactive to light  Cardiovascular:      Rate and Rhythm: Normal rate and regular rhythm  Heart sounds: Normal heart sounds  Pulmonary:      Effort: Pulmonary effort is normal  No respiratory distress  Breath sounds: Normal breath sounds  Musculoskeletal:         General: Normal range of motion  Cervical back: Normal range of motion and neck supple  Skin:     General: Skin is warm  Capillary Refill: Capillary refill takes less than 2 seconds     Neurological:      Mental Status: She is alert and oriented to person, place, and time                      Kortney Ibanez MD,   [facility]  8/29/2022

## 2022-08-30 ENCOUNTER — TELEPHONE (OUTPATIENT)
Dept: GASTROENTEROLOGY | Facility: CLINIC | Age: 72
End: 2022-08-30

## 2022-08-30 ENCOUNTER — PREP FOR PROCEDURE (OUTPATIENT)
Dept: GASTROENTEROLOGY | Facility: CLINIC | Age: 72
End: 2022-08-30

## 2022-08-30 DIAGNOSIS — Z87.19 HISTORY OF ESOPHAGEAL ULCER: ICD-10-CM

## 2022-08-30 DIAGNOSIS — Z12.11 SCREENING FOR COLON CANCER: Primary | ICD-10-CM

## 2022-08-30 DIAGNOSIS — K21.9 GASTROESOPHAGEAL REFLUX DISEASE, UNSPECIFIED WHETHER ESOPHAGITIS PRESENT: ICD-10-CM

## 2022-08-30 DIAGNOSIS — K51.90 ULCERATIVE COLITIS WITHOUT COMPLICATIONS, UNSPECIFIED LOCATION (HCC): Primary | ICD-10-CM

## 2022-08-30 NOTE — TELEPHONE ENCOUNTER
20 1819   Ventilator Management Group   Intensivist Group Yes   Ventilator Identifier   Ventilator Number [id number]   General Vent Information   #Vent Initial Day  Yes   Ventilator Red Plug Ventilator Plugged into Red Electrical Outlet   Vent Temp (Celsius) 35   Pulse Oximetry 97 %   Pulse (!) 107   Resuscitation Bag Resuscitation Bag and Mask at Bedside and Checked   CO2 Monitoring   #ETCO2 20   Vent Alarms   Ventilation Alarms Reviewed / Activated Yes   Upper Pressure Limit (HI PIP) Alarm 40   Low VE (LPM) Alarm 4   High Respiratory Rate Alarm 40   Low Respiratory Rate Alarm 8   Low VT Alarm 150   Apnea Parameters Checked / Activated Yes   EtCO2 High Alarm 60   EtCO2 Low Alarm 15   Andres Vent   Andres Vent Yes   Andres Conventional Settings   Rate (breaths/min) 24   Vt Target (mL) 450   TI (Seconds) 0.9   PEEP/CPAP 8   Pramp 50   FiO2 60   Sensitivity Setting Flow Trigger   Other Settings 5   Andres Measured Parameters   P Peak (PIP) 31    Minute Volume 10.9   Control VTE (exp VT) 449   f Total (Breaths/Min) 24   I:E Ratio 1:1.9   P MEAN 8   PEEP/CPAP MONITORED 8   Static Compliance (ml / cm H2O) 30   R exp 22   Plateau Pressure (Q Shift) 27   Respiratory WDL   Respiratory (WDL) X   Chest Exam   Work Of Breathing / Effort Vented   Breath Sounds   Pre/Post Intervention Pre Intervention Assessment   RUL Breath Sounds Coarse Crackles   MARY Breath Sounds Coarse Crackles   Secretions   Cough Productive   How Sputum Obtained Endotracheal   Sputum Amount Small   Sputum Color White;Yellow   Sputum Consistency Thick   Suction Frequency Suctioned Once or Twice Per Encounter      Scheduled date of EGD/colonoscopy (as of today): 10/25/22    Physician performing EGD/colonoscopy:Dr Darryn Dumont    [facility]oscopy[facility]    Desired bowel prep reviewed with patient:Bandar/Maya    Instructions reviewed with patient by:ti    Clearances:  Dr Areli Cotton clearance to Dr Roman Spivey to get permission to hold Plavix 5 days due to patient having 3 stents put in in 2019  Please follow up in about two weeks to verify they received  Thank you!

## 2022-08-30 NOTE — TELEPHONE ENCOUNTER
Patients GI provider:  Dr Elsa Bauman    Number to return call: ([phone])    Reason for call: Please send in Golytely and Dulcolax to pharmacy for patients colonoscopy  Thank you!     Scheduled procedure/appointment date if applicable: Apt/procedure 10/25/22

## 2022-09-06 DIAGNOSIS — K20.90 ESOPHAGITIS: ICD-10-CM

## 2022-09-06 RX ORDER — PANTOPRAZOLE SODIUM 40 MG/1
40 TABLET, DELAYED RELEASE ORAL 2 TIMES DAILY
Qty: 60 TABLET | Refills: 6 | Status: SHIPPED | OUTPATIENT
Start: 2022-09-06 | End: 2022-10-21

## 2022-09-12 DIAGNOSIS — K20.90 ESOPHAGITIS: ICD-10-CM

## 2022-09-12 RX ORDER — SUCRALFATE 1 G/1
1 TABLET ORAL
Qty: 120 TABLET | Refills: 0 | Status: SHIPPED | OUTPATIENT
Start: 2022-09-12 | End: 2022-10-21

## 2022-09-26 NOTE — TELEPHONE ENCOUNTER
Plavix clearance received back from Dr Mcmahon Course  Pt is cleared to hold her Plavix 5 days prior to the procedure  Called and spoke to pt and informed of this  She will hold her Plavix 5 days prior to the procedure

## 2022-09-26 NOTE — TELEPHONE ENCOUNTER
Called Dr Thelma Stearns office spoke to  whom informed that clearance was received and faxed back  I asked her to please refax to [phone]  Will watch for fax

## 2022-10-05 DIAGNOSIS — M25.571 BILATERAL ANKLE PAIN, UNSPECIFIED CHRONICITY: ICD-10-CM

## 2022-10-05 DIAGNOSIS — M25.572 BILATERAL ANKLE PAIN, UNSPECIFIED CHRONICITY: ICD-10-CM

## 2022-10-05 RX ORDER — PREGABALIN 75 MG/1
CAPSULE ORAL
Qty: 60 CAPSULE | Refills: 0 | Status: SHIPPED | OUTPATIENT
Start: 2022-10-05

## 2022-10-10 DIAGNOSIS — E11.40 TYPE 2 DIABETES MELLITUS WITH CHRONIC PAINFUL DIABETIC NEUROPATHY (HCC): ICD-10-CM

## 2022-10-10 RX ORDER — INSULIN LISPRO 100 [IU]/ML
4 INJECTION, SOLUTION INTRAVENOUS; SUBCUTANEOUS
Qty: 10 ML | Refills: 0 | Status: SHIPPED | OUTPATIENT
Start: 2022-10-10 | End: 2022-10-11 | Stop reason: SDUPTHER

## 2022-10-11 RX ORDER — INSULIN LISPRO 100 [IU]/ML
4 INJECTION, SOLUTION INTRAVENOUS; SUBCUTANEOUS
Qty: 10 ML | Refills: 0 | Status: SHIPPED | OUTPATIENT
Start: 2022-10-11 | End: 2022-10-12

## 2022-10-12 ENCOUNTER — TELEPHONE (OUTPATIENT)
Dept: FAMILY MEDICINE CLINIC | Facility: CLINIC | Age: 72
End: 2022-10-12

## 2022-10-12 DIAGNOSIS — E11.40 TYPE 2 DIABETES MELLITUS WITH CHRONIC PAINFUL DIABETIC NEUROPATHY (HCC): Primary | ICD-10-CM

## 2022-10-12 RX ORDER — INSULIN LISPRO 100 [IU]/ML
4 INJECTION, SOLUTION INTRAVENOUS; SUBCUTANEOUS
Qty: 15 ML | Refills: 3 | Status: SHIPPED | OUTPATIENT
Start: 2022-10-12 | End: 2023-01-09 | Stop reason: SDUPTHER

## 2022-10-12 NOTE — TELEPHONE ENCOUNTER
Called and spoke with pt to let her know Island Pedicle Flap With Canthal Suspension Text: The defect edges were debeveled with a #15 scalpel blade.  Given the location of the defect, shape of the defect and the proximity to free margins an island pedicle advancement flap was deemed most appropriate.  Using a sterile surgical marker, an appropriate advancement flap was drawn incorporating the defect, outlining the appropriate donor tissue and placing the expected incisions within the relaxed skin tension lines where possible. The area thus outlined was incised deep to adipose tissue with a #15 scalpel blade.  The skin margins were undermined to an appropriate distance in all directions around the primary defect and laterally outward around the island pedicle utilizing iris scissors.  There was minimal undermining beneath the pedicle flap. A suspension suture was placed in the canthal tendon to prevent tension and prevent ectropion.

## 2022-10-12 NOTE — TELEPHONE ENCOUNTER
Please be sure the patient calls the pharmacy to stop autorefills   I have called in the pen for her

## 2022-10-12 NOTE — TELEPHONE ENCOUNTER
Patient needs refill of Humalog Insulin Pens 100 units not the bottles  She keeps getting the Humalog bottles which is incorrect

## 2022-10-24 RX ORDER — SODIUM CHLORIDE, SODIUM LACTATE, POTASSIUM CHLORIDE, CALCIUM CHLORIDE 600; 310; 30; 20 MG/100ML; MG/100ML; MG/100ML; MG/100ML
75 INJECTION, SOLUTION INTRAVENOUS CONTINUOUS
Status: CANCELLED | OUTPATIENT
Start: 2022-10-24

## 2022-10-25 ENCOUNTER — HOSPITAL ENCOUNTER (OUTPATIENT)
Dept: GASTROENTEROLOGY | Facility: AMBULARY SURGERY CENTER | Age: 72
Setting detail: OUTPATIENT SURGERY
Discharge: HOME | End: 2022-10-25
Attending: INTERNAL MEDICINE
Payer: COMMERCIAL

## 2022-10-25 ENCOUNTER — ANESTHESIA EVENT (OUTPATIENT)
Dept: GASTROENTEROLOGY | Facility: AMBULARY SURGERY CENTER | Age: 72
End: 2022-10-25

## 2022-10-25 ENCOUNTER — ANESTHESIA (OUTPATIENT)
Dept: GASTROENTEROLOGY | Facility: AMBULARY SURGERY CENTER | Age: 72
End: 2022-10-25

## 2022-10-25 VITALS
TEMPERATURE: 97.2 F | RESPIRATION RATE: 18 BRPM | SYSTOLIC BLOOD PRESSURE: 205 MMHG | DIASTOLIC BLOOD PRESSURE: 84 MMHG | HEART RATE: 66 BPM | OXYGEN SATURATION: 98 %

## 2022-10-25 DIAGNOSIS — Z87.19 HISTORY OF ESOPHAGEAL ULCER: ICD-10-CM

## 2022-10-25 DIAGNOSIS — Z12.11 SCREENING FOR COLON CANCER: ICD-10-CM

## 2022-10-25 DIAGNOSIS — K21.00 GASTROESOPHAGEAL REFLUX DISEASE WITH ESOPHAGITIS WITHOUT HEMORRHAGE: Primary | ICD-10-CM

## 2022-10-25 DIAGNOSIS — K21.9 GASTROESOPHAGEAL REFLUX DISEASE, UNSPECIFIED WHETHER ESOPHAGITIS PRESENT: ICD-10-CM

## 2022-10-25 PROBLEM — Z98.61 HISTORY OF PTCA: Status: ACTIVE | Noted: 2022-10-25

## 2022-10-25 LAB — GLUCOSE SERPL-MCNC: 94 MG/DL (ref 65–140)

## 2022-10-25 PROCEDURE — 82948 REAGENT STRIP/BLOOD GLUCOSE: CPT

## 2022-10-25 RX ORDER — PANTOPRAZOLE SODIUM 20 MG/1
20 TABLET, DELAYED RELEASE ORAL DAILY
Qty: 90 TABLET | Refills: 0 | Status: SHIPPED | OUTPATIENT
Start: 2022-10-25

## 2022-10-25 RX ORDER — PROPOFOL 10 MG/ML
INJECTION, EMULSION INTRAVENOUS AS NEEDED
Status: DISCONTINUED | OUTPATIENT
Start: 2022-10-25 | End: 2022-10-25

## 2022-10-25 RX ORDER — LIDOCAINE HYDROCHLORIDE 20 MG/ML
INJECTION, SOLUTION EPIDURAL; INFILTRATION; INTRACAUDAL; PERINEURAL AS NEEDED
Status: DISCONTINUED | OUTPATIENT
Start: 2022-10-25 | End: 2022-10-25

## 2022-10-25 RX ORDER — SODIUM CHLORIDE, SODIUM LACTATE, POTASSIUM CHLORIDE, CALCIUM CHLORIDE 600; 310; 30; 20 MG/100ML; MG/100ML; MG/100ML; MG/100ML
75 INJECTION, SOLUTION INTRAVENOUS CONTINUOUS
Status: DISCONTINUED | OUTPATIENT
Start: 2022-10-25 | End: 2022-10-29 | Stop reason: HOSPADM

## 2022-10-25 RX ORDER — PROPOFOL 10 MG/ML
INJECTION, EMULSION INTRAVENOUS CONTINUOUS PRN
Status: DISCONTINUED | OUTPATIENT
Start: 2022-10-25 | End: 2022-10-25

## 2022-10-25 RX ADMIN — PROPOFOL 120 MCG/KG/MIN: 10 INJECTION, EMULSION INTRAVENOUS at 09:23

## 2022-10-25 RX ADMIN — PROPOFOL 50 MG: 10 INJECTION, EMULSION INTRAVENOUS at 09:19

## 2022-10-25 RX ADMIN — PROPOFOL 100 MG: 10 INJECTION, EMULSION INTRAVENOUS at 09:12

## 2022-10-25 RX ADMIN — SODIUM CHLORIDE, SODIUM LACTATE, POTASSIUM CHLORIDE, AND CALCIUM CHLORIDE 75 ML/HR: .6; .31; .03; .02 INJECTION, SOLUTION INTRAVENOUS at 09:04

## 2022-10-25 RX ADMIN — PROPOFOL 50 MG: 10 INJECTION, EMULSION INTRAVENOUS at 09:15

## 2022-10-25 RX ADMIN — LIDOCAINE HYDROCHLORIDE 60 MG: 20 INJECTION, SOLUTION EPIDURAL; INFILTRATION; INTRACAUDAL; PERINEURAL at 09:12

## 2022-10-25 NOTE — DISCHARGE SUMMARY
Discharge Summary - Saeed Gomez 81 y o  female MRN: [id number]    Unit/Bed#:  Encounter: [id number]    Admission Date:  10/25/2022    Admitting Diagnosis: Screening for colon cancer [Z12 11]  Gastroesophageal reflux disease, unspecified whether esophagitis present [K21 9]  History of esophageal ulcer [Z87 19]    HPI:  Occasional diarrhea  History of reflux and recent food impaction  Procedures Performed: No orders of the defined types were placed in this encounter  Summary of Hospital Course: Tolerated procedure well    Significant Findings, Care, Treatment and Services Provided:  Previously seen ulceration has healed  Colonoscopy prep was poor and there was suboptimal visualization  1 polyp removed  Repeat colonoscopy needed within three months    Complications:  None    Discharge Diagnosis:  See above    Medical Problems             Resolved Problems  Date Reviewed: 8/8/2022   None                 Condition at Discharge: good         Discharge instructions/Information to patient and family:   See after visit summary for information provided to patient and family  Provisions for Follow-Up Care:  See after visit summary for information related to follow-up care and any pertinent home health orders        PCP: Nini Nelson MD    Disposition: Home

## 2022-10-25 NOTE — ANESTHESIA POSTPROCEDURE EVALUATION
Post-Op Assessment Note    CV Status:  Stable  Pain Score: 0    Pain management: adequate     Mental Status:  Alert and awake   Hydration Status:  Euvolemic   PONV Controlled:  Controlled   Airway Patency:  Patent      Post Op Vitals Reviewed: Yes      Staff: Anesthesiologist, CRNA         No complications documented      BP     Temp     Pulse     Resp      SpO2

## 2022-10-25 NOTE — H&P
History and Physical - [facility]  Franky Cooper 81 y o  female MRN: [id number]                  HPI: Franky Cooper is a 81y o  year old female who presents for EGD and colonoscopy  Patient history dysphagia and food impaction  Currently she is eating well  Esophageal ulceration was noted during endoscopy done for foreign body removal   Patient also history of colitis  I have reviewed the past colonoscopy in   For the last three weeks she is having diarrhea  REVIEW OF SYSTEMS: Per the HPI, and otherwise unremarkable      Historical Information   Past Medical History:   Diagnosis Date   • Colitis    • Coronary artery disease    • Diabetes mellitus ([address] )    • Esophageal ulcer    • Kidney stone    • Neuropathy      Past Surgical History:   Procedure Laterality Date   • ANKLE ARTHROPLASTY Bilateral     bilat hardware post fx surgeries   • COLONOSCOPY     • CORONARY ANGIOPLASTY WITH STENT PLACEMENT  2019    x2 procedures total 3 stents   • LITHOTRIPSY     • OVARIAN CYST SURGERY Right      Social History   Social History     Substance and Sexual Activity   Alcohol Use Not Currently     Social History     Substance and Sexual Activity   Drug Use Never     Social History     Tobacco Use   Smoking Status Former Smoker   • Years: 34 00   • Quit date:    • Years since quittin 8   Smokeless Tobacco Never Used     Family History   Problem Relation Age of Onset   • Stroke Mother    • Heart disease Father        Meds/Allergies       Current Outpatient Medications:   •  atorvastatin (LIPITOR) 40 mg tablet  •  carvedilol (COREG) 25 mg tablet  •  cloNIDine (CATAPRES) 0 1 mg tablet  •  insulin glargine (LANTUS) 100 units/mL subcutaneous injection  •  insulin lispro (HumaLOG KwikPen) 100 units/mL injection pen  •  polyethylene glycol (GOLYTELY) 4000 mL solution  •  pregabalin (LYRICA) 75 mg capsule  •  tolterodine (DETROL LA) 4 mg 24 hr capsule  •  valsartan (DIOVAN) 320 MG tablet  • aspirin (ECOTRIN LOW STRENGTH) 81 mg EC tablet  •  clopidogrel (PLAVIX) 75 mg tablet  •  Insulin Pen Needle (UltiCare Mini Pen Needles) 31G X 6 MM MISC  •  nystatin (MYCOSTATIN) cream  •  nystatin (MYCOSTATIN) powder  •  pantoprazole (PROTONIX) 40 mg tablet  •  sitaGLIPtin (JANUVIA) 50 mg tablet    Current Facility-Administered Medications:   •  lactated ringers infusion, 75 mL/hr, Intravenous, Continuous, 75 mL/hr at 10/25/22 0904    Allergies   Allergen Reactions   • Other Rash     Latex tape       Objective     BP (!) 217/93   Pulse 71   Temp (!) 97 2 °F (36 2 °C)   Resp 18   SpO2 97%       PHYSICAL EXAM    Gen: NAD  Head: NCAT  CV: RRR  CHEST: Clear  ABD: soft, NT/ND  EXT: no edema      ASSESSMENT/PLAN:  This is a 81y o  year old female here for EGD and colonoscopy, and she is stable and optimized for her procedure

## 2022-10-25 NOTE — ANESTHESIA PREPROCEDURE EVALUATION
Procedure:  COLONOSCOPY  EGD    Relevant Problems   CARDIO   (+) Coronary artery disease involving native coronary artery of native heart without angina pectoris   (+) History of PTCA with stents   (+) Hypertension   (+) Other hyperlipidemia   (+) Pure hypercholesterolemia      ENDO   (+) Type 2 diabetes mellitus with hyperglycemia, with long-term current use of insulin (HCC)      GI/HEPATIC   (+) Gastroesophageal reflux disease with esophagitis      MUSCULOSKELETAL  extensive hardware in both LEs      PULMONARY   (+) Emphysema, unspecified (HCC)      Cardiovascular and Mediastinum   (+) Ischemic cardiomyopathy      Digestive   (+) Ulcerative colitis ([facility] [address] )        Physical Exam    Airway    Mallampati score: III  TM Distance: >3 FB  Neck ROM: full     Dental       Cardiovascular  Rhythm: regular, Rate: normal,     Pulmonary  Breath sounds clear to auscultation,     Other Findings        Anesthesia Plan  ASA Score- 3     Anesthesia Type- IV sedation with anesthesia with ASA Monitors  Additional Monitors:   Airway Plan:           Plan Factors-    Chart reviewed  Patient is not a current smoker  Induction- intravenous  Postoperative Plan-     Informed Consent- Anesthetic plan and risks discussed with patient  I personally reviewed this patient with the CRNA  Discussed and agreed on the Anesthesia Plan with the CRNA  Kaylene Phan

## 2022-10-25 NOTE — INTERVAL H&P NOTE
H&P reviewed  After examining the patient I find no changes in the patients condition since the H&P had been written      Vitals:    10/25/22 0817   BP: (!) 217/93   Pulse:    Resp:    Temp:    SpO2:

## 2022-10-30 DIAGNOSIS — M25.571 BILATERAL ANKLE PAIN, UNSPECIFIED CHRONICITY: ICD-10-CM

## 2022-10-30 DIAGNOSIS — M25.572 BILATERAL ANKLE PAIN, UNSPECIFIED CHRONICITY: ICD-10-CM

## 2022-10-31 RX ORDER — PREGABALIN 75 MG/1
CAPSULE ORAL
Qty: 60 CAPSULE | Refills: 0 | Status: SHIPPED | OUTPATIENT
Start: 2022-10-31

## 2022-11-03 ENCOUNTER — TELEPHONE (OUTPATIENT)
Dept: FAMILY MEDICINE CLINIC | Facility: CLINIC | Age: 72
End: 2022-11-03

## 2022-11-03 ENCOUNTER — OFFICE VISIT (OUTPATIENT)
Dept: FAMILY MEDICINE CLINIC | Facility: CLINIC | Age: 72
End: 2022-11-03

## 2022-11-03 VITALS
BODY MASS INDEX: 24.55 KG/M2 | DIASTOLIC BLOOD PRESSURE: 80 MMHG | HEART RATE: 74 BPM | RESPIRATION RATE: 14 BRPM | SYSTOLIC BLOOD PRESSURE: 180 MMHG | WEIGHT: 130 LBS | HEIGHT: 61 IN | TEMPERATURE: 97.7 F | OXYGEN SATURATION: 98 %

## 2022-11-03 DIAGNOSIS — E11.40 TYPE 2 DIABETES MELLITUS WITH CHRONIC PAINFUL DIABETIC NEUROPATHY (HCC): ICD-10-CM

## 2022-11-03 DIAGNOSIS — K21.9 GASTROESOPHAGEAL REFLUX DISEASE WITHOUT ESOPHAGITIS: ICD-10-CM

## 2022-11-03 DIAGNOSIS — I10 PRIMARY HYPERTENSION: Primary | ICD-10-CM

## 2022-11-03 RX ORDER — FLASH GLUCOSE SENSOR
1 KIT MISCELLANEOUS
Qty: 6 EACH | Refills: 4 | Status: SHIPPED | OUTPATIENT
Start: 2022-11-03

## 2022-11-03 RX ORDER — FLASH GLUCOSE SCANNING READER
1 EACH MISCELLANEOUS EVERY MORNING
Qty: 1 EACH | Refills: 0 | Status: SHIPPED | OUTPATIENT
Start: 2022-11-03

## 2022-11-03 RX ORDER — ACETAMINOPHEN AND CODEINE PHOSPHATE 300; 30 MG/1; MG/1
TABLET ORAL
COMMUNITY
Start: 2022-11-02 | End: 2022-11-03

## 2022-11-03 NOTE — TELEPHONE ENCOUNTER
Called Dr Robert Pruett office  and spoke with Kerri Bronson  She advised that Dr Claribel Castañeda is on maternity leave and they are awaiting new doctor   They will place patient on wait list

## 2022-11-03 NOTE — TELEPHONE ENCOUNTER
----- Message from Peggy Reveles MD sent at 11/3/2022  9:35 AM EDT -----  Please call Endocrinology  She was suppose to be seen after discharge    And BS are abnormal

## 2022-11-03 NOTE — PROGRESS NOTES
Azeb Melendez 1950 female MRN: [id number]    [facility] PRACTICE OFFICE VISIT  [facility] - [address]      ASSESSMENT/PLAN  Azeb Melendez is a 81 y o  female presents to the office for    Diagnoses and all orders for this visit:    Primary hypertension    Gastroesophageal reflux disease without esophagitis    Type 2 diabetes mellitus with chronic painful diabetic neuropathy (HCC)  -     Continuous Blood Gluc  (FreeStyle [place] 2 [place]) CORNEL; Use 1 Device every morning  -     Continuous Blood Gluc Sensor (FreeStyle [place] 2 Sensor) MISC; Use 1 patch every 14 (fourteen) days    Other orders  -     Discontinue: acetaminophen-codeine (TYLENOL with CODEINE #3) 300-30 MG per tablet     Neuropathy of the right foot worse than the left  Decreased microfilament today  Would like to add on hydrochlorothiazide; however patient does have a cardiologist   Pending a call back from her cardiologist  Acid reflux improving on medications continue as prescribed  Sent in for an monitor  given the patient states she finds it difficult to check her sugars  Future Appointments   Date Time Provider Martina Raymundo   11/28/2022 10:40 AM Jaron Jacobs MD [address] [place] [address] [facility]          SUBJECTIVE  CC: Blood Pressure Check (For dental work )      HPI:  Azeb Melendez is a 81 y o  female who presents for acute appointment  Patient was just recently seen in the dentist office and brought in blood pressure readings  States that was elevated over 190 over 80s  Patient has a cardiologist appointment in December  Patient states her acid reflux medication is helping  Would like a diabetic monitor  But does not have a smart phone therefore would only be able to use freestyle        Review of Systems   Constitutional: Negative for activity change, appetite change, chills, fatigue and fever  HENT: Negative for congestion      Respiratory: Negative for cough, chest tightness and shortness of breath  Cardiovascular: Negative for chest pain and leg swelling  Gastrointestinal: Negative for abdominal distention, abdominal pain, constipation, diarrhea, nausea and vomiting  All other systems reviewed and are negative        Historical Information   The patient history was reviewed as follows:  Past Medical History:   Diagnosis Date   • Colitis    • Coronary artery disease    • Diabetes mellitus ([facility] [address] )    • Esophageal ulcer    • Kidney stone    • Neuropathy          Medications:     Current Outpatient Medications:   •  aspirin (ECOTRIN LOW STRENGTH) 81 mg EC tablet, Take 81 mg by mouth daily, Disp: , Rfl:   •  atorvastatin (LIPITOR) 40 mg tablet, Take 1 tablet (40 mg total) by mouth daily, Disp: 90 tablet, Rfl: 2  •  carvedilol (COREG) 25 mg tablet, Take 25 mg by mouth 2 (two) times a day, Disp: , Rfl:   •  cloNIDine (CATAPRES) 0 1 mg tablet, Take 1 tablet (0 1 mg total) by mouth 2 (two) times a day, Disp: 180 tablet, Rfl: 2  •  clopidogrel (PLAVIX) 75 mg tablet, Take 75 mg by mouth daily, Disp: , Rfl:   •  Continuous Blood Gluc  (FreeStyle [place] 2 [place]) [facility], Use 1 Device every morning, Disp: 1 each, Rfl: 0  •  Continuous Blood Gluc Sensor (FreeStyle [place] 2 Sensor) MISC, Use 1 patch every 14 (fourteen) days, Disp: 6 each, Rfl: 4  •  insulin glargine (LANTUS) 100 units/mL subcutaneous injection, Inject 20 Units under the skin daily at bedtime (Patient taking differently: Inject 25 Units under the skin daily at bedtime), Disp: 10 mL, Rfl: 10  •  insulin lispro (HumaLOG KwikPen) 100 units/mL injection pen, Inject 4 Units under the skin 3 (three) times a day with meals (Patient taking differently: Inject under the skin 3 (three) times a day with meals Sliding scale), Disp: 15 mL, Rfl: 3  •  Insulin Pen Needle (UltiCare Mini Pen Needles) 31G X 6 MM MISC, Use 4 (four) times a day, Disp: 200 each, Rfl: 2  •  pantoprazole (PROTONIX) 20 mg tablet, Take 1 tablet (20 mg total) by mouth daily Take 1/2 hour before breakfast, Disp: 90 tablet, Rfl: 0  •  pregabalin (LYRICA) 75 mg capsule, Take 1 capsule by mouth twice daily, Disp: 60 capsule, Rfl: 0  •  sitaGLIPtin (JANUVIA) 50 mg tablet, Take 1 tablet (50 mg total) by mouth daily, Disp: 30 tablet, Rfl: 5  •  tolterodine (DETROL LA) 4 mg 24 hr capsule, Take 1 capsule (4 mg total) by mouth daily, Disp: 90 capsule, Rfl: 2  •  valsartan (DIOVAN) 320 MG tablet, Take 1 tablet (320 mg total) by mouth daily, Disp: 90 tablet, Rfl: 1  •  polyethylene glycol (Golytely) 4000 mL solution, Take 4,000 mL by mouth once for 1 dose Take 4000 mL by mouth once for 1 dose  Use as directed, Disp: 4000 mL, Rfl: 0    Allergies   Allergen Reactions   • Other Rash     Latex tape       OBJECTIVE  Vitals:   Vitals:    11/03/22 0848   BP: (!) 180/80   BP Location: Left arm   Patient Position: Sitting   Cuff Size: Standard   Pulse: 74   Resp: 14   Temp: 97 7 °F (36 5 °C)   SpO2: 98%   Weight: 59 kg (130 lb)   Height: 5' 0 5" (1 537 m)         Physical Exam  Vitals reviewed  Constitutional:       Appearance: She is well-developed  HENT:      Head: Normocephalic and atraumatic  Eyes:      Conjunctiva/sclera: Conjunctivae normal       Pupils: Pupils are equal, round, and reactive to light  Cardiovascular:      Rate and Rhythm: Normal rate and regular rhythm  Pulses: no weak pulses          Dorsalis pedis pulses are 2+ on the right side and 2+ on the left side  Posterior tibial pulses are 2+ on the right side and 2+ on the left side  Heart sounds: Normal heart sounds  Pulmonary:      Effort: Pulmonary effort is normal  No respiratory distress  Breath sounds: Normal breath sounds  Musculoskeletal:         General: Normal range of motion  Cervical back: Normal range of motion and neck supple  Feet:      Right foot:      Skin integrity: No ulcer, skin breakdown, erythema, warmth, callus or dry skin        Left foot:      Skin integrity: No ulcer, skin breakdown, erythema, warmth, callus or dry skin  Skin:     General: Skin is warm  Capillary Refill: Capillary refill takes less than 2 seconds  Neurological:      Mental Status: She is alert and oriented to person, place, and time  Diabetic Foot Exam    Patient's shoes and socks removed  Right Foot/Ankle   Right Foot Inspection  Skin Exam: skin normal and skin intact  No dry skin, no warmth, no callus, no erythema, no maceration, no abnormal color, no pre-ulcer, no ulcer and no callus  Toe Exam: ROM and strength within normal limits  No swelling    Sensory   Vibration: intact  Proprioception: intact  Monofilament testing: diminished    Vascular  Capillary refills: < 3 seconds  The right DP pulse is 2+  The right PT pulse is 2+  Left Foot/Ankle  Left Foot Inspection  Skin Exam: skin normal and skin intact  No dry skin, no warmth, no erythema, no maceration, normal color, no pre-ulcer, no ulcer and no callus  Toe Exam: ROM and strength within normal limits  No swelling  Sensory   Vibration: intact  Proprioception: intact  Monofilament testing: diminished    Vascular  Capillary refills: < 3 seconds  The left DP pulse is 2+  The left PT pulse is 2+       Assign Risk Category  No deformity present  No loss of protective sensation  No weak pulses  Risk: 0            Maria Elena [address] [place] [address]  11/6/2022

## 2022-11-04 ENCOUNTER — TELEPHONE (OUTPATIENT)
Dept: FAMILY MEDICINE CLINIC | Facility: CLINIC | Age: 72
End: 2022-11-04

## 2022-11-04 NOTE — TELEPHONE ENCOUNTER
Can we please call his office  I called yesterday advising them that I wanted to start her on a low-dose hydrochlorothiazide given that her blood pressure is 200/100  I also want her to be seen by him within the next 1-2 weeks for evaluation  Given the blood pressure has been ranging on the higher side

## 2022-11-04 NOTE — TELEPHONE ENCOUNTER
Dr Sussy Rodríguez:    Patient called asking if you can send information over to Dr Sheri Montoya explaining to him the medication that you wanted to start patient on for dental procedure/high BP  She tried to contact them and they want this information from you    Please c/b to discuss further or fax to Dr Sheri Montoya @ [phone]

## 2022-11-07 DIAGNOSIS — I10 PRIMARY HYPERTENSION: Primary | ICD-10-CM

## 2022-11-07 DIAGNOSIS — E11.40 TYPE 2 DIABETES MELLITUS WITH CHRONIC PAINFUL DIABETIC NEUROPATHY (HCC): ICD-10-CM

## 2022-11-07 RX ORDER — PROCHLORPERAZINE 25 MG/1
1 SUPPOSITORY RECTAL
Qty: 1 EACH | Refills: 9 | Status: SHIPPED | OUTPATIENT
Start: 2022-11-07 | End: 2023-03-28

## 2022-11-07 RX ORDER — PROCHLORPERAZINE 25 MG/1
1 SUPPOSITORY RECTAL
Qty: 3 EACH | Refills: 3 | Status: SHIPPED | OUTPATIENT
Start: 2022-11-07 | End: 2023-03-28

## 2022-11-07 RX ORDER — PROCHLORPERAZINE 25 MG/1
1 SUPPOSITORY RECTAL
Qty: 1 EACH | Refills: 0 | Status: SHIPPED | OUTPATIENT
Start: 2022-11-07 | End: 2023-03-28

## 2022-11-07 RX ORDER — HYDROCHLOROTHIAZIDE 12.5 MG/1
12.5 TABLET ORAL DAILY
Qty: 30 TABLET | Refills: 0 | Status: SHIPPED | OUTPATIENT
Start: 2022-11-07 | End: 2022-12-05

## 2022-11-07 NOTE — TELEPHONE ENCOUNTER
I spoke to Dr Juanito Vee office and patient is scheduled 11/18/22    I called patient to make sure she is awre and she said when she went to the pharmacy and her hydrochlorizide was not covered tc/cma

## 2022-11-07 NOTE — TELEPHONE ENCOUNTER
There is a previous message about contacting her cardiologist, sent on Thursday   Was this addressed>

## 2022-11-08 DIAGNOSIS — K52.832 LYMPHOCYTIC COLITIS: Primary | ICD-10-CM

## 2022-11-08 RX ORDER — BUDESONIDE 3 MG/1
6 CAPSULE, COATED PELLETS ORAL DAILY
Qty: 60 CAPSULE | Refills: 3 | Status: SHIPPED | OUTPATIENT
Start: 2022-11-08

## 2022-11-29 ENCOUNTER — RA CDI HCC (OUTPATIENT)
Dept: OTHER | Facility: HOSPITAL | Age: 72
End: 2022-11-29

## 2022-11-29 NOTE — PROGRESS NOTES
[facility] [address]  coding opportunities     E11 22 and E11 65     Chart Reviewed number of suggestions sent to Provider: 2     Patients Insurance     Medicare Insurance: [address] [place] [address]

## 2022-12-02 ENCOUNTER — OFFICE VISIT (OUTPATIENT)
Dept: FAMILY MEDICINE CLINIC | Facility: CLINIC | Age: 72
End: 2022-12-02

## 2022-12-02 VITALS
HEIGHT: 61 IN | HEART RATE: 68 BPM | WEIGHT: 127.8 LBS | RESPIRATION RATE: 16 BRPM | DIASTOLIC BLOOD PRESSURE: 88 MMHG | BODY MASS INDEX: 24.13 KG/M2 | SYSTOLIC BLOOD PRESSURE: 190 MMHG | TEMPERATURE: 96.2 F

## 2022-12-02 DIAGNOSIS — J32.9 SINUSITIS, UNSPECIFIED CHRONICITY, UNSPECIFIED LOCATION: ICD-10-CM

## 2022-12-02 DIAGNOSIS — E11.65 TYPE 2 DIABETES MELLITUS WITH HYPERGLYCEMIA, WITH LONG-TERM CURRENT USE OF INSULIN (HCC): Primary | ICD-10-CM

## 2022-12-02 DIAGNOSIS — K51.90 ULCERATIVE COLITIS WITHOUT COMPLICATIONS, UNSPECIFIED LOCATION (HCC): ICD-10-CM

## 2022-12-02 DIAGNOSIS — Z79.4 TYPE 2 DIABETES MELLITUS WITH HYPERGLYCEMIA, WITH LONG-TERM CURRENT USE OF INSULIN (HCC): Primary | ICD-10-CM

## 2022-12-02 LAB — SL AMB POCT HEMOGLOBIN AIC: 7.7 (ref ?–6.5)

## 2022-12-02 RX ORDER — AMOXICILLIN 500 MG/1
500 CAPSULE ORAL EVERY 12 HOURS SCHEDULED
Qty: 14 CAPSULE | Refills: 0 | Status: SHIPPED | OUTPATIENT
Start: 2022-12-02 | End: 2022-12-09

## 2022-12-02 RX ORDER — INSULIN GLARGINE 100 [IU]/ML
INJECTION, SOLUTION SUBCUTANEOUS
Qty: 30 ML | Refills: 11 | Status: SHIPPED | OUTPATIENT
Start: 2022-12-02

## 2022-12-02 RX ORDER — ISOSORBIDE DINITRATE 30 MG/1
TABLET ORAL
COMMUNITY
Start: 2022-11-23

## 2022-12-02 RX ORDER — TRIAMCINOLONE ACETONIDE 55 UG/1
2 SPRAY, METERED NASAL DAILY
Qty: 16.9 ML | Refills: 2 | Status: SHIPPED | OUTPATIENT
Start: 2022-12-02

## 2022-12-02 NOTE — PROGRESS NOTES
Natanael Gerber 1950 female MRN: [id number]    [address]      ASSESSMENT/PLAN  Natanael Gerber is a 81 y o  female presents to the office for     Diagnoses and all orders for this visit:    Type 2 diabetes mellitus with hyperglycemia, with long-term current use of insulin ([facility])  -     POCT hemoglobin A1c  -     insulin glargine (LANTUS) 100 units/mL subcutaneous injection; 5 units in the AM and 30 units before bedtime  Sinusitis, unspecified chronicity, unspecified location  -     Triamcinolone Acetonide (Nasacort Allergy 24HR) 55 MCG/ACT nasal spray; 2 sprays by Each Nare route daily  -     amoxicillin (AMOXIL) 500 mg capsule; Take 1 capsule (500 mg total) by mouth every 12 (twelve) hours for 7 days    Ulcerative colitis without complications, unspecified location [facility])    Other orders  -     isosorbide dinitrate (ISORDIL) 30 mg tablet;  (Patient not taking: Reported on 12/2/2022)      Type 2 diabetes: At this time will increase patient's Lantus to 5 units in the a m  And 30 units in the p m  Originally was on 20/5 in the p m  Harris Velez Reviewed patient's blood sugar log  Which she required an extra 26 units of Humalog daily  Recommend continuing 4 mg scheduled with sliding scale adjustments  Ulcerative colitis currently controlled  Sinusitis given let the time recommend trial on antibiotic    Health Maintence:         Disposition: Return to the office in 3 months  No future appointments  SUBJECTIVE  CC: Diabetes (Follow up ) and Cold Like Symptoms (Congestion ,slight cough but pt feels it deep in her chest )      HPI:  Natanael Gerber is a 81 y o  femalepresenting to the office for a follow up on  Was taking Loratidine 30mgs daily) relized caused her blood pressure being elevated  She states that she has noticed a decrease after reducing her medications  But however sure blood pressure is still out of range    She was started on ISORDIL but has not started it given her fear of starting new medications  Her ulcerative colitis has been controlled  Patient has a sinusitis symptoms and is currently being treated for tooth pain with Percocet which she knows is not improving her blood pressure  Patient states she has tried multiple things but no other improvement  Tooth pain Percocet as needed    Review of Systems   Constitutional: Negative for activity change, appetite change, chills, fatigue and fever  HENT: Positive for congestion  Respiratory: Positive for cough  Negative for chest tightness and shortness of breath  Cardiovascular: Negative for chest pain and leg swelling  Gastrointestinal: Negative for abdominal distention, abdominal pain, constipation, diarrhea, nausea and vomiting  All other systems reviewed and are negative        Historical Information   The patient history was reviewed as follows:    Past Medical History:   Diagnosis Date   • Colitis    • Coronary artery disease    • Diabetes mellitus ([facility] [address] )    • Esophageal ulcer    • Kidney stone    • Neuropathy      Past Surgical History:   Procedure Laterality Date   • ANKLE ARTHROPLASTY Bilateral     bilat hardware post fx surgeries   • COLONOSCOPY     • CORONARY ANGIOPLASTY WITH STENT PLACEMENT  2019    x2 procedures total 3 stents   • LITHOTRIPSY     • OVARIAN CYST SURGERY Right      Family History   Problem Relation Age of Onset   • Stroke Mother    • Heart disease Father       Social History   Social History     Substance and Sexual Activity   Alcohol Use Not Currently     Social History     Substance and Sexual Activity   Drug Use Never     Social History     Tobacco Use   Smoking Status Former   • Years: 34 00   • Types: Cigarettes   • Quit date:    • Years since quittin 9   Smokeless Tobacco Never       Medications:     Current Outpatient Medications:   •  aspirin (ECOTRIN LOW STRENGTH) 81 mg EC tablet, Take 81 mg by mouth daily, Disp: , Rfl:   •  atorvastatin (LIPITOR) 40 mg tablet, Take 1 tablet (40 mg total) by mouth daily, Disp: 90 tablet, Rfl: 2  •  budesonide (ENTOCORT EC) 3 MG capsule, Take 2 capsules (6 mg total) by mouth daily, Disp: 60 capsule, Rfl: 3  •  carvedilol (COREG) 25 mg tablet, Take 25 mg by mouth 2 (two) times a day, Disp: , Rfl:   •  clopidogrel (PLAVIX) 75 mg tablet, Take 75 mg by mouth daily, Disp: , Rfl:   •  Continuous Blood Gluc  (Dexcom G6 ) CORNEL, Use 1 each every 14 (fourteen) days, Disp: 1 each, Rfl: 0  •  Continuous Blood Gluc  (FreeStyle [place] 2 [place]) CORNEL, Use 1 Device every morning, Disp: 1 each, Rfl: 0  •  Continuous Blood Gluc Sensor (Dexcom G6 Sensor) MISC, Use 1 each every 14 (fourteen) days, Disp: 3 each, Rfl: 3  •  Continuous Blood Gluc Sensor (FreeStyle [place] 2 Sensor) MISC, Use 1 patch every 14 (fourteen) days, Disp: 6 each, Rfl: 4  •  Continuous Blood Gluc Transmit (Dexcom G6 Transmitter) MISC, Use 1 each every 14 (fourteen) days, Disp: 1 each, Rfl: 9  •  hydrochlorothiazide (HYDRODIURIL) 12 5 mg tablet, Take 1 tablet (12 5 mg total) by mouth daily, Disp: 30 tablet, Rfl: 0  •  insulin glargine (LANTUS) 100 units/mL subcutaneous injection, Inject 20 Units under the skin daily at bedtime (Patient taking differently: Inject 25 Units under the skin daily at bedtime), Disp: 10 mL, Rfl: 10  •  insulin lispro (HumaLOG KwikPen) 100 units/mL injection pen, Inject 4 Units under the skin 3 (three) times a day with meals (Patient taking differently: Inject under the skin 3 (three) times a day with meals Sliding scale), Disp: 15 mL, Rfl: 3  •  Insulin Pen Needle (UltiCare Mini Pen Needles) 31G X 6 MM MISC, Use 4 (four) times a day, Disp: 200 each, Rfl: 2  •  pantoprazole (PROTONIX) 20 mg tablet, Take 1 tablet (20 mg total) by mouth daily Take 1/2 hour before breakfast, Disp: 90 tablet, Rfl: 0  •  pregabalin (LYRICA) 75 mg capsule, Take 1 capsule by mouth twice daily, Disp: 60 capsule, Rfl: 0  •  sitaGLIPtin (Sirena Nip) 50 mg tablet, Take 1 tablet (50 mg total) by mouth daily, Disp: 30 tablet, Rfl: 5  •  tolterodine (DETROL LA) 4 mg 24 hr capsule, Take 1 capsule (4 mg total) by mouth daily, Disp: 90 capsule, Rfl: 2  •  valsartan (DIOVAN) 320 MG tablet, Take 1 tablet (320 mg total) by mouth daily, Disp: 90 tablet, Rfl: 1  •  cloNIDine (CATAPRES) 0 1 mg tablet, Take 1 tablet (0 1 mg total) by mouth 2 (two) times a day, Disp: 180 tablet, Rfl: 2  •  isosorbide dinitrate (ISORDIL) 30 mg tablet, , Disp: , Rfl:   •  polyethylene glycol (Golytely) 4000 mL solution, Take 4,000 mL by mouth once for 1 dose Take 4000 mL by mouth once for 1 dose  Use as directed, Disp: 4000 mL, Rfl: 0  Allergies   Allergen Reactions   • Other Rash     Latex tape       OBJECTIVE    Vitals:   Vitals:    12/02/22 1250   BP: (!) 190/88   BP Location: Left arm   Patient Position: Sitting   Cuff Size: Large   Pulse: 68   Resp: 16   Temp: (!) 96 2 °F (35 7 °C)   Weight: 58 kg (127 lb 12 8 oz)   Height: 5' 0 6" (1 539 m)           Physical Exam  Vitals reviewed  Constitutional:       Appearance: She is well-developed and well-nourished  HENT:      Head: Normocephalic and atraumatic  Right Ear: Tympanic membrane, ear canal and external ear normal       Left Ear: Tympanic membrane, ear canal and external ear normal       Nose: Congestion present  Eyes:      Extraocular Movements: EOM normal       Conjunctiva/sclera: Conjunctivae normal       Pupils: Pupils are equal, round, and reactive to light  Cardiovascular:      Rate and Rhythm: Normal rate and regular rhythm  Pulses: Intact distal pulses  Heart sounds: Normal heart sounds  Pulmonary:      Effort: Pulmonary effort is normal  No respiratory distress  Breath sounds: Normal breath sounds  Musculoskeletal:         General: No edema  Normal range of motion  Cervical back: Normal range of motion and neck supple  Skin:     General: Skin is warm        Capillary Refill: Capillary refill takes less than 2 seconds  Neurological:      Mental Status: She is alert and oriented to person, place, and time              Rito Gray MD  [place] [id number]

## 2022-12-05 DIAGNOSIS — I10 PRIMARY HYPERTENSION: ICD-10-CM

## 2022-12-05 RX ORDER — HYDROCHLOROTHIAZIDE 12.5 MG/1
12.5 TABLET ORAL DAILY
Qty: 90 TABLET | Refills: 2 | Status: SHIPPED | OUTPATIENT
Start: 2022-12-05

## 2022-12-08 DIAGNOSIS — M25.571 BILATERAL ANKLE PAIN, UNSPECIFIED CHRONICITY: ICD-10-CM

## 2022-12-08 DIAGNOSIS — M25.572 BILATERAL ANKLE PAIN, UNSPECIFIED CHRONICITY: ICD-10-CM

## 2022-12-11 RX ORDER — PREGABALIN 75 MG/1
CAPSULE ORAL
Qty: 60 CAPSULE | Refills: 0 | Status: SHIPPED | OUTPATIENT
Start: 2022-12-11

## 2022-12-20 DIAGNOSIS — E11.40 TYPE 2 DIABETES MELLITUS WITH CHRONIC PAINFUL DIABETIC NEUROPATHY (HCC): ICD-10-CM

## 2022-12-20 DIAGNOSIS — I10 HYPERTENSION, UNSPECIFIED TYPE: ICD-10-CM

## 2022-12-20 RX ORDER — CLONIDINE HYDROCHLORIDE 0.1 MG/1
0.1 TABLET ORAL 2 TIMES DAILY
Qty: 180 TABLET | Refills: 2 | Status: SHIPPED | OUTPATIENT
Start: 2022-12-20 | End: 2023-03-20

## 2022-12-20 RX ORDER — VALSARTAN 320 MG/1
320 TABLET ORAL DAILY
Qty: 90 TABLET | Refills: 1 | Status: SHIPPED | OUTPATIENT
Start: 2022-12-20

## 2022-12-24 NOTE — ASSESSMENT & PLAN NOTE
No evidence of acute exacerbation  Will continue to monitor  (B) UE grossly 3+/5; both LE 3-/5; except for ankle 3/5

## 2023-01-09 DIAGNOSIS — Z79.4 TYPE 2 DIABETES MELLITUS WITH HYPERGLYCEMIA, WITH LONG-TERM CURRENT USE OF INSULIN (HCC): ICD-10-CM

## 2023-01-09 DIAGNOSIS — E11.40 TYPE 2 DIABETES MELLITUS WITH CHRONIC PAINFUL DIABETIC NEUROPATHY (HCC): ICD-10-CM

## 2023-01-09 DIAGNOSIS — E11.65 TYPE 2 DIABETES MELLITUS WITH HYPERGLYCEMIA, WITH LONG-TERM CURRENT USE OF INSULIN (HCC): ICD-10-CM

## 2023-01-09 RX ORDER — INSULIN LISPRO 100 [IU]/ML
4 INJECTION, SOLUTION INTRAVENOUS; SUBCUTANEOUS
Qty: 15 ML | Refills: 3 | Status: SHIPPED | OUTPATIENT
Start: 2023-01-09 | End: 2023-04-09

## 2023-01-10 ENCOUNTER — TELEPHONE (OUTPATIENT)
Dept: FAMILY MEDICINE CLINIC | Facility: CLINIC | Age: 73
End: 2023-01-10

## 2023-01-10 NOTE — TELEPHONE ENCOUNTER
Dr Buchanan Burn    Patient is requesting new rx for prodigy test strips no coding sent to [facility]  She used to get them online without insurance but is having a problem doing this

## 2023-01-11 DIAGNOSIS — Z79.4 TYPE 2 DIABETES MELLITUS WITH HYPERGLYCEMIA, WITH LONG-TERM CURRENT USE OF INSULIN (HCC): Primary | ICD-10-CM

## 2023-01-11 DIAGNOSIS — E11.65 TYPE 2 DIABETES MELLITUS WITH HYPERGLYCEMIA, WITH LONG-TERM CURRENT USE OF INSULIN (HCC): Primary | ICD-10-CM

## 2023-01-11 RX ORDER — LIDOCAINE HCL 4 %
1 CREAM (GRAM) TOPICAL 2 TIMES DAILY
Qty: 200 EACH | Refills: 2 | Status: SHIPPED | OUTPATIENT
Start: 2023-01-11

## 2023-01-13 DIAGNOSIS — E11.65 TYPE 2 DIABETES MELLITUS WITH HYPERGLYCEMIA, WITH LONG-TERM CURRENT USE OF INSULIN (HCC): ICD-10-CM

## 2023-01-13 DIAGNOSIS — Z79.4 TYPE 2 DIABETES MELLITUS WITH HYPERGLYCEMIA, WITH LONG-TERM CURRENT USE OF INSULIN (HCC): ICD-10-CM

## 2023-01-13 RX ORDER — LIDOCAINE HCL 4 %
1 CREAM (GRAM) TOPICAL 2 TIMES DAILY
Qty: 200 EACH | Refills: 2 | OUTPATIENT
Start: 2023-01-13

## 2023-01-30 DIAGNOSIS — E78.00 PURE HYPERCHOLESTEROLEMIA: ICD-10-CM

## 2023-01-30 DIAGNOSIS — N32.81 OVERACTIVE BLADDER: ICD-10-CM

## 2023-01-30 RX ORDER — TOLTERODINE 4 MG/1
4 CAPSULE, EXTENDED RELEASE ORAL DAILY
Qty: 90 CAPSULE | Refills: 2 | Status: SHIPPED | OUTPATIENT
Start: 2023-01-30

## 2023-01-30 RX ORDER — ATORVASTATIN CALCIUM 40 MG/1
40 TABLET, FILM COATED ORAL DAILY
Qty: 90 TABLET | Refills: 2 | Status: SHIPPED | OUTPATIENT
Start: 2023-01-30

## 2023-02-27 DIAGNOSIS — M25.571 BILATERAL ANKLE PAIN, UNSPECIFIED CHRONICITY: ICD-10-CM

## 2023-02-27 DIAGNOSIS — I10 PRIMARY HYPERTENSION: ICD-10-CM

## 2023-02-27 DIAGNOSIS — M25.572 BILATERAL ANKLE PAIN, UNSPECIFIED CHRONICITY: ICD-10-CM

## 2023-02-27 RX ORDER — PREGABALIN 75 MG/1
75 CAPSULE ORAL 2 TIMES DAILY
Qty: 60 CAPSULE | Refills: 0 | Status: SHIPPED | OUTPATIENT
Start: 2023-02-27

## 2023-02-27 RX ORDER — HYDROCHLOROTHIAZIDE 12.5 MG/1
12.5 TABLET ORAL DAILY
Qty: 90 TABLET | Refills: 2 | Status: SHIPPED | OUTPATIENT
Start: 2023-02-27

## 2023-03-07 ENCOUNTER — RA CDI HCC (OUTPATIENT)
Dept: OTHER | Facility: HOSPITAL | Age: 73
End: 2023-03-07

## 2023-03-07 NOTE — PROGRESS NOTES
[facility] [address]  coding opportunities     E11 22     Chart Reviewed number of suggestions sent to Provider: 1     Patients Insurance     Medicare Insurance: [address] [place] [address]

## 2023-03-20 DIAGNOSIS — K52.832 LYMPHOCYTIC COLITIS: ICD-10-CM

## 2023-03-20 RX ORDER — BUDESONIDE 3 MG/1
6 CAPSULE, COATED PELLETS ORAL DAILY
Qty: 60 CAPSULE | Refills: 3 | Status: ON HOLD | OUTPATIENT
Start: 2023-03-20

## 2023-03-28 ENCOUNTER — LAB (OUTPATIENT)
Dept: LAB | Facility: CLINIC | Age: 73
End: 2023-03-28

## 2023-03-28 ENCOUNTER — OFFICE VISIT (OUTPATIENT)
Dept: FAMILY MEDICINE CLINIC | Facility: CLINIC | Age: 73
End: 2023-03-28

## 2023-03-28 VITALS
OXYGEN SATURATION: 99 % | HEIGHT: 60 IN | RESPIRATION RATE: 16 BRPM | BODY MASS INDEX: 25.52 KG/M2 | SYSTOLIC BLOOD PRESSURE: 140 MMHG | TEMPERATURE: 97.3 F | DIASTOLIC BLOOD PRESSURE: 70 MMHG | HEART RATE: 65 BPM | WEIGHT: 130 LBS

## 2023-03-28 DIAGNOSIS — I25.10 CORONARY ARTERY DISEASE INVOLVING NATIVE CORONARY ARTERY OF NATIVE HEART WITHOUT ANGINA PECTORIS: ICD-10-CM

## 2023-03-28 DIAGNOSIS — E11.65 TYPE 2 DIABETES MELLITUS WITH HYPERGLYCEMIA, WITH LONG-TERM CURRENT USE OF INSULIN (HCC): ICD-10-CM

## 2023-03-28 DIAGNOSIS — K51.90 ULCERATIVE COLITIS WITHOUT COMPLICATIONS, UNSPECIFIED LOCATION (HCC): ICD-10-CM

## 2023-03-28 DIAGNOSIS — Z12.11 ENCOUNTER FOR COLORECTAL CANCER SCREENING: ICD-10-CM

## 2023-03-28 DIAGNOSIS — Z12.12 ENCOUNTER FOR COLORECTAL CANCER SCREENING: ICD-10-CM

## 2023-03-28 DIAGNOSIS — Z79.4 TYPE 2 DIABETES MELLITUS WITH HYPERGLYCEMIA, WITH LONG-TERM CURRENT USE OF INSULIN (HCC): ICD-10-CM

## 2023-03-28 DIAGNOSIS — E11.40 TYPE 2 DIABETES MELLITUS WITH CHRONIC PAINFUL DIABETIC NEUROPATHY (HCC): ICD-10-CM

## 2023-03-28 DIAGNOSIS — M25.572 BILATERAL ANKLE PAIN, UNSPECIFIED CHRONICITY: ICD-10-CM

## 2023-03-28 DIAGNOSIS — Z00.00 MEDICARE ANNUAL WELLNESS VISIT, SUBSEQUENT: Primary | ICD-10-CM

## 2023-03-28 DIAGNOSIS — M25.571 BILATERAL ANKLE PAIN, UNSPECIFIED CHRONICITY: ICD-10-CM

## 2023-03-28 LAB
ALBUMIN SERPL BCP-MCNC: 4.5 G/DL (ref 3.5–5)
ALP SERPL-CCNC: 106 U/L (ref 46–116)
ALT SERPL W P-5'-P-CCNC: 94 U/L (ref 12–78)
ANION GAP SERPL CALCULATED.3IONS-SCNC: 3 MMOL/L (ref 4–13)
AST SERPL W P-5'-P-CCNC: 42 U/L (ref 5–45)
BASOPHILS # BLD AUTO: 0.03 THOUSANDS/ÂΜL (ref 0–0.1)
BASOPHILS NFR BLD AUTO: 0 % (ref 0–1)
BILIRUB SERPL-MCNC: 0.57 MG/DL (ref 0.2–1)
BUN SERPL-MCNC: 40 MG/DL (ref 5–25)
CALCIUM SERPL-MCNC: 10.8 MG/DL (ref 8.3–10.1)
CHLORIDE SERPL-SCNC: 101 MMOL/L (ref 96–108)
CHOLEST SERPL-MCNC: 182 MG/DL
CO2 SERPL-SCNC: 29 MMOL/L (ref 21–32)
CREAT SERPL-MCNC: 1.53 MG/DL (ref 0.6–1.3)
EOSINOPHIL # BLD AUTO: 0.12 THOUSAND/ÂΜL (ref 0–0.61)
EOSINOPHIL NFR BLD AUTO: 1 % (ref 0–6)
ERYTHROCYTE [DISTWIDTH] IN BLOOD BY AUTOMATED COUNT: 13.9 % (ref 11.6–15.1)
GFR SERPL CREATININE-BSD FRML MDRD: 34 ML/MIN/1.73SQ M
GLUCOSE P FAST SERPL-MCNC: 54 MG/DL (ref 65–99)
HCT VFR BLD AUTO: 43.8 % (ref 34.8–46.1)
HDLC SERPL-MCNC: 62 MG/DL
HGB BLD-MCNC: 14.7 G/DL (ref 11.5–15.4)
IMM GRANULOCYTES # BLD AUTO: 0.05 THOUSAND/UL (ref 0–0.2)
IMM GRANULOCYTES NFR BLD AUTO: 1 % (ref 0–2)
LDLC SERPL CALC-MCNC: 81 MG/DL (ref 0–100)
LYMPHOCYTES # BLD AUTO: 4.23 THOUSANDS/ÂΜL (ref 0.6–4.47)
LYMPHOCYTES NFR BLD AUTO: 42 % (ref 14–44)
MCH RBC QN AUTO: 28.5 PG (ref 26.8–34.3)
MCHC RBC AUTO-ENTMCNC: 33.6 G/DL (ref 31.4–37.4)
MCV RBC AUTO: 85 FL (ref 82–98)
MONOCYTES # BLD AUTO: 0.77 THOUSAND/ÂΜL (ref 0.17–1.22)
MONOCYTES NFR BLD AUTO: 8 % (ref 4–12)
NEUTROPHILS # BLD AUTO: 4.96 THOUSANDS/ÂΜL (ref 1.85–7.62)
NEUTS SEG NFR BLD AUTO: 48 % (ref 43–75)
NONHDLC SERPL-MCNC: 120 MG/DL
NRBC BLD AUTO-RTO: 0 /100 WBCS
PLATELET # BLD AUTO: 145 THOUSANDS/UL (ref 149–390)
PMV BLD AUTO: 10.7 FL (ref 8.9–12.7)
POTASSIUM SERPL-SCNC: 3.9 MMOL/L (ref 3.5–5.3)
PROT SERPL-MCNC: 8.2 G/DL (ref 6.4–8.4)
RBC # BLD AUTO: 5.15 MILLION/UL (ref 3.81–5.12)
SL AMB POCT HEMOGLOBIN AIC: 8.7 (ref ?–6.5)
SODIUM SERPL-SCNC: 133 MMOL/L (ref 135–147)
TRIGL SERPL-MCNC: 194 MG/DL
WBC # BLD AUTO: 10.16 THOUSAND/UL (ref 4.31–10.16)

## 2023-03-28 RX ORDER — AMOXICILLIN 500 MG/1
CAPSULE ORAL
COMMUNITY
Start: 2023-02-10 | End: 2023-03-28

## 2023-03-28 RX ORDER — DULAGLUTIDE 0.75 MG/.5ML
0.75 INJECTION, SOLUTION SUBCUTANEOUS
Qty: 6 ML | Refills: 0 | Status: SHIPPED | OUTPATIENT
Start: 2023-03-28 | End: 2023-04-05

## 2023-03-28 RX ORDER — ISOSORBIDE DINITRATE 30 MG/1
30 TABLET ORAL 2 TIMES DAILY
Qty: 180 TABLET | Refills: 2 | Status: SHIPPED | OUTPATIENT
Start: 2023-03-28 | End: 2023-06-26

## 2023-03-28 RX ORDER — INSULIN LISPRO 100 [IU]/ML
4 INJECTION, SOLUTION INTRAVENOUS; SUBCUTANEOUS
Qty: 15 ML | Refills: 3 | Status: SHIPPED | OUTPATIENT
Start: 2023-03-28 | End: 2023-06-26

## 2023-03-28 RX ORDER — AZITHROMYCIN 250 MG/1
TABLET, FILM COATED ORAL
COMMUNITY
Start: 2023-03-03 | End: 2023-03-28

## 2023-03-28 RX ORDER — PREGABALIN 100 MG/1
100 CAPSULE ORAL 2 TIMES DAILY
Qty: 60 CAPSULE | Refills: 2 | Status: SHIPPED | OUTPATIENT
Start: 2023-03-28

## 2023-03-28 RX ORDER — INSULIN GLARGINE 100 [IU]/ML
INJECTION, SOLUTION SUBCUTANEOUS
Qty: 30 ML | Refills: 11 | Status: SHIPPED | OUTPATIENT
Start: 2023-03-28

## 2023-03-28 NOTE — PROGRESS NOTES
Assessment and Plan:     Problem List Items Addressed This Visit        Digestive    Ulcerative colitis ([facility] )       Endocrine    Type 2 diabetes mellitus with hyperglycemia, with long-term current use of insulin (HCC)    Relevant Medications    insulin lispro (HumaLOG KwikPen) 100 units/mL injection pen    insulin glargine (LANTUS) 100 units/mL subcutaneous injection    dulaglutide (Trulicity) 5 84 HJ/0 4EV injection    Other Relevant Orders    POCT hemoglobin A1c (Completed)    Lipid panel    Comprehensive metabolic panel    CBC and differential (Completed)       Cardiovascular and Mediastinum    Coronary artery disease involving native coronary artery of native heart without angina pectoris    Relevant Medications    isosorbide dinitrate (ISORDIL) 30 mg tablet   Other Visit Diagnoses     Medicare annual wellness visit, subsequent    -  Primary    Encounter for colorectal cancer screening        Relevant Orders    Ambulatory referral for colonoscopy    Type 2 diabetes mellitus with chronic painful diabetic neuropathy (HCC)        Relevant Medications    insulin lispro (HumaLOG KwikPen) 100 units/mL injection pen    insulin glargine (LANTUS) 100 units/mL subcutaneous injection    dulaglutide (Trulicity) 8 78 XF/4 1AJ injection    Bilateral ankle pain, unspecified chronicity        Relevant Medications    pregabalin (LYRICA) 100 mg capsule      Increase to Lyrica; advised the patient that there could be some dizziness and lightheadedness with the dose adjustment  Patient will notify us if this develops  Referral to colonoscopy given  Type 2 diabetes started on Trulicity  Coronary artery disease: Currently being managed by cardiologist   Refilled medications today  Ulcerative colitis stable    BMI Counseling: Body mass index is 25 39 kg/m²  The BMI is above normal  Nutrition recommendations include consuming healthier snacks  Exercise recommendations include exercising 3-5 times per week   Rationale for BMI follow-up plan is due to patient being overweight or obese  Depression Screening and Follow-up Plan: Patient was screened for depression during today's encounter  They screened negative with a PHQ-2 score of 0  Preventive health issues were discussed with patient, and age appropriate screening tests were ordered as noted in patient's After Visit Summary  Personalized health advice and appropriate referrals for health education or preventive services given if needed, as noted in patient's After Visit Summary  History of Present Illness:     Patient presents for a Medicare Wellness Visit  Patient's blood sugars continue to be elevated  She is very concerned about this  Patient is going to be seeing an endocrinologist in May  Continues to have profound neuropathy pain is not improving and would like an increase in her Lyrica pain  Patient states that she needs a refill on her cardiology medications  Would like to see the gastroenterologist prior to getting a colonoscopy again    HPI   Patient Care Team:  Booker Gold MD as PCP - General (Family Medicine)  Zhao Cisneros MD as PCP - [address] [place], [place] [address] ([facility])     Review of Systems:     Review of Systems   Constitutional: Negative for activity change, appetite change, chills, fatigue and fever  HENT: Negative for congestion  Respiratory: Negative for cough, chest tightness and shortness of breath  Cardiovascular: Negative for chest pain and leg swelling  Gastrointestinal: Negative for abdominal distention, abdominal pain, constipation, diarrhea, nausea and vomiting  Musculoskeletal: Positive for arthralgias and gait problem  All other systems reviewed and are negative         Problem List:     Patient Active Problem List   Diagnosis   • Coronary artery disease involving native coronary artery of native heart without angina pectoris   • Emphysema, unspecified ([facility] [address] )   • Ischemic cardiomyopathy   • Hypertension   • Ulcerative colitis ([facility] )   • Pure hypercholesterolemia   • Pulmonary hypertension ([facility])   • Other hyperlipidemia   • Osteoporosis   • Type 2 diabetes mellitus with hyperglycemia, with long-term current use of insulin ([facility])   • Elevated troponin   • Gastroesophageal reflux disease with esophagitis   • Platelets decreased ([facility])   • History of PTCA with stents      Past Medical and Surgical History:     Past Medical History:   Diagnosis Date   • Colitis    • Coronary artery disease    • Diabetes mellitus ([facility] )    • Esophageal ulcer    • Kidney stone    • Neuropathy      Past Surgical History:   Procedure Laterality Date   • ANKLE ARTHROPLASTY Bilateral     bilat hardware post fx surgeries   • COLONOSCOPY     • CORONARY ANGIOPLASTY WITH STENT PLACEMENT  2019    x2 procedures total 3 stents   • LITHOTRIPSY     • OVARIAN CYST SURGERY Right       Family History:     Family History   Problem Relation Age of Onset   • Stroke Mother    • Heart disease Father       Social History:     Social History     Socioeconomic History   • Marital status: Single     Spouse name: None   • Number of children: None   • Years of education: None   • Highest education level: None   Occupational History   • None   Tobacco Use   • Smoking status: Former     Years: 34 00     Types: Cigarettes     Quit date:      Years since quittin 2   • Smokeless tobacco: Never   Vaping Use   • Vaping Use: Never used   Substance and Sexual Activity   • Alcohol use: Not Currently   • Drug use: Never   • Sexual activity: Not Currently   Other Topics Concern   • None   Social History Narrative   • None     Social Determinants of Health     Financial Resource Strain: Low Risk    • Difficulty of Paying Living Expenses: Not hard at all   Food Insecurity: No Food Insecurity   • Worried About Running Out of Food in the Last Year: Never true   • Ran Out of Food in the Last Year: Never true   Transportation Needs: No Transportation Needs   • Lack of Transportation (Medical):  No   • Lack of Transportation (Non-Medical): No   Physical Activity: Not on file   Stress: Not on file   Social Connections: Not on file   Intimate Partner Violence: Not on file   Housing Stability: Low Risk    • Unable to Pay for Housing in the Last Year: No   • Number of Places Lived in the Last Year: 1   • Unstable Housing in the Last Year: No      Medications and Allergies:     Current Outpatient Medications   Medication Sig Dispense Refill   • aspirin (ECOTRIN LOW STRENGTH) 81 mg EC tablet Take 81 mg by mouth daily     • atorvastatin (LIPITOR) 40 mg tablet Take 1 tablet (40 mg total) by mouth daily 90 tablet 2   • budesonide (ENTOCORT EC) 3 MG capsule Take 2 capsules (6 mg total) by mouth daily 60 capsule 3   • carvedilol (COREG) 25 mg tablet Take 25 mg by mouth 2 (two) times a day     • cloNIDine (CATAPRES) 0 1 mg tablet Take 1 tablet (0 1 mg total) by mouth 2 (two) times a day 180 tablet 2   • clopidogrel (PLAVIX) 75 mg tablet Take 75 mg by mouth daily     • Continuous Blood Gluc  (FreeStyle [place] 2 [employer]) CORNEL Use 1 Device every morning 1 each 0   • dulaglutide (Trulicity) 8 42 FB/5 0HQ injection Inject 0 5 mL (0 75 mg total) under the skin every 7 days for 4 days 6 mL 0   • glucose blood ([employer] Glucose Meter Test Strips) test strip Use 1 each 2 (two) times a day Use as instructed DX E11 65 200 each 2   • hydrochlorothiazide (HYDRODIURIL) 12 5 mg tablet Take 1 tablet (12 5 mg total) by mouth daily 90 tablet 2   • insulin glargine (LANTUS) 100 units/mL subcutaneous injection 5 units in the AM and 30 units before bedtime   30 mL 11   • insulin lispro (HumaLOG KwikPen) 100 units/mL injection pen Inject 4 Units under the skin 3 (three) times a day with meals Plus sliding scale 15 mL 3   • Insulin Pen Needle (UltiCare Mini Pen Needles) 31G X 6 MM MISC Use 4 (four) times a day 200 each 2   • isosorbide dinitrate (ISORDIL) 30 mg tablet Take 1 tablet (30 mg total) by mouth 2 (two) times a day 180 tablet 2   • pregabalin (LYRICA) 100 mg capsule Take 1 capsule (100 mg total) by mouth 2 (two) times a day 60 capsule 2   • tolterodine (DETROL LA) 4 mg 24 hr capsule Take 1 capsule (4 mg total) by mouth daily 90 capsule 2   • valsartan (DIOVAN) 320 MG tablet Take 1 tablet (320 mg total) by mouth daily 90 tablet 1   • sitaGLIPtin (JANUVIA) 50 mg tablet Take 1 tablet (50 mg total) by mouth daily (Patient not taking: Reported on 3/28/2023) 30 tablet 5   • Triamcinolone Acetonide (Nasacort Allergy 24HR) 55 MCG/ACT nasal spray 2 sprays by Each Nare route daily (Patient not taking: Reported on 3/28/2023) 16 9 mL 2     No current facility-administered medications for this visit  Allergies   Allergen Reactions   • Other Rash     Latex tape      Immunizations:     Immunization History   Administered Date(s) Administered   • COVID-19 MODERNA VACC 0 5 ML IM 03/10/2021, 04/07/2021, 10/29/2021, 05/11/2022   • INFLUENZA 10/29/2021, 11/04/2022      Health Maintenance:         Topic Date Due   • Breast Cancer Screening: Mammogram  Never done   • Colorectal Cancer Screening  01/23/2023   • Hepatitis C Screening  Completed     There are no preventive care reminders to display for this patient  Medicare Screening Tests and Risk Assessments:     George Reaves is here for her Subsequent Wellness visit  Health Risk Assessment:   Patient rates overall health as fair  Patient feels that their physical health rating is same  Patient is satisfied with their life  Eyesight was rated as same  Hearing was rated as slightly worse  Patient feels that their emotional and mental health rating is slightly better  Patients states they are sometimes angry  Patient states they are never, rarely unusually tired/fatigued  Pain experienced in the last 7 days has been some  Patient's pain rating has been 2/10  Patient states that she has experienced no weight loss or gain in last 6 months  Depression Screening:   PHQ-2 Score: 0      Fall Risk Screening:    In the past year, patient has experienced: no history of falling in past year      Urinary Incontinence Screening:   Patient has not leaked urine accidently in the last six months  Home Safety:  Patient has trouble with stairs inside or outside of their home  Patient has working smoke alarms and has working carbon monoxide detector  Home safety hazards include: none  Nutrition:   Current diet is Diabetic  Medications:   Patient is not currently taking any over-the-counter supplements  Patient is able to manage medications  Activities of Daily Living (ADLs)/Instrumental Activities of Daily Living (IADLs):   Walk and transfer into and out of bed and chair?: Yes  Dress and groom yourself?: Yes    Bathe or shower yourself?: Yes    Feed yourself?  Yes  Do your laundry/housekeeping?: Yes  Manage your money, pay your bills and track your expenses?: Yes  Make your own meals?: Yes    Do your own shopping?: Yes    Previous Hospitalizations:   Any hospitalizations or ED visits within the last 12 months?: Yes    How many hospitalizations have you had in the last year?: 1-2    Advance Care Planning:   Living will: No    Advanced directive: No      Cognitive Screening:   Provider or family/friend/caregiver concerned regarding cognition?: No    PREVENTIVE SCREENINGS      Cardiovascular Screening:    General: Screening Not Indicated and History Lipid Disorder    Due for: Lipid Panel      Diabetes Screening:     General: Screening Not Indicated and History Diabetes    Due for: Blood Glucose      Colorectal Cancer Screening:     General: Screening Current      Breast Cancer Screening:       Due for: Mammogram        Cervical Cancer Screening:    General: Screening Not Indicated      Osteoporosis Screening:    General: Screening Not Indicated and History Osteoporosis      Abdominal Aortic Aneurysm (AAA) Screening:        General: Risks and Benefits Discussed      Lung Cancer Screening:     General: Screening Not Indicated Hepatitis C Screening:    General: Screening Current    Screening, Brief Intervention, and Referral to Treatment (SBIRT)    Screening  Typical number of drinks in a day: 0  Typical number of drinks in a week: 0  Interpretation: Low risk drinking behavior  Single Item Drug Screening:  How often have you used an illegal drug (including marijuana) or a prescription medication for non-medical reasons in the past year? never    Single Item Drug Screen Score: 0  Interpretation: Negative screen for possible drug use disorder    No results found  Physical Exam:     /70 (BP Location: Left arm, Patient Position: Sitting, Cuff Size: Standard)   Pulse 65   Temp (!) 97 3 °F (36 3 °C)   Resp 16   Ht 5' (1 524 m)   Wt 59 kg (130 lb)   SpO2 99%   BMI 25 39 kg/m²     Physical Exam  Vitals reviewed  Constitutional:       Appearance: Normal appearance  She is well-developed  HENT:      Head: Normocephalic and atraumatic  Right Ear: Tympanic membrane, ear canal and external ear normal  There is no impacted cerumen  Left Ear: Tympanic membrane, ear canal and external ear normal  There is no impacted cerumen  Nose: Nose normal       Mouth/Throat:      Mouth: Mucous membranes are moist       Pharynx: Oropharynx is clear  Eyes:      Conjunctiva/sclera: Conjunctivae normal       Pupils: Pupils are equal, round, and reactive to light  Cardiovascular:      Rate and Rhythm: Normal rate and regular rhythm  Heart sounds: Normal heart sounds  Pulmonary:      Effort: Pulmonary effort is normal       Breath sounds: Normal breath sounds  Abdominal:      General: Abdomen is flat  Bowel sounds are normal       Palpations: Abdomen is soft  Musculoskeletal:         General: Normal range of motion  Cervical back: Normal range of motion and neck supple  Skin:     General: Skin is warm  Capillary Refill: Capillary refill takes less than 2 seconds     Neurological:      General: No focal deficit present  Mental Status: She is alert and oriented to person, place, and time  Mental status is at baseline  Psychiatric:         Mood and Affect: Mood normal          Behavior: Behavior normal          Thought Content:  Thought content normal          Judgment: Judgment normal           Miguel Pina MD

## 2023-03-28 NOTE — PATIENT INSTRUCTIONS
Medicare Preventive Visit Patient Instructions  Thank you for completing your Welcome to Medicare Visit or Medicare Annual Wellness Visit today  Your next wellness visit will be due in one year (3/28/2024)  The screening/preventive services that you may require over the next 5-10 years are detailed below  Some tests may not apply to you based off risk factors and/or age  Screening tests ordered at today's visit but not completed yet may show as past due  Also, please note that scanned in results may not display below  Preventive Screenings:  Service Recommendations Previous Testing/Comments   Colorectal Cancer Screening  * Colonoscopy    * Fecal Occult Blood Test (FOBT)/Fecal Immunochemical Test (FIT)  * Fecal DNA/Cologuard Test  * Flexible Sigmoidoscopy Age: 39-70 years old   Colonoscopy: every 10 years (may be performed more frequently if at higher risk)  OR  FOBT/FIT: every 1 year  OR  Cologuard: every 3 years  OR  Sigmoidoscopy: every 5 years  Screening may be recommended earlier than age 39 if at higher risk for colorectal cancer  Also, an individualized decision between you and your healthcare provider will decide whether screening between the ages of 74-80 would be appropriate  Colonoscopy: 10/25/2022  FOBT/FIT: Not on file  Cologuard: Not on file  Sigmoidoscopy: Not on file    Screening Current     Breast Cancer Screening Age: 36 years old  Frequency: every 1-2 years  Not required if history of left and right mastectomy Mammogram: 10/22/2018        Cervical Cancer Screening Between the ages of 21-29, pap smear recommended once every 3 years  Between the ages of 33-67, can perform pap smear with HPV co-testing every 5 years     Recommendations may differ for women with a history of total hysterectomy, cervical cancer, or abnormal pap smears in past  Pap Smear: Not on file    Screening Not Indicated   Hepatitis C Screening Once for adults born between 1945 and 1965  More frequently in patients at high risk for Hepatitis C Hep C Antibody: 08/04/2022    Screening Current   Diabetes Screening 1-2 times per year if you're at risk for diabetes or have pre-diabetes Fasting glucose: 121 mg/dL (4/4/2022)  A1C: 8 7 (3/28/2023)  Screening Not Indicated  History Diabetes   Cholesterol Screening Once every 5 years if you don't have a lipid disorder  May order more often based on risk factors  Lipid panel: 03/28/2023    Screening Not Indicated  History Lipid Disorder     Other Preventive Screenings Covered by Medicare:  1  Abdominal Aortic Aneurysm (AAA) Screening: covered once if your at risk  You're considered to be at risk if you have a family history of AAA  2  Lung Cancer Screening: covers low dose CT scan once per year if you meet all of the following conditions: (1) Age 50-69; (2) No signs or symptoms of lung cancer; (3) Current smoker or have quit smoking within the last 15 years; (4) You have a tobacco smoking history of at least 20 pack years (packs per day multiplied by number of years you smoked); (5) You get a written order from a healthcare provider  3  Glaucoma Screening: covered annually if you're considered high risk: (1) You have diabetes OR (2) Family history of glaucoma OR (3)  aged 48 and older OR (3)  American aged 72 and older  3  Osteoporosis Screening: covered every 2 years if you meet one of the following conditions: (1) You're estrogen deficient and at risk for osteoporosis based off medical history and other findings; (2) Have a vertebral abnormality; (3) On glucocorticoid therapy for more than 3 months; (4) Have primary hyperparathyroidism; (5) On osteoporosis medications and need to assess response to drug therapy  · Last bone density test (DXA Scan): Not on file  5  HIV Screening: covered annually if you're between the age of 12-76  Also covered annually if you are younger than 13 and older than 72 with risk factors for HIV infection   For pregnant patients, it is covered up to 3 times per pregnancy  Immunizations:  Immunization Recommendations   Influenza Vaccine Annual influenza vaccination during flu season is recommended for all persons aged >= 6 months who do not have contraindications   Pneumococcal Vaccine   * Pneumococcal conjugate vaccine = PCV13 (Prevnar 13), PCV15 (Vaxneuvance), PCV20 (Prevnar 20)  * Pneumococcal polysaccharide vaccine = PPSV23 (Pneumovax) Adults 25-60 years old: 1-3 doses may be recommended based on certain risk factors  Adults 72 years old: 1-2 doses may be recommended based off what pneumonia vaccine you previously received   Hepatitis B Vaccine 3 dose series if at intermediate or high risk (ex: diabetes, end stage renal disease, liver disease)   Tetanus (Td) Vaccine - COST NOT COVERED BY MEDICARE PART B Following completion of primary series, a booster dose should be given every 10 years to maintain immunity against tetanus  Td may also be given as tetanus wound prophylaxis  Tdap Vaccine - COST NOT COVERED BY MEDICARE PART B Recommended at least once for all adults  For pregnant patients, recommended with each pregnancy  Shingles Vaccine (Shingrix) - COST NOT COVERED BY MEDICARE PART B  2 shot series recommended in those aged 48 and above     Health Maintenance Due:      Topic Date Due   • Breast Cancer Screening: Mammogram  Never done   • Colorectal Cancer Screening  01/23/2023   • Hepatitis C Screening  Completed     Immunizations Due:  There are no preventive care reminders to display for this patient  Advance Directives   What are advance directives? Advance directives are legal documents that state your wishes and plans for medical care  These plans are made ahead of time in case you lose your ability to make decisions for yourself  Advance directives can apply to any medical decision, such as the treatments you want, and if you want to donate organs  What are the types of advance directives?   There are many types of advance directives, and each state has rules about how to use them  You may choose a combination of any of the following:  · Living will: This is a written record of the treatment you want  You can also choose which treatments you do not want, which to limit, and which to stop at a certain time  This includes surgery, medicine, IV fluid, and tube feedings  · Durable power of  for healthcare [facility]): This is a written record that states who you want to make healthcare choices for you when you are unable to make them for yourself  This person, called a proxy, is usually a family member or a friend  You may choose more than 1 proxy  · Do not resuscitate (DNR) order:  A DNR order is used in case your heart stops beating or you stop breathing  It is a request not to have certain forms of treatment, such as CPR  A DNR order may be included in other types of advance directives  · Medical directive: This covers the care that you want if you are in a coma, near death, or unable to make decisions for yourself  You can list the treatments you want for each condition  Treatment may include pain medicine, surgery, blood transfusions, dialysis, IV or tube feedings, and a ventilator (breathing machine)  · Values history: This document has questions about your views, beliefs, and how you feel and think about life  This information can help others choose the care that you would choose  Why are advance directives important? An advance directive helps you control your care  Although spoken wishes may be used, it is better to have your wishes written down  Spoken wishes can be misunderstood, or not followed  Treatments may be given even if you do not want them  An advance directive may make it easier for your family to make difficult choices about your care     Weight Management   Why it is important to manage your weight:  Being overweight increases your risk of health conditions such as heart disease, high blood pressure, type 2 diabetes, and certain types of cancer  It can also increase your risk for osteoarthritis, sleep apnea, and other respiratory problems  Aim for a slow, steady weight loss  Even a small amount of weight loss can lower your risk of health problems  How to lose weight safely:  A safe and healthy way to lose weight is to eat fewer calories and get regular exercise  You can lose up about 1 pound a week by decreasing the number of calories you eat by 500 calories each day  Healthy meal plan for weight management:  A healthy meal plan includes a variety of foods, contains fewer calories, and helps you stay healthy  A healthy meal plan includes the following:  · Eat whole-grain foods more often  A healthy meal plan should contain fiber  Fiber is the part of grains, fruits, and vegetables that is not broken down by your body  Whole-grain foods are healthy and provide extra fiber in your diet  Some examples of whole-grain foods are whole-wheat breads and pastas, oatmeal, brown rice, and bulgur  · Eat a variety of vegetables every day  Include dark, leafy greens such as spinach, kale, desiree greens, and mustard greens  Eat yellow and orange vegetables such as carrots, sweet potatoes, and winter squash  · Eat a variety of fruits every day  Choose fresh or canned fruit (canned in its own juice or light syrup) instead of juice  Fruit juice has very little or no fiber  · Eat low-fat dairy foods  Drink fat-free (skim) milk or 1% milk  Eat fat-free yogurt and low-fat cottage cheese  Try low-fat cheeses such as mozzarella and other reduced-fat cheeses  · Choose meat and other protein foods that are low in fat  Choose beans or other legumes such as split peas or lentils  Choose fish, skinless poultry (chicken or turkey), or lean cuts of red meat (beef or pork)  Before you cook meat or poultry, cut off any visible fat  · Use less fat and oil  Try baking foods instead of frying them   Add less fat, such as margarine, sour cream, regular salad dressing and mayonnaise to foods  Eat fewer high-fat foods  Some examples of high-fat foods include french fries, doughnuts, ice cream, and cakes  · Eat fewer sweets  Limit foods and drinks that are high in sugar  This includes candy, cookies, regular soda, and sweetened drinks  Exercise:  Exercise at least 30 minutes per day on most days of the week  Some examples of exercise include walking, biking, dancing, and swimming  You can also fit in more physical activity by taking the stairs instead of the elevator or parking farther away from stores  Ask your healthcare provider about the best exercise plan for you  © Copyright [employer] 2018 Information is for End User's use only and may not be sold, redistributed or otherwise used for commercial purposes   All illustrations and images included in CareNotes® are the copyrighted property of A D A M [employer]  or [address] [place] [address]

## 2023-03-29 ENCOUNTER — TELEPHONE (OUTPATIENT)
Dept: ADMINISTRATIVE | Facility: OTHER | Age: 73
End: 2023-03-29

## 2023-03-29 DIAGNOSIS — E11.65 TYPE 2 DIABETES MELLITUS WITH HYPERGLYCEMIA, WITH LONG-TERM CURRENT USE OF INSULIN (HCC): Primary | ICD-10-CM

## 2023-03-29 DIAGNOSIS — Z79.4 TYPE 2 DIABETES MELLITUS WITH HYPERGLYCEMIA, WITH LONG-TERM CURRENT USE OF INSULIN (HCC): Primary | ICD-10-CM

## 2023-03-29 NOTE — TELEPHONE ENCOUNTER
Upon review of the In Basket request we were able to locate, review, and update the patient chart as requested for CRC: Colonoscopy  10/25/22 3 mth recall  If a [facility]  doctor,  will update automatically  Any additional questions or concerns should be emailed to the Practice Liaisons via the appropriate education email address, please do not reply via In Basket      Thank you  Brandi Paredes

## 2023-03-29 NOTE — TELEPHONE ENCOUNTER
----- Message from Vu Castaneda MD sent at 3/28/2023 11:00 AM EDT -----  Regarding: care gap request  03/28/23 11:00 AM    Hello, our patient attached above has had CRC: Colonoscopy completed/performed  Please assist in updating the patient chart by making an External outreach to Dr Maxime Thomas from [facility]   She had it in October was found to have 1 polyp     Thank you,  Maria Elena Denny  [facility]

## 2023-03-29 NOTE — TELEPHONE ENCOUNTER
Upon review of the In Basket request and the patient's chart, initial outreach has been made via fax to facility  Please see Contacts section for details       Thank you  Evon Werner

## 2023-03-30 DIAGNOSIS — E11.65 TYPE 2 DIABETES MELLITUS WITH HYPERGLYCEMIA, WITH LONG-TERM CURRENT USE OF INSULIN (HCC): Primary | ICD-10-CM

## 2023-03-30 DIAGNOSIS — Z79.4 TYPE 2 DIABETES MELLITUS WITH HYPERGLYCEMIA, WITH LONG-TERM CURRENT USE OF INSULIN (HCC): Primary | ICD-10-CM

## 2023-03-30 RX ORDER — INSULIN GLARGINE 100 [IU]/ML
INJECTION, SOLUTION SUBCUTANEOUS
Qty: 45 ML | Refills: 10 | Status: SHIPPED | OUTPATIENT
Start: 2023-03-30 | End: 2023-04-05

## 2023-04-02 ENCOUNTER — APPOINTMENT (EMERGENCY)
Dept: RADIOLOGY | Facility: HOSPITAL | Age: 73
End: 2023-04-02

## 2023-04-02 ENCOUNTER — HOSPITAL ENCOUNTER (INPATIENT)
Facility: HOSPITAL | Age: 73
LOS: 3 days | Discharge: HOME | End: 2023-04-05
Attending: EMERGENCY MEDICINE | Admitting: INTERNAL MEDICINE

## 2023-04-02 DIAGNOSIS — K92.0 HEMATEMESIS, UNSPECIFIED WHETHER NAUSEA PRESENT: ICD-10-CM

## 2023-04-02 DIAGNOSIS — E86.0 DEHYDRATION: ICD-10-CM

## 2023-04-02 DIAGNOSIS — N17.9 AKI (ACUTE KIDNEY INJURY) (HCC): ICD-10-CM

## 2023-04-02 DIAGNOSIS — K20.90 ESOPHAGITIS: ICD-10-CM

## 2023-04-02 DIAGNOSIS — K92.0 COFFEE GROUND EMESIS: ICD-10-CM

## 2023-04-02 DIAGNOSIS — K21.00 GASTROESOPHAGEAL REFLUX DISEASE WITH ESOPHAGITIS: ICD-10-CM

## 2023-04-02 DIAGNOSIS — R10.9 ABDOMINAL PAIN: ICD-10-CM

## 2023-04-02 DIAGNOSIS — I10 HYPERTENSION, UNSPECIFIED TYPE: ICD-10-CM

## 2023-04-02 DIAGNOSIS — R77.8 ELEVATED TROPONIN: ICD-10-CM

## 2023-04-02 DIAGNOSIS — R11.2 INTRACTABLE NAUSEA AND VOMITING: Primary | ICD-10-CM

## 2023-04-02 DIAGNOSIS — I10 HYPERTENSION: ICD-10-CM

## 2023-04-02 PROBLEM — R65.10 SIRS (SYSTEMIC INFLAMMATORY RESPONSE SYNDROME) (HCC): Status: ACTIVE | Noted: 2023-04-02

## 2023-04-02 PROBLEM — E87.3 METABOLIC ALKALOSIS: Status: ACTIVE | Noted: 2023-04-02

## 2023-04-02 LAB
2HR DELTA HS TROPONIN: 135 NG/L
4HR DELTA HS TROPONIN: 239 NG/L
ABO GROUP BLD: NORMAL
ABO GROUP BLD: NORMAL
ALBUMIN SERPL BCP-MCNC: 4.5 G/DL (ref 3.5–5)
ALP SERPL-CCNC: 96 U/L (ref 34–104)
ALT SERPL W P-5'-P-CCNC: 42 U/L (ref 7–52)
ANION GAP SERPL CALCULATED.3IONS-SCNC: 15 MMOL/L (ref 4–13)
AST SERPL W P-5'-P-CCNC: 23 U/L (ref 13–39)
ATRIAL RATE: 97 BPM
BACTERIA UR QL AUTO: NORMAL /HPF
BASE EX.OXY STD BLDV CALC-SCNC: 80.6 % (ref 60–80)
BASE EXCESS BLDV CALC-SCNC: 6.9 MMOL/L
BASOPHILS # BLD AUTO: 0.03 THOUSANDS/ÂΜL (ref 0–0.1)
BASOPHILS NFR BLD AUTO: 0 % (ref 0–1)
BETA-HYDROXYBUTYRATE: 0.4 MMOL/L
BILIRUB SERPL-MCNC: 1.12 MG/DL (ref 0.2–1)
BILIRUB UR QL STRIP: NEGATIVE
BLD GP AB SCN SERPL QL: NEGATIVE
BUN SERPL-MCNC: 38 MG/DL (ref 5–25)
CALCIUM SERPL-MCNC: 10.6 MG/DL (ref 8.4–10.2)
CARDIAC TROPONIN I PNL SERPL HS: 175 NG/L
CARDIAC TROPONIN I PNL SERPL HS: 279 NG/L
CARDIAC TROPONIN I PNL SERPL HS: 40 NG/L
CHLORIDE SERPL-SCNC: 89 MMOL/L (ref 96–108)
CLARITY UR: CLEAR
CO2 SERPL-SCNC: 28 MMOL/L (ref 21–32)
COLOR UR: ABNORMAL
CREAT SERPL-MCNC: 1.59 MG/DL (ref 0.6–1.3)
EOSINOPHIL # BLD AUTO: 0.06 THOUSAND/ÂΜL (ref 0–0.61)
EOSINOPHIL NFR BLD AUTO: 0 % (ref 0–6)
ERYTHROCYTE [DISTWIDTH] IN BLOOD BY AUTOMATED COUNT: 13.5 % (ref 11.6–15.1)
FLUAV RNA RESP QL NAA+PROBE: NEGATIVE
FLUBV RNA RESP QL NAA+PROBE: NEGATIVE
GFR SERPL CREATININE-BSD FRML MDRD: 32 ML/MIN/1.73SQ M
GLUCOSE SERPL-MCNC: 176 MG/DL (ref 65–140)
GLUCOSE SERPL-MCNC: 180 MG/DL (ref 65–140)
GLUCOSE SERPL-MCNC: 182 MG/DL (ref 65–140)
GLUCOSE SERPL-MCNC: 213 MG/DL (ref 65–140)
GLUCOSE SERPL-MCNC: 264 MG/DL (ref 65–140)
GLUCOSE UR STRIP-MCNC: ABNORMAL MG/DL
HCO3 BLDV-SCNC: 29.1 MMOL/L (ref 24–30)
HCT VFR BLD AUTO: 38.5 % (ref 34.8–46.1)
HCT VFR BLD AUTO: 44.4 % (ref 34.8–46.1)
HGB BLD-MCNC: 13.2 G/DL (ref 11.5–15.4)
HGB BLD-MCNC: 15.8 G/DL (ref 11.5–15.4)
HGB UR QL STRIP.AUTO: ABNORMAL
IMM GRANULOCYTES # BLD AUTO: 0.06 THOUSAND/UL (ref 0–0.2)
IMM GRANULOCYTES NFR BLD AUTO: 0 % (ref 0–2)
KETONES UR STRIP-MCNC: NEGATIVE MG/DL
LACTATE SERPL-SCNC: 1.5 MMOL/L (ref 0.5–2)
LEUKOCYTE ESTERASE UR QL STRIP: ABNORMAL
LIPASE SERPL-CCNC: 49 U/L (ref 11–82)
LYMPHOCYTES # BLD AUTO: 2 THOUSANDS/ÂΜL (ref 0.6–4.47)
LYMPHOCYTES NFR BLD AUTO: 14 % (ref 14–44)
MAGNESIUM SERPL-MCNC: 1.8 MG/DL (ref 1.9–2.7)
MCH RBC QN AUTO: 29 PG (ref 26.8–34.3)
MCHC RBC AUTO-ENTMCNC: 35.6 G/DL (ref 31.4–37.4)
MCV RBC AUTO: 82 FL (ref 82–98)
MONOCYTES # BLD AUTO: 0.96 THOUSAND/ÂΜL (ref 0.17–1.22)
MONOCYTES NFR BLD AUTO: 7 % (ref 4–12)
NEUTROPHILS # BLD AUTO: 11.45 THOUSANDS/ÂΜL (ref 1.85–7.62)
NEUTS SEG NFR BLD AUTO: 79 % (ref 43–75)
NITRITE UR QL STRIP: NEGATIVE
NON-SQ EPI CELLS URNS QL MICRO: NORMAL /HPF
NRBC BLD AUTO-RTO: 0 /100 WBCS
O2 CT BLDV-SCNC: 18.3 ML/DL
P AXIS: 59 DEGREES
PCO2 BLDV: 34 MM HG (ref 42–50)
PH BLDV: 7.55 [PH] (ref 7.3–7.4)
PH UR STRIP.AUTO: 8 [PH]
PLATELET # BLD AUTO: 155 THOUSANDS/UL (ref 149–390)
PMV BLD AUTO: 9.7 FL (ref 8.9–12.7)
PO2 BLDV: 41.7 MM HG (ref 35–45)
POTASSIUM SERPL-SCNC: 3.9 MMOL/L (ref 3.5–5.3)
PR INTERVAL: 174 MS
PROCALCITONIN SERPL-MCNC: <0.05 NG/ML
PROT SERPL-MCNC: 7.7 G/DL (ref 6.4–8.4)
PROT UR STRIP-MCNC: ABNORMAL MG/DL
QRS AXIS: 37 DEGREES
QRSD INTERVAL: 92 MS
QT INTERVAL: 374 MS
QTC INTERVAL: 474 MS
RBC # BLD AUTO: 5.44 MILLION/UL (ref 3.81–5.12)
RBC #/AREA URNS AUTO: NORMAL /HPF
RH BLD: POSITIVE
RH BLD: POSITIVE
RSV RNA RESP QL NAA+PROBE: NEGATIVE
SARS-COV-2 RNA RESP QL NAA+PROBE: NEGATIVE
SODIUM SERPL-SCNC: 132 MMOL/L (ref 135–147)
SP GR UR STRIP.AUTO: 1.01 (ref 1–1.03)
SPECIMEN EXPIRATION DATE: NORMAL
T WAVE AXIS: 63 DEGREES
UROBILINOGEN UR QL STRIP.AUTO: 0.2 E.U./DL
VENTRICULAR RATE: 97 BPM
WBC # BLD AUTO: 14.56 THOUSAND/UL (ref 4.31–10.16)
WBC #/AREA URNS AUTO: NORMAL /HPF

## 2023-04-02 RX ORDER — CLONIDINE HYDROCHLORIDE 0.1 MG/1
0.1 TABLET ORAL 2 TIMES DAILY
Status: DISCONTINUED | OUTPATIENT
Start: 2023-04-02 | End: 2023-04-04

## 2023-04-02 RX ORDER — ONDANSETRON 2 MG/ML
4 INJECTION INTRAMUSCULAR; INTRAVENOUS ONCE
Status: COMPLETED | OUTPATIENT
Start: 2023-04-02 | End: 2023-04-02

## 2023-04-02 RX ORDER — SUCRALFATE 1 G/1
1 TABLET ORAL ONCE
Status: COMPLETED | OUTPATIENT
Start: 2023-04-02 | End: 2023-04-02

## 2023-04-02 RX ORDER — LABETALOL HYDROCHLORIDE 5 MG/ML
10 INJECTION, SOLUTION INTRAVENOUS ONCE
Status: COMPLETED | OUTPATIENT
Start: 2023-04-02 | End: 2023-04-02

## 2023-04-02 RX ORDER — PROMETHAZINE HYDROCHLORIDE 25 MG/ML
25 INJECTION, SOLUTION INTRAMUSCULAR; INTRAVENOUS ONCE
Status: COMPLETED | OUTPATIENT
Start: 2023-04-02 | End: 2023-04-02

## 2023-04-02 RX ORDER — OCTREOTIDE ACETATE 50 UG/ML
50 INJECTION, SOLUTION INTRAVENOUS; SUBCUTANEOUS ONCE
Status: COMPLETED | OUTPATIENT
Start: 2023-04-02 | End: 2023-04-02

## 2023-04-02 RX ORDER — FAMOTIDINE 10 MG/ML
40 INJECTION, SOLUTION INTRAVENOUS ONCE
Status: COMPLETED | OUTPATIENT
Start: 2023-04-02 | End: 2023-04-02

## 2023-04-02 RX ORDER — POTASSIUM CHLORIDE 14.9 MG/ML
20 INJECTION INTRAVENOUS ONCE
Status: COMPLETED | OUTPATIENT
Start: 2023-04-02 | End: 2023-04-02

## 2023-04-02 RX ORDER — INSULIN LISPRO 100 [IU]/ML
1-5 INJECTION, SOLUTION INTRAVENOUS; SUBCUTANEOUS
Status: DISCONTINUED | OUTPATIENT
Start: 2023-04-02 | End: 2023-04-05 | Stop reason: HOSPADM

## 2023-04-02 RX ORDER — ONDANSETRON 2 MG/ML
4 INJECTION INTRAMUSCULAR; INTRAVENOUS EVERY 6 HOURS PRN
Status: DISCONTINUED | OUTPATIENT
Start: 2023-04-02 | End: 2023-04-03

## 2023-04-02 RX ORDER — MAGNESIUM SULFATE HEPTAHYDRATE 40 MG/ML
2 INJECTION, SOLUTION INTRAVENOUS ONCE
Status: COMPLETED | OUTPATIENT
Start: 2023-04-02 | End: 2023-04-02

## 2023-04-02 RX ORDER — CARVEDILOL 25 MG/1
25 TABLET ORAL 2 TIMES DAILY
Status: DISCONTINUED | OUTPATIENT
Start: 2023-04-02 | End: 2023-04-05 | Stop reason: HOSPADM

## 2023-04-02 RX ORDER — HYDRALAZINE HYDROCHLORIDE 20 MG/ML
5 INJECTION INTRAMUSCULAR; INTRAVENOUS EVERY 4 HOURS PRN
Status: DISCONTINUED | OUTPATIENT
Start: 2023-04-02 | End: 2023-04-05 | Stop reason: HOSPADM

## 2023-04-02 RX ORDER — CLONIDINE HYDROCHLORIDE 0.1 MG/1
0.2 TABLET ORAL ONCE
Status: COMPLETED | OUTPATIENT
Start: 2023-04-02 | End: 2023-04-02

## 2023-04-02 RX ORDER — METOCLOPRAMIDE HYDROCHLORIDE 5 MG/ML
10 INJECTION INTRAMUSCULAR; INTRAVENOUS ONCE
Status: COMPLETED | OUTPATIENT
Start: 2023-04-02 | End: 2023-04-02

## 2023-04-02 RX ORDER — PANTOPRAZOLE SODIUM 40 MG/10ML
40 INJECTION, POWDER, LYOPHILIZED, FOR SOLUTION INTRAVENOUS ONCE
Status: COMPLETED | OUTPATIENT
Start: 2023-04-02 | End: 2023-04-02

## 2023-04-02 RX ORDER — LABETALOL HYDROCHLORIDE 5 MG/ML
10 INJECTION, SOLUTION INTRAVENOUS EVERY 4 HOURS PRN
Status: DISCONTINUED | OUTPATIENT
Start: 2023-04-02 | End: 2023-04-05 | Stop reason: HOSPADM

## 2023-04-02 RX ORDER — SUCRALFATE 1 G/1
1 TABLET ORAL
Status: DISCONTINUED | OUTPATIENT
Start: 2023-04-02 | End: 2023-04-04

## 2023-04-02 RX ORDER — MORPHINE SULFATE 4 MG/ML
4 INJECTION, SOLUTION INTRAMUSCULAR; INTRAVENOUS ONCE
Status: COMPLETED | OUTPATIENT
Start: 2023-04-02 | End: 2023-04-02

## 2023-04-02 RX ORDER — SODIUM CHLORIDE 9 MG/ML
125 INJECTION, SOLUTION INTRAVENOUS CONTINUOUS
Status: DISCONTINUED | OUTPATIENT
Start: 2023-04-02 | End: 2023-04-04

## 2023-04-02 RX ADMIN — METOCLOPRAMIDE 10 MG: 5 INJECTION, SOLUTION INTRAMUSCULAR; INTRAVENOUS at 10:07

## 2023-04-02 RX ADMIN — LABETALOL HYDROCHLORIDE 10 MG: 5 INJECTION, SOLUTION INTRAVENOUS at 17:37

## 2023-04-02 RX ADMIN — CARVEDILOL 25 MG: 25 TABLET, FILM COATED ORAL at 21:54

## 2023-04-02 RX ADMIN — CLONIDINE HYDROCHLORIDE 0.1 MG: 0.1 TABLET ORAL at 21:54

## 2023-04-02 RX ADMIN — PROMETHAZINE HYDROCHLORIDE 25 MG: 25 INJECTION INTRAMUSCULAR; INTRAVENOUS at 14:27

## 2023-04-02 RX ADMIN — POTASSIUM CHLORIDE 20 MEQ: 200 INJECTION, SOLUTION INTRAVENOUS at 11:03

## 2023-04-02 RX ADMIN — SODIUM CHLORIDE 8 MG/HR: 9 INJECTION, SOLUTION INTRAVENOUS at 23:06

## 2023-04-02 RX ADMIN — SUCRALFATE 1 G: 1 TABLET ORAL at 18:42

## 2023-04-02 RX ADMIN — LABETALOL HYDROCHLORIDE 10 MG: 5 INJECTION, SOLUTION INTRAVENOUS at 14:06

## 2023-04-02 RX ADMIN — SODIUM CHLORIDE 1000 ML: 0.9 INJECTION, SOLUTION INTRAVENOUS at 09:23

## 2023-04-02 RX ADMIN — MAGNESIUM SULFATE HEPTAHYDRATE 2 G: 40 INJECTION, SOLUTION INTRAVENOUS at 10:58

## 2023-04-02 RX ADMIN — OCTREOTIDE ACETATE 50 MCG: 50 INJECTION, SOLUTION INTRAVENOUS; SUBCUTANEOUS at 14:18

## 2023-04-02 RX ADMIN — PANTOPRAZOLE SODIUM 40 MG: 40 INJECTION, POWDER, FOR SOLUTION INTRAVENOUS at 12:58

## 2023-04-02 RX ADMIN — SODIUM CHLORIDE 8 MG/HR: 9 INJECTION, SOLUTION INTRAVENOUS at 14:21

## 2023-04-02 RX ADMIN — HYDRALAZINE HYDROCHLORIDE 5 MG: 20 INJECTION INTRAMUSCULAR; INTRAVENOUS at 14:39

## 2023-04-02 RX ADMIN — SODIUM CHLORIDE, SODIUM LACTATE, POTASSIUM CHLORIDE, AND CALCIUM CHLORIDE 500 ML: .6; .31; .03; .02 INJECTION, SOLUTION INTRAVENOUS at 10:46

## 2023-04-02 RX ADMIN — IOHEXOL 100 ML: 350 INJECTION, SOLUTION INTRAVENOUS at 11:07

## 2023-04-02 RX ADMIN — ONDANSETRON 4 MG: 2 INJECTION INTRAMUSCULAR; INTRAVENOUS at 09:23

## 2023-04-02 RX ADMIN — PROMETHAZINE HYDROCHLORIDE 25 MG: 25 INJECTION INTRAMUSCULAR; INTRAVENOUS at 17:37

## 2023-04-02 RX ADMIN — ONDANSETRON 4 MG: 2 INJECTION INTRAMUSCULAR; INTRAVENOUS at 17:20

## 2023-04-02 RX ADMIN — PROMETHAZINE HYDROCHLORIDE 25 MG: 25 INJECTION INTRAMUSCULAR; INTRAVENOUS at 12:53

## 2023-04-02 RX ADMIN — SUCRALFATE 1 G: 1 TABLET ORAL at 13:32

## 2023-04-02 RX ADMIN — FAMOTIDINE 40 MG: 10 INJECTION, SOLUTION INTRAVENOUS at 09:36

## 2023-04-02 RX ADMIN — MORPHINE SULFATE 4 MG: 4 INJECTION INTRAVENOUS at 09:38

## 2023-04-02 RX ADMIN — MORPHINE SULFATE 2 MG: 2 INJECTION, SOLUTION INTRAMUSCULAR; INTRAVENOUS at 14:05

## 2023-04-02 RX ADMIN — SODIUM CHLORIDE 125 ML/HR: 0.9 INJECTION, SOLUTION INTRAVENOUS at 17:12

## 2023-04-02 RX ADMIN — CLONIDINE HYDROCHLORIDE 0.2 MG: 0.1 TABLET ORAL at 15:31

## 2023-04-02 NOTE — ASSESSMENT & PLAN NOTE
ABG congruent: pH 7 55, CO2 34, HCO3- WNL, AG-15, beta hydroxybutyrate-WNL    · Likely secondary to uncontrolled vomiting  · We will monitor

## 2023-04-02 NOTE — ASSESSMENT & PLAN NOTE
History of ulcerative colitis not on any medication currently    Denies any diarrhea    · Follow-up with GI after discharge as needed

## 2023-04-02 NOTE — ASSESSMENT & PLAN NOTE
Secondary to nausea vomiting and hyperglycemia    · Creatine 1 59 on admission, baseline 1 15- 1 23  · Continue IVF  · Hold nephrotoxic's  · IV blood pressure regiment due to esophagitis and possible Romelia-San  · We will monitor

## 2023-04-02 NOTE — ASSESSMENT & PLAN NOTE
History of CAD status post PCI x3 in 2019  · Follows up with Cardiology     · Hold aspirin, Plavix, carvedilol, Lipitor

## 2023-04-02 NOTE — ASSESSMENT & PLAN NOTE
Lab Results   Component Value Date    HGBA1C 8 7 (A) 03/28/2023       Recent Labs     04/02/23  1015   POCGLU 213*       Blood Sugar Average: Last 72 hrs:  (P) 213     Improved,  · Remain on SSI, hold home medications due to n p o  and acute presentation

## 2023-04-02 NOTE — H&P (VIEW-ONLY)
Consultation - [facility]  Jerri Hardy 81 y o  female MRN: [id number]  Unit/Bed#: ED 01 Encounter: [id number]        Consults    Reason for Consult / Principal Problem: Hematemesis    HPI: Jerri Hardy is a 81y o  year old female with history of coronary artery disease, antiplatelet therapy with Plavix and 81 mg aspirin who presented to the emergency room today with concerns for acute onset of epigastric pain, nausea and vomiting  Patient said the symptoms started abruptly around 4:00 in the morning, she reports she was feeling fine yesterday  Denies any suspicious food intake or any sick contacts, she says she lives alone  No recent antibiotics use, denies use of NSAIDs, says that she takes medicine regularly for acid reflux though does not recall exactly what this is  Denies any swallowing difficulties  Of note she underwent EGD and colonoscopy fairly recently in October 2022 with Dr Awa Ibanez, noting the removal of a 12 mm polyp from the proximal descending colon, her EGD had showed moderate gastritis with some focuses of hemorrhage, and a small 1 cm hiatal hernia  REVIEW OF SYSTEMS:    CONSTITUTIONAL: Denies any fever, chills, or rigors  Good appetite, and no recent weight loss  HEENT: No earache or tinnitus  Denies hearing loss or visual disturbances  CARDIOVASCULAR: No chest pain or palpitations  RESPIRATORY: Denies any cough, hemoptysis, shortness of breath or dyspnea on exertion  GASTROINTESTINAL: As noted in the History of Present Illness  GENITOURINARY: No problems with urination  Denies any hematuria or dysuria  NEUROLOGIC: No dizziness or vertigo, denies headaches  MUSCULOSKELETAL: Denies any muscle or joint pain  SKIN: Denies skin rashes or itching  ENDOCRINE: Denies excessive thirst  Denies intolerance to heat or cold  PSYCHOSOCIAL: Denies depression or anxiety  Denies any recent memory loss         Historical Information   Past Medical History: Diagnosis Date   • Colitis    • Coronary artery disease    • Diabetes mellitus ([facility] [address] )    • Esophageal ulcer    • Kidney stone    • Neuropathy      Past Surgical History:   Procedure Laterality Date   • ANKLE ARTHROPLASTY Bilateral     bilat hardware post fx surgeries   • COLONOSCOPY     • CORONARY ANGIOPLASTY WITH STENT PLACEMENT  2019    x2 procedures total 3 stents   • LITHOTRIPSY     • OVARIAN CYST SURGERY Right      Social History   Social History     Substance and Sexual Activity   Alcohol Use Not Currently     Social History     Substance and Sexual Activity   Drug Use Never     Social History     Tobacco Use   Smoking Status Former   • Years: 34 00   • Types: Cigarettes   • Quit date:    • Years since quittin 2   Smokeless Tobacco Never     Family History   Problem Relation Age of Onset   • Stroke Mother    • Heart disease Father        Meds/Allergies     (Not in a hospital admission)    Current Facility-Administered Medications   Medication Dose Route Frequency   • cloNIDine (CATAPRES) tablet 0 2 mg  0 2 mg Oral Once   • hydrALAZINE (APRESOLINE) injection 5 mg  5 mg Intravenous Q4H PRN   • morphine injection 2 mg  2 mg Intravenous Q4H PRN   • pantoprazole (PROTONIX) 80 mg in sodium chloride 0 9 % 100 mL infusion  8 mg/hr Intravenous Continuous   • promethazine (PHENERGAN) injection 25 mg  25 mg Intravenous Once       Allergies   Allergen Reactions   • Other Rash     Latex tape           Objective     Blood pressure (!) 245/142, pulse 94, temperature (!) 96 9 °F (36 1 °C), temperature source Tympanic, resp  rate 20, SpO2 100 %        Intake/Output Summary (Last 24 hours) at 2023 1432  Last data filed at 2023 1354  Gross per 24 hour   Intake 1550 ml   Output --   Net 1550 ml         PHYSICAL EXAM     General Appearance:   Alert, cooperative, no distress, appears stated age    HEENT:   Normocephalic, atraumatic, anicteric      Neck:  Supple, symmetrical, trachea midline, no adenopathy;    thyroid: no enlargement/tenderness/nodules; no carotid  bruit or JVD    Lungs:   Clear to auscultation bilaterally; no rales, rhonchi or wheezing; respirations unlabored    Heart[de-identified]   S1 and S2 normal; regular rate and rhythm; no murmur, rub, or gallop     Abdomen:   Soft, non-tender, non-distended; normal bowel sounds; no masses, no organomegaly    Genitalia:   Deferred    Rectal:   Deferred    Extremities:  No cyanosis, clubbing or edema    Pulses:  2+ and symmetric all extremities    Skin:  Skin color, texture, turgor normal, no rashes or lesions    Lymph nodes:  No palpable cervical, axillary or inguinal lymphadenopathy        Lab Results:   Admission on 04/02/2023   Component Date Value   • WBC 04/02/2023 14 56 (H)    • RBC 04/02/2023 5 44 (H)    • Hemoglobin 04/02/2023 15 8 (H)    • Hematocrit 04/02/2023 44 4    • MCV 04/02/2023 82    • MCH 04/02/2023 29 0    • MCHC 04/02/2023 35 6    • RDW 04/02/2023 13 5    • MPV 04/02/2023 9 7    • Platelets 31/81/7677 155    • nRBC 04/02/2023 0    • Neutrophils Relative 04/02/2023 79 (H)    • Immat GRANS % 04/02/2023 0    • Lymphocytes Relative 04/02/2023 14    • Monocytes Relative 04/02/2023 7    • Eosinophils Relative 04/02/2023 0    • Basophils Relative 04/02/2023 0    • Neutrophils Absolute 04/02/2023 11 45 (H)    • Immature Grans Absolute 04/02/2023 0 06    • Lymphocytes Absolute 04/02/2023 2 00    • Monocytes Absolute 04/02/2023 0 96    • Eosinophils Absolute 04/02/2023 0 06    • Basophils Absolute 04/02/2023 0 03    • Sodium 04/02/2023 132 (L)    • Potassium 04/02/2023 3 9    • Chloride 04/02/2023 89 (L)    • CO2 04/02/2023 28    • ANION GAP 04/02/2023 15 (H)    • BUN 04/02/2023 38 (H)    • Creatinine 04/02/2023 1 59 (H)    • Glucose 04/02/2023 264 (H)    • Calcium 04/02/2023 10 6 (H)    • AST 04/02/2023 23    • ALT 04/02/2023 42    • Alkaline Phosphatase 04/02/2023 96    • Total Protein 04/02/2023 7 7    • Albumin 04/02/2023 4 5    • Total Bilirubin 04/02/2023 1 12 (H)    • eGFR 04/02/2023 32    • Magnesium 04/02/2023 1 8 (L)    • Lipase 04/02/2023 49    • hs TnI 0hr 04/02/2023 40    • SARS-CoV-2 04/02/2023 Negative    • INFLUENZA A PCR 04/02/2023 Negative    • INFLUENZA B PCR 04/02/2023 Negative    • RSV PCR 04/02/2023 Negative    • Color, UA 04/02/2023 Light Yellow    • Clarity, UA 04/02/2023 Clear    • Specific Gravity, UA 04/02/2023 1 010    • pH, UA 04/02/2023 8 0    • Leukocytes, UA 04/02/2023 Elevated glucose may cause decreased leukocyte values  See urine microscopic for [facility] result/ (A)    • Nitrite, UA 04/02/2023 Negative    • Protein, UA 04/02/2023 30 (1+) (A)    • Glucose, UA 04/02/2023 >=1000 (1%) (A)    • Ketones, UA 04/02/2023 Negative    • Urobilinogen, UA 04/02/2023 0 2    • Bilirubin, UA 04/02/2023 Negative    • Occult Blood, UA 04/02/2023 Trace-Intact (A)    • pH, Rodrigo 04/02/2023 7 550 (H)    • pCO2, Rodrigo 04/02/2023 34 0 (L)    • pO2, Rodrigo 04/02/2023 41 7    • HCO3, Rodrigo 04/02/2023 29 1    • Base Excess, Rodrigo 04/02/2023 6 9    • O2 Content, Rodrigo 04/02/2023 18 3    • O2 HGB, VENOUS 04/02/2023 80 6 (H)    • BETA-HYDROXYBUTYRATE 04/02/2023 0 4    • hs TnI 2hr 04/02/2023 175 (H)    • Delta 2hr hsTnI 04/02/2023 135 (H)    • POC Glucose 04/02/2023 213 (H)    • Ventricular Rate 04/02/2023 97    • Atrial Rate 04/02/2023 97    • [place] Interval 04/02/2023 174    • QRSD Interval 04/02/2023 92    • QT Interval 04/02/2023 374    • QTC Interval 04/02/2023 474    • P Axis 04/02/2023 59    • QRS Axis 04/02/2023 37    • T Wave Axis 04/02/2023 63    • hs TnI 4hr 04/02/2023 279 (H)    • Delta 4hr hsTnI 04/02/2023 239 (H)    • RBC, UA 04/02/2023 1-2    • WBC, UA 04/02/2023 0-1    • Epithelial Cells 04/02/2023 Occasional    • Bacteria, UA 04/02/2023 None Seen        Imaging Studies: I have personally reviewed pertinent reports        CT CHEST, ABDOMEN AND PELVIS WITH IV CONTRAST     INDICATION:   chest pain      COMPARISON:  None      TECHNIQUE: CT examination of the chest, abdomen and pelvis was performed  In addition to portal venous phase postcontrast scanning through the abdomen and pelvis, delayed phase postcontrast scanning was performed through the upper abdominal viscera  Multiplanar 2D reformatted images were created from the source data      Radiation dose length product (DLP) for this visit:  521 65 mGy-cm   This examination, like all CT scans performed in the [facility], was performed utilizing techniques to minimize radiation dose exposure, including the use of iterative   reconstruction and automated exposure control      IV Contrast:  100 mL of iohexol (OMNIPAQUE)  Enteric Contrast: Enteric contrast was not administered      FINDINGS:     CHEST     LUNGS:  Mild pulmonary emphysema  No focal consolidation      PLEURA:  Unremarkable      HEART/GREAT VESSELS: Normal heart size  Coronary artery calcifications and atherosclerotic calcifications of the aorta  No thoracic aortic aneurysm      MEDIASTINUM AND WESLEY:  Esophageal wall thickening  No mediastinal or hilar lymphadenopathy      CHEST WALL AND LOWER NECK:  Unremarkable      ABDOMEN     LIVER/BILIARY TREE:  Unremarkable      GALLBLADDER:  No calcified gallstones  No pericholecystic inflammatory change      SPLEEN:  Unremarkable      PANCREAS:  Mild diffuse pancreatic atrophy  Calcifications most predominant in the pancreatic head  Nondilated main pancreatic duct  No acute peripancreatic inflammation      ADRENAL GLANDS:  Unremarkable      KIDNEYS/URETERS:  One or more simple renal cyst(s) is noted  Otherwise unremarkable kidneys  No hydronephrosis      STOMACH AND BOWEL:  There is colonic diverticulosis without evidence of acute diverticulitis      APPENDIX:  A normal appendix was visualized      ABDOMINOPELVIC CAVITY:  No ascites  No pneumoperitoneum  No lymphadenopathy      VESSELS:  Atherosclerotic changes are present    No evidence of aneurysm      PELVIS     REPRODUCTIVE ORGANS:  Unremarkable for patient's age      URINARY BLADDER:  Unremarkable      ABDOMINAL WALL/INGUINAL REGIONS:  Unremarkable      OSSEOUS STRUCTURES:  No acute fracture or destructive osseous lesion  Spinal degenerative changes are noted      IMPRESSION:     Esophageal wall thickening  Reported history of esophageal ulcers noted  Assessment for signs of esophagitis is recommended  ASSESSMENT/PLAN:     #1   Acute onset of nausea, vomiting, abdominal pain, chills and leukocytosis, clinically appears to represent acute viral gastroenteritis with Romelia-San tear as etiology for hematemesis; appears hemodynamically stable for now and hemoglobin appears stable, CT scan showed esophageal wall thickening which would be expected for her clinical presentation    -N p o  after midnight, will tentatively plan for EGD tomorrow; can pursue EGD more urgently if needed if patient does demonstrate evidence of hemodynamically significant upper GI bleeding    -May continue with Protonix drip in the meantime    -Supportive care, antiemetics as needed      The patient was seen and examined by Dr Juan Jiménez, all mark medical decisions were made with Dr Juan Jiménez  Thank you for allowing us to participate in the care of this pleasant patient  We will follow up with you closely

## 2023-04-02 NOTE — ASSESSMENT & PLAN NOTE
· Continue IV medications for hypertension as needed   · Restart home post EGD carvedilol, losartan and catapres

## 2023-04-02 NOTE — ASSESSMENT & PLAN NOTE
Unresolved,     · CT: Esophageal wall thickening  Reported history of esophageal ulcers noted  Assessment for signs of esophagitis is recommended  · S/p Carafate 1 g, Protonix injection Reglan, Pepcid in ED,, continue as tolerated  • Placed on Protonix drip  • Octreotide given in ED  • N p o , IVF  • Type and screen-pending  • Repeat H&H pending  • GI consulted: Likely EGD in a m

## 2023-04-02 NOTE — CONSULTS
Consultation - [facility]) [facility]  Babetta Hatchet 81 y o  female MRN: [id number]  Unit/Bed#: ED 01 Encounter: [id number]        Consults    Reason for Consult / Principal Problem: Hematemesis    HPI: Babetta Hatchet is a 81y o  year old female with history of coronary artery disease, antiplatelet therapy with Plavix and 81 mg aspirin who presented to the emergency room today with concerns for acute onset of epigastric pain, nausea and vomiting  Patient said the symptoms started abruptly around 4:00 in the morning, she reports she was feeling fine yesterday  Denies any suspicious food intake or any sick contacts, she says she lives alone  No recent antibiotics use, denies use of NSAIDs, says that she takes medicine regularly for acid reflux though does not recall exactly what this is  Denies any swallowing difficulties  Of note she underwent EGD and colonoscopy fairly recently in October 2022 with Dr Joceline Jean, noting the removal of a 12 mm polyp from the proximal descending colon, her EGD had showed moderate gastritis with some focuses of hemorrhage, and a small 1 cm hiatal hernia  REVIEW OF SYSTEMS:    CONSTITUTIONAL: Denies any fever, chills, or rigors  Good appetite, and no recent weight loss  HEENT: No earache or tinnitus  Denies hearing loss or visual disturbances  CARDIOVASCULAR: No chest pain or palpitations  RESPIRATORY: Denies any cough, hemoptysis, shortness of breath or dyspnea on exertion  GASTROINTESTINAL: As noted in the History of Present Illness  GENITOURINARY: No problems with urination  Denies any hematuria or dysuria  NEUROLOGIC: No dizziness or vertigo, denies headaches  MUSCULOSKELETAL: Denies any muscle or joint pain  SKIN: Denies skin rashes or itching  ENDOCRINE: Denies excessive thirst  Denies intolerance to heat or cold  PSYCHOSOCIAL: Denies depression or anxiety  Denies any recent memory loss         Historical Information   Past Medical History: Diagnosis Date   • Colitis    • Coronary artery disease    • Diabetes mellitus ([facility] [address] )    • Esophageal ulcer    • Kidney stone    • Neuropathy      Past Surgical History:   Procedure Laterality Date   • ANKLE ARTHROPLASTY Bilateral     bilat hardware post fx surgeries   • COLONOSCOPY     • CORONARY ANGIOPLASTY WITH STENT PLACEMENT  2019    x2 procedures total 3 stents   • LITHOTRIPSY     • OVARIAN CYST SURGERY Right      Social History   Social History     Substance and Sexual Activity   Alcohol Use Not Currently     Social History     Substance and Sexual Activity   Drug Use Never     Social History     Tobacco Use   Smoking Status Former   • Years: 34 00   • Types: Cigarettes   • Quit date:    • Years since quittin 2   Smokeless Tobacco Never     Family History   Problem Relation Age of Onset   • Stroke Mother    • Heart disease Father        Meds/Allergies     (Not in a hospital admission)    Current Facility-Administered Medications   Medication Dose Route Frequency   • cloNIDine (CATAPRES) tablet 0 2 mg  0 2 mg Oral Once   • hydrALAZINE (APRESOLINE) injection 5 mg  5 mg Intravenous Q4H PRN   • morphine injection 2 mg  2 mg Intravenous Q4H PRN   • pantoprazole (PROTONIX) 80 mg in sodium chloride 0 9 % 100 mL infusion  8 mg/hr Intravenous Continuous   • promethazine (PHENERGAN) injection 25 mg  25 mg Intravenous Once       Allergies   Allergen Reactions   • Other Rash     Latex tape           Objective     Blood pressure (!) 245/142, pulse 94, temperature (!) 96 9 °F (36 1 °C), temperature source Tympanic, resp  rate 20, SpO2 100 %        Intake/Output Summary (Last 24 hours) at 2023 1432  Last data filed at 2023 1354  Gross per 24 hour   Intake 1550 ml   Output --   Net 1550 ml         PHYSICAL EXAM     General Appearance:   Alert, cooperative, no distress, appears stated age    HEENT:   Normocephalic, atraumatic, anicteric      Neck:  Supple, symmetrical, trachea midline, no adenopathy;    thyroid: no enlargement/tenderness/nodules; no carotid  bruit or JVD    Lungs:   Clear to auscultation bilaterally; no rales, rhonchi or wheezing; respirations unlabored    Heart[de-identified]   S1 and S2 normal; regular rate and rhythm; no murmur, rub, or gallop     Abdomen:   Soft, non-tender, non-distended; normal bowel sounds; no masses, no organomegaly    Genitalia:   Deferred    Rectal:   Deferred    Extremities:  No cyanosis, clubbing or edema    Pulses:  2+ and symmetric all extremities    Skin:  Skin color, texture, turgor normal, no rashes or lesions    Lymph nodes:  No palpable cervical, axillary or inguinal lymphadenopathy        Lab Results:   Admission on 04/02/2023   Component Date Value   • WBC 04/02/2023 14 56 (H)    • RBC 04/02/2023 5 44 (H)    • Hemoglobin 04/02/2023 15 8 (H)    • Hematocrit 04/02/2023 44 4    • MCV 04/02/2023 82    • MCH 04/02/2023 29 0    • MCHC 04/02/2023 35 6    • RDW 04/02/2023 13 5    • MPV 04/02/2023 9 7    • Platelets 49/02/3059 155    • nRBC 04/02/2023 0    • Neutrophils Relative 04/02/2023 79 (H)    • Immat GRANS % 04/02/2023 0    • Lymphocytes Relative 04/02/2023 14    • Monocytes Relative 04/02/2023 7    • Eosinophils Relative 04/02/2023 0    • Basophils Relative 04/02/2023 0    • Neutrophils Absolute 04/02/2023 11 45 (H)    • Immature Grans Absolute 04/02/2023 0 06    • Lymphocytes Absolute 04/02/2023 2 00    • Monocytes Absolute 04/02/2023 0 96    • Eosinophils Absolute 04/02/2023 0 06    • Basophils Absolute 04/02/2023 0 03    • Sodium 04/02/2023 132 (L)    • Potassium 04/02/2023 3 9    • Chloride 04/02/2023 89 (L)    • CO2 04/02/2023 28    • ANION GAP 04/02/2023 15 (H)    • BUN 04/02/2023 38 (H)    • Creatinine 04/02/2023 1 59 (H)    • Glucose 04/02/2023 264 (H)    • Calcium 04/02/2023 10 6 (H)    • AST 04/02/2023 23    • ALT 04/02/2023 42    • Alkaline Phosphatase 04/02/2023 96    • Total Protein 04/02/2023 7 7    • Albumin 04/02/2023 4 5    • Total Bilirubin 04/02/2023 1 12 (H)    • eGFR 04/02/2023 32    • Magnesium 04/02/2023 1 8 (L)    • Lipase 04/02/2023 49    • hs TnI 0hr 04/02/2023 40    • SARS-CoV-2 04/02/2023 Negative    • INFLUENZA A PCR 04/02/2023 Negative    • INFLUENZA B PCR 04/02/2023 Negative    • RSV PCR 04/02/2023 Negative    • Color, UA 04/02/2023 Light Yellow    • Clarity, UA 04/02/2023 Clear    • Specific Gravity, UA 04/02/2023 1 010    • pH, UA 04/02/2023 8 0    • Leukocytes, UA 04/02/2023 Elevated glucose may cause decreased leukocyte values  See urine microscopic for [facility] result/ (A)    • Nitrite, UA 04/02/2023 Negative    • Protein, UA 04/02/2023 30 (1+) (A)    • Glucose, UA 04/02/2023 >=1000 (1%) (A)    • Ketones, UA 04/02/2023 Negative    • Urobilinogen, UA 04/02/2023 0 2    • Bilirubin, UA 04/02/2023 Negative    • Occult Blood, UA 04/02/2023 Trace-Intact (A)    • pH, Rodrigo 04/02/2023 7 550 (H)    • pCO2, Rodrigo 04/02/2023 34 0 (L)    • pO2, Rodrigo 04/02/2023 41 7    • HCO3, Rodrigo 04/02/2023 29 1    • Base Excess, Rodrigo 04/02/2023 6 9    • O2 Content, Rodrigo 04/02/2023 18 3    • O2 HGB, VENOUS 04/02/2023 80 6 (H)    • BETA-HYDROXYBUTYRATE 04/02/2023 0 4    • hs TnI 2hr 04/02/2023 175 (H)    • Delta 2hr hsTnI 04/02/2023 135 (H)    • POC Glucose 04/02/2023 213 (H)    • Ventricular Rate 04/02/2023 97    • Atrial Rate 04/02/2023 97    • [place] Interval 04/02/2023 174    • QRSD Interval 04/02/2023 92    • QT Interval 04/02/2023 374    • QTC Interval 04/02/2023 474    • P Axis 04/02/2023 59    • QRS Axis 04/02/2023 37    • T Wave Axis 04/02/2023 63    • hs TnI 4hr 04/02/2023 279 (H)    • Delta 4hr hsTnI 04/02/2023 239 (H)    • RBC, UA 04/02/2023 1-2    • WBC, UA 04/02/2023 0-1    • Epithelial Cells 04/02/2023 Occasional    • Bacteria, UA 04/02/2023 None Seen        Imaging Studies: I have personally reviewed pertinent reports        CT CHEST, ABDOMEN AND PELVIS WITH IV CONTRAST     INDICATION:   chest pain      COMPARISON:  None      TECHNIQUE: CT examination of the chest, abdomen and pelvis was performed  In addition to portal venous phase postcontrast scanning through the abdomen and pelvis, delayed phase postcontrast scanning was performed through the upper abdominal viscera  Multiplanar 2D reformatted images were created from the source data      Radiation dose length product (DLP) for this visit:  521 65 mGy-cm   This examination, like all CT scans performed in the [facility], was performed utilizing techniques to minimize radiation dose exposure, including the use of iterative   reconstruction and automated exposure control      IV Contrast:  100 mL of iohexol (OMNIPAQUE)  Enteric Contrast: Enteric contrast was not administered      FINDINGS:     CHEST     LUNGS:  Mild pulmonary emphysema  No focal consolidation      PLEURA:  Unremarkable      HEART/GREAT VESSELS: Normal heart size  Coronary artery calcifications and atherosclerotic calcifications of the aorta  No thoracic aortic aneurysm      MEDIASTINUM AND WESLEY:  Esophageal wall thickening  No mediastinal or hilar lymphadenopathy      CHEST WALL AND LOWER NECK:  Unremarkable      ABDOMEN     LIVER/BILIARY TREE:  Unremarkable      GALLBLADDER:  No calcified gallstones  No pericholecystic inflammatory change      SPLEEN:  Unremarkable      PANCREAS:  Mild diffuse pancreatic atrophy  Calcifications most predominant in the pancreatic head  Nondilated main pancreatic duct  No acute peripancreatic inflammation      ADRENAL GLANDS:  Unremarkable      KIDNEYS/URETERS:  One or more simple renal cyst(s) is noted  Otherwise unremarkable kidneys  No hydronephrosis      STOMACH AND BOWEL:  There is colonic diverticulosis without evidence of acute diverticulitis      APPENDIX:  A normal appendix was visualized      ABDOMINOPELVIC CAVITY:  No ascites  No pneumoperitoneum  No lymphadenopathy      VESSELS:  Atherosclerotic changes are present    No evidence of aneurysm      PELVIS     REPRODUCTIVE ORGANS:  Unremarkable for patient's age      URINARY BLADDER:  Unremarkable      ABDOMINAL WALL/INGUINAL REGIONS:  Unremarkable      OSSEOUS STRUCTURES:  No acute fracture or destructive osseous lesion  Spinal degenerative changes are noted      IMPRESSION:     Esophageal wall thickening  Reported history of esophageal ulcers noted  Assessment for signs of esophagitis is recommended  ASSESSMENT/PLAN:     #1   Acute onset of nausea, vomiting, abdominal pain, chills and leukocytosis, clinically appears to represent acute viral gastroenteritis with Romelia-San tear as etiology for hematemesis; appears hemodynamically stable for now and hemoglobin appears stable, CT scan showed esophageal wall thickening which would be expected for her clinical presentation    -N p o  after midnight, will tentatively plan for EGD tomorrow; can pursue EGD more urgently if needed if patient does demonstrate evidence of hemodynamically significant upper GI bleeding    -May continue with Protonix drip in the meantime    -Supportive care, antiemetics as needed      The patient was seen and examined by Dr Moises Andre, all mark medical decisions were made with Dr Moises Andre  Thank you for allowing us to participate in the care of this pleasant patient  We will follow up with you closely

## 2023-04-02 NOTE — ED PROVIDER NOTES
History  Chief Complaint   Patient presents with   • Epigastric Pain     Pain starting around 0400 this morning    • Vomiting     Starting at 0400, diabetic did not check her sugar      27-year-old female with past history of diabetes, esophageal ulcer, hiatal hernia, GERD, kidney stones, CAD, presents to the ED for evaluation of acute onset nausea, vomiting, epigastric abdominal pain radiating to substernal region since 4:00 this morning  Patient thinks she is having exacerbation of her gastric ulcer as well as her hiatal hernia  Patient came to the ED for further evaluation  Patient denies any fevers or chills  Patient notes vomiting food particles at home  Patient denies any fevers or chills  Epigastric Pain  Associated symptoms: abdominal pain, nausea and vomiting    Associated symptoms: no back pain, no cough, no fever, no palpitations and no shortness of breath    Vomiting  Associated symptoms: abdominal pain    Associated symptoms: no arthralgias, no chills, no cough, no fever and no sore throat        Prior to Admission Medications   Prescriptions Last Dose Informant Patient Reported? Taking?    Continuous Blood Gluc  (FreeStyle [place] 2 [place]) CORNEL   No No   Sig: Use 1 Device every morning   Insulin Glargine Solostar (Lantus SoloStar) 100 UNIT/ML SOPN   No No   Sig: Please inject 5 units in the AM and 30 units in the PM   Insulin Pen Needle (UltiCare Mini Pen Needles) 31G X 6 MM MISC   No No   Sig: Use 4 (four) times a day   aspirin (ECOTRIN LOW STRENGTH) 81 mg EC tablet   Yes No   Sig: Take 81 mg by mouth daily   atorvastatin (LIPITOR) 40 mg tablet   No No   Sig: Take 1 tablet (40 mg total) by mouth daily   budesonide (ENTOCORT EC) 3 MG capsule   No Yes   Sig: Take 2 capsules (6 mg total) by mouth daily   carvedilol (COREG) 25 mg tablet   Yes Yes   Sig: Take 25 mg by mouth 2 (two) times a day   cloNIDine (CATAPRES) 0 1 mg tablet   No Yes   Sig: Take 1 tablet (0 1 mg total) by mouth 2 (two) times a day   clopidogrel (PLAVIX) 75 mg tablet   Yes Yes   Sig: Take 75 mg by mouth daily   dulaglutide (Trulicity) 3 30 DB/1 7QL injection   No Yes   Sig: Inject 0 5 mL (0 75 mg total) under the skin every 7 days for 4 days   glucose blood (CVS Glucose Meter Test Strips) test strip   No No   Sig: Use 1 each 2 (two) times a day Use as instructed DX E11 65   hydrochlorothiazide (HYDRODIURIL) 12 5 mg tablet   No Yes   Sig: Take 1 tablet (12 5 mg total) by mouth daily   insulin glargine (LANTUS) 100 units/mL subcutaneous injection   No No   Si units in the AM and 30 units before bedtime  insulin lispro (HumaLOG KwikPen) 100 units/mL injection pen   No No   Sig: Inject 4 Units under the skin 3 (three) times a day with meals Plus sliding scale   isosorbide dinitrate (ISORDIL) 30 mg tablet   No Yes   Sig: Take 1 tablet (30 mg total) by mouth 2 (two) times a day   pregabalin (LYRICA) 100 mg capsule   No Yes   Sig: Take 1 capsule (100 mg total) by mouth 2 (two) times a day   tolterodine (DETROL LA) 4 mg 24 hr capsule   No Yes   Sig: Take 1 capsule (4 mg total) by mouth daily   valsartan (DIOVAN) 320 MG tablet   No Yes   Sig: Take 1 tablet (320 mg total) by mouth daily      Facility-Administered Medications: None       Past Medical History:   Diagnosis Date   • Colitis    • Coronary artery disease    • Diabetes mellitus (HCC)    • Esophageal ulcer    • Kidney stone    • Neuropathy        Past Surgical History:   Procedure Laterality Date   • ANKLE ARTHROPLASTY Bilateral     bilat hardware post fx surgeries   • COLONOSCOPY     • CORONARY ANGIOPLASTY WITH STENT PLACEMENT  2019    x2 procedures total 3 stents   • LITHOTRIPSY     • OVARIAN CYST SURGERY Right        Family History   Problem Relation Age of Onset   • Stroke Mother    • Heart disease Father      I have reviewed and agree with the history as documented      E-Cigarette/Vaping   • E-Cigarette Use Never User      E-Cigarette/Vaping Substances     Social History Tobacco Use   • Smoking status: Former     Years: 34 00     Types: Cigarettes     Quit date:      Years since quittin 2   • Smokeless tobacco: Never   Vaping Use   • Vaping Use: Never used   Substance Use Topics   • Alcohol use: Not Currently   • Drug use: Never       Review of Systems   Constitutional: Negative for chills and fever  HENT: Negative for ear pain and sore throat  Eyes: Negative for pain and visual disturbance  Respiratory: Negative for cough and shortness of breath  Cardiovascular: Positive for chest pain  Negative for palpitations  Gastrointestinal: Positive for abdominal pain, nausea and vomiting  Genitourinary: Negative for dysuria and hematuria  Musculoskeletal: Negative for arthralgias and back pain  Skin: Negative for color change and rash  Neurological: Negative for seizures and syncope  All other systems reviewed and are negative  Physical Exam  Physical Exam  Vitals and nursing note reviewed  Constitutional:       General: She is not in acute distress  Appearance: She is well-developed  HENT:      Head: Normocephalic and atraumatic  Eyes:      Conjunctiva/sclera: Conjunctivae normal    Cardiovascular:      Rate and Rhythm: Normal rate and regular rhythm  Heart sounds: No murmur heard  Pulmonary:      Effort: Pulmonary effort is normal  No respiratory distress  Breath sounds: Normal breath sounds  Abdominal:      Palpations: Abdomen is soft  Tenderness: There is no abdominal tenderness  Comments: Abdomen is soft, nondistended, with bowel sound present all 4 quadrants  Epigastric tenderness noted to palpation  Musculoskeletal:         General: No swelling  Cervical back: Neck supple  Skin:     General: Skin is warm and dry  Capillary Refill: Capillary refill takes less than 2 seconds  Neurological:      Mental Status: She is alert     Psychiatric:         Mood and Affect: Mood normal          Vital Signs  ED Triage Vitals   Temperature Pulse Respirations Blood Pressure SpO2   04/02/23 0941 04/02/23 0905 04/02/23 0905 04/02/23 1011 04/02/23 0915   (!) 96 9 °F (36 1 °C) 103 (!) 24 (!) 176/101 100 %      Temp Source Heart Rate Source Patient Position - Orthostatic VS BP Location FiO2 (%)   04/02/23 0941 04/02/23 0905 04/02/23 1217 04/02/23 1217 --   Tympanic Monitor Sitting Left arm       Pain Score       04/02/23 0905       10 - Worst Possible Pain           Vitals:    04/02/23 1439 04/02/23 1500 04/02/23 1540 04/02/23 1619   BP: (!) 223/106 (!) 196/97 (!) 198/97 (!) 193/103   Pulse:  94 93 98   Patient Position - Orthostatic VS:             Visual Acuity      ED Medications  Medications   morphine injection 2 mg (2 mg Intravenous Given 4/2/23 1405)   pantoprazole (PROTONIX) 80 mg in sodium chloride 0 9 % 100 mL infusion (8 mg/hr Intravenous New Bag 4/2/23 1421)   hydrALAZINE (APRESOLINE) injection 5 mg (5 mg Intravenous Given 4/2/23 1439)   ondansetron (ZOFRAN) injection 4 mg (4 mg Intravenous Given 4/2/23 0923)   sodium chloride 0 9 % bolus 1,000 mL (0 mL Intravenous Stopped 4/2/23 1120)   Famotidine (PF) (PEPCID) injection 40 mg (40 mg Intravenous Given 4/2/23 0936)   morphine injection 4 mg (4 mg Intravenous Given 4/2/23 0938)   metoclopramide (REGLAN) injection 10 mg (10 mg Intravenous Given 4/2/23 1007)   lactated ringers bolus 500 mL (0 mL Intravenous Stopped 4/2/23 1354)   magnesium sulfate 2 g/50 mL IVPB (premix) 2 g (0 g Intravenous Stopped 4/2/23 1300)   potassium chloride 20 mEq IVPB (premix) (0 mEq Intravenous Stopped 4/2/23 1354)   iohexol (OMNIPAQUE) 350 MG/ML injection (SINGLE-DOSE) 100 mL (100 mL Intravenous Given 4/2/23 1107)   promethazine (PHENERGAN) injection 25 mg (25 mg Intravenous Given 4/2/23 1253)   sucralfate (CARAFATE) tablet 1 g (1 g Oral Given 4/2/23 1332)   pantoprazole (PROTONIX) injection 40 mg (40 mg Intravenous Given 4/2/23 1258)   labetalol (NORMODYNE) injection 10 mg (10 mg Intravenous Given 4/2/23 1406)   promethazine (PHENERGAN) injection 25 mg (25 mg Intravenous Given 4/2/23 1427)   octreotide (SandoSTATIN) injection 50 mcg (50 mcg Intravenous Given 4/2/23 1418)   cloNIDine (CATAPRES) tablet 0 2 mg (0 2 mg Oral Given 4/2/23 1531)       Diagnostic Studies  Results Reviewed     Procedure Component Value Units Date/Time    Lactic acid [[id number]]  (Normal) Collected: 04/02/23 1532    Lab Status: Final result Specimen: Blood from Arm, Left Updated: 04/02/23 1556     LACTIC ACID 1 5 mmol/L     Narrative:      Result may be elevated if tourniquet was used during collection  Hemoglobin and hematocrit, blood [[id number]]  (Normal) Collected: 04/02/23 1450    Lab Status: Final result Specimen: Blood from Foot, Right Updated: 04/02/23 1506     Hemoglobin 13 2 g/dL      Hematocrit 38 5 %     Blood culture #2 [[id number]] Collected: 04/02/23 1450    Lab Status: In process Specimen: Blood from Arm, Right Updated: 04/02/23 1455    Blood culture #1 [[id number]] Collected: 04/02/23 1438    Lab Status:  In process Specimen: Blood from Arm, Left Updated: 04/02/23 1442    HS Troponin I 4hr [[id number]]  (Abnormal) Collected: 04/02/23 1333    Lab Status: Final result Specimen: Blood from Arm, Right Updated: 04/02/23 1411     hs TnI 4hr 279 ng/L      Delta 4hr hsTnI 239 ng/L     HS Troponin I 2hr [[id number]]  (Abnormal) Collected: 04/02/23 1139    Lab Status: Final result Specimen: Blood from Arm, Right Updated: 04/02/23 1253     hs TnI 2hr 175 ng/L      Delta 2hr hsTnI 135 ng/L     Urine Microscopic [[id number]]  (Normal) Collected: 04/02/23 1140    Lab Status: Final result Specimen: Urine, Clean Catch Updated: 04/02/23 1201     RBC, UA 1-2 /hpf      WBC, UA 0-1 /hpf      Epithelial Cells Occasional /hpf      Bacteria, UA None Seen /hpf     UA w Reflex to Microscopic w Reflex to Culture [[id number]]  (Abnormal) Collected: 04/02/23 1140    Lab Status: Final result Specimen: Urine, Clean Catch Updated: 04/02/23 1155     Color, UA Light Yellow     Clarity, UA Clear     Specific Gravity, UA 1 010     pH, UA 8 0     Leukocytes, UA Elevated glucose may cause decreased leukocyte values   See urine microscopic for [facility] result/     Nitrite, UA Negative     Protein, UA 30 (1+) mg/dl      Glucose, UA >=1000 (1%) mg/dl      Ketones, UA Negative mg/dl      Urobilinogen, UA 0 2 E U /dl      Bilirubin, UA Negative     Occult Blood, UA Trace-Intact    Comprehensive metabolic panel [[id number]]  (Abnormal) Collected: 04/02/23 0922    Lab Status: Final result Specimen: Blood from Arm, Left Updated: 04/02/23 1024     Sodium 132 mmol/L      Potassium 3 9 mmol/L      Chloride 89 mmol/L      CO2 28 mmol/L      ANION GAP 15 mmol/L      BUN 38 mg/dL      Creatinine 1 59 mg/dL      Glucose 264 mg/dL      Calcium 10 6 mg/dL      AST 23 U/L      ALT 42 U/L      Alkaline Phosphatase 96 U/L      Total Protein 7 7 g/dL      Albumin 4 5 g/dL      Total Bilirubin 1 12 mg/dL      eGFR 32 ml/min/1 73sq m     Narrative:      [facility] guidelines for Chronic Kidney Disease (CKD):   •  Stage 1 with normal or high GFR (GFR > 90 mL/min/1 73 square meters)  •  Stage 2 Mild CKD (GFR = 60-89 mL/min/1 73 square meters)  •  Stage 3A Moderate CKD (GFR = 45-59 mL/min/1 73 square meters)  •  Stage 3B Moderate CKD (GFR = 30-44 mL/min/1 73 square meters)  •  Stage 4 Severe CKD (GFR = 15-29 mL/min/1 73 square meters)  •  Stage 5 End Stage CKD (GFR <15 mL/min/1 73 square meters)  Note: GFR calculation is accurate only with a steady state creatinine    Magnesium [[id number]]  (Abnormal) Collected: 04/02/23 0922    Lab Status: Final result Specimen: Blood from Arm, Left Updated: 04/02/23 1024     Magnesium 1 8 mg/dL     Lipase [[id number]]  (Normal) Collected: 04/02/23 0922    Lab Status: Final result Specimen: Blood from Arm, Left Updated: 04/02/23 1024     Lipase 49 u/L     Fingerstick Glucose (POCT) [[id number]]  (Abnormal) Collected: 04/02/23 1015    Lab Status: Final result Updated: 04/02/23 1016     POC Glucose 213 mg/dl     FLU/RSV/COVID - if FLU/RSV clinically relevant [[id number]]  (Normal) Collected: 04/02/23 0931    Lab Status: Final result Specimen: Nares from Nose Updated: 04/02/23 1015     SARS-CoV-2 Negative     INFLUENZA A PCR Negative     INFLUENZA B PCR Negative     RSV PCR Negative    Narrative:      FOR PEDIATRIC PATIENTS - copy/paste COVID Guidelines URL to browser: [email or web address]  [email or web address]    SARS-CoV-2 assay is a Nucleic Acid Amplification assay intended for the  qualitative detection of nucleic acid from SARS-CoV-2 in nasopharyngeal  swabs  Results are for the presumptive identification of SARS-CoV-2 RNA  Positive results are indicative of infection with SARS-CoV-2, the virus  causing COVID-19, but do not rule out bacterial infection or co-infection  with other viruses  Laboratories within the [place] and its  territories are required to report all positive results to the appropriate  public health authorities  Negative results do not preclude SARS-CoV-2  infection and should not be used as the sole basis for treatment or other  patient management decisions  Negative results must be combined with  clinical observations, patient history, and epidemiological information  This test has not been FDA cleared or approved  This test has been authorized by FDA under an Emergency Use Authorization  (EUA)  This test is only authorized for the duration of time the  declaration that circumstances exist justifying the authorization of the  emergency use of an in vitro diagnostic tests for detection of SARS-CoV-2  virus and/or diagnosis of COVID-19 infection under section 564(b)(1) of  the Act, 21 U  S C  262CSS-0(D)(8), unless the authorization is terminated  or revoked sooner  The test has been validated but independent review by FDA  and CLIA is pending      Test performed using [employer] GeneXpert: This RT-PCR assay targets N2,  a region unique to SARS-CoV-2  A conserved region in the E-gene was chosen  for pan-Sarbecovirus detection which includes SARS-CoV-2  According to CMS-2020-01-R, this platform meets the definition of high-throughput technology      HS Troponin 0hr (reflex protocol) [[id number]]  (Normal) Collected: 04/02/23 0922    Lab Status: Final result Specimen: Blood from Arm, Left Updated: 04/02/23 0959     hs TnI 0hr 40 ng/L     Beta Hydroxybutyrate [[id number]]  (Normal) Collected: 04/02/23 0922    Lab Status: Final result Specimen: Blood from Arm, Left Updated: 04/02/23 0935     BETA-HYDROXYBUTYRATE 0 4 mmol/L     Blood gas, venous [[id number]]  (Abnormal) Collected: 04/02/23 0922    Lab Status: Final result Specimen: Blood from Arm, Left Updated: 04/02/23 0934     pH, Rodrigo 7 550     pCO2, Rodrigo 34 0 mm Hg      pO2, Rodrigo 41 7 mm Hg      HCO3, Rodrigo 29 1 mmol/L      Base Excess, Rodrigo 6 9 mmol/L      O2 Content, Rodrigo 18 3 ml/dL      O2 HGB, VENOUS 80 6 %     CBC and differential [[id number]]  (Abnormal) Collected: 04/02/23 0922    Lab Status: Final result Specimen: Blood from Arm, Left Updated: 04/02/23 0931     WBC 14 56 Thousand/uL      RBC 5 44 Million/uL      Hemoglobin 15 8 g/dL      Hematocrit 44 4 %      MCV 82 fL      MCH 29 0 pg      MCHC 35 6 g/dL      RDW 13 5 %      MPV 9 7 fL      Platelets 158 Thousands/uL      nRBC 0 /100 WBCs      Neutrophils Relative 79 %      Immat GRANS % 0 %      Lymphocytes Relative 14 %      Monocytes Relative 7 %      Eosinophils Relative 0 %      Basophils Relative 0 %      Neutrophils Absolute 11 45 Thousands/µL      Immature Grans Absolute 0 06 Thousand/uL      Lymphocytes Absolute 2 00 Thousands/µL      Monocytes Absolute 0 96 Thousand/µL      Eosinophils Absolute 0 06 Thousand/µL      Basophils Absolute 0 03 Thousands/µL                  CT chest abdomen pelvis w contrast   Final Result by Minesh James MD (04/02 1141) Esophageal wall thickening  Reported history of esophageal ulcers noted  Assessment for signs of esophagitis is recommended  Workstation performed: [id number]                    Procedures  ECG 12 Lead Documentation Only    Date/Time: 4/2/2023 2:04 PM  Performed by: Cheli Forte DO  Authorized by: Cheli Forte DO     Indications / Diagnosis:  Nausea and vomiting  ECG reviewed by me, the ED Provider: yes    Patient location:  ED  Previous ECG:     Previous ECG:  Compared to current    Similarity:  Changes noted    Comparison to cardiac monitor: Yes    Interpretation:     Interpretation: abnormal    Comments:      Sinus rhythm, rate 87, normal axis, normal intervals, no acute ST/T wave abnormalities noted, previous EKG showed lateral ischemic changes that is no longer present, wide P waves noted suggesting possible left atrial enlargement  ED Course  ED Course as of 04/02/23 1621   Sun Apr 02, 2023   1230 Continues to have abdominal discomfort as well as nausea and vomiting  We will give Carafate and Phenergan as CAT scan shows concern for esophagitis  At this time patient will be admitted for further evaluation and management  Medical Decision Making  Obtain blood work, EKG, CT chest/abdomen/pelvis, viral swab  IV fluids, pain medication, antiemetics and continue to monitor patient for any worsening symptoms  His EKG did not show any acute ischemic changes  Patient's troponin was elevated in the ED which may be secondary to patient's esophagitis and intractable nausea and vomiting  CT scan showed concern for old esophageal ulcer as well as esophagitis  Patient required multiple doses of antiemetics and pain medication for symptom control  Patient given Pepcid, Protonix as well as Carafate for her esophagitis  Patient started to have some coffee-ground emesis in the ED    Subsequently patient placed on Protonix drip and octreotide bolus given  Patient did not take any of her medications this morning secondary to nausea and vomiting  Subsequently patient's she was elevated  Blood pressure improved with IV medications  Patient had elevated troponin in the ED without any EKG changes  Elevated troponin may be secondary to the acuity of condition  At this time patient will be admitted for further evaluation and management  Patient agrees with admission plans  Amount and/or Complexity of Data Reviewed  Labs: ordered  Decision-making details documented in ED Course  Radiology: ordered  Decision-making details documented in ED Course  ECG/medicine tests: ordered and independent interpretation performed  Decision-making details documented in ED Course  Risk  Prescription drug management  Decision regarding hospitalization            Disposition  Final diagnoses:   Intractable nausea and vomiting   Abdominal pain   Esophagitis   Elevated troponin   Dehydration   Coffee ground emesis   Hypertension     Time reflects when diagnosis was documented in both MDM as applicable and the Disposition within this note     Time User Action Codes Description Comment    4/2/2023 12:49 PM Bertha Sanes Add [R11 2] Intractable nausea and vomiting     4/2/2023 12:49 PM Bertha Sanes Add [R10 9] Abdominal pain     4/2/2023 12:49 PM Bertha Sanes Add [K20 90] Esophagitis     4/2/2023  1:04 PM Bertha Sanes Add [R77 8] Elevated troponin     4/2/2023  1:21 PM Bertha Sanes Add [E86 0] Dehydration     4/2/2023  1:49 PM Bran Zhou Add [K21 00] Gastroesophageal reflux disease with esophagitis     4/2/2023  2:04 PM Anna Santos Add [K92 0] Coffee ground emesis     4/2/2023  4:20 PM Bertha Sanes Add [I10] Hypertension       ED Disposition     ED Disposition   Admit    Condition   Stable    Date/Time   Sun Apr 2, 2023  1:12 PM    Comment   Case was discussed with Dr Todd White and the patient's admission status was agreed to be Admission Status: inpatient status to the service of Dr Amanda You             Follow-up Information    None         Current Discharge Medication List      CONTINUE these medications which have NOT CHANGED    Details   budesonide (ENTOCORT EC) 3 MG capsule Take 2 capsules (6 mg total) by mouth daily  Qty: 60 capsule, Refills: 3    Associated Diagnoses: Lymphocytic colitis      carvedilol (COREG) 25 mg tablet Take 25 mg by mouth 2 (two) times a day      cloNIDine (CATAPRES) 0 1 mg tablet Take 1 tablet (0 1 mg total) by mouth 2 (two) times a day  Qty: 180 tablet, Refills: 2    Associated Diagnoses: Hypertension, unspecified type      clopidogrel (PLAVIX) 75 mg tablet Take 75 mg by mouth daily      dulaglutide (Trulicity) 5 26 TH/6 7QP injection Inject 0 5 mL (0 75 mg total) under the skin every 7 days for 4 days  Qty: 6 mL, Refills: 0    Associated Diagnoses: Type 2 diabetes mellitus with hyperglycemia, with long-term current use of insulin (HCC)      hydrochlorothiazide (HYDRODIURIL) 12 5 mg tablet Take 1 tablet (12 5 mg total) by mouth daily  Qty: 90 tablet, Refills: 2    Associated Diagnoses: Primary hypertension      isosorbide dinitrate (ISORDIL) 30 mg tablet Take 1 tablet (30 mg total) by mouth 2 (two) times a day  Qty: 180 tablet, Refills: 2    Associated Diagnoses: Coronary artery disease involving native coronary artery of native heart without angina pectoris      pregabalin (LYRICA) 100 mg capsule Take 1 capsule (100 mg total) by mouth 2 (two) times a day  Qty: 60 capsule, Refills: 2    Associated Diagnoses: Bilateral ankle pain, unspecified chronicity      tolterodine (DETROL LA) 4 mg 24 hr capsule Take 1 capsule (4 mg total) by mouth daily  Qty: 90 capsule, Refills: 2    Associated Diagnoses: Overactive bladder      valsartan (DIOVAN) 320 MG tablet Take 1 tablet (320 mg total) by mouth daily  Qty: 90 tablet, Refills: 1    Associated Diagnoses: Type 2 diabetes mellitus with chronic painful diabetic neuropathy ([facility] [address] )      aspirin (ECOTRIN LOW STRENGTH) 81 mg EC tablet Take 81 mg by mouth daily      atorvastatin (LIPITOR) 40 mg tablet Take 1 tablet (40 mg total) by mouth daily  Qty: 90 tablet, Refills: 2    Associated Diagnoses: Pure hypercholesterolemia      Continuous Blood Gluc  (FreeStyle [place] 2 [place]) CORNEL Use 1 Device every morning  Qty: 1 each, Refills: 0    Associated Diagnoses: Type 2 diabetes mellitus with chronic painful diabetic neuropathy ([facility])      glucose blood (CVS Glucose Meter Test Strips) test strip Use 1 each 2 (two) times a day Use as instructed DX E11 65  Qty: 200 each, Refills: 2    Comments: prodigy test strips  Associated Diagnoses: Type 2 diabetes mellitus with hyperglycemia, with long-term current use of insulin ([facility])      insulin glargine (LANTUS) 100 units/mL subcutaneous injection 5 units in the AM and 30 units before bedtime  Qty: 30 mL, Refills: 11    Associated Diagnoses: Type 2 diabetes mellitus with hyperglycemia, with long-term current use of insulin ([facility])      Insulin Glargine Solostar (Lantus SoloStar) 100 UNIT/ML SOPN Please inject 5 units in the AM and 30 units in the PM  Qty: 45 mL, Refills: 10    Associated Diagnoses: Type 2 diabetes mellitus with hyperglycemia, with long-term current use of insulin ([facility])      insulin lispro (HumaLOG KwikPen) 100 units/mL injection pen Inject 4 Units under the skin 3 (three) times a day with meals Plus sliding scale  Qty: 15 mL, Refills: 3    Associated Diagnoses: Type 2 diabetes mellitus with chronic painful diabetic neuropathy ([facility])      Insulin Pen Needle (UltiCare Mini Pen Needles) 31G X 6 MM MISC Use 4 (four) times a day  Qty: 200 each, Refills: 2    Associated Diagnoses: Type 2 diabetes mellitus with chronic painful diabetic neuropathy ([facility]             No discharge procedures on file      PDMP Review       Value Time User    PDMP Reviewed  Yes 11/15/2021 11:25 AM Scott Montoya MD          ED Provider  Electronically Signed by           Iva Mejia,   04/02/23 1627

## 2023-04-02 NOTE — ASSESSMENT & PLAN NOTE
Asymptomatic,     · Troponin elevated to 175, repeat 279  • Non MI troponin elevation secondary to uncontrolled vomiting   • history of multiple stents in place per patient  • EKG NSR, HR 87  • Cardiac consulted  • Telemetry in place

## 2023-04-02 NOTE — ASSESSMENT & PLAN NOTE
Evident by with leucocytosis, tachycardia,     · Blood cultures-pending  · Presentation likely secondary to acute vomiting and inflammatory markers  · IV bolus given  · LA-negative, Pro-Otf-pending  · Continue to trend  · Consider ABX

## 2023-04-02 NOTE — PROGRESS NOTES
[address]  Progress Note  Name: Cecy Charles  MRN: [id number]  Unit/Bed#: 4 [place] 419-01 I Date of Admission: 4/2/2023   Date of Service: 4/2/2023 I Hospital Day: 0    Assessment/Plan   Hematemesis  Assessment & Plan  Unresolved,     · CT: Esophageal wall thickening  Reported history of esophageal ulcers noted  Assessment for signs of esophagitis is recommended  · S/p Carafate 1 g, Protonix injection Reglan, Pepcid in ED,, continue as tolerated  • Placed on Protonix drip  • Octreotide given in ED  • N p o , IVF  • Type and screen-pending  • Repeat H&H pending  • GI consulted: Likely EGD in a m      Elevated troponin  Assessment & Plan  Asymptomatic,     · Troponin elevated to 175, repeat 279  • Non MI troponin elevation secondary to uncontrolled vomiting   • history of multiple stents in place per patient  • EKG NSR, HR 87  • Cardiac consulted  • Telemetry in place    Metabolic alkalosis  Assessment & Plan  ABG congruent: pH 7 55, CO2 34, HCO3- WNL, AG-15, beta hydroxybutyrate-WNL    · Likely secondary to uncontrolled vomiting  · We will monitor        SIRS (systemic inflammatory response syndrome) (HCC)  Assessment & Plan  Evident by with leucocytosis, tachycardia,     · Blood cultures-pending  · Presentation likely secondary to acute vomiting and inflammatory markers  · IV bolus given  · LA-negative, Pro-Otf-pending  · Continue to trend  · Consider ABX    Gastroesophageal reflux disease with esophagitis  Assessment & Plan  Historical diagnosis, A/C     Likely exacerbated by current hematemesis with coffee-ground findings    · followed by GI outpatient, history of EGD confirming esophagitis and ulceration in lower 3rd  Of esophagus, mild gastritis  · S/p Carafate 1 g, Protonix injection Reglan, Pepcid in ED  • Placed on Protonix drip  • GI consulted     LATASHA (acute kidney injury) ([facility] [address] )  Assessment & Plan  Secondary to nausea vomiting and hyperglycemia    · Creatine 1 59 on admission, baseline 1 15- 1 23  · Continue IVF  · Hold nephrotoxic's  · IV blood pressure regiment due to esophagitis and possible Romelia-San  · We will monitor    Type 2 diabetes mellitus with hyperglycemia, with long-term current use of insulin ([facility])  Assessment & Plan  Lab Results   Component Value Date    HGBA1C 8 7 (A) 03/28/2023       Recent Labs     04/02/23  1015   POCGLU 213*       Blood Sugar Average: Last 72 hrs:  (P) 213     Improved,  · Remain on SSI, hold home medications due to n p o  and acute presentation    Other hyperlipidemia  Assessment & Plan  · restart home Lipitor s/p EGD    Ulcerative colitis ([facility] [address] )  Assessment & Plan  History of ulcerative colitis not on any medication currently  Denies any diarrhea    · Follow-up with GI after discharge as needed    Hypertension  Assessment & Plan  · Continue IV medications for hypertension as needed   · Restart home post EGD carvedilol, losartan and catapres      Coronary artery disease involving native coronary artery of native heart without angina pectoris  Assessment & Plan  History of CAD status post PCI x3 in 2019  · Follows up with Cardiology   · Hold aspirin, Plavix, carvedilol, Lipitor           VTE Pharmacologic Prophylaxis:   Moderate Risk (Score 3-4) - Pharmacological DVT Prophylaxis Contraindicated  Sequential Compression Devices Ordered  Code Status: Level 1 - Full Code   Discussion with family: Patient declined call to   Anticipated Length of Stay: Patient will be admitted on an inpatient basis with an anticipated length of stay of greater than 2 midnights secondary to Clinical course and specialist recommendations      Total Time Spent on Date of Encounter in care of patient: 55 minutes This time was spent on one or more of the following: performing physical exam; counseling and coordination of care; obtaining or reviewing history; documenting in the medical record; reviewing/ordering tests, medications or procedures; communicating with other healthcare professionals and discussing with patient's family/caregivers  Chief Complaint: Vomiting    History of Present Illness:  Carlos Morris is a 81 y o  female with a PMH of CAD, HTN, DM GERD with esophagitis who presents with uncontrolled vomiting  Patient reported waking up this morning at 3 AM with uncontrolled vomiting presented to the ED and had minimal resolution of symptoms  Upon arrival patient had coffee-ground emesis at bedside and was actively vomiting  patient reported initiating Trulicity injection on Friday with her outpatient PCP  Patient stated she did not take any of her medications of aspirin or anticoagulation today due to vomiting  Patient denied any eating of red foods over the last 48 hours  patient denied any chest pain or palpitations  HPI limited due to patient's presentation  Review of Systems:  Review of Systems   Gastrointestinal: Positive for nausea and vomiting  Psychiatric/Behavioral: Positive for agitation  Past Medical and Surgical History:   Past Medical History:   Diagnosis Date   • Colitis    • Coronary artery disease    • Diabetes mellitus ([facility] [address] )    • Esophageal ulcer    • Kidney stone    • Neuropathy        Past Surgical History:   Procedure Laterality Date   • ANKLE ARTHROPLASTY Bilateral     bilat hardware post fx surgeries   • COLONOSCOPY     • CORONARY ANGIOPLASTY WITH STENT PLACEMENT  2019    x2 procedures total 3 stents   • LITHOTRIPSY     • OVARIAN CYST SURGERY Right        Meds/Allergies:  Prior to Admission medications    Medication Sig Start Date End Date Taking?  Authorizing Provider   budesonide (ENTOCORT EC) 3 MG capsule Take 2 capsules (6 mg total) by mouth daily 3/20/23  Yes Eddie Pierre MD   carvedilol (COREG) 25 mg tablet Take 25 mg by mouth 2 (two) times a day 9/9/21  Yes Historical Provider, MD   cloNIDine (CATAPRES) 0 1 mg tablet Take 1 tablet (0 1 mg total) by mouth 2 (two) times a day 12/20/22 4/2/23 Yes Ivonne Cooper MD   clopidogrel (PLAVIX) 75 mg tablet Take 75 mg by mouth daily   Yes Historical Provider, MD   dulaglutide (Trulicity) 8 68 SZ/0 6SF injection Inject 0 5 mL (0 75 mg total) under the skin every 7 days for 4 days 3/28/23 4/2/23 Yes Ivonne Cooper MD   hydrochlorothiazide (HYDRODIURIL) 12 5 mg tablet Take 1 tablet (12 5 mg total) by mouth daily 2/27/23  Yes Ivonne Cooper MD   isosorbide dinitrate (ISORDIL) 30 mg tablet Take 1 tablet (30 mg total) by mouth 2 (two) times a day 3/28/23 6/26/23 Yes Ivonne Cooper MD   pregabalin (LYRICA) 100 mg capsule Take 1 capsule (100 mg total) by mouth 2 (two) times a day 3/28/23  Yes Ivonne Cooper MD   tolterodine (DETROL LA) 4 mg 24 hr capsule Take 1 capsule (4 mg total) by mouth daily 1/30/23  Yes Ivonne Cooper MD   valsartan (DIOVAN) 320 MG tablet Take 1 tablet (320 mg total) by mouth daily 12/20/22  Yes Ivonne Cooper MD   aspirin (ECOTRIN LOW STRENGTH) 81 mg EC tablet Take 81 mg by mouth daily    Historical Provider, MD   atorvastatin (LIPITOR) 40 mg tablet Take 1 tablet (40 mg total) by mouth daily 1/30/23   Ivonne Cooper MD   Continuous Blood Gluc  (FreeStyle Meyers 2 Molino) CORNEL Use 1 Device every morning 11/3/22   Ivonne Cooper MD   glucose blood ([employer] Glucose Meter Test Strips) test strip Use 1 each 2 (two) times a day Use as instructed DX E11 65 1/11/23   Ivonne Cooper MD   insulin glargine (LANTUS) 100 units/mL subcutaneous injection 5 units in the AM and 30 units before bedtime   3/28/23   Ivonne Cooper MD   Insulin Glargine Solostar (Lantus SoloStar) 100 UNIT/ML SOPN Please inject 5 units in the AM and 30 units in the PM 3/30/23   Ivonne Cooper MD   insulin lispro (HumaLOG KwikPen) 100 units/mL injection pen Inject 4 Units under the skin 3 (three) times a day with meals Plus sliding scale 3/28/23 6/26/23  Ivonne Cooper MD   Insulin Pen Needle (UltiCare Mini Pen Needles) 31G X 6 MM MISC Use 4 (four) times a day 22   Sanjeev Ledezma MD   glimepiride (AMARYL) 2 mg tablet Take 2 mg by mouth daily  Patient not taking: Reported on 2022  Historical Provider, MD   sitaGLIPtin (JANUVIA) 50 mg tablet Take 1 tablet (50 mg total) by mouth daily  Patient not taking: Reported on 3/28/2023 8/16/22 4/2/23  Sanjeev Ledezma MD   Triamcinolone Acetonide (Nasacort Allergy 24HR) 55 MCG/ACT nasal spray 2 sprays by Each Nare route daily  Patient not taking: Reported on 3/28/2023 12/2/22 4/2/23  Sanjeev Ledezma MD     I have reviewed home medications using recent Epic encounter  Allergies: Allergies   Allergen Reactions   • Other Rash     Latex tape       Social History:  Marital Status: Single   Occupation:   Patient Pre-hospital Living Situation: Home  Patient Pre-hospital Level of Mobility: walks  Patient Pre-hospital Diet Restrictions:   Substance Use History:   Social History     Substance and Sexual Activity   Alcohol Use Not Currently     Social History     Tobacco Use   Smoking Status Former   • Years: 34 00   • Types: Cigarettes   • Quit date:    • Years since quittin 2   Smokeless Tobacco Never     Social History     Substance and Sexual Activity   Drug Use Never       Family History:  Family History   Problem Relation Age of Onset   • Stroke Mother    • Heart disease Father        Physical Exam:     Vitals:   Blood Pressure: (!) 182/103 (phys notified) (23 1648)  Pulse: 103 (23 1648)  Temperature: 97 6 °F (36 4 °C) (23 161)  Temp Source: Oral (23 161)  Respirations: 20 (23 1619)  SpO2: 90 % (23 1701)    Physical Exam  Vitals and nursing note reviewed  Constitutional:       General: She is not in acute distress  Appearance: She is well-developed  HENT:      Head: Normocephalic and atraumatic  Eyes:      Conjunctiva/sclera: Conjunctivae normal    Cardiovascular:      Rate and Rhythm: Normal rate and regular rhythm  Heart sounds:     No gallop  Pulmonary:      Effort: Pulmonary effort is normal  No respiratory distress  Breath sounds: Normal breath sounds  No wheezing  Abdominal:      Palpations: Abdomen is soft  Tenderness: There is abdominal tenderness  There is no guarding or rebound  Musculoskeletal:         General: No swelling or tenderness  Cervical back: Neck supple  Right lower leg: No edema  Left lower leg: No edema  Skin:     General: Skin is warm and dry  Capillary Refill: Capillary refill takes less than 2 seconds  Findings: No bruising  Neurological:      Mental Status: She is alert and oriented to person, place, and time  Motor: No weakness  Psychiatric:         Mood and Affect: Affect is angry  Behavior: Behavior is agitated and combative           Additional Data:     Lab Results:  Results from last 7 days   Lab Units 04/02/23  1450 04/02/23  0922   WBC Thousand/uL  --  14 56*   HEMOGLOBIN g/dL 13 2 15 8*   HEMATOCRIT % 38 5 44 4   PLATELETS Thousands/uL  --  155   NEUTROS PCT %  --  79*   LYMPHS PCT %  --  14   MONOS PCT %  --  7   EOS PCT %  --  0     Results from last 7 days   Lab Units 04/02/23  0922   SODIUM mmol/L 132*   POTASSIUM mmol/L 3 9   CHLORIDE mmol/L 89*   CO2 mmol/L 28   BUN mg/dL 38*   CREATININE mg/dL 1 59*   ANION GAP mmol/L 15*   CALCIUM mg/dL 10 6*   ALBUMIN g/dL 4 5   TOTAL BILIRUBIN mg/dL 1 12*   ALK PHOS U/L 96   ALT U/L 42   AST U/L 23   GLUCOSE RANDOM mg/dL 264*         Results from last 7 days   Lab Units 04/02/23  1015   POC GLUCOSE mg/dl 213*     Results from last 7 days   Lab Units 03/28/23  1140   HEMOGLOBIN A1C  8 7*     Results from last 7 days   Lab Units 04/02/23  1532   LACTIC ACID mmol/L 1 5       Lines/Drains:  Invasive Devices     Peripheral Intravenous Line  Duration           Peripheral IV 04/02/23 Left;Ventral (anterior) Forearm <1 day    Peripheral IV 04/02/23 Right;Ventral (anterior) Hand <1 day                    Imaging: Reviewed radiology reports from this admission including: abdominal/pelvic CT  CT chest abdomen pelvis w contrast   Final Result by Ilene Doan MD (04/02 1141)      Esophageal wall thickening  Reported history of esophageal ulcers noted  Assessment for signs of esophagitis is recommended  Workstation performed: [id number]             EKG and Other Studies Reviewed on Admission:   · EKG: NSR  HR 87     ** Please Note: This note has been constructed using a voice recognition system   **

## 2023-04-02 NOTE — Clinical Note
Case was discussed with Dr Krystle Borges and the patient's admission status was agreed to be Admission Status: observation status to the service of Dr Krystle Borges

## 2023-04-02 NOTE — PLAN OF CARE
Problem: MOBILITY - ADULT  Goal: Maintain or return to baseline ADL function  Description: INTERVENTIONS:  -  Assess patient's ability to carry out ADLs; assess patient's baseline for ADL function and identify physical deficits which impact ability to perform ADLs (bathing, care of mouth/teeth, toileting, grooming, dressing, etc )  - Assess/evaluate cause of self-care deficits   - Assess range of motion  - Assess patient's mobility; develop plan if impaired  - Assess patient's need for assistive devices and provide as appropriate  - Encourage maximum independence but intervene and supervise when necessary  - Involve family in performance of ADLs  - Assess for home care needs following discharge   - Consider OT consult to assist with ADL evaluation and planning for discharge  - Provide patient education as appropriate  Outcome: Progressing  Goal: Maintains/Returns to pre admission functional level  Description: INTERVENTIONS:  - Perform BMAT or MOVE assessment daily    - Set and communicate daily mobility goal to care team and patient/family/caregiver  - Collaborate with rehabilitation services on mobility goals if consulted  - Perform Range of Motion 4 times a day  - Reposition patient every 2 hours    - Dangle patient 2 times a day  - Stand patient 3 times a day  - Ambulate patient 2 times a day  - Out of bed to chair 2 times a day   - Out of bed for meals 2 times a day  - Out of bed for toileting  - Record patient progress and toleration of activity level   Outcome: Progressing     Problem: PAIN - ADULT  Goal: Verbalizes/displays adequate comfort level or baseline comfort level  Description: Interventions:  - Encourage patient to monitor pain and request assistance  - Assess pain using appropriate pain scale  - Administer analgesics based on type and severity of pain and evaluate response  - Implement non-pharmacological measures as appropriate and evaluate response  - Consider cultural and social influences on pain and pain management  - Notify physician/advanced practitioner if interventions unsuccessful or patient reports new pain  Outcome: Progressing     Problem: INFECTION - ADULT  Goal: Absence or prevention of progression during hospitalization  Description: INTERVENTIONS:  - Assess and monitor for signs and symptoms of infection  - Monitor lab/diagnostic results  - Monitor all insertion sites, i e  indwelling lines, tubes, and drains  - Monitor endotracheal if appropriate and nasal secretions for changes in amount and color  - [place] appropriate cooling/warming therapies per order  - Administer medications as ordered  - Instruct and encourage patient and family to use good hand hygiene technique  - Identify and instruct in appropriate isolation precautions for identified infection/condition  Outcome: Progressing  Goal: Absence of fever/infection during neutropenic period  Description: INTERVENTIONS:  - Monitor WBC    Outcome: Progressing     Problem: SAFETY ADULT  Goal: Maintain or return to baseline ADL function  Description: INTERVENTIONS:  -  Assess patient's ability to carry out ADLs; assess patient's baseline for ADL function and identify physical deficits which impact ability to perform ADLs (bathing, care of mouth/teeth, toileting, grooming, dressing, etc )  - Assess/evaluate cause of self-care deficits   - Assess range of motion  - Assess patient's mobility; develop plan if impaired  - Assess patient's need for assistive devices and provide as appropriate  - Encourage maximum independence but intervene and supervise when necessary  - Involve family in performance of ADLs  - Assess for home care needs following discharge   - Consider OT consult to assist with ADL evaluation and planning for discharge  - Provide patient education as appropriate  Outcome: Progressing  Goal: Maintains/Returns to pre admission functional level  Description: INTERVENTIONS:  - Perform BMAT or MOVE assessment daily    - Set and communicate daily mobility goal to care team and patient/family/caregiver  - Collaborate with rehabilitation services on mobility goals if consulted  - Perform Range of Motion 4 times a day  - Reposition patient every 2 hours    - Dangle patient 2 times a day  - Stand patient 3 times a day  - Ambulate patient 2 times a day  - Out of bed to chair 2 times a day   - Out of bed for meals 2 times a day  - Out of bed for toileting  - Record patient progress and toleration of activity level   Outcome: Progressing  Goal: Patient will remain free of falls  Description: INTERVENTIONS:  - Educate patient/family on patient safety including physical limitations  - Instruct patient to call for assistance with activity   - Consult OT/PT to assist with strengthening/mobility   - Keep Call bell within reach  - Keep bed low and locked with side rails adjusted as appropriate  - Keep care items and personal belongings within reach  - Initiate and maintain comfort rounds  - Make Fall Risk Sign visible to staff  - Offer Toileting every 2 Hours, in advance of need  - Initiate/Maintain bed alarm  - Obtain necessary fall risk management equipment: alarms  - Apply yellow socks and bracelet for high fall risk patients  - Consider moving patient to room near nurses station  Outcome: Progressing     Problem: DISCHARGE PLANNING  Goal: Discharge to home or other facility with appropriate resources  Description: INTERVENTIONS:  - Identify barriers to discharge w/patient and caregiver  - Arrange for needed discharge resources and transportation as appropriate  - Identify discharge learning needs (meds, wound care, etc )  - Arrange for interpretive services to assist at discharge as needed  - Refer to Case Management Department for coordinating discharge planning if the patient needs post-hospital services based on physician/advanced practitioner order or complex needs related to functional status, cognitive ability, or social support system  Outcome: Progressing     Problem: Knowledge Deficit  Goal: Patient/family/caregiver demonstrates understanding of disease process, treatment plan, medications, and discharge instructions  Description: Complete learning assessment and assess knowledge base    Interventions:  - Provide teaching at level of understanding  - Provide teaching via preferred learning methods  Outcome: Progressing     Problem: GASTROINTESTINAL - ADULT  Goal: Minimal or absence of nausea and/or vomiting  Description: INTERVENTIONS:  - Administer IV fluids if ordered to ensure adequate hydration  - Maintain NPO status until nausea and vomiting are resolved  - Nasogastric tube if ordered  - Administer ordered antiemetic medications as needed  - Provide nonpharmacologic comfort measures as appropriate  - Advance diet as tolerated, if ordered  - Consider nutrition services referral to assist patient with adequate nutrition and appropriate food choices  Outcome: Progressing

## 2023-04-02 NOTE — ASSESSMENT & PLAN NOTE
Historical diagnosis, A/C     Likely exacerbated by current hematemesis with coffee-ground findings    · followed by GI outpatient, history of EGD confirming esophagitis and ulceration in lower 3rd  Of esophagus, mild gastritis  · S/p Carafate 1 g, Protonix injection Reglan, Pepcid in ED  • Placed on Protonix drip  • GI consulted

## 2023-04-03 ENCOUNTER — ANESTHESIA EVENT (INPATIENT)
Dept: PERIOP | Facility: HOSPITAL | Age: 73
End: 2023-04-03

## 2023-04-03 ENCOUNTER — APPOINTMENT (OUTPATIENT)
Dept: PERIOP | Facility: HOSPITAL | Age: 73
End: 2023-04-03

## 2023-04-03 ENCOUNTER — ANESTHESIA (INPATIENT)
Dept: PERIOP | Facility: HOSPITAL | Age: 73
End: 2023-04-03

## 2023-04-03 ENCOUNTER — APPOINTMENT (INPATIENT)
Dept: NON INVASIVE DIAGNOSTICS | Facility: HOSPITAL | Age: 73
End: 2023-04-03

## 2023-04-03 LAB
ALBUMIN SERPL BCP-MCNC: 3.5 G/DL (ref 3.5–5)
ALP SERPL-CCNC: 73 U/L (ref 34–104)
ALT SERPL W P-5'-P-CCNC: 25 U/L (ref 7–52)
ANION GAP SERPL CALCULATED.3IONS-SCNC: 7 MMOL/L (ref 4–13)
AST SERPL W P-5'-P-CCNC: 18 U/L (ref 13–39)
BASOPHILS # BLD AUTO: 0.03 THOUSANDS/ÂΜL (ref 0–0.1)
BASOPHILS NFR BLD AUTO: 0 % (ref 0–1)
BILIRUB SERPL-MCNC: 0.91 MG/DL (ref 0.2–1)
BUN SERPL-MCNC: 23 MG/DL (ref 5–25)
CALCIUM SERPL-MCNC: 8.8 MG/DL (ref 8.4–10.2)
CHLORIDE SERPL-SCNC: 103 MMOL/L (ref 96–108)
CO2 SERPL-SCNC: 27 MMOL/L (ref 21–32)
CREAT SERPL-MCNC: 1.51 MG/DL (ref 0.6–1.3)
EOSINOPHIL # BLD AUTO: 0.06 THOUSAND/ÂΜL (ref 0–0.61)
EOSINOPHIL NFR BLD AUTO: 1 % (ref 0–6)
ERYTHROCYTE [DISTWIDTH] IN BLOOD BY AUTOMATED COUNT: 14.3 % (ref 11.6–15.1)
GFR SERPL CREATININE-BSD FRML MDRD: 34 ML/MIN/1.73SQ M
GLUCOSE SERPL-MCNC: 151 MG/DL (ref 65–140)
GLUCOSE SERPL-MCNC: 160 MG/DL (ref 65–140)
GLUCOSE SERPL-MCNC: 162 MG/DL (ref 65–140)
GLUCOSE SERPL-MCNC: 166 MG/DL (ref 65–140)
GLUCOSE SERPL-MCNC: 190 MG/DL (ref 65–140)
HCT VFR BLD AUTO: 40.9 % (ref 34.8–46.1)
HGB BLD-MCNC: 13.8 G/DL (ref 11.5–15.4)
IMM GRANULOCYTES # BLD AUTO: 0.05 THOUSAND/UL (ref 0–0.2)
IMM GRANULOCYTES NFR BLD AUTO: 0 % (ref 0–2)
LYMPHOCYTES # BLD AUTO: 1.47 THOUSANDS/ÂΜL (ref 0.6–4.47)
LYMPHOCYTES NFR BLD AUTO: 13 % (ref 14–44)
MCH RBC QN AUTO: 29.2 PG (ref 26.8–34.3)
MCHC RBC AUTO-ENTMCNC: 33.7 G/DL (ref 31.4–37.4)
MCV RBC AUTO: 87 FL (ref 82–98)
MONOCYTES # BLD AUTO: 0.93 THOUSAND/ÂΜL (ref 0.17–1.22)
MONOCYTES NFR BLD AUTO: 8 % (ref 4–12)
NEUTROPHILS # BLD AUTO: 9.08 THOUSANDS/ÂΜL (ref 1.85–7.62)
NEUTS SEG NFR BLD AUTO: 78 % (ref 43–75)
NRBC BLD AUTO-RTO: 0 /100 WBCS
PLATELET # BLD AUTO: 108 THOUSANDS/UL (ref 149–390)
PMV BLD AUTO: 9.8 FL (ref 8.9–12.7)
POTASSIUM SERPL-SCNC: 4.4 MMOL/L (ref 3.5–5.3)
PROT SERPL-MCNC: 6.1 G/DL (ref 6.4–8.4)
RBC # BLD AUTO: 4.72 MILLION/UL (ref 3.81–5.12)
SODIUM SERPL-SCNC: 137 MMOL/L (ref 135–147)
WBC # BLD AUTO: 11.62 THOUSAND/UL (ref 4.31–10.16)

## 2023-04-03 PROCEDURE — 0DB58ZX EXCISION OF ESOPHAGUS, VIA NATURAL OR ARTIFICIAL OPENING ENDOSCOPIC, DIAGNOSTIC: ICD-10-PCS | Performed by: INTERNAL MEDICINE

## 2023-04-03 PROCEDURE — 0DB98ZX EXCISION OF DUODENUM, VIA NATURAL OR ARTIFICIAL OPENING ENDOSCOPIC, DIAGNOSTIC: ICD-10-PCS | Performed by: INTERNAL MEDICINE

## 2023-04-03 PROCEDURE — 0DB68ZX EXCISION OF STOMACH, VIA NATURAL OR ARTIFICIAL OPENING ENDOSCOPIC, DIAGNOSTIC: ICD-10-PCS | Performed by: INTERNAL MEDICINE

## 2023-04-03 RX ORDER — ASPIRIN 81 MG/1
324 TABLET, CHEWABLE ORAL ONCE
Status: COMPLETED | OUTPATIENT
Start: 2023-04-03 | End: 2023-04-03

## 2023-04-03 RX ORDER — LOSARTAN POTASSIUM 50 MG/1
100 TABLET ORAL DAILY
Status: DISCONTINUED | OUTPATIENT
Start: 2023-04-03 | End: 2023-04-05 | Stop reason: HOSPADM

## 2023-04-03 RX ORDER — LIDOCAINE HYDROCHLORIDE 10 MG/ML
INJECTION, SOLUTION EPIDURAL; INFILTRATION; INTRACAUDAL; PERINEURAL AS NEEDED
Status: DISCONTINUED | OUTPATIENT
Start: 2023-04-03 | End: 2023-04-03

## 2023-04-03 RX ORDER — SODIUM CHLORIDE, SODIUM LACTATE, POTASSIUM CHLORIDE, CALCIUM CHLORIDE 600; 310; 30; 20 MG/100ML; MG/100ML; MG/100ML; MG/100ML
INJECTION, SOLUTION INTRAVENOUS CONTINUOUS PRN
Status: DISCONTINUED | OUTPATIENT
Start: 2023-04-03 | End: 2023-04-03

## 2023-04-03 RX ORDER — ATORVASTATIN CALCIUM 40 MG/1
40 TABLET, FILM COATED ORAL
Status: DISCONTINUED | OUTPATIENT
Start: 2023-04-03 | End: 2023-04-05 | Stop reason: HOSPADM

## 2023-04-03 RX ORDER — ONDANSETRON 2 MG/ML
4 INJECTION INTRAMUSCULAR; INTRAVENOUS EVERY 6 HOURS PRN
Status: DISCONTINUED | OUTPATIENT
Start: 2023-04-03 | End: 2023-04-05 | Stop reason: HOSPADM

## 2023-04-03 RX ORDER — PROPOFOL 10 MG/ML
INJECTION, EMULSION INTRAVENOUS AS NEEDED
Status: DISCONTINUED | OUTPATIENT
Start: 2023-04-03 | End: 2023-04-03

## 2023-04-03 RX ORDER — ONDANSETRON 2 MG/ML
4 INJECTION INTRAMUSCULAR; INTRAVENOUS ONCE AS NEEDED
Status: CANCELLED | OUTPATIENT
Start: 2023-04-03

## 2023-04-03 RX ADMIN — PROPOFOL 20 MG: 10 INJECTION, EMULSION INTRAVENOUS at 13:17

## 2023-04-03 RX ADMIN — CLONIDINE HYDROCHLORIDE 0.1 MG: 0.1 TABLET ORAL at 20:28

## 2023-04-03 RX ADMIN — CLONIDINE HYDROCHLORIDE 0.1 MG: 0.1 TABLET ORAL at 08:21

## 2023-04-03 RX ADMIN — SODIUM CHLORIDE 125 ML/HR: 0.9 INJECTION, SOLUTION INTRAVENOUS at 02:37

## 2023-04-03 RX ADMIN — SODIUM CHLORIDE 125 ML/HR: 0.9 INJECTION, SOLUTION INTRAVENOUS at 15:33

## 2023-04-03 RX ADMIN — PROPOFOL 20 MG: 10 INJECTION, EMULSION INTRAVENOUS at 13:19

## 2023-04-03 RX ADMIN — HYDRALAZINE HYDROCHLORIDE 5 MG: 20 INJECTION INTRAMUSCULAR; INTRAVENOUS at 15:40

## 2023-04-03 RX ADMIN — SODIUM CHLORIDE 8 MG/HR: 9 INJECTION, SOLUTION INTRAVENOUS at 10:06

## 2023-04-03 RX ADMIN — LIDOCAINE HYDROCHLORIDE 50 MG: 10 INJECTION, SOLUTION EPIDURAL; INFILTRATION; INTRACAUDAL at 13:13

## 2023-04-03 RX ADMIN — CARVEDILOL 25 MG: 25 TABLET, FILM COATED ORAL at 20:27

## 2023-04-03 RX ADMIN — SODIUM CHLORIDE, SODIUM LACTATE, POTASSIUM CHLORIDE, AND CALCIUM CHLORIDE: .6; .31; .03; .02 INJECTION, SOLUTION INTRAVENOUS at 13:04

## 2023-04-03 RX ADMIN — PROPOFOL 20 MG: 10 INJECTION, EMULSION INTRAVENOUS at 13:20

## 2023-04-03 RX ADMIN — SUCRALFATE 1 G: 1 TABLET ORAL at 15:46

## 2023-04-03 RX ADMIN — SUCRALFATE 1 G: 1 TABLET ORAL at 07:36

## 2023-04-03 RX ADMIN — ASPIRIN 81 MG CHEWABLE TABLET 324 MG: 81 TABLET CHEWABLE at 17:14

## 2023-04-03 RX ADMIN — ATORVASTATIN CALCIUM 40 MG: 40 TABLET, FILM COATED ORAL at 17:14

## 2023-04-03 RX ADMIN — PROPOFOL 100 MG: 10 INJECTION, EMULSION INTRAVENOUS at 13:13

## 2023-04-03 RX ADMIN — CARVEDILOL 25 MG: 25 TABLET, FILM COATED ORAL at 08:21

## 2023-04-03 RX ADMIN — LOSARTAN POTASSIUM 100 MG: 50 TABLET, FILM COATED ORAL at 17:14

## 2023-04-03 RX ADMIN — PROPOFOL 40 MG: 10 INJECTION, EMULSION INTRAVENOUS at 13:15

## 2023-04-03 NOTE — ASSESSMENT & PLAN NOTE
Lab Results   Component Value Date    HGBA1C 8 7 (A) 03/28/2023       Recent Labs     04/02/23  2101 04/03/23  0716 04/03/23  1112 04/03/23  1554   POCGLU 176* 162* 190* 151*       Blood Sugar Average: Last 72 hrs:  (P) 179 [id number]     Improved,  · Remain on SSI, hold home medications due to n p o  and acute presentation

## 2023-04-03 NOTE — ASSESSMENT & PLAN NOTE
Resolved per patient report    · CT: Esophageal wall thickening  Reported history of esophageal ulcers noted  Assessment for signs of esophagitis is recommended    · S/p Carafate 1 g, Protonix injection Reglan, Pepcid in ED,, continue as tolerated  • Placed on Protonix drip  • Octreotide given in ED  • N p o until EGD , IVF  • Type and screen  • Repeat H&H stable  • GI: EGD in a m , continue Protonix drip, n p o  at midnight

## 2023-04-03 NOTE — PHYSICAL THERAPY NOTE
"   PT EVALUATION    No further skilled PT services - pt is independent without an AD and encouraged to amb ad sharon in her room while admitted  Will dc IP PT services  04/03/23 0915   PT Last Visit   PT Visit Date 04/03/23   Note Type   Note type Evaluation   Pain Assessment   Pain Assessment Tool 0-10   Pain Score No Pain   Restrictions/Precautions   Other Precautions   (Amilcar;IV)   Home Living   Type of Home House   Home Layout One level;Stairs to enter with rails; Able to live on main level with bedroom/bathroom; Performs ADLs on one level  (2 GIL)   Home Equipment Cane  (RW)   Prior Function   Level of [place] Independent with ADLs; Independent with functional mobility; Independent with IADLS   Lives With Alone   IADLs Independent with driving; Independent with meal prep; Independent with medication management   Falls in the last 6 months 0   Vocational Part time employment  ([employer] , 2 days/wk)   Comments Pt amb w/out an AD PTA, (+)    General   Additional Pertinent History Pt is adm with epigastric pain, nausea and vomiting, pain radiating to substernal region  Family/Caregiver Present No   Cognition   Overall Cognitive Status WFL   Arousal/Participation Cooperative   Orientation Level Oriented X4   Following Commands Follows all commands and directions without difficulty   Subjective   Subjective \"I don't need PT\"   RLE Assessment   RLE Assessment WFL  (3+ to 4-/5)   LLE Assessment   LLE Assessment WFL  (3+ to 4-/5)   Bed Mobility   Supine to Sit 7  Independent   Transfers   Sit to Stand 6  Modified independent   Additional items Assist x 1;Verbal cues; Increased time required   Stand to Sit 6  Modified independent   Additional items Assist x 1;Verbal cues; Increased time required   Toilet transfer 6  Modified independent   Additional items Assist x 1;Verbal cues; Increased time required  (pt is independent w hygiene after toileting)   Ambulation/Elevation   Gait pattern Short stride; Step " through pattern;Decreased foot clearance  (mild generalized unsteadiness intiailly, then good balance)   Gait Assistance   (supervision/Mod I)   Additional items Assist x 1;Verbal cues; Tactile cues   Assistive Device None   Distance 25 feet to and from the BR;pt declined further amb in the garcia   Balance   Static Sitting Normal   Dynamic Sitting Good   Static Standing Good   Dynamic Standing Good   Ambulatory Good   Activity Tolerance   Activity Tolerance Patient tolerated treatment well   Assessment   Problem List Decreased strength; Impaired balance;Decreased mobility; Decreased safety awareness   Assessment Patient seen for Physical Therapy evaluation  Patient admitted with epigastric pain  Comorbidities affecting patient's physical performance include: CAD, HLD, DM2, LATASHA, SIRS, emphysema, HTN, pul HTN, ischemic cardiomyopathy, osteoporosis  Personal factors affecting patient at time of initial evaluation include: lives in one story house, stairs to enter home, inability to navigate community distances, inability to navigate level surfaces without external assistance, inability to perform dynamic tasks in community and limited home support  Prior to admission, patient was independent with functional mobility without assistive device, independent with ADLS, independent with IADLS, living alone in one story home with 2 steps to enter, ambulating household distance, ambulating community distances and works part time  Please find objective findings from Physical Therapy assessment regarding body systems outlined above with impairments and limitations including weakness, impaired balance, decreased endurance, gait deviations, decreased activity tolerance, decreased functional mobility tolerance, decreased safety awareness and fall risk     Patient's clinical presentation is currently evolving as seen in patient's presentation of vital sign response, changing level of pain, increased fall risk, new onset of impairment of functional mobility, decreased endurance and new onset of weakness  Pt is not in need of further skilled IP PT services as pt is independent with gait and mobility without an AD  Pt encouraged to amb ad sharon in her room to and from the BR  As demonstrated by objective findings, the assigned level of complexity for this evaluation is low  The patient's [facility] Basic Mobility Inpatient Short Form Raw Score is 20  A Raw score of greater than 16 suggests the patient may benefit from discharge to home  Please also refer to the recommendation of the Physical Therapist for safe discharge planning  Goals   Patient Goals to feel better and go home   Plan   Treatment/Interventions ADL retraining;Functional transfer training;LE strengthening/ROM; Elevations; Therapeutic exercise; Endurance training;Patient/family training;Equipment eval/education; Bed mobility;Gait training; Compensatory technique education   PT Frequency Other (Comment)  (1x visit)   Recommendation   PT Discharge Recommendation No rehabilitation needs   Additional Comments Pt is near baseline level of mobility - no further skilled PT needs  Pt can amb independently in her room to and from the bathroom and with Supervision of nursing staff in the hallway  AM-[facility] Basic Mobility Inpatient   Turning in Flat Bed Without Bedrails 4   Lying on Back to Sitting on Edge of Flat Bed Without Bedrails 4   Moving Bed to Chair 3   Standing Up From Chair Using Arms 3   Walk in Room 3   Climb 3-5 Stairs With Railing 3   Basic Mobility Inpatient Raw Score 20   Basic Mobility Standardized Score 43 99   Highest Level Of Mobility   JH-HLM Goal 6: Walk 10 steps or more   JH-HLM Achieved 7: Walk 25 feet or more   End of Consult   Patient Position at End of Consult Bedside chair; All needs within reach   [facility] Insurance Number  [address] [place] [address] [id number]

## 2023-04-03 NOTE — ASSESSMENT & PLAN NOTE
Secondary to nausea vomiting and hyperglycemia    · Creatine 1 59 > 1 51 on admission, baseline 1 15- 1 23  · Continue IVF  · Hold nephrotoxic's  · IV blood pressure regiment due to esophagitis and possible Romelia-San  · Consider nephro consult in a m  if no improvement

## 2023-04-03 NOTE — ASSESSMENT & PLAN NOTE
If worsening evident by with leucocytosis, tachycardia,     · Blood cultures-pending  · Presentation likely secondary to acute vomiting and inflammatory markers  · IV bolus given  · LA-negative, Pro-Otf-pending  · Continue to trend  · Consider ABX if worsening

## 2023-04-03 NOTE — APP STUDENT NOTE
RANDY STUDENT  Inpatient Progress Note for TRAINING ONLY  Not Part of Legal Medical Record     Progress Note - Madeleine Dsouza 81 y o  female MRN: [id number]  Unit/Bed#: [address] [place] [address] Encounter: [id number]    Assessment:    Active Problems:    Coronary artery disease involving native coronary artery of native heart without angina pectoris    Ulcerative colitis ([facility] )    Other hyperlipidemia    Type 2 diabetes mellitus with hyperglycemia, with long-term current use of insulin ([facility])    Elevated troponin    LATASHA (acute kidney injury) ([facility] )    Gastroesophageal reflux disease with esophagitis    Hematemesis    SIRS (systemic inflammatory response syndrome) ([facility])    Metabolic alkalosis    Plan:  1  Hematemesis  Patient presented to the ED on 4/2/23 with nausea, vomiting, and epigastric pain  It was noted in the ED that she had episodes of hematemesis with coffee-ground emesis  · CT abdomen and pelvis with contrast (4/2/23): Esophageal wall thickening  Recommend assessment for signs of esophagitis  · Type and screen completed  Patient is O positive  · H&H (4/3/23): 13 8/40 9  · Continue pantoprazole (Protonix) drip  Patient should be receiving 80 mg in sodium chloride 0 9% 100 mL infusion at 8 mg/hour  · Continue 0 9% NS at 125 mL/hour  · GI saw patient yesterday (4/2/23)  State that the patient's episode of uncontrollable N/V was likely secondary to viral gastroenteritis  Also state that the fresh blood in emesis is most likely from a Romelia-San tear  · Patient recently started Trulicity for management of her diabetes  Known side effects of this medication include nausea, vomiting, and gastroparesis  Consider medication as a cause of the patient's uncontrollable N/V, leading to a suspected Romelia San tear  · Patient is undergoing EGD today (4/3/23)  Awaiting results  · Continue ondansetron (Zofran) 4 mg IV PRN nausea, vomiting  · Continue to monitor      2  Elevated troponin  In the ED, patient's 2-hour troponin was elevated at 175  Repeat troponin at 4 hours was also elevated at 279  Patient did not have any symptoms of chest pain, diaphoresis, SOB, lightheadedness, dizziness  She did experience uncontrollable N/V   · Patient continues to be asymptomatic  · Suspect that the patient's non-MI troponin elevation issecondary to uncontrolled vomiting and retching  · Patient is on telemetry and is in normal sinus rhythm  When I saw the patient, her heart rate was 83  · Continue telemetry  No events noted when I looked through the patient's telemetry monitoring  · Cardiology was consulted to see the patient  Of note, patient refused to get an ECHO done at the bedside because she does not want to pay for it  Dr Colt Oconnell informed patient that she would strongly encourage her to get an ECHO if Cardiology also recommends it  · Continue to monitor  3  Metabolic alkalosis  In the ED, patient's VBG showed metabolic alkalosis: pH 1 215, pCO2 34 0, HCO3 WNL  · Metabolic alkalosis most likely secondary to the patient's episode of uncontrollable vomiting  · Patient did not have an episode of vomiting since 0200 4/3/23  · She continues to remain asymptomatic  · Continue to monitor  4  Systemic inflammatory response syndrome (SIRS)  Upon admission, patient was tachycardic and leukocytosis was noted  · Blood cultures are pending  · Lactic acid (4/2/23): 1 5  · Procalcitonin (4/2/23): < 0 05  · No results for lactic acid or procalcitonin today  · CBC (4/3/23): WBC 11 62   · CBC (4/2/23): WBC 14 56  · When I saw the patient, she was not tachycardic and her HR was in the 80s  · Continue 0 9% NS at 125 mL/hour  · Patient remains afebrile  · Patient is not currently on antibiotics  Her WBC improved today  · Repeat CBC in a m  If patient is continuing to improve, she may not need antibiotics  · Continue to monitor  5  Gastroesophageal reflux disease with esophagitis  Patient follows up with GI outpatient   She had an EGD performed on 10/25/2022 that showed a small hiatal hernia with reflux  Also showed gastritis and punctate hemorrhagic spots in the stomach, can't exclude H  Pylori infection  · Continue pantoprazole (Protonix) drip  Patient should be receiving 80 mg in sodium chloride 0 9% 100 mL infusion at 8 mg/hour  · GI saw patient yesterday (4/2/23)  State that the patient's episode of uncontrollable N/V was likely secondary to viral gastroenteritis  Also state that the fresh blood in emesis is most likely from a Romelia-San tear  · Patient is undergoing EGD today (4/3/23)  Awaiting results  · Continue to monitor  6  Acute kidney injury   On admission, patient's creatinine was 1 59 and her baseline is 1 15-1  23  Suspect LATASHA secondary to hyperglycemia, nausea, and vomiting  · Creatinine (4/3/23) 1 51  Trending downward  Repeat BMP in the a m  · Continue 0 9% NS at 125 mL/hour  · Continue to hold nephrotoxic medications  · Patient is on an IV blood pressure regimen due to her esophagitis and suspected Romelia San tear  Administer hydralazine if SBP > 170 and labetalol if SBP > 150  · Continue to monitor  7  Type 2 diabetes mellitus with hyperglycemia, with long-term current use of insulin  Patient's HgbA1C on 3/28/2023 was elevated at 8 7  Upon admission, patient's POCT glucose was elevated at 213  · This morning, the patient's POCT glucose was elevated at 162  When I saw the patient, she told me that this value was low for her and that she usually is in the 200s  · Discontinue Trulicity injections due to the possibility of its side effects causing the patient's current condition  · Continue inpatient insulin regimen of insulin lispro (HumaLOG) 100 units/mL SQ injection 1-5 units three times daily before meals  · Hold patient's home medications since she is NPO and has been experiencing severe vomiting  · Continue POCT glucose checks three times daily before meals    · Continue to monitor  8  Hyperlipidemia  Patient has history of hyperlipidemia  · Most recent lipid panel performed on 3/28/2023: Cholesterol 182, Triglycerides 194, HDL 62, LDL 81   · Plan to re-start the patient's home medication of atorvastatin (Lipitor) 40 mg PO daily once she undergoes endoscopy and is no longer NPO  · Continue to monitor  9  Ulcerative colitis  Patient has a history of ulcerative colitis  She is not currently on any medications  She denies episodes of diarrhea or worsening abdominal pain upon admission  · Encourage patient to follow-up with GI as needed when she is discharged  · Continue to monitor  10  Hypertension  Patient has a history of hypertension  · Patient is on an IV blood pressure regimen due to her esophagitis and suspected Romelia San tear  Administer hydralazine if SBP > 170 and labetalol if SBP > 150  · Restart patient's home medications after her EGD is completed  · hydrochlorothiazide (Hydrodiuril) 12 5 mg PO daily  · valsartan (Diovan) 320 mg PO daily  · carvedilol (Coreg) 25 mg PO BID  · clonidine (Catapres) 0 1 mg PO BID    11  Coronary artery disease involving native coronary artery of native heart without angina pectoris  Patient has a history of CAD s/p PCI x 3 in 2019  Denied chest pain, palpitations, SOB, and diaphoresis upoon admission  · Patient follows up with Cardiology outpatient  · Cardiology was consulted to see the patient  Of note, patient refused to get an ECHO done at the bedside because she does not want to pay for it  Dr Nataliia Oden informed patient that she would strongly encourage her to get an ECHO if Cardiology also recommends it  · Restart patient's home medications after her EGD is completed    · aspirin 81 mg PO daily  · atorvastatin (Lipitor) 40 gm PO daily  · clopidogrel (Plavix) 75 mg PO daily  · isosorbide dinitrate (Isordil) 30 mg PO BID    VTE Pharmacologic Prophylaxis:   Pharmacologic: Pharmacologic VTE Prophylaxis contraindicated due to hematemesis  Mechanical VTE Prophylaxis in Place: Yes    Patient Centered Rounds: I have performed bedside rounds with nursing staff today  Discussions with Specialists or Other Care Team Provider: GI, Cardiology    Education and Discussions with Family / Patient: Dr Colt Oconnell updated patient at bedside  Time Spent for Care: 45 minutes  More than 50% of total time spent on counseling and coordination of care as described above  Current Length of Stay: 1 day(s)    Current Patient Status: Inpatient   Certification Statement: The patient will continue to require additional inpatient hospital stay due to endoscopy and elevated troponin follow-up  Discharge Plan: Anticipate discharge in 24-48 hours pending symptomatic improvement and result of endoscopy  Code Status: Level 1 - Full Code    Subjective:   80 y/o female on Hospital Day #1 for hematemesis and possible Romelia San tear  Patient states that she was started on Trulicity on   Yesterday morning (23), patient experienced epigastric pain and vomiting that prompted her to go to the ED  In the ED, it was noted that her emesis was coffee-ground-like  Patient states that her last episode of vomiting was at 0200 (4/3/23)  She states that as of this morning, she did not experience any more episodes of vomiting  Patient also denies abdominal pain, stating that she feels much better than yesterday  Her last bowel movement was right before she visited the ED  She notes that she has been passing gas  Denies chest pain, palpitations, SOB, fevers, chills, N/V/D  Objective:     Vitals:   Temp (24hrs), Av 5 °F (36 9 °C), Min:97 6 °F (36 4 °C), Max:99 5 °F (37 5 °C)    Temp:  [97 6 °F (36 4 °C)-99 5 °F (37 5 °C)] 98 5 °F (36 9 °C)  HR:  [] 85  Resp:  [17-20] 18  BP: (142-245)/() 208/81  SpO2:  [90 %-100 %] 96 %  Body mass index is 25 39 kg/m²  Input and Output Summary (last 24 hours):        Intake/Output Summary (Last 24 hours) at 4/3/2023 1309  Last data filed at 4/3/2023 0237  Gross per 24 hour   Intake 1667 08 ml   Output 0 ml   Net 1667 08 ml     Physical Exam  Vitals and nursing note reviewed  Constitutional:       General: She is awake  She is not in acute distress  HENT:      Head: Normocephalic and atraumatic  Cardiovascular:      Rate and Rhythm: Normal rate and regular rhythm  Heart sounds: S1 normal and S2 normal  No murmur heard  Pulmonary:      Effort: Pulmonary effort is normal  No respiratory distress  Breath sounds: Normal breath sounds  Abdominal:      General: Bowel sounds are normal  There is no distension  Palpations: Abdomen is soft  Tenderness: There is no abdominal tenderness  Musculoskeletal:         General: No swelling  Right lower leg: No edema  Left lower leg: No edema  Skin:     General: Skin is warm and dry  Neurological:      Mental Status: She is alert  Psychiatric:         Behavior: Behavior is cooperative         Historical Information   Past Medical History:   Diagnosis Date   • Colitis    • Coronary artery disease    • Diabetes mellitus ([address] )    • Esophageal ulcer    • Kidney stone    • Neuropathy      Past Surgical History:   Procedure Laterality Date   • ANKLE ARTHROPLASTY Bilateral     bilat hardware post fx surgeries   • COLONOSCOPY     • CORONARY ANGIOPLASTY WITH STENT PLACEMENT  2019    x2 procedures total 3 stents   • LITHOTRIPSY     • OVARIAN CYST SURGERY Right      Social History   Social History     Substance and Sexual Activity   Alcohol Use Not Currently     Social History     Substance and Sexual Activity   Drug Use Never     Social History     Tobacco Use   Smoking Status Former   • Years: 34 00   • Types: Cigarettes   • Quit date:    • Years since quittin 2   Smokeless Tobacco Never     Family History:   Family History   Problem Relation Age of Onset   • Stroke Mother    • Heart disease Father        Meds/Allergies   PTA meds: Prior to Admission Medications   Prescriptions Last Dose Informant Patient Reported? Taking? Continuous Blood Gluc  ([place] [address] [place]) CORNEL Unknown  No No   Sig: Use 1 Device every morning   Insulin Glargine Solostar (Lantus SoloStar) 100 UNIT/ML SOPN Unknown  No No   Sig: Please inject 5 units in the AM and 30 units in the PM   Insulin Pen Needle (UltiCare Mini Pen Needles) 31G X 6 MM MISC Unknown  No No   Sig: Use 4 (four) times a day   aspirin (ECOTRIN LOW STRENGTH) 81 mg EC tablet Unknown  Yes No   Sig: Take 81 mg by mouth daily   atorvastatin (LIPITOR) 40 mg tablet Unknown  No No   Sig: Take 1 tablet (40 mg total) by mouth daily   Patient not taking: Reported on 2023   budesonide (ENTOCORT EC) 3 MG capsule Unknown  No No   Sig: Take 2 capsules (6 mg total) by mouth daily   carvedilol (COREG) 25 mg tablet Unknown  Yes No   Sig: Take 25 mg by mouth 2 (two) times a day   cloNIDine (CATAPRES) 0 1 mg tablet Unknown  No No   Sig: Take 1 tablet (0 1 mg total) by mouth 2 (two) times a day   clopidogrel (PLAVIX) 75 mg tablet Unknown  Yes No   Sig: Take 75 mg by mouth daily   dulaglutide (Trulicity) 1 98 HI/6 5RX injection Unknown  No No   Sig: Inject 0 5 mL (0 75 mg total) under the skin every 7 days for 4 days   glucose blood ([employer] Glucose Meter Test Strips) test strip Unknown  No No   Sig: Use 1 each 2 (two) times a day Use as instructed DX E11 65   hydrochlorothiazide (HYDRODIURIL) 12 5 mg tablet Unknown  No No   Sig: Take 1 tablet (12 5 mg total) by mouth daily   insulin glargine (LANTUS) 100 units/mL subcutaneous injection Unknown  No No   Si units in the AM and 30 units before bedtime     insulin lispro (HumaLOG KwikPen) 100 units/mL injection pen Unknown  No No   Sig: Inject 4 Units under the skin 3 (three) times a day with meals Plus sliding scale   isosorbide dinitrate (ISORDIL) 30 mg tablet Unknown  No No   Sig: Take 1 tablet (30 mg total) by mouth 2 (two) times a day   pregabalin (LYRICA) 100 mg capsule Unknown  No No   Sig: Take 1 capsule (100 mg total) by mouth 2 (two) times a day   tolterodine (DETROL LA) 4 mg 24 hr capsule Unknown  No No   Sig: Take 1 capsule (4 mg total) by mouth daily   valsartan (DIOVAN) 320 MG tablet Unknown  No No   Sig: Take 1 tablet (320 mg total) by mouth daily      Facility-Administered Medications: None     Allergies   Allergen Reactions   • Other Rash     Latex tape       Additional Data:     Labs:    Results from last 7 days   Lab Units 04/03/23  0539   WBC Thousand/uL 11 62*   HEMOGLOBIN g/dL 13 8   HEMATOCRIT % 40 9   PLATELETS Thousands/uL 108*   NEUTROS PCT % 78*   LYMPHS PCT % 13*   MONOS PCT % 8   EOS PCT % 1     Results from last 7 days   Lab Units 04/03/23  0539   SODIUM mmol/L 137   POTASSIUM mmol/L 4 4   CHLORIDE mmol/L 103   CO2 mmol/L 27   BUN mg/dL 23   CREATININE mg/dL 1 51*   ANION GAP mmol/L 7   CALCIUM mg/dL 8 8   ALBUMIN g/dL 3 5   TOTAL BILIRUBIN mg/dL 0 91   ALK PHOS U/L 73   ALT U/L 25   AST U/L 18   GLUCOSE RANDOM mg/dL 166*         Results from last 7 days   Lab Units 04/03/23  1112 04/03/23  0716 04/02/23  2101 04/02/23  1920 04/02/23  1757 04/02/23  1015   POC GLUCOSE mg/dl 190* 162* 176* 180* 182* 213*     Results from last 7 days   Lab Units 03/28/23  1140   HEMOGLOBIN A1C  8 7*     Results from last 7 days   Lab Units 04/02/23  1532 04/02/23  0922   LACTIC ACID mmol/L 1 5  --    PROCALCITONIN ng/ml  --  <0 05         * I Have Reviewed All Lab Data Listed Above  * Additional Pertinent Lab Tests Reviewed: [facility]    Recent Cultures (last 7 days):     Results from last 7 days   Lab Units 04/02/23  1450 04/02/23  1438   BLOOD CULTURE  Received in Microbiology Lab  Culture in Progress  Received in Microbiology Lab  Culture in Progress         Last 24 Hours Medication List:   Current Facility-Administered Medications   Medication Dose Route Frequency Provider Last Rate   • carvedilol  25 mg Oral BID Anthony Torres MD     • cloNIDine  0 1 mg Oral BID Anthony Torres MD     • hydrALAZINE  5 mg Intravenous Q4H PRN Anthony Torres MD     • insulin lispro  1-5 Units Subcutaneous TID JENY Torres MD     • labetalol  10 mg Intravenous Q4H PRN Anthony Torres MD     • morphine injection  2 mg Intravenous Q4H PRN Anthony Torres MD     • ondansetron  4 mg Intravenous Q6H PRN Anthony Torres MD     • pantoprozole (PROTONIX) infusion (Continuous)  8 mg/hr Intravenous Continuous Komaira Ferdous, DO 8 mg/hr (04/03/23 1309)   • sodium chloride  125 mL/hr Intravenous Continuous Anthony Torres  mL/hr (04/03/23 0237)   • sucralfate  1 g Oral BID JENY Torres MD     • trimethobenzamide  200 mg Intramuscular Q6H PRN Anthony Torres MD       Facility-Administered Medications Ordered in Other Encounters   Medication Dose Route Frequency Provider Last Rate   • lactated ringers   Intravenous Continuous PRN Yamila Knowles CRNA          Today, Patient Was Seen By: CECILE IbarraS and Dr Valeria Farmer    ** Please Note: Dictation voice to text software may have been used in the creation of this document   **

## 2023-04-03 NOTE — PLAN OF CARE
Problem: MOBILITY - ADULT  Goal: Maintain or return to baseline ADL function  Description: INTERVENTIONS:  -  Assess patient's ability to carry out ADLs; assess patient's baseline for ADL function and identify physical deficits which impact ability to perform ADLs (bathing, care of mouth/teeth, toileting, grooming, dressing, etc )  - Assess/evaluate cause of self-care deficits   - Assess range of motion  - Assess patient's mobility; develop plan if impaired  - Assess patient's need for assistive devices and provide as appropriate  - Encourage maximum independence but intervene and supervise when necessary  - Involve family in performance of ADLs  - Assess for home care needs following discharge   - Consider OT consult to assist with ADL evaluation and planning for discharge  - Provide patient education as appropriate  Outcome: Progressing  Goal: Maintains/Returns to pre admission functional level  Description: INTERVENTIONS:  - Perform BMAT or MOVE assessment daily    - Set and communicate daily mobility goal to care team and patient/family/caregiver  - Collaborate with rehabilitation services on mobility goals if consulted  - Perform Range of Motion 3 times a day  - Reposition patient every 2 hours    - Dangle patient 3 times a day  - Stand patient 3 times a day  - Ambulate patient 3 times a day  - Out of bed to chair 3 times a day   - Out of bed for meals 3 times a day  - Out of bed for toileting  - Record patient progress and toleration of activity level   Outcome: Progressing     Problem: PAIN - ADULT  Goal: Verbalizes/displays adequate comfort level or baseline comfort level  Description: Interventions:  - Encourage patient to monitor pain and request assistance  - Assess pain using appropriate pain scale  - Administer analgesics based on type and severity of pain and evaluate response  - Implement non-pharmacological measures as appropriate and evaluate response  - Consider cultural and social influences on pain and pain management  - Notify physician/advanced practitioner if interventions unsuccessful or patient reports new pain  Outcome: Progressing     Problem: INFECTION - ADULT  Goal: Absence or prevention of progression during hospitalization  Description: INTERVENTIONS:  - Assess and monitor for signs and symptoms of infection  - Monitor lab/diagnostic results  - Monitor all insertion sites, i e  indwelling lines, tubes, and drains  - Monitor endotracheal if appropriate and nasal secretions for changes in amount and color  - Cocoa appropriate cooling/warming therapies per order  - Administer medications as ordered  - Instruct and encourage patient and family to use good hand hygiene technique  - Identify and instruct in appropriate isolation precautions for identified infection/condition  Outcome: Progressing  Goal: Absence of fever/infection during neutropenic period  Description: INTERVENTIONS:  - Monitor WBC    Outcome: Progressing     Problem: SAFETY ADULT  Goal: Maintain or return to baseline ADL function  Description: INTERVENTIONS:  -  Assess patient's ability to carry out ADLs; assess patient's baseline for ADL function and identify physical deficits which impact ability to perform ADLs (bathing, care of mouth/teeth, toileting, grooming, dressing, etc )  - Assess/evaluate cause of self-care deficits   - Assess range of motion  - Assess patient's mobility; develop plan if impaired  - Assess patient's need for assistive devices and provide as appropriate  - Encourage maximum independence but intervene and supervise when necessary  - Involve family in performance of ADLs  - Assess for home care needs following discharge   - Consider OT consult to assist with ADL evaluation and planning for discharge  - Provide patient education as appropriate  Outcome: Progressing  Goal: Maintains/Returns to pre admission functional level  Description: INTERVENTIONS:  - Perform BMAT or MOVE assessment daily    - Set and communicate daily mobility goal to care team and patient/family/caregiver  - Collaborate with rehabilitation services on mobility goals if consulted  - Perform Range of Motion 3 times a day  - Reposition patient every 2 hours    - Dangle patient 3 times a day  - Stand patient 3 times a day  - Ambulate patient 3 times a day  - Out of bed to chair 3 times a day   - Out of bed for meals 3 times a day  - Out of bed for toileting  - Record patient progress and toleration of activity level   Outcome: Progressing  Goal: Patient will remain free of falls  Description: INTERVENTIONS:  - Educate patient/family on patient safety including physical limitations  - Instruct patient to call for assistance with activity   - Consult OT/PT to assist with strengthening/mobility   - Keep Call bell within reach  - Keep bed low and locked with side rails adjusted as appropriate  - Keep care items and personal belongings within reach  - Initiate and maintain comfort rounds  - Make Fall Risk Sign visible to staff  - Offer Toileting every 2 Hours, in advance of need  - Initiate/Maintain bed alarm  - Obtain necessary fall risk management equipment: walker  - Apply yellow socks and bracelet for high fall risk patients  - Consider moving patient to room near nurses station  Outcome: Progressing     Problem: DISCHARGE PLANNING  Goal: Discharge to home or other facility with appropriate resources  Description: INTERVENTIONS:  - Identify barriers to discharge w/patient and caregiver  - Arrange for needed discharge resources and transportation as appropriate  - Identify discharge learning needs (meds, wound care, etc )  - Arrange for interpretive services to assist at discharge as needed  - Refer to Case Management Department for coordinating discharge planning if the patient needs post-hospital services based on physician/advanced practitioner order or complex needs related to functional status, cognitive ability, or social support system  Outcome: Progressing     Problem: Knowledge Deficit  Goal: Patient/family/caregiver demonstrates understanding of disease process, treatment plan, medications, and discharge instructions  Description: Complete learning assessment and assess knowledge base    Interventions:  - Provide teaching at level of understanding  - Provide teaching via preferred learning methods  Outcome: Progressing     Problem: GASTROINTESTINAL - ADULT  Goal: Minimal or absence of nausea and/or vomiting  Description: INTERVENTIONS:  - Administer IV fluids if ordered to ensure adequate hydration  - Maintain NPO status until nausea and vomiting are resolved  - Nasogastric tube if ordered  - Administer ordered antiemetic medications as needed  - Provide nonpharmacologic comfort measures as appropriate  - Advance diet as tolerated, if ordered  - Consider nutrition services referral to assist patient with adequate nutrition and appropriate food choices  Outcome: Progressing

## 2023-04-03 NOTE — ANESTHESIA PREPROCEDURE EVALUATION
Procedure:  EGD    Relevant Problems   ANESTHESIA (within normal limits)      CARDIO   (+) Coronary artery disease involving native coronary artery of native heart without angina pectoris   (+) History of PTCA with stents   (+) Hypertension   (+) Other hyperlipidemia   (+) Pure hypercholesterolemia      ENDO   (+) Type 2 diabetes mellitus with hyperglycemia, with long-term current use of insulin (HCC)      GI/HEPATIC   (+) Gastroesophageal reflux disease with esophagitis   (+) Hematemesis      /RENAL   (+) LATASHA (acute kidney injury) ([facility] [place] [address] )      PULMONARY   (+) Emphysema, unspecified (HCC)        Physical Exam    Airway    Mallampati score: II  TM Distance: >3 FB  Neck ROM: full     Dental       Cardiovascular  Rhythm: regular, Rate: normal,     Pulmonary  Breath sounds clear to auscultation,     Other Findings        Anesthesia Plan  ASA Score- 3 Emergent    Anesthesia Type- IV sedation with anesthesia with ASA Monitors  Additional Monitors:   Airway Plan:           Plan Factors-Exercise tolerance (METS): >4 METS  Chart reviewed  EKG reviewed  Existing labs reviewed  Patient summary reviewed  Patient is not a current smoker  Induction-     Postoperative Plan-     Informed Consent- Anesthetic plan and risks discussed with patient  I personally reviewed this patient with the CRNA  Discussed and agreed on the Anesthesia Plan with the CRNA  Estefany Shaw

## 2023-04-03 NOTE — H&P
[facility] 47  H&P  Name: Constantin Go  MRN: [id number]  Unit/Bed#: 4 [address] 419-01 I Date of Admission: 4/2/2023   Date of Service: 4/3/2023 I Hospital Day: 1      Assessment/Plan   Hematemesis  Assessment & Plan  Resolved per patient report    · CT: Esophageal wall thickening  Reported history of esophageal ulcers noted  Assessment for signs of esophagitis is recommended  · S/p Carafate 1 g, Protonix injection Reglan, Pepcid in ED,, continue as tolerated  • Placed on Protonix drip  • Octreotide given in ED  • N p o until EGD , IVF  • Type and screen  • Repeat H&H stable  • GI: EGD in a m , continue Protonix drip, n p o  at midnight    Elevated troponin  Assessment & Plan  Asymptomatic,     · Troponin elevated to 175, repeat 279  • Non MI troponin elevation secondary to uncontrolled vomiting   • history of multiple stents in place per patient  • EKG NSR, HR 87  • Cardiac recs: TTE D/T elevated troponin and her cardiac Hx, patient refused echo and TTE stating her Cardiologist Dr Christoper Kocher did one recently     • Telemetry in place    Metabolic alkalosis  Assessment & Plan  ABG congruent: pH 7 55, CO2 34, HCO3- WNL, AG-15, beta hydroxybutyrate-WNL    · Likely secondary to uncontrolled vomiting  · We will monitor        SIRS (systemic inflammatory response syndrome) (HCC)  Assessment & Plan  If worsening evident by with leucocytosis, tachycardia,     · Blood cultures-pending  · Presentation likely secondary to acute vomiting and inflammatory markers  · IV bolus given  · LA-negative, Pro-Otf-pending  · Continue to trend  · Consider ABX if worsening    Gastroesophageal reflux disease with esophagitis  Assessment & Plan  Historical diagnosis, A/C     Likely exacerbated by current hematemesis with coffee-ground findings    · followed by GI outpatient, history of EGD confirming esophagitis and ulceration in lower 3rd  Of esophagus, mild gastritis  · S/p Carafate 1 g, Protonix injection Reglan, Pepcid in ED  • Continue Protonix drip  • GI following: EGD in a m  LATASHA (acute kidney injury) ([facility] [address] )  Assessment & Plan  Secondary to nausea vomiting and hyperglycemia    · Creatine 1 59 > 1 51 on admission, baseline 1 15- 1 23  · Continue IVF  · Hold nephrotoxic's  · IV blood pressure regiment due to esophagitis and possible Romelia-San  · Consider nephro consult in a m  if no improvement    Type 2 diabetes mellitus with hyperglycemia, with long-term current use of insulin [facility])  Assessment & Plan  Lab Results   Component Value Date    HGBA1C 8 7 (A) 03/28/2023       Recent Labs     04/02/23  2101 04/03/23  0716 04/03/23  1112 04/03/23  1554   POCGLU 176* 162* 190* 151*       Blood Sugar Average: Last 72 hrs:  (P) 179 [id number]     Improved,  · Remain on SSI, hold home medications due to n p o  and acute presentation    Other hyperlipidemia  Assessment & Plan  · restart home Lipitor s/p EGD    Ulcerative colitis ([facility] )  Assessment & Plan  History of ulcerative colitis not on any medication currently  Denies any diarrhea    · Follow-up with GI after discharge as needed    Coronary artery disease involving native coronary artery of native heart without angina pectoris  Assessment & Plan  History of CAD status post PCI x3 in 2019  · Follows up with Cardiology   · Restart after EGD aspirin, Plavix, carvedilol, Lipitor           VTE Pharmacologic Prophylaxis:   Moderate Risk (Score 3-4) - Pharmacological DVT Prophylaxis Contraindicated  Sequential Compression Devices Ordered  Patient Centered Rounds: I performed bedside rounds with nursing staff today  Discussions with Specialists or Other Care Team Provider: Gi    Education and Discussions with Family / Patient: Updated  (daughter) via phone      Total Time Spent on Date of Encounter in care of patient: 45 minutes This time was spent on one or more of the following: performing physical exam; counseling and coordination of care; obtaining or reviewing history; documenting in the medical record; reviewing/ordering tests, medications or procedures; communicating with other healthcare professionals and discussing with patient's family/caregivers  Current Length of Stay: 1 day(s)  Current Patient Status: Inpatient   Certification Statement: The patient will continue to require additional inpatient hospital stay due to Clinical course  Discharge Plan: Anticipate discharge in 48 hrs to discharge location to be determined pending rehab evaluations  Code Status: Level 1 - Full Code    Subjective:   Patient has stopped vomiting since yesterday and informed daughter one episode of vomiting overnight  Patients daughter stated Endo specialist and f/u May of 2023, patient's daughter concerned that she was initially started on Trulicity outpatient, needs to follow-up with her endocrinologist requested a consult while inpatient  She reported to her daughter that she felt a little bit better this morning but was fatigued  Patient's daughter lives in [facility] and works at [employer] and is hard for her to get here so appreciated feedback  Objective:     Vitals:   Temp (24hrs), Av 3 °F (36 8 °C), Min:97 6 °F (36 4 °C), Max:98 9 °F (37 2 °C)    Temp:  [97 6 °F (36 4 °C)-98 9 °F (37 2 °C)] 97 6 °F (36 4 °C)  HR:  [78-97] 87  Resp:  [16-20] 18  BP: (139-208)/(68-94) 178/88  SpO2:  [91 %-99 %] 91 %  Body mass index is 25 39 kg/m²  Input and Output Summary (last 24 hours): Intake/Output Summary (Last 24 hours) at 4/3/2023 192  Last data filed at 4/3/2023 1320  Gross per 24 hour   Intake 1227 08 ml   Output --   Net 1227 08 ml       Physical Exam:   Physical Exam  Vitals and nursing note reviewed  Constitutional:       General: She is not in acute distress  Appearance: She is well-developed  HENT:      Head: Normocephalic and atraumatic     Eyes:      Conjunctiva/sclera: Conjunctivae normal    Cardiovascular:      Rate and Rhythm: Normal rate and regular rhythm  Heart sounds: No murmur heard  No friction rub  No gallop  Pulmonary:      Effort: Pulmonary effort is normal  No respiratory distress  Breath sounds: Normal breath sounds  No stridor  No wheezing, rhonchi or rales  Abdominal:      Palpations: Abdomen is soft  Tenderness: There is no abdominal tenderness  There is no guarding or rebound  Musculoskeletal:         General: No swelling or tenderness  Cervical back: Neck supple  Right lower leg: No edema  Left lower leg: No edema  Skin:     General: Skin is warm and dry  Capillary Refill: Capillary refill takes less than 2 seconds  Findings: No bruising  Neurological:      Mental Status: She is alert and oriented to person, place, and time  Motor: No weakness     Psychiatric:         Mood and Affect: Mood normal          Behavior: Behavior normal           Additional Data:     Labs:  Results from last 7 days   Lab Units 04/03/23  0539   WBC Thousand/uL 11 62*   HEMOGLOBIN g/dL 13 8   HEMATOCRIT % 40 9   PLATELETS Thousands/uL 108*   NEUTROS PCT % 78*   LYMPHS PCT % 13*   MONOS PCT % 8   EOS PCT % 1     Results from last 7 days   Lab Units 04/03/23  0539   SODIUM mmol/L 137   POTASSIUM mmol/L 4 4   CHLORIDE mmol/L 103   CO2 mmol/L 27   BUN mg/dL 23   CREATININE mg/dL 1 51*   ANION GAP mmol/L 7   CALCIUM mg/dL 8 8   ALBUMIN g/dL 3 5   TOTAL BILIRUBIN mg/dL 0 91   ALK PHOS U/L 73   ALT U/L 25   AST U/L 18   GLUCOSE RANDOM mg/dL 166*         Results from last 7 days   Lab Units 04/03/23  1554 04/03/23  1112 04/03/23  0716 04/02/23  2101 04/02/23  1920 04/02/23  1757 04/02/23  1015   POC GLUCOSE mg/dl 151* 190* 162* 176* 180* 182* 213*     Results from last 7 days   Lab Units 03/28/23  1140   HEMOGLOBIN A1C  8 7*     Results from last 7 days   Lab Units 04/02/23  1532 04/02/23  0922   LACTIC ACID mmol/L 1 5  --    PROCALCITONIN ng/ml  --  <0 05       Lines/Drains:  Invasive Devices Peripheral Intravenous Line  Duration           Peripheral IV 04/02/23 Right;Ventral (anterior) Hand 1 day    Peripheral IV 04/02/23 Ventral (anterior); Right Forearm 1 day                  Telemetry:  Telemetry Orders (From admission, onward)             24 Hour Telemetry Monitoring  Continuous x 24 Hours (Telem)        Expiring   References:    Telemetry Guidelines   Question:  Reason for 24 Hour Telemetry  Answer:  Metabolic/Electrolyte Disturbance with High Probability of Dysrhythmia (K level <3 or >6, or KCL infusion >10mEq/hr)                 Telemetry Reviewed: Normal Sinus Rhythm  Indication for Continued Telemetry Use: Arrthymias requiring medical therapy             Imaging: Reviewed radiology reports from this admission including: abdominal/pelvic CT    Recent Cultures (last 7 days):   Results from last 7 days   Lab Units 04/02/23  1450 04/02/23  1438   BLOOD CULTURE  Received in Microbiology Lab  Culture in Progress  Received in Microbiology Lab  Culture in Progress         Last 24 Hours Medication List:   Current Facility-Administered Medications   Medication Dose Route Frequency Provider Last Rate   • atorvastatin  40 mg Oral Daily With Migue Betancur PA-C     • carvedilol  25 mg Oral BID Kaitlin Vazquez MD     • cloNIDine  0 1 mg Oral BID Kaitlin Vazqeuz MD     • hydrALAZINE  5 mg Intravenous Q4H PRN Kaitlin Vazquez MD     • insulin lispro  1-5 Units Subcutaneous TID AC Kaitlin Vazquez MD     • labetalol  10 mg Intravenous Q4H PRN Kaitlin Vazquez MD     • losartan  100 mg Oral Daily Antonino Jerry PA-C     • morphine injection  2 mg Intravenous Q4H PRN Kaitlin Vazquez MD     • ondansetron  4 mg Intravenous Q6H PRN Kaitlin Vazquez MD     • pantoprozole (PROTONIX) infusion (Continuous)  8 mg/hr Intravenous Continuous Komaira Ferdous, DO 8 mg/hr (04/03/23 1400)   • sodium chloride  125 mL/hr Intravenous Continuous Kaitlin Vazquez  mL/hr (04/03/23 1533)   • sucralfate  1 g Oral BID AC Eva Dorman Geraldo Cabello MD     • trimethobenzamide  200 mg Intramuscular Q6H PRN Sandra Mccollum MD          Today, Patient Was Seen By: Sandra Mccollum MD    **Please Note: This note may have been constructed using a voice recognition system  **

## 2023-04-03 NOTE — UTILIZATION REVIEW
Initial Clinical Review    Admission: Date/Time/Statement:   Admission Orders (From admission, onward)     Ordered        04/02/23 1321  INPATIENT ADMISSION  Once                      Orders Placed This Encounter   Procedures   • INPATIENT ADMISSION     Standing Status:   Standing     Number of Occurrences:   1     Order Specific Question:   Level of Care     Answer:   Med Surg [16]     Order Specific Question:   Estimated length of stay     Answer:   More than 2 Midnights     Order Specific Question:   Certification     Answer:   I certify that inpatient services are medically necessary for this patient for a duration of greater than two midnights  See H&P and MD Progress Notes for additional information about the patient's course of treatment  ED Arrival Information     Expected   -    Arrival   4/2/2023 08:55    Acuity   Urgent            Means of arrival   Wheelchair    Escorted by   [facility]    Admission type   Emergency            Arrival complaint   Chest pain, Diabetic - vomiting since 4am           Chief Complaint   Patient presents with   • Epigastric Pain     Pain starting around 0400 this morning    • Vomiting     Starting at 0400, diabetic did not check her sugar        Initial Presentation: 81 y o  female , presented to the ED @ [facility], from home, via wheelchair with friend  Admitted as Inpatient due to hematemesis      PMH of CAD, HTN, DM GERD with esophagitis   Date: 04/02/2023 Presents with uncontrolled vomiting   Patient reported waking up this morning at 3 AM with uncontrolled vomiting presented to the ED and had minimal resolution of symptoms   Upon arrival patient had coffee-ground emesis at bedside and was actively vomiting   patient reported initiating Trulicity injection on Friday with her outpatient PCP  Patient stated she did not take any of her medications of aspirin or anticoagulation today due to vomiting   Patient denied any eating of red foods over the last 48 hours  CT: Esophageal wall thickening   Reported history of esophageal ulcers noted   Assessment for signs of esophagitis is recommended  S/p Carafate 1 g, Protonix injection Reglan, Pepcid in ED,, continue as tolerated  Placed on Protonix drip  Octreotide given in ED   NPO  IV flds  Monitor & trend H/H  Consult GI       04/02/2023  Consult GI:  Acute onset of nausea, vomiting, abdominal pain, chills and leukocytosis, clinically appears to represent acute viral gastroenteritis with Romelia-San tear as etiology for hematemesis; appears hemodynamically stable for now and hemoglobin appears stable, CT scan showed esophageal wall thickening which would be expected for her clinical presentation  NPO after midnight, will tentatively plan for EGD tomorrow; can pursue EGD more urgently if needed if patient does demonstrate evidence of hemodynamically significant upper GI bleeding  May continue with Protonix drip in the meantime  Supportive care, antiemetics as needed        Day 2: 04/03/2023   Monitor on telemetry  Monitor & trend H/H  IV flds  Continue IV protonix gtt  Continue zofran PRN  04/03/2023  Consult Cardio:  Elevated troponin     - 4/02/23 hs troponin: 40 (0hrs), 175 (2hrs), 279 (4hrs)  - Likely non-ischemic myocardial injury secondary to hypertensive urgency and uncontrolled vomiting    - Patient currently denies chest pain, palpitations, or shortness of breath    - Discussed with patient recommendations for TTE in setting of elevated troponin and her cardiac history however she refused stating her Cardiologist Dr Maggie Chaney did one recently  Unable to find TTE more recent than 3/8/19    - 4/02/23 EKG: Normal sinus rhythm, rate 97 bpm  Possible Left atrial enlargement  Septal infarct , age undetermined    Hypertension    - Hypertensive urgency on presentation  Continues to have high BP    - Continue coreg 25 mg twice daily     - Continue clonidine 0 1 mg twice daily    - Does not yes appear to be on home medications of valsartan 320 mg once daily, HCTZ 12 5 mg daily, or isordil 30 mg twice daily  - Will order losartan 100 mg as alternative to valsartan (non-formulary)  - Continue hydralazine IV prn     CAD  - Patient follow with Dr Tsering Polk for outpatient cardiology  - s/p PCI x 2 (LAD and circumflex on 4/2019 and RCA on 12/10/19)  - Recommend restarting aspirin 81 mg daily and lipitor 40 mg daily, ordered     Ischemic cardiomyopathy    - Patient follow with Dr Tsering Polk for outpatient cardiology  - 3/18/19 TTE: EF 20-25 %  - Discussed with patient recommendations for TTE in setting of elevated troponin and her cardiac history however she refused stating her Cardiologist Dr Will Treviño did one recently  Unable to find TTE more recent than 3/8/19    - Does not appear to be on home medications of valsartan 320 mg once daily, HCTZ 12 5 mg daily, or isordil 30 mg twice daily  - Will order losartan 100 mg as alternative to valsartan (non-formulary)  - Will continue to reintroduce home medications as tolerated  Chronic systolic congestive heart failure  - Does not appear in acute phase  Patient euvolemic on exam     - 3/18/19 TTE: EF 20-25 %  - Discussed with patient recommendations for TTE in setting of elevated troponin and her cardiac history however she refused stating her Cardiologist Dr Will Treviño did one recently  Unable to find TTE more recent than 3/8/19    - Does not appear to be on home medications of valsartan 320 mg once daily, HCTZ 12 5 mg daily, or isordil 30 mg twice daily  - Will order losartan 100 mg as alternative to valsartan (non-formulary)  - 4/02/23 CT chest/abd/pelvis: Mild pulmonary emphysema   No focal consolidation  Hyperlipidemia  - 3/28/23 lipid panel: cholesterol 182, triglycerides 194, HDL 62, LDL 81    - Recommend restarting lipitor 40 mg daily, ordered       04/03/2023  EGD:  IMPRESSION:  Severe ulcerative esophagitis, gastritis, some duodenitis   RECOMMENDATION:  Clear liquids room temperature, PPI twice daily, Carafate, repeat EGD 2 months      ED Triage Vitals   Temperature Pulse Respirations Blood Pressure SpO2   04/02/23 0941 04/02/23 0905 04/02/23 0905 04/02/23 1011 04/02/23 0915   (!) 96 9 °F (36 1 °C) 103 (!) 24 (!) 176/101 100 %      Temp Source Heart Rate Source Patient Position - Orthostatic VS BP Location FiO2 (%)   04/02/23 0941 04/02/23 0905 04/02/23 1217 04/02/23 1217 --   Tympanic Monitor Sitting Left arm       Pain Score       04/02/23 0905       10 - Worst Possible Pain          Wt Readings from Last 1 Encounters:   04/03/23 59 kg (130 lb)     Additional Vital Signs:   Date/Time Temp Pulse Resp BP MAP (mmHg) SpO2 O2 Flow Rate (L/min) O2 Device Patient Position - Orthostatic VS   04/03/23 17:16:56 -- 89 -- 188/91 Abnormal   123 93 % -- -- --   BP: scheduled losartan given at 04/03/23 1716   04/03/23 15:27:32 98 °F (36 7 °C) 85 18 190/94 Abnormal   126 95 % -- None (Room air) Lying   BP: prn given at 04/03/23 1527   04/03/23 1342 -- 81 18 -- -- 96 % -- None (Room air) --   04/03/23 1333 -- 82 18 139/70 -- 99 % -- None (Room air) --   04/03/23 1323 -- 78 16 155/68 -- 99 % 2 L/min EtCO2 mask --   04/03/23 1255 -- -- -- 187/84 Abnormal  -- -- -- -- --   04/03/23 1244 98 5 °F (36 9 °C) 85 18 208/81 Abnormal  -- 96 % -- None (Room air) --   04/03/23 07:22:02 97 8 °F (36 6 °C) 86 18 164/86 112 96 % -- None (Room air) Lying   04/03/23 03:34:06 98 7 °F (37 1 °C) 89 17 156/82 107 95 % -- -- --   04/02/23 23:00:02 98 9 °F (37 2 °C) 97 20 142/77 99 94 % -- -- --   04/02/23 19:20:53 98 3 °F (36 8 °C) 97 18 168/95 119 93 % -- -- --   04/02/23 18:23:07 -- 95 -- 174/95 Abnormal  121 98 % -- -- --   04/02/23 1701 -- -- -- -- -- 90 % -- -- --   04/02/23 16:48:45 -- 103 -- 182/103 Abnormal   129 90 % -- -- --   BP: phys notified; labetalol admin'd at 04/02/23 1648   04/02/23 16:19:35 97 6 °F (36 4 °C) 98 20 193/103 Abnormal   133 90 % -- None (Room air) Lying   BP: phys notified at 23 1619   23 1540 99 5 °F (37 5 °C) 93 20 198/97 Abnormal  -- 97 % -- None (Room air) --   23 1500 -- 94 -- 196/97 Abnormal  126 97 % -- -- --   23 1439 -- -- -- 223/106 Abnormal  -- -- -- -- --   23 1330 -- 94 -- 245/142 Abnormal  172 100 % -- -- --   23 1217 -- 93 20 217/95 Abnormal  -- 99 % -- None (Room air) Sitting     2023 @ 0944  EC, NSR, Possible Left atrial enlargement  Septal infarct , age undetermined    Pertinent Labs/Diagnostic Test Results:   CT chest abdomen pelvis w contrast   Final Result by Melissa Yoon MD ( 114)      Esophageal wall thickening  Reported history of esophageal ulcers noted  Assessment for signs of esophagitis is recommended          Results from last 7 days   Lab Units 23  0931   SARS-COV-2  Negative     Results from last 7 days   Lab Units 23  0539 23  1450 23  0922 23  1115   WBC Thousand/uL 11 62*  --  14 56* 10 16   HEMOGLOBIN g/dL 13 8 13 2 15 8* 14 7   HEMATOCRIT % 40 9 38 5 44 4 43 8   PLATELETS Thousands/uL 108*  --  155 145*   NEUTROS ABS Thousands/µL 9 08*  --  11 45* 4 96     Results from last 7 days   Lab Units 23  0539 23  1115   SODIUM mmol/L 137 132* 133*   POTASSIUM mmol/L 4 4 3 9 3 9   CHLORIDE mmol/L 103 89* 101   CO2 mmol/L 27 28 29   ANION GAP mmol/L 7 15* 3*   BUN mg/dL 23 38* 40*   CREATININE mg/dL 1 51* 1 59* 1 53*   EGFR ml/min/1 73sq m 34 32 34   CALCIUM mg/dL 8 8 10 6* 10 8*   MAGNESIUM mg/dL  --  1 8*  --      Results from last 7 days   Lab Units 23  0539 23  0922 23  1115   AST U/L 18 23 42   ALT U/L 25 42 94*   ALK PHOS U/L 73 96 106   TOTAL PROTEIN g/dL 6 1* 7 7 8 2   ALBUMIN g/dL 3 5 4 5 4 5   TOTAL BILIRUBIN mg/dL 0 91 1 12* 0 57     Results from last 7 days   Lab Units 23  1554 23  1112 23  0716 23  2101 23  1920 23  1757 23  1015   POC GLUCOSE mg/dl 151* 190* 162* 176* 180* 182* 213*     Results from last 7 days   Lab Units 04/03/23  0539 04/02/23  0922   GLUCOSE RANDOM mg/dL 166* 264*     Results from last 7 days   Lab Units 03/28/23  1140   HEMOGLOBIN A1C  8 7*     BETA-HYDROXYBUTYRATE   Date Value Ref Range Status   04/02/2023 0 4 <0 6 mmol/L Final   08/04/2022 1 4 (H) <0 6 mmol/L Final      Results from last 7 days   Lab Units 04/02/23  0922   PH USHA  7 550*   PCO2 USHA mm Hg 34 0*   PO2 USHA mm Hg 41 7   HCO3 USHA mmol/L 29 1   BASE EXC USHA mmol/L 6 9   O2 CONTENT USHA ml/dL 18 3   O2 HGB, VENOUS % 80 6*     Results from last 7 days   Lab Units 04/02/23  1333 04/02/23  1139 04/02/23  0922   HS TNI 0HR ng/L  --   --  40   HS TNI 2HR ng/L  --  175*  --    HSTNI D2 ng/L  --  135*  --    HS TNI 4HR ng/L 279*  --   --    HSTNI D4 ng/L 239*  --   --      Results from last 7 days   Lab Units 04/02/23  0922   PROCALCITONIN ng/ml <0 05     Results from last 7 days   Lab Units 04/02/23  1532   LACTIC ACID mmol/L 1 5     Results from last 7 days   Lab Units 04/02/23  0922   LIPASE u/L 49     Results from last 7 days   Lab Units 04/02/23  1140   CLARITY UA  Clear   COLOR UA  Light Yellow   SPEC GRAV UA  1 010   PH UA  8 0   GLUCOSE UA mg/dl >=1000 (1%)*   KETONES UA mg/dl Negative   BLOOD UA  Trace-Intact*   PROTEIN UA mg/dl 30 (1+)*   NITRITE UA  Negative   BILIRUBIN UA  Negative   UROBILINOGEN UA E U /dl 0 2   LEUKOCYTES UA  Elevated glucose may cause decreased leukocyte values  See urine microscopic for [facility] result/*   WBC UA /hpf 0-1   RBC UA /hpf 1-2   BACTERIA UA /hpf None Seen   EPITHELIAL CELLS WET PREP /hpf Occasional     Results from last 7 days   Lab Units 04/02/23  0931   INFLUENZA A PCR  Negative   INFLUENZA B PCR  Negative   RSV PCR  Negative     Results from last 7 days   Lab Units 04/02/23  1450 04/02/23  1438   BLOOD CULTURE  Received in Microbiology Lab  Culture in Progress  Received in Microbiology Lab  Culture in Progress       ED Treatment: Medication Administration from 04/02/2023 0855 to 04/02/2023 1556       Date/Time Order Dose Route Action     04/02/2023 0923 EDT ondansetron (ZOFRAN) injection 4 mg 4 mg Intravenous Given     04/02/2023 4174 EDT sodium chloride 0 9 % bolus 1,000 mL 1,000 mL Intravenous New Bag     04/02/2023 0936 EDT Famotidine (PF) (PEPCID) injection 40 mg 40 mg Intravenous Given     04/02/2023 6548 EDT morphine injection 4 mg 4 mg Intravenous Given     04/02/2023 1007 EDT metoclopramide (REGLAN) injection 10 mg 10 mg Intravenous Given     04/02/2023 1046 EDT lactated ringers bolus 500 mL 500 mL Intravenous New Bag     04/02/2023 1058 EDT magnesium sulfate 2 g/50 mL IVPB (premix) 2 g 2 g Intravenous New Bag     04/02/2023 1103 EDT potassium chloride 20 mEq IVPB (premix) 20 mEq Intravenous New Bag     04/02/2023 1107 EDT iohexol (OMNIPAQUE) 350 MG/ML injection (SINGLE-DOSE) 100 mL 100 mL Intravenous Given     04/02/2023 1253 EDT promethazine (PHENERGAN) injection 25 mg 25 mg Intravenous Given     04/02/2023 1332 EDT sucralfate (CARAFATE) tablet 1 g 1 g Oral Given     04/02/2023 1258 EDT pantoprazole (PROTONIX) injection 40 mg 40 mg Intravenous Given     04/02/2023 1406 EDT labetalol (NORMODYNE) injection 10 mg 10 mg Intravenous Given     04/02/2023 1405 EDT morphine injection 2 mg 2 mg Intravenous Given     04/02/2023 1427 EDT promethazine (PHENERGAN) injection 25 mg 25 mg Intravenous Given     04/02/2023 1421 EDT pantoprazole (PROTONIX) 80 mg in sodium chloride 0 9 % 100 mL infusion 8 mg/hr Intravenous New Bag     04/02/2023 1418 EDT octreotide (SandoSTATIN) injection 50 mcg 50 mcg Intravenous Given     04/02/2023 1531 EDT cloNIDine (CATAPRES) tablet 0 2 mg 0 2 mg Oral Given     04/02/2023 1439 EDT hydrALAZINE (APRESOLINE) injection 5 mg 5 mg Intravenous Given        Past Medical History:   Diagnosis Date   • Colitis    • Coronary artery disease    • [facility]    • Esophageal ulcer    • Kidney stone    • Neuropathy Present on Admission:  • Coronary artery disease involving native coronary artery of native heart without angina pectoris  • Elevated troponin  • Ulcerative colitis (HCC)  • Gastroesophageal reflux disease with esophagitis  • Other hyperlipidemia  • LATASHA (acute kidney injury) ([facility] )      Admitting Diagnosis: Dehydration [E86 0]  Vomiting [R11 10]  Epigastric pain [R10 13]  Esophagitis [K20 90]  Abdominal pain [R10 9]  Elevated troponin [R77 8]  Coffee ground emesis [K92 0]  Intractable nausea and vomiting [R11 2]  Gastroesophageal reflux disease with esophagitis [K21 00]  Age/Sex: 81 y o  female  Admission Orders:  Telemetry  Clear liquid diet  Up w Assistance  I&O  EGD  Brendon SCDs      Scheduled Medications:  atorvastatin, 40 mg, Oral, Daily With Dinner  carvedilol, 25 mg, Oral, BID  cloNIDine, 0 1 mg, Oral, BID  insulin lispro, 1-5 Units, Subcutaneous, TID AC  losartan, 100 mg, Oral, Daily  sucralfate, 1 g, Oral, BID AC      Continuous IV Infusions:  pantoprozole (PROTONIX) infusion (Continuous), 8 mg/hr, Intravenous, Continuous  sodium chloride, 125 mL/hr, Intravenous, Continuous      PRN Meds:  hydrALAZINE, 5 mg, Intravenous, Q4H PRN   X 1 dose 4/2;  X 1 dose 4/3  labetalol, 10 mg, Intravenous, Q4H PRN  X 1 dose 4/2  morphine injection, 2 mg, Intravenous, Q4H PRN  X 1 dose 4/2  ondansetron, 4 mg, Intravenous, Q6H PRN   X 1 dose 4/2  trimethobenzamide, 200 mg, Intramuscular, Q6H PRN        IP CONSULT TO GASTROENTEROLOGY  IP CONSULT TO CARDIOLOGY    Network Utilization Review Department  ATTENTION: Please call with any questions or concerns to [phone] and carefully listen to the prompts so that you are directed to the right person  All voicemails are confidential   Bryn Cools all requests for admission clinical reviews, approved or denied determinations and any other requests to dedicated fax number below belonging to the campus where the patient is receiving treatment   List of dedicated fax numbers for the Facilities:  FACILITY NAME UR FAX NUMBER   ADMISSION DENIALS (Administrative/Medical Necessity) [phone]   [address]  (Maternity/NICU/Pediatrics) [phone]   [address] [phone]   [address] [place] [id number] [place] [address] [address] [facility] [address] [phone]

## 2023-04-03 NOTE — ASSESSMENT & PLAN NOTE
Asymptomatic,     · Troponin elevated to 175, repeat 279  • Non MI troponin elevation secondary to uncontrolled vomiting   • history of multiple stents in place per patient  • EKG NSR, HR 87  • Cardiac recs: TTE D/T elevated troponin and her cardiac Hx, patient refused echo and TTE stating her Cardiologist Dr Tra Harmon did one recently     • Telemetry in place

## 2023-04-03 NOTE — H&P
[address]  H&P  Name: Shahid Tovar  MRN: [id number]  Unit/Bed#: 4 [place] 419-01 I Date of Admission: 4/2/2023   Date of Service: 4/3/2023 I Hospital Day: 1      Hematemesis  Assessment & Plan  Unresolved,      • CT: Esophageal wall thickening   Reported history of esophageal ulcers noted   Assessment for signs of esophagitis is recommended    • S/p Carafate 1 g, Protonix injection Reglan, Pepcid in ED,, continue as tolerated  • Placed on Protonix drip  • Octreotide given in ED  • N p o , IVF  • Type and screen-pending  • Repeat H&H pending  • GI consulted: Likely EGD in a m      Elevated troponin  Assessment & Plan  Asymptomatic,      • Troponin elevated to 175, repeat 279  • Non MI troponin elevation secondary to uncontrolled vomiting   • history of multiple stents in place per patient  • EKG NSR, HR 87  • Cardiac consulted  • Telemetry in place     Metabolic alkalosis  Assessment & Plan  ABG congruent: pH 7 55, CO2 34, HCO3- WNL, AG-15, beta hydroxybutyrate-WNL     • Likely secondary to uncontrolled vomiting  • We will monitor           SIRS (systemic inflammatory response syndrome) (HCC)  Assessment & Plan  Evident by with leucocytosis, tachycardia,      • Blood cultures-pending  • Presentation likely secondary to acute vomiting and inflammatory markers  • IV bolus given  • LA-negative, Pro-Otf-pending  • Continue to trend  • Consider ABX     Gastroesophageal reflux disease with esophagitis  Assessment & Plan  Historical diagnosis, A/C      Likely exacerbated by current hematemesis with coffee-ground findings     • followed by GI outpatient, history of EGD confirming esophagitis and ulceration in lower 3rd  Of esophagus, mild gastritis  • S/p Carafate 1 g, Protonix injection Reglan, Pepcid in ED  • Placed on Protonix drip  • GI consulted      LATASHA (acute kidney injury) ([facility] [address] )  Assessment & Plan  Secondary to nausea vomiting and hyperglycemia     • Creatine 1 59 on admission, baseline 1 15- 1 23  • Continue IVF  • Hold nephrotoxic's  • IV blood pressure regiment due to esophagitis and possible Rom[place]-San  • We will monitor     Type 2 diabetes mellitus with hyperglycemia, with long-term current use of insulin ([facility])  Assessment & Plan        Lab Results   Component Value Date     HGBA1C 8 7 (A) 03/28/2023             Recent Labs     04/02/23  1015   POCGLU 213*         Blood Sugar Average: Last 72 hrs:  (P) 213      Improved,  • Remain on SSI, hold home medications due to n p o  and acute presentation     Other hyperlipidemia  Assessment & Plan  • restart home Lipitor s/p EGD     Ulcerative colitis ([facility] [address] )  Assessment & Plan  History of ulcerative colitis not on any medication currently   Denies any diarrhea     • Follow-up with GI after discharge as needed     Hypertension  Assessment & Plan  • Continue IV medications for hypertension as needed   • Restart home post EGD carvedilol, losartan and catapres        Coronary artery disease involving native coronary artery of native heart without angina pectoris  Assessment & Plan  History of CAD status post PCI x3 in 2019  • Follows up with Cardiology    • Hold aspirin, Plavix, carvedilol, Lipitor         VTE Pharmacologic Prophylaxis:   Moderate Risk (Score 3-4) - Pharmacological DVT Prophylaxis Contraindicated  Sequential Compression Devices Ordered    Code Status: Level 1 - Full Code   Discussion with family: Patient declined call to        Total Time Spent on Date of Encounter in care of patient: 55 minutes This time was spent on one or more of the following: performing physical exam; counseling and coordination of care; obtaining or reviewing history; documenting in the medical record; reviewing/ordering tests, medications or procedures; communicating with other healthcare professionals and discussing with patient's family/caregivers      Chief Complaint: Vomiting     History of Present Illness:  Gurvinder López is a 81 y o  female with a PMH of CAD, HTN, DM GERD with esophagitis who presents with uncontrolled vomiting  Patient reported waking up this morning at 3 AM with uncontrolled vomiting presented to the ED and had minimal resolution of symptoms  Upon arrival patient had coffee-ground emesis at bedside and was actively vomiting  patient reported initiating Trulicity injection on Friday with her outpatient PCP  Patient stated she did not take any of her medications of aspirin or anticoagulation today due to vomiting  Patient denied any eating of red foods over the last 48 hours  patient denied any chest pain or palpitations  HPI limited due to patient's presentation      Review of Systems:  Review of Systems   Gastrointestinal: Positive for nausea and vomiting  Psychiatric/Behavioral: Positive for agitation          Past Medical and Surgical History:   Medical History        Past Medical History:   Diagnosis Date   • Colitis     • Coronary artery disease     • Diabetes mellitus ([facility] [address] )     • Esophageal ulcer     • Kidney stone     • Neuropathy              Surgical History         Past Surgical History:   Procedure Laterality Date   • ANKLE ARTHROPLASTY Bilateral       bilat hardware post fx surgeries   • COLONOSCOPY       • CORONARY ANGIOPLASTY WITH STENT PLACEMENT   2019     x2 procedures total 3 stents   • LITHOTRIPSY       • OVARIAN CYST SURGERY Right              Meds/Allergies:          Prior to Admission medications    Medication Sig Start Date End Date Taking?  Authorizing Provider   budesonide (ENTOCORT EC) 3 MG capsule Take 2 capsules (6 mg total) by mouth daily 3/20/23   Yes Booker Gold MD   carvedilol (COREG) 25 mg tablet Take 25 mg by mouth 2 (two) times a day 9/9/21   Yes Historical Provider, MD   cloNIDine (CATAPRES) 0 1 mg tablet Take 1 tablet (0 1 mg total) by mouth 2 (two) times a day 12/20/22 4/2/23 Yes Booker Gold MD   clopidogrel (PLAVIX) 75 mg tablet Take 75 mg by mouth daily     Yes Historical Provider, MD   dulaglutide (Trulicity) 1 01 MR/4 4ZR injection Inject 0 5 mL (0 75 mg total) under the skin every 7 days for 4 days 3/28/23 4/2/23 Yes Sissy Chopra MD   hydrochlorothiazide (HYDRODIURIL) 12 5 mg tablet Take 1 tablet (12 5 mg total) by mouth daily 2/27/23   Yes Sissy Chopra MD   isosorbide dinitrate (ISORDIL) 30 mg tablet Take 1 tablet (30 mg total) by mouth 2 (two) times a day 3/28/23 6/26/23 Yes Sissy Chopra MD   pregabalin (LYRICA) 100 mg capsule Take 1 capsule (100 mg total) by mouth 2 (two) times a day 3/28/23   Yes Sissy Chopra MD   tolterodine (DETROL LA) 4 mg 24 hr capsule Take 1 capsule (4 mg total) by mouth daily 1/30/23   Yes Sissy Chopra MD   valsartan (DIOVAN) 320 MG tablet Take 1 tablet (320 mg total) by mouth daily 12/20/22   Yes Sissy Chopra MD   aspirin (ECOTRIN LOW STRENGTH) 81 mg EC tablet Take 81 mg by mouth daily       Historical Provider, MD   atorvastatin (LIPITOR) 40 mg tablet Take 1 tablet (40 mg total) by mouth daily 1/30/23     Sissy Chopra MD   Continuous Blood Gluc  (FreeStyle Tiny Yennifer 2 [place]) CORNEL Use 1 Device every morning 11/3/22     Sissy Chopra MD   glucose blood ([employer] Glucose Meter Test Strips) test strip Use 1 each 2 (two) times a day Use as instructed DX E11 65 1/11/23     Sissy Chopra MD   insulin glargine (LANTUS) 100 units/mL subcutaneous injection 5 units in the AM and 30 units before bedtime   3/28/23     Sissy Chopra MD   Insulin Glargine Solostar (Lantus SoloStar) 100 UNIT/ML SOPN Please inject 5 units in the AM and 30 units in the PM 3/30/23     Sissy Chopra MD   insulin lispro (HumaLOG KwikPen) 100 units/mL injection pen Inject 4 Units under the skin 3 (three) times a day with meals Plus sliding scale 3/28/23 6/26/23   Sissy Chopra MD   Insulin Pen Needle (UltiCare Mini Pen Needles) 31G X 6 MM MISC Use 4 (four) times a day 2/28/22     Sissy Chopra MD   glimepiride (AMARYL) 2 mg tablet Take 2 mg by mouth daily  Patient not taking: Reported on 2022   Historical Provider, MD   sitaGLIPtin (JANUVIA) 50 mg tablet Take 1 tablet (50 mg total) by mouth daily  Patient not taking: Reported on 3/28/2023 8/16/22 4/2/23   Genesis Rush MD   Triamcinolone Acetonide (Nasacort Allergy 24HR) 55 MCG/ACT nasal spray 2 sprays by Each Nare route daily  Patient not taking: Reported on 3/28/2023 12/2/22 4/2/23   Genesis Rush MD      I have reviewed home medications using recent Epic encounter      Allergies: Allergies   Allergen Reactions   • Other Rash       Latex tape         Social History:  Marital Status: Single   Occupation:   Patient Pre-hospital Living Situation: Home  Patient Pre-hospital Level of Mobility: walks  Patient Pre-hospital Diet Restrictions:   Substance Use History:   Social History          Substance and Sexual Activity   Alcohol Use Not Currently      Social History           Tobacco Use   Smoking Status Former   • Years: 34 00   • Types: Cigarettes   • Quit date:    • Years since quittin 2   Smokeless Tobacco Never      Social History          Substance and Sexual Activity   Drug Use Never         Family History:  Family History         Family History   Problem Relation Age of Onset   • Stroke Mother     • Heart disease Father              Physical Exam:      Vitals:   Blood Pressure: (!) 182/103 (phys notified) (23 1648)  Pulse: 103 (23 1648)  Temperature: 97 6 °F (36 4 °C) (23 1619)  Temp Source: Oral (23 1619)  Respirations: 20 (23 1619)  SpO2: 90 % (23 1701)     Physical Exam  Vitals and nursing note reviewed  Constitutional:       General: She is in acute distress  Bloody vomitus a bedside     Appearance: She is well-developed  HENT:      Head: Normocephalic and atraumatic  Eyes:      Conjunctiva/sclera: Conjunctivae normal    Cardiovascular:      Rate and Rhythm: Normal rate and regular rhythm  Heart sounds:     No gallop  Pulmonary:      Effort: Pulmonary effort is normal  No respiratory distress  Breath sounds: Normal breath sounds  No wheezing  Abdominal:      Palpations: Abdomen is soft  Tenderness: There is abdominal tenderness  There is no guarding or rebound  Musculoskeletal:         General: No swelling or tenderness  Cervical back: Neck supple  Right lower leg: No edema  Left lower leg: No edema  Skin:     General: Skin is warm and dry  Capillary Refill: Capillary refill takes less than 2 seconds  Findings: No bruising  Neurological:      Mental Status: She is alert and oriented to person, place, and time  Motor: No weakness  Psychiatric:         Mood and Affect: Affect is angry           Behavior: Behavior is agitated and combative             Additional Data:      Lab Results:        Results from last 7 days   Lab Units 04/02/23  1450 04/02/23  0922   WBC Thousand/uL  --  14 56*   HEMOGLOBIN g/dL 13 2 15 8*   HEMATOCRIT % 38 5 44 4   PLATELETS Thousands/uL  --  155   NEUTROS PCT %  --  79*   LYMPHS PCT %  --  14   MONOS PCT %  --  7   EOS PCT %  --  0           Results from last 7 days   Lab Units 04/02/23  0922   SODIUM mmol/L 132*   POTASSIUM mmol/L 3 9   CHLORIDE mmol/L 89*   CO2 mmol/L 28   BUN mg/dL 38*   CREATININE mg/dL 1 59*   ANION GAP mmol/L 15*   CALCIUM mg/dL 10 6*   ALBUMIN g/dL 4 5   TOTAL BILIRUBIN mg/dL 1 12*   ALK PHOS U/L 96   ALT U/L 42   AST U/L 23   GLUCOSE RANDOM mg/dL 264*          Results from last 7 days   Lab Units 04/02/23  1015   POC GLUCOSE mg/dl 213*           Results from last 7 days   Lab Units 03/28/23  1140   HEMOGLOBIN A1C   8 7*           Results from last 7 days   Lab Units 04/02/23  1532   LACTIC ACID mmol/L 1 5         Lines/Drains:      Invasive Devices            Peripheral Intravenous Line  Duration             Peripheral IV 04/02/23 Left;Ventral (anterior) Forearm <1 day     Peripheral IV 04/02/23 Right;Ventral (anterior) Hand <1 day                          Imaging: Reviewed radiology reports from this admission including: abdominal/pelvic CT  CT chest abdomen pelvis w contrast   Final Result by Balwinder Bolanos MD (04/02 1141)       Esophageal wall thickening  Reported history of esophageal ulcers noted  Assessment for signs of esophagitis is recommended                Workstation performed: [id number]                 EKG and Other Studies Reviewed on Admission:   • EKG: NSR   HR 87      ** Please Note: This note has been constructed using a voice recognition system**

## 2023-04-03 NOTE — CASE MANAGEMENT
Case Management Assessment & Discharge Planning Note    Patient name Nataliia Vidal  Location [address] [place] [address] MRN [id number]  : 1950 Date 4/3/2023       Current Admission Date: 2023  Current Admission Diagnosis:Coronary artery disease involving native coronary artery of native heart without angina pectoris   Patient Active Problem List    Diagnosis Date Noted   • Hematemesis 2023   • SIRS (systemic inflammatory response syndrome) ([facility] [address] )    • Metabolic alkalosis    • History of PTCA with stents 10/25/2022   • Platelets decreased ([facility] [address] ) 2022   • Gastroesophageal reflux disease with esophagitis 2022   • Elevated troponin 2022   • LATASHA (acute kidney injury) ([facility] [address] ) 2022   • Type 2 diabetes mellitus with hyperglycemia, with long-term current use of insulin ([facility] ) 2022   • Hypertension 11/15/2021   • Ulcerative colitis ([facility] [address] ) 11/15/2021   • Other hyperlipidemia 11/15/2021   • Osteoporosis 11/15/2021   • Coronary artery disease involving native coronary artery of native heart without angina pectoris 2019   • Ischemic cardiomyopathy 2019   • Pure hypercholesterolemia 2019   • Pulmonary hypertension ([facility] [address] ) 2019   • Emphysema, unspecified ([facility] [address] ) 2019      LOS (days): 1  Geometric Mean LOS (GMLOS) (days):   Days to GMLOS:     OBJECTIVE:    Risk of Unplanned Readmission Score: 13 06      Current admission status: Inpatient     Preferred Pharmacy:   [address] [place] [address] [address] [facility],[address] [place] [id number]  Phone: [phone] Fax: [phone]    Primary Care Provider: Isamar Daley MD    Primary Insurance: [facility]  Secondary Insurance:     ASSESSMENT:  Agatha Mejia Proxies    There are no active Health Care Proxies on file      Readmission Root Cause  30 Day Readmission: No    Patient Information  Admitted from[de-identified] Home  Mental Status: Alert  During Assessment patient was accompanied by: Not accompanied during assessment  Assessment information provided by[de-identified] Patient  Primary Caregiver: Self  Support Systems: Daughter, Nalini Baker, [address] of Residence: [address] [place] [address] do you live in?: [address] entry access options   Select all that apply : Stairs  Number of steps to enter home : 2  Type of Current Residence: Apartment  Floor Level: 1  Upon entering residence, is there a bedroom on the main floor (no further steps)?: Yes  Upon entering residence, is there a bathroom on the main floor (no further steps)?: Yes  In the last 12 months, was there a time when you were not able to pay the mortgage or rent on time?: No  In the last 12 months, how many places have you lived?: 1  In the last 12 months, was there a time when you did not have a steady place to sleep or slept in a shelter (including now)?: No  Homeless/housing insecurity resource given?: N/A  Living Arrangements: Lives Alone (Dtr lives in [place], [place])    Activities of Daily Living Prior to Admission  Functional Status: Independent  Completes ADLs independently?: Yes  Ambulates independently?: Yes  Does patient use assisted devices?: No  Does patient currently own DME?: Yes  What DME does the patient currently own?: Cali Pruitt  Does patient have a history of Outpatient Therapy (PT/OT)?: No  Does the patient have a history of Short-Term Rehab?: No  Does patient have a history of HHC?: No  Does patient currently have [address]?: No     Patient Information Continued  Income Source: Employed (Works PT as [employer]  2x week)  Does patient have prescription coverage?: Yes ([place] [employer] [place], Denies issues with OOP costs/coverge)  Within the past 12 months, you worried that your food would run out before you got the money to buy more : Never true  Within the past 12 months, the food you bought just didn't last and you didn't have money to get more : Never true  Food insecurity resource given?: N/A  Does patient receive dialysis treatments?: No     Means of Transportation  Means of Transport to Appts[de-identified] Drives Self  In the past 12 months, has lack of transportation kept you from medical appointments or from getting medications?: No  In the past 12 months, has lack of transportation kept you from meetings, work, or from getting things needed for daily living?: No  Was application for public transport provided?: N/A    DISCHARGE DETAILS:    Discharge planning discussed with[de-identified] Patient, Dtr Valery Phillips of Choice: Yes  Comments - Freedom of Choice: SW met bedside with patient and spoke via phone with dtr Lionel Griffin to introduce role, complete assessment, and discuss discharge planning needs  SW reviewed therapy's recommendations for patient to return home w/o rehab needs  Pt and dtr both gave verbal understanding and agreement with recommendation as pt is at her functional baseline  Both Marta and patient deny having any questions, concerns, or anticipated needs that SW can assist with at this time    CM contacted family/caregiver?: Yes  Were Treatment Team discharge recommendations reviewed with patient/caregiver?: Yes  Did patient/caregiver verbalize understanding of patient care needs?: Yes  Were patient/caregiver advised of the risks associated with not following Treatment Team discharge recommendations?: Yes    Contacts  Patient Contacts: Margy Rebolledo (dtr)  Relationship to Patient[de-identified] Family  Contact Method: Phone  Phone Number: [phone]  Reason/Outcome: Emergency Contact, Continuity of Care, Discharge [address]         Is the patient interested in [place] [address] at discharge?: No    DME Referral Provided  Referral made for DME?: No     Would you like to participate in our [address] service program?  : No - Declined    Treatment Team Recommendation: Home  Discharge Destination Plan[de-identified] Home  Transport at Discharge : Auto with designated  (Friend Carli Carrera

## 2023-04-03 NOTE — ASSESSMENT & PLAN NOTE
Historical diagnosis, A/C     Likely exacerbated by current hematemesis with coffee-ground findings    · followed by GI outpatient, history of EGD confirming esophagitis and ulceration in lower 3rd  Of esophagus, mild gastritis  · S/p Carafate 1 g, Protonix injection Reglan, Pepcid in ED  • Continue Protonix drip  • GI following: EGD in a

## 2023-04-03 NOTE — CONSULTS
Consultation - Cardiology   [facility]     Cathy Head 81 y o  female MRN: [id number]  : 1950  Unit/Bed#: [address] [place] [address] Encounter: [id number]      Assessment & Plan   1  Hematemesis  - GI following    - Plan for EGD today with GI    - 23 CT chest/abd/pelvis: Esophageal wall thickening  Reported history of esophageal ulcers noted  Assessment for signs of esophagitis is recommended  2  Elevated troponin  - 23 hs troponin: 40 (0hrs), 175 (2hrs), 279 (4hrs)  - Likely non-ischemic myocardial injury secondary to hypertensive urgency and uncontrolled vomiting    - Patient currently denies chest pain, palpitations, or shortness of breath    - Discussed with patient recommendations for TTE in setting of elevated troponin and her cardiac history however she refused stating her Cardiologist Dr Andres Romoite did one recently  Unable to find TTE more recent than 3/8/19    - 23 EKG: Normal sinus rhythm, rate 97 bpm  Possible Left atrial enlargement  Septal infarct , age undetermined  3  Hypertension      - Hypertensive urgency on presentation  Continues to have high BP    - Continue coreg 25 mg twice daily  - Continue clonidine 0 1 mg twice daily    - Does not appear to be on home medications of valsartan 320 mg once daily, HCTZ 12 5 mg daily, or isordil 30 mg twice daily  - Will order losartan 100 mg as alternative to valsartan (non-formulary)  - Continue hydralazine IV prn      4  CAD  - Patient follow with Dr Nayla David for outpatient cardiology  - s/p PCI x 2 (LAD and circumflex on 2019 and RCA on 12/10/19)  - Recommend restarting aspirin 81 mg daily and lipitor 40 mg daily, ordered  5  Ischemic cardiomyopathy      - Patient follow with Dr Nayla David for outpatient cardiology  - 3/18/19 TTE: EF 20-25 %    - Discussed with patient recommendations for TTE in setting of elevated troponin and her cardiac history however she refused stating her Cardiologist Dr Lux Tirado did one recently  Unable to find TTE more recent than 3/8/19    - Does not appear to be on home medications of valsartan 320 mg once daily, HCTZ 12 5 mg daily, or isordil 30 mg twice daily  - Will order losartan 100 mg as alternative to valsartan (non-formulary)  - Will continue to reintroduce home medications as tolerated  6  Chronic systolic congestive heart failure  - Does not appear in acute phase  Patient euvolemic on exam     - 3/18/19 TTE: EF 20-25 %  - Discussed with patient recommendations for TTE in setting of elevated troponin and her cardiac history however she refused stating her Cardiologist Dr Lux Tirado did one recently  Unable to find TTE more recent than 3/8/19    - Does not appear to be on home medications of valsartan 320 mg once daily, HCTZ 12 5 mg daily, or isordil 30 mg twice daily  - Will order losartan 100 mg as alternative to valsartan (non-formulary)  - 4/02/23 CT chest/abd/pelvis: Mild pulmonary emphysema  No focal consolidation  7  Hyperlipidemia  - 3/28/23 lipid panel: cholesterol 182, triglycerides 194, HDL 62, LDL 81    - Recommend restarting lipitor 40 mg daily, ordered  8  DMII      - 3/28/23 HgbA1c: 8 7    - Care pre primary team      Summary of Recommendations: Thank you for your consultation  Physician Requesting Consult: Ester Dejesus MD    Reason for Consult / Principal Problem: elevated troponin  Inpatient consult to Cardiology  Consult performed by: Rasta Leyva PA-C  Consult ordered by: Ester Dejesus MD          HPI: Fernando Self is a 81y o  year old female with PMHx of HTN, CAD s/p PCI x 2 (LAD and circumflex on 4/2019 and RCA on 12/10/19), ischemic cardiomyopathy (EF 20-25 % 3/8/19 TTE), chronic systolic congestive heart failure, HLD, DMII, and GERD who presents for evaluation for uncontrollable vomiting  Patient reports she had been uncontrollably vomiting for almost 24 hours   She states prior to vomiting episodes she was in her normal state of health without issues  She states she had mild sternal chest discomfort with vomiting but denies chest pain, shortness of breath, lightheadedness, or dizziness  Cardiology consulted due to elevated troponin  4/02/23 hs troponin: 40 (0hrs), 175 (2hrs), 279 (4hrs)  Discussed with patient recommendations for TTE in setting of elevated troponin and her cardiac history however she refused stating her Cardiologist Dr Saleh Brought did one recently  Unable to find TTE more recent than 3/8/19  She became agitated when she was told it was recommended to repeat due to increase in troponin, she declined TTE  Review of Systems   Constitutional: Positive for appetite change  Negative for activity change, fatigue and unexpected weight change  Respiratory: Negative for cough, chest tightness, shortness of breath and wheezing  Cardiovascular: Negative for chest pain, palpitations and leg swelling  Gastrointestinal: Positive for nausea and vomiting  Negative for abdominal pain, constipation and diarrhea  Skin: Negative  Neurological: Negative for dizziness, syncope, weakness, light-headedness and numbness  Psychiatric/Behavioral: Positive for agitation         Historical Information   Past Medical History:   Diagnosis Date   • Colitis    • Coronary artery disease    • Diabetes mellitus ([facility] [address] )    • Esophageal ulcer    • Kidney stone    • Neuropathy      Past Surgical History:   Procedure Laterality Date   • ANKLE ARTHROPLASTY Bilateral     bilat hardware post fx surgeries   • COLONOSCOPY     • CORONARY ANGIOPLASTY WITH STENT PLACEMENT  2019    x2 procedures total 3 stents   • LITHOTRIPSY     • OVARIAN CYST SURGERY Right      Social History     Substance and Sexual Activity   Alcohol Use Not Currently     Social History     Substance and Sexual Activity   Drug Use Never     Social History     Tobacco Use   Smoking Status Former   • Years: 34 00   • Types: Cigarettes   • Quit date:    • Years since quittin 2   Smokeless Tobacco Never     Family History:   Family History   Problem Relation Age of Onset   • Stroke Mother    • Heart disease Father        Meds/Allergies    PTA meds:    Medications Prior to Admission   Medication   • aspirin (ECOTRIN LOW STRENGTH) 81 mg EC tablet   • atorvastatin (LIPITOR) 40 mg tablet   • budesonide (ENTOCORT EC) 3 MG capsule   • carvedilol (COREG) 25 mg tablet   • cloNIDine (CATAPRES) 0 1 mg tablet   • clopidogrel (PLAVIX) 75 mg tablet   • Continuous Blood Gluc  (FreeStyle [place] 2 [place]) CORNEL   • dulaglutide (Trulicity) 1 03 ZV/6 7FK injection   • glucose blood ([employer] Glucose Meter Test Strips) test strip   • hydrochlorothiazide (HYDRODIURIL) 12 5 mg tablet   • insulin glargine (LANTUS) 100 units/mL subcutaneous injection   • Insulin Glargine Solostar (Lantus SoloStar) 100 UNIT/ML SOPN   • insulin lispro (HumaLOG KwikPen) 100 units/mL injection pen   • Insulin Pen Needle (UltiCare Mini Pen Needles) 31G X 6 MM MISC   • isosorbide dinitrate (ISORDIL) 30 mg tablet   • pregabalin (LYRICA) 100 mg capsule   • tolterodine (DETROL [place]) 4 mg 24 hr capsule   • valsartan (DIOVAN) 320 MG tablet      Allergies   Allergen Reactions   • Other Rash     Latex tape       Current Facility-Administered Medications:   •  carvedilol (COREG) tablet 25 mg, 25 mg, Oral, BID, Deven Burgess MD, 25 mg at 23 5008  •  cloNIDine (CATAPRES) tablet 0 1 mg, 0 1 mg, Oral, BID, Deven Burgess MD, 0 1 mg at 23 0536  •  hydrALAZINE (APRESOLINE) injection 5 mg, 5 mg, Intravenous, Q4H PRN, Deven Burgess MD, 5 mg at 23 1439  •  insulin lispro (HumaLOG) 100 units/mL subcutaneous injection 1-5 Units, 1-5 Units, Subcutaneous, TID AC **AND** Fingerstick Glucose (POCT), , , TID AC, Deven Burgess MD  •  labetalol (NORMODYNE) injection 10 mg, 10 mg, Intravenous, Q4H PRN, Deven Burgess MD, 10 mg at 23 1737  •  morphine injection 2 mg, 2 mg, Intravenous, Q4H PRN, Pradeep Ndiaye MD, 2 mg at 04/02/23 1405  •  ondansetron [facility]) injection 4 mg, 4 mg, Intravenous, Q6H PRN, Pradeep Ndiaye MD  •  pantoprazole (PROTONIX) 80 mg in sodium chloride 0 9 % 100 mL infusion, 8 mg/hr, Intravenous, Continuous, Abner Santos DO, Last Rate: 10 mL/hr at 04/03/23 1006, 8 mg/hr at 04/03/23 1006  •  sodium chloride 0 9 % infusion, 125 mL/hr, Intravenous, Continuous, Pradeep Ndiaye MD, Last Rate: 125 mL/hr at 04/03/23 0237, 125 mL/hr at 04/03/23 0237  •  sucralfate (CARAFATE) tablet 1 g, 1 g, Oral, BID AC, Pradeep Ndiaye MD, 1 g at 04/03/23 9973  •  trimethobenzamide (TIGAN) IM injection 200 mg, 200 mg, Intramuscular, Q6H PRN, Pradeep Ndiaye MD    VTE Pharmacologic Prophylaxis: none  Objective:   Vitals: Blood pressure 139/70, pulse 81, temperature 98 5 °F (36 9 °C), temperature source Tympanic, resp  rate 18, height 5' (1 524 m), weight 59 kg (130 lb), SpO2 96 %  Body mass index is 25 39 kg/m²  Wt Readings from Last 3 Encounters:   04/03/23 59 kg (130 lb)   03/28/23 59 kg (130 lb)   12/02/22 58 kg (127 lb 12 8 oz)     BP Readings from Last 3 Encounters:   04/03/23 139/70   03/28/23 140/70   12/02/22 (!) 190/88     Orthostatic Blood Pressures    Flowsheet Row Most Recent Value   Blood Pressure 139/70 filed at 04/03/2023 1333   Patient Position - Orthostatic VS Lying filed at 04/03/2023 8859          Intake/Output Summary (Last 24 hours) at 4/3/2023 1436  Last data filed at 4/3/2023 1320  Gross per 24 hour   Intake 1267 08 ml   Output 0 ml   Net 1267 08 ml       Invasive Devices     Peripheral Intravenous Line  Duration           Peripheral IV 04/02/23 Right;Ventral (anterior) Hand 1 day    Peripheral IV 04/02/23 Ventral (anterior); Right Forearm 1 day                  Physical Exam:   Physical Exam  Vitals reviewed  Constitutional:       General: She is not in acute distress  Appearance: She is obese     Cardiovascular:      Rate and Rhythm: Normal rate and regular rhythm  Pulses: Normal pulses  Pulmonary:      Effort: Pulmonary effort is normal  No respiratory distress  Breath sounds: Decreased breath sounds present  Musculoskeletal:      Right lower leg: No edema  Left lower leg: No edema  Skin:     General: Skin is warm and dry  Neurological:      Mental Status: She is alert and oriented to person, place, and time           Labs:   Troponins:  Results from last 7 days   Lab Units 04/02/23  1333 04/02/23  1139   HSTNI D2 ng/L  --  135*   HSTNI D4 ng/L 239*  --        CBC with diff:   Results from last 7 days   Lab Units 04/03/23  0539 04/02/23  1450 04/02/23  0922 03/28/23  1115   WBC Thousand/uL 11 62*  --  14 56* 10 16   HEMOGLOBIN g/dL 13 8 13 2 15 8* 14 7   HEMATOCRIT % 40 9 38 5 44 4 43 8   MCV fL 87  --  82 85   PLATELETS Thousands/uL 108*  --  155 145*   MCH pg 29 2  --  29 0 28 5   MCHC g/dL 33 7  --  35 6 33 6   RDW % 14 3  --  13 5 13 9   MPV fL 9 8  --  9 7 10 7   NRBC AUTO /100 WBCs 0  --  0 0       CMP:   Results from last 7 days   Lab Units 04/03/23  0539 04/02/23  0922 03/28/23  1115   SODIUM mmol/L 137 132* 133*   POTASSIUM mmol/L 4 4 3 9 3 9   CHLORIDE mmol/L 103 89* 101   CO2 mmol/L 27 28 29   ANION GAP mmol/L 7 15* 3*   BUN mg/dL 23 38* 40*   CREATININE mg/dL 1 51* 1 59* 1 53*   GLUCOSE FASTING mg/dL  --   --  54*   CALCIUM mg/dL 8 8 10 6* 10 8*   AST U/L 18 23 42   ALT U/L 25 42 94*   ALK PHOS U/L 73 96 106   TOTAL PROTEIN g/dL 6 1* 7 7 8 2   ALBUMIN g/dL 3 5 4 5 4 5   TOTAL BILIRUBIN mg/dL 0 91 1 12* 0 57   EGFR ml/min/1 73sq m 34 32 34   GLUCOSE RANDOM mg/dL 166* 264*  --        Magnesium:   Results from last 7 days   Lab Units 04/02/23  0922   MAGNESIUM mg/dL 1 8*     Coags:     TSH:      No components found for: TSH3  Lipid Profile:   Results from last 7 days   Lab Units 03/28/23  1115   CHOLESTEROL mg/dL 182   TRIGLYCERIDES mg/dL 194*   HDL mg/dL 62   LDL CALC mg/dL 81     Lipid Profile:   Lab Results   Component Value Date CHOLESTEROL 182 03/28/2023    HDL 62 03/28/2023    LDLCALC 81 03/28/2023    TRIG 194 (H) 03/28/2023     Hgb A1c:   Results from last 7 days   Lab Units 03/28/23  1140   HEMOGLOBIN A1C  8 7*     NT-proBNP: No results for input(s): NTBNP in the last 72 hours  Imaging & Testing     Cardiac testing:   No results found for this or any previous visit  Imaging: I have personally reviewed pertinent reports  EGD    Result Date: 4/3/2023  Narrative: Table formatting from the original result was not included  [address] [facility] Operating Room [address] [facility] [address] [phone] DATE OF SERVICE: 4/03/23 PHYSICIAN(S): Attending: No Staff Documented Fellow: No Staff Documented INDICATION: Intractable nausea and vomiting, Hematemesis, unspecified whether nausea present POST-OP DIAGNOSIS: See the impression below  PREPROCEDURE: Informed consent was obtained for the procedure, including sedation  Risks of perforation, hemorrhage, adverse drug reaction and aspiration were discussed  The patient was placed in the left lateral decubitus position  Patient was explained about the risks and benefits of the procedure  Risks including but not limited to bleeding, infection, and perforation were explained in detail  Also explained about less than 100% sensitivity with the exam and other alternatives  PROCEDURE: EGD DETAILS OF PROCEDURE: Patient was taken to the procedure room where a time out was performed to confirm correct patient and correct procedure  The patient underwent monitored anesthesia care, which was administered by an anesthesia professional  The patient's blood pressure, heart rate, level of consciousness, respirations and oxygen were monitored throughout the procedure  The scope was advanced to the second part of the duodenum  Retroflexion was performed in the fundus  The patient experienced no blood loss  The procedure was not difficult  The patient tolerated the procedure well   There were no apparent complications  ANESTHESIA INFORMATION: ASA: III Anesthesia Type: IV Sedation with Anesthesia MEDICATIONS: No administrations occurring from 1301 to 1323 on 04/03/23 FINDINGS: Edematous, erythematous and friable mucosa in the 2nd part of the duodenum; performed cold forceps biopsy Erythematous mucosa in the antrum; performed cold forceps biopsy Ulcerative esophagitis extending from 37 cm to 20 cm encompassing about three fourths of the esophagus  Several biopsies obtained  SPECIMENS: ID Type Source Tests Collected by Time Destination 1 : Bx Inflamed Mucosa, Duodenum Tissue Duodenum TISSUE EXAM Mouna Huerta MD 4/3/2023  1:16 PM  2 : Bx Antrum, r/o h  pylori Tissue Stomach TISSUE EXAM Mouna Huerta MD 4/3/2023  1:17 PM  3 : Bx Ulcerative Esophagus Tissue Esophagus TISSUE EXAM Mouna Huerta MD 4/3/2023  1:20 PM      Impression: Severe ulcerative esophagitis, gastritis, some duodenitis RECOMMENDATION: Clear liquids room temperature, PPI twice daily, Carafate, repeat EGD 2 months   No Staff Documented     CT chest abdomen pelvis w contrast    Result Date: 4/2/2023  Narrative: CT CHEST, ABDOMEN AND PELVIS WITH IV CONTRAST INDICATION:   chest pain  COMPARISON:  None  TECHNIQUE: CT examination of the chest, abdomen and pelvis was performed  In addition to portal venous phase postcontrast scanning through the abdomen and pelvis, delayed phase postcontrast scanning was performed through the upper abdominal viscera  Multiplanar 2D reformatted images were created from the source data  Radiation dose length product (DLP) for this visit:  521 65 mGy-cm   This examination, like all CT scans performed in the [facility], was performed utilizing techniques to minimize radiation dose exposure, including the use of iterative  reconstruction and automated exposure control  IV Contrast:  100 mL of iohexol (OMNIPAQUE) Enteric Contrast: Enteric contrast was not administered  FINDINGS: CHEST LUNGS:  Mild pulmonary emphysema  No focal consolidation  PLEURA:  Unremarkable  HEART/GREAT VESSELS: Normal heart size  Coronary artery calcifications and atherosclerotic calcifications of the aorta  No thoracic aortic aneurysm  MEDIASTINUM AND WESLEY:  Esophageal wall thickening  No mediastinal or hilar lymphadenopathy  CHEST WALL AND LOWER NECK:  Unremarkable  ABDOMEN LIVER/BILIARY TREE:  Unremarkable  GALLBLADDER:  No calcified gallstones  No pericholecystic inflammatory change  SPLEEN:  Unremarkable  PANCREAS:  Mild diffuse pancreatic atrophy  Calcifications most predominant in the pancreatic head  Nondilated main pancreatic duct  No acute peripancreatic inflammation  ADRENAL GLANDS:  Unremarkable  KIDNEYS/URETERS:  One or more simple renal cyst(s) is noted  Otherwise unremarkable kidneys  No hydronephrosis  STOMACH AND BOWEL:  There is colonic diverticulosis without evidence of acute diverticulitis  APPENDIX:  A normal appendix was visualized  ABDOMINOPELVIC CAVITY:  No ascites  No pneumoperitoneum  No lymphadenopathy  VESSELS:  Atherosclerotic changes are present  No evidence of aneurysm  PELVIS REPRODUCTIVE ORGANS:  Unremarkable for patient's age  URINARY BLADDER:  Unremarkable  ABDOMINAL WALL/INGUINAL REGIONS:  Unremarkable  OSSEOUS STRUCTURES:  No acute fracture or destructive osseous lesion  Spinal degenerative changes are noted  Impression: Esophageal wall thickening  Reported history of esophageal ulcers noted  Assessment for signs of esophagitis is recommended  Workstation performed: [id number]       EKG/ Monitor: Personally reviewed       Telemetry: sinus rhythm, rate 87 bpm       Code Status: Level 1 - Full Code  Advance Directive and Living Will:      POLST:        Carolee Stearns PA-C

## 2023-04-03 NOTE — PLAN OF CARE
Problem: MOBILITY - ADULT  Goal: Maintain or return to baseline ADL function  Description: INTERVENTIONS:  -  Assess patient's ability to carry out ADLs; assess patient's baseline for ADL function and identify physical deficits which impact ability to perform ADLs (bathing, care of mouth/teeth, toileting, grooming, dressing, etc )  - Assess/evaluate cause of self-care deficits   - Assess range of motion  - Assess patient's mobility; develop plan if impaired  - Assess patient's need for assistive devices and provide as appropriate  - Encourage maximum independence but intervene and supervise when necessary  - Involve family in performance of ADLs  - Assess for home care needs following discharge   - Consider OT consult to assist with ADL evaluation and planning for discharge  - Provide patient education as appropriate  4/3/2023 0803 by Melanie Ulloa RN  Outcome: Progressing  4/2/2023 1811 by Melanie Ulloa RN  Outcome: Progressing  Goal: Maintains/Returns to pre admission functional level  Description: INTERVENTIONS:  - Perform BMAT or MOVE assessment daily    - Set and communicate daily mobility goal to care team and patient/family/caregiver  - Collaborate with rehabilitation services on mobility goals if consulted  - Perform Range of Motion 4 times a day  - Reposition patient every 2 hours    - Dangle patient 2 times a day  - Stand patient 2 times a day  - Ambulate patient 3 times a day  - Out of bed to chair 2 times a day   - Out of bed for meals 2 times a day  - Out of bed for toileting  - Record patient progress and toleration of activity level   4/3/2023 0803 by Melanie Ulloa RN  Outcome: Progressing  4/2/2023 1811 by Melanie Ulloa RN  Outcome: Progressing     Problem: PAIN - ADULT  Goal: Verbalizes/displays adequate comfort level or baseline comfort level  Description: Interventions:  - Encourage patient to monitor pain and request assistance  - Assess pain using appropriate pain scale  - Administer analgesics based on type and severity of pain and evaluate response  - Implement non-pharmacological measures as appropriate and evaluate response  - Consider cultural and social influences on pain and pain management  - Notify physician/advanced practitioner if interventions unsuccessful or patient reports new pain  4/3/2023 0803 by Sheryl Melara RN  Outcome: Progressing  4/2/2023 1811 by Sheryl Melara RN  Outcome: Progressing     Problem: INFECTION - ADULT  Goal: Absence or prevention of progression during hospitalization  Description: INTERVENTIONS:  - Assess and monitor for signs and symptoms of infection  - Monitor lab/diagnostic results  - Monitor all insertion sites, i e  indwelling lines, tubes, and drains  - Monitor endotracheal if appropriate and nasal secretions for changes in amount and color  - [place] appropriate cooling/warming therapies per order  - Administer medications as ordered  - Instruct and encourage patient and family to use good hand hygiene technique  - Identify and instruct in appropriate isolation precautions for identified infection/condition  4/3/2023 0803 by Sheryl Melara RN  Outcome: Progressing  4/2/2023 1811 by Sheryl Melara RN  Outcome: Progressing  Goal: Absence of fever/infection during neutropenic period  Description: INTERVENTIONS:  - Monitor WBC    4/3/2023 0803 by Sheryl Melara RN  Outcome: Progressing  4/2/2023 1811 by Sheryl Melara RN  Outcome: Progressing     Problem: SAFETY ADULT  Goal: Maintain or return to baseline ADL function  Description: INTERVENTIONS:  -  Assess patient's ability to carry out ADLs; assess patient's baseline for ADL function and identify physical deficits which impact ability to perform ADLs (bathing, care of mouth/teeth, toileting, grooming, dressing, etc )  - Assess/evaluate cause of self-care deficits   - Assess range of motion  - Assess patient's mobility; develop plan if impaired  - Assess patient's need for assistive devices and provide as appropriate  - Encourage maximum independence but intervene and supervise when necessary  - Involve family in performance of ADLs  - Assess for home care needs following discharge   - Consider OT consult to assist with ADL evaluation and planning for discharge  - Provide patient education as appropriate  4/3/2023 0803 by Thais Whitaker RN  Outcome: Progressing  4/2/2023 1811 by Thais Whitaker RN  Outcome: Progressing  Goal: Maintains/Returns to pre admission functional level  Description: INTERVENTIONS:  - Perform BMAT or MOVE assessment daily    - Set and communicate daily mobility goal to care team and patient/family/caregiver  - Collaborate with rehabilitation services on mobility goals if consulted  - Perform Range of Motion 4 times a day  - Reposition patient every 2 hours    - Dangle patient 2 times a day  - Stand patient 2 times a day  - Ambulate patient 3 times a day  - Out of bed to chair 2 times a day   - Out of bed for meals 2 times a day  - Out of bed for toileting  - Record patient progress and toleration of activity level   4/3/2023 0803 by Thais Whitaker RN  Outcome: Progressing  4/2/2023 1811 by Thais Whitaker RN  Outcome: Progressing  Goal: Patient will remain free of falls  Description: INTERVENTIONS:  - Educate patient/family on patient safety including physical limitations  - Instruct patient to call for assistance with activity   - Consult OT/PT to assist with strengthening/mobility   - Keep Call bell within reach  - Keep bed low and locked with side rails adjusted as appropriate  - Keep care items and personal belongings within reach  - Initiate and maintain comfort rounds  - Make Fall Risk Sign visible to staff  - Offer Toileting every 2 Hours, in advance of need  - Initiate/Maintain bed alarm  - Obtain necessary fall risk management equipment: alarms, walker bedside  - Apply yellow socks and bracelet for high fall risk patients  - Consider moving patient to room near nurses station  4/3/2023 0803 by Alberto Espinoza RN  Outcome: Progressing  4/2/2023 1811 by Alberto Espinoza RN  Outcome: Progressing     Problem: DISCHARGE PLANNING  Goal: Discharge to home or other facility with appropriate resources  Description: INTERVENTIONS:  - Identify barriers to discharge w/patient and caregiver  - Arrange for needed discharge resources and transportation as appropriate  - Identify discharge learning needs (meds, wound care, etc )  - Arrange for interpretive services to assist at discharge as needed  - Refer to Case Management Department for coordinating discharge planning if the patient needs post-hospital services based on physician/advanced practitioner order or complex needs related to functional status, cognitive ability, or social support system  4/3/2023 0803 by Alberto Espinoza RN  Outcome: Progressing  4/2/2023 1811 by Alberto Espinoza RN  Outcome: Progressing     Problem: Knowledge Deficit  Goal: Patient/family/caregiver demonstrates understanding of disease process, treatment plan, medications, and discharge instructions  Description: Complete learning assessment and assess knowledge base    Interventions:  - Provide teaching at level of understanding  - Provide teaching via preferred learning methods  4/3/2023 0803 by Alberto Espinoza RN  Outcome: Progressing  4/2/2023 1811 by Alberto Espinoza RN  Outcome: Progressing     Problem: GASTROINTESTINAL - ADULT  Goal: Minimal or absence of nausea and/or vomiting  Description: INTERVENTIONS:  - Administer IV fluids if ordered to ensure adequate hydration  - Maintain NPO status until nausea and vomiting are resolved  - Nasogastric tube if ordered  - Administer ordered antiemetic medications as needed  - Provide nonpharmacologic comfort measures as appropriate  - Advance diet as tolerated, if ordered  - Consider nutrition services referral to assist patient with adequate nutrition and appropriate food choices  4/3/2023 0803 by Thais Whitaker RN  Outcome: Progressing  4/2/2023 1811 by Thais Whitaker RN  Outcome: Progressing

## 2023-04-03 NOTE — INTERVAL H&P NOTE
H&P reviewed  After examining the patient I find no changes in the patients condition since the H&P had been written      Vitals:    04/03/23 1244   BP: (!) 208/81   Pulse: 85   Resp: 18   Temp: 98 5 °F (36 9 °C)   SpO2: 96%

## 2023-04-03 NOTE — OCCUPATIONAL THERAPY NOTE
Occupational Therapy Screen       04/03/23 1210   Note Type   Note type Screen   Additional Comments Patient is currently independent in all ADLs and mobility at this time, no skilled inpatient OT services indicated  OT orders to be discharged     Licensure   [place] License Number  [place], [place] [id number]

## 2023-04-03 NOTE — ASSESSMENT & PLAN NOTE
History of CAD status post PCI x3 in 2019  · Follows up with Cardiology     · Restart after EGD aspirin, Plavix, carvedilol, Lipitor

## 2023-04-04 LAB
ALBUMIN SERPL BCP-MCNC: 3.4 G/DL (ref 3.5–5)
ALP SERPL-CCNC: 58 U/L (ref 34–104)
ALT SERPL W P-5'-P-CCNC: 21 U/L (ref 7–52)
ANION GAP SERPL CALCULATED.3IONS-SCNC: 8 MMOL/L (ref 4–13)
AST SERPL W P-5'-P-CCNC: 20 U/L (ref 13–39)
BASOPHILS # BLD AUTO: 0.02 THOUSANDS/ÂΜL (ref 0–0.1)
BASOPHILS NFR BLD AUTO: 0 % (ref 0–1)
BILIRUB SERPL-MCNC: 1 MG/DL (ref 0.2–1)
BUN SERPL-MCNC: 20 MG/DL (ref 5–25)
CALCIUM ALBUM COR SERPL-MCNC: 8.7 MG/DL (ref 8.3–10.1)
CALCIUM SERPL-MCNC: 8.2 MG/DL (ref 8.4–10.2)
CHLORIDE SERPL-SCNC: 107 MMOL/L (ref 96–108)
CO2 SERPL-SCNC: 23 MMOL/L (ref 21–32)
CREAT SERPL-MCNC: 1.17 MG/DL (ref 0.6–1.3)
EOSINOPHIL # BLD AUTO: 0.12 THOUSAND/ÂΜL (ref 0–0.61)
EOSINOPHIL NFR BLD AUTO: 2 % (ref 0–6)
ERYTHROCYTE [DISTWIDTH] IN BLOOD BY AUTOMATED COUNT: 14 % (ref 11.6–15.1)
GFR SERPL CREATININE-BSD FRML MDRD: 47 ML/MIN/1.73SQ M
GLUCOSE SERPL-MCNC: 130 MG/DL (ref 65–140)
GLUCOSE SERPL-MCNC: 134 MG/DL (ref 65–140)
GLUCOSE SERPL-MCNC: 150 MG/DL (ref 65–140)
GLUCOSE SERPL-MCNC: 150 MG/DL (ref 65–140)
GLUCOSE SERPL-MCNC: 159 MG/DL (ref 65–140)
GLUCOSE SERPL-MCNC: 162 MG/DL (ref 65–140)
HCT VFR BLD AUTO: 36.4 % (ref 34.8–46.1)
HGB BLD-MCNC: 12.2 G/DL (ref 11.5–15.4)
IMM GRANULOCYTES # BLD AUTO: 0.03 THOUSAND/UL (ref 0–0.2)
IMM GRANULOCYTES NFR BLD AUTO: 0 % (ref 0–2)
LYMPHOCYTES # BLD AUTO: 1.41 THOUSANDS/ÂΜL (ref 0.6–4.47)
LYMPHOCYTES NFR BLD AUTO: 20 % (ref 14–44)
MCH RBC QN AUTO: 29 PG (ref 26.8–34.3)
MCHC RBC AUTO-ENTMCNC: 33.5 G/DL (ref 31.4–37.4)
MCV RBC AUTO: 87 FL (ref 82–98)
MONOCYTES # BLD AUTO: 0.69 THOUSAND/ÂΜL (ref 0.17–1.22)
MONOCYTES NFR BLD AUTO: 10 % (ref 4–12)
NEUTROPHILS # BLD AUTO: 4.97 THOUSANDS/ÂΜL (ref 1.85–7.62)
NEUTS SEG NFR BLD AUTO: 68 % (ref 43–75)
NRBC BLD AUTO-RTO: 0 /100 WBCS
PLATELET # BLD AUTO: 96 THOUSANDS/UL (ref 149–390)
PMV BLD AUTO: 10.2 FL (ref 8.9–12.7)
POTASSIUM SERPL-SCNC: 3.5 MMOL/L (ref 3.5–5.3)
PROT SERPL-MCNC: 5.7 G/DL (ref 6.4–8.4)
RBC # BLD AUTO: 4.2 MILLION/UL (ref 3.81–5.12)
SODIUM SERPL-SCNC: 138 MMOL/L (ref 135–147)
WBC # BLD AUTO: 7.24 THOUSAND/UL (ref 4.31–10.16)

## 2023-04-04 RX ORDER — ASPIRIN 81 MG/1
324 TABLET, CHEWABLE ORAL ONCE
Status: COMPLETED | OUTPATIENT
Start: 2023-04-04 | End: 2023-04-04

## 2023-04-04 RX ORDER — CLONIDINE HYDROCHLORIDE 0.1 MG/1
0.2 TABLET ORAL 2 TIMES DAILY
Status: DISCONTINUED | OUTPATIENT
Start: 2023-04-04 | End: 2023-04-05 | Stop reason: HOSPADM

## 2023-04-04 RX ORDER — HYDROCHLOROTHIAZIDE 12.5 MG/1
12.5 TABLET ORAL DAILY
Status: DISCONTINUED | OUTPATIENT
Start: 2023-04-04 | End: 2023-04-05 | Stop reason: HOSPADM

## 2023-04-04 RX ORDER — PANTOPRAZOLE SODIUM 40 MG/10ML
40 INJECTION, POWDER, LYOPHILIZED, FOR SOLUTION INTRAVENOUS EVERY 12 HOURS SCHEDULED
Status: DISCONTINUED | OUTPATIENT
Start: 2023-04-04 | End: 2023-04-05 | Stop reason: HOSPADM

## 2023-04-04 RX ORDER — INSULIN LISPRO 100 [IU]/ML
4 INJECTION, SOLUTION INTRAVENOUS; SUBCUTANEOUS
Status: DISCONTINUED | OUTPATIENT
Start: 2023-04-04 | End: 2023-04-05 | Stop reason: HOSPADM

## 2023-04-04 RX ORDER — ISOSORBIDE DINITRATE 10 MG/1
30 TABLET ORAL 2 TIMES DAILY
Status: DISCONTINUED | OUTPATIENT
Start: 2023-04-04 | End: 2023-04-05 | Stop reason: HOSPADM

## 2023-04-04 RX ORDER — INSULIN GLARGINE 100 [IU]/ML
30 INJECTION, SOLUTION SUBCUTANEOUS
Status: DISCONTINUED | OUTPATIENT
Start: 2023-04-04 | End: 2023-04-05 | Stop reason: HOSPADM

## 2023-04-04 RX ORDER — PREGABALIN 100 MG/1
100 CAPSULE ORAL 2 TIMES DAILY
Status: DISCONTINUED | OUTPATIENT
Start: 2023-04-04 | End: 2023-04-05 | Stop reason: HOSPADM

## 2023-04-04 RX ORDER — CLONIDINE HYDROCHLORIDE 0.1 MG/1
0.1 TABLET ORAL ONCE
Status: COMPLETED | OUTPATIENT
Start: 2023-04-04 | End: 2023-04-04

## 2023-04-04 RX ORDER — SUCRALFATE 1 G/1
1 TABLET ORAL
Status: DISCONTINUED | OUTPATIENT
Start: 2023-04-05 | End: 2023-04-05 | Stop reason: HOSPADM

## 2023-04-04 RX ADMIN — INSULIN LISPRO 1 UNITS: 100 INJECTION, SOLUTION INTRAVENOUS; SUBCUTANEOUS at 09:07

## 2023-04-04 RX ADMIN — INSULIN LISPRO 1 UNITS: 100 INJECTION, SOLUTION INTRAVENOUS; SUBCUTANEOUS at 11:54

## 2023-04-04 RX ADMIN — ISOSORBIDE DINITRATE 30 MG: 10 TABLET ORAL at 13:26

## 2023-04-04 RX ADMIN — SODIUM CHLORIDE 125 ML/HR: 0.9 INJECTION, SOLUTION INTRAVENOUS at 09:11

## 2023-04-04 RX ADMIN — CLONIDINE HYDROCHLORIDE 0.1 MG: 0.1 TABLET ORAL at 16:13

## 2023-04-04 RX ADMIN — SODIUM CHLORIDE 125 ML/HR: 0.9 INJECTION, SOLUTION INTRAVENOUS at 00:24

## 2023-04-04 RX ADMIN — PREGABALIN 100 MG: 100 CAPSULE ORAL at 17:38

## 2023-04-04 RX ADMIN — HYDROCHLOROTHIAZIDE 12.5 MG: 12.5 TABLET ORAL at 13:26

## 2023-04-04 RX ADMIN — PANTOPRAZOLE SODIUM 40 MG: 40 INJECTION, POWDER, FOR SOLUTION INTRAVENOUS at 17:38

## 2023-04-04 RX ADMIN — INSULIN LISPRO 4 UNITS: 100 INJECTION, SOLUTION INTRAVENOUS; SUBCUTANEOUS at 16:15

## 2023-04-04 RX ADMIN — CLONIDINE HYDROCHLORIDE 0.1 MG: 0.1 TABLET ORAL at 09:06

## 2023-04-04 RX ADMIN — SUCRALFATE 1 G: 1 TABLET ORAL at 06:24

## 2023-04-04 RX ADMIN — SODIUM CHLORIDE 8 MG/HR: 9 INJECTION, SOLUTION INTRAVENOUS at 00:19

## 2023-04-04 RX ADMIN — CARVEDILOL 25 MG: 25 TABLET, FILM COATED ORAL at 21:21

## 2023-04-04 RX ADMIN — ASPIRIN 81 MG CHEWABLE TABLET 324 MG: 81 TABLET CHEWABLE at 16:12

## 2023-04-04 RX ADMIN — SODIUM CHLORIDE 125 ML/HR: 0.9 INJECTION, SOLUTION INTRAVENOUS at 09:06

## 2023-04-04 RX ADMIN — SODIUM CHLORIDE 8 MG/HR: 9 INJECTION, SOLUTION INTRAVENOUS at 10:48

## 2023-04-04 RX ADMIN — SUCRALFATE 1 G: 1 TABLET ORAL at 16:13

## 2023-04-04 RX ADMIN — HYDRALAZINE HYDROCHLORIDE 5 MG: 20 INJECTION INTRAMUSCULAR; INTRAVENOUS at 10:50

## 2023-04-04 RX ADMIN — ISOSORBIDE DINITRATE 30 MG: 10 TABLET ORAL at 21:21

## 2023-04-04 RX ADMIN — INSULIN LISPRO 1 UNITS: 100 INJECTION, SOLUTION INTRAVENOUS; SUBCUTANEOUS at 16:15

## 2023-04-04 RX ADMIN — CARVEDILOL 25 MG: 25 TABLET, FILM COATED ORAL at 09:07

## 2023-04-04 RX ADMIN — LOSARTAN POTASSIUM 100 MG: 50 TABLET, FILM COATED ORAL at 09:06

## 2023-04-04 RX ADMIN — PREGABALIN 100 MG: 100 CAPSULE ORAL at 13:26

## 2023-04-04 RX ADMIN — CLONIDINE HYDROCHLORIDE 0.2 MG: 0.1 TABLET ORAL at 21:25

## 2023-04-04 RX ADMIN — ATORVASTATIN CALCIUM 40 MG: 40 TABLET, FILM COATED ORAL at 16:13

## 2023-04-04 RX ADMIN — INSULIN GLARGINE 30 UNITS: 100 INJECTION, SOLUTION SUBCUTANEOUS at 21:22

## 2023-04-04 NOTE — ASSESSMENT & PLAN NOTE
As evident by with leucocytosis, tachycardia,   Remains afebrile, leukocytosis improved  · Blood cultures- negative  · Presentation likely secondary to acute vomiting and inflammatory markers  · LA-negative, Pro-Otf- normal

## 2023-04-04 NOTE — ASSESSMENT & PLAN NOTE
Asymptomatic  · Troponin elevated to 175, repeat 279  • Non MI troponin elevation secondary to uncontrolled hypertension  • history of multiple stents in place per patient  • EKG NSR, HR 87  Telemetry without any arrhythmia  • Cardiac recs: TTE D/T elevated troponin and her cardiac Hx, patient refused echo and TTE stating her Cardiologist Dr Suyapa Tavera did one recently    • Optimize blood pressure control  • Follow-up cardiology recommendation

## 2023-04-04 NOTE — ASSESSMENT & PLAN NOTE
Historical diagnosis   Likely exacerbated by current hematemesis with coffee-ground findings    · followed by GI outpatient, history of EGD confirming esophagitis and ulceration in lower 3rd  Of esophagus, mild gastritis  • Status post EGD, 4/3-revealed severe ulcerative esophagitis, gastritis and some duodenitis  • Continue IV Protonix, changed to twice daily  • Clear liquid diet, advance as tolerated  • Follow-up pathology  • Follow-up with GI

## 2023-04-04 NOTE — PROGRESS NOTES
Progress Note - Erica Garcia 81 y o  female MRN: [id number]    Unit/Bed#: [address] [place] [address] Encounter: [id number]        Assessment/Plan:  80 y/o female with acute onset of nausea, vomiting, abdominal pain, chills and leukocytosis, CT scan showed esophageal wall thickening   -Underwent EGD yesterday which showed severe ulcerative esophagitis, gastritis and some duodenitis  -Continue PPI twice daily Carafate and will need repeat EGD in 2 months  -Follow-up pathology  Patient started on clear liquids, can advance diet as tolerated    Subjective:   Patient is lying in bed and she reports that she is doing well and she is eager to go home and denies any nausea or vomiting and has not had a bowel movement in a couple days since she has not been eating      Objective:     Vitals: /80   Pulse 80   Temp 97 7 °F (36 5 °C)   Resp 18   Ht 5' (1 524 m)   Wt 59 kg (130 lb)   SpO2 93%   BMI 25 39 kg/m²       Physical Exam:  Gen-alert no acute distress  Abd-positive bowel sounds nontender nondistended no rebound rigidity guarding       Lab, Imaging and other studies:   Recent Results (from the past 72 hour(s))   CBC and differential    Collection Time: 04/02/23  9:22 AM   Result Value Ref Range    WBC 14 56 (H) 4 31 - 10 16 Thousand/uL    RBC 5 44 (H) 3 81 - 5 12 Million/uL    Hemoglobin 15 8 (H) 11 5 - 15 4 g/dL    Hematocrit 44 4 34 8 - 46 1 %    MCV 82 82 - 98 fL    MCH 29 0 26 8 - 34 3 pg    MCHC 35 6 31 4 - 37 4 g/dL    RDW 13 5 11 6 - 15 1 %    MPV 9 7 8 9 - 12 7 fL    Platelets 304 222 - 209 Thousands/uL    nRBC 0 /100 WBCs    Neutrophils Relative 79 (H) 43 - 75 %    Immat GRANS % 0 0 - 2 %    Lymphocytes Relative 14 14 - 44 %    Monocytes Relative 7 4 - 12 %    Eosinophils Relative 0 0 - 6 %    Basophils Relative 0 0 - 1 %    Neutrophils Absolute 11 45 (H) 1 85 - 7 62 Thousands/µL    Immature Grans Absolute 0 06 0 00 - 0 20 Thousand/uL    Lymphocytes Absolute 2 00 0 60 - 4 47 Thousands/µL    Monocytes Absolute "0 96 0 17 - 1 22 Thousand/µL    Eosinophils Absolute 0 06 0 00 - 0 61 Thousand/µL    Basophils Absolute 0 03 0 00 - 0 10 Thousands/µL   Comprehensive metabolic panel    Collection Time: 04/02/23  9:22 AM   Result Value Ref Range    Sodium 132 (L) 135 - 147 mmol/L    Potassium 3 9 3 5 - 5 3 mmol/L    Chloride 89 (L) 96 - 108 mmol/L    CO2 28 21 - 32 mmol/L    ANION GAP 15 (H) 4 - 13 mmol/L    BUN 38 (H) 5 - 25 mg/dL    Creatinine 1 59 (H) 0 60 - 1 30 mg/dL    Glucose 264 (H) 65 - 140 mg/dL    Calcium 10 6 (H) 8 4 - 10 2 mg/dL    AST 23 13 - 39 U/L    ALT 42 7 - 52 U/L    Alkaline Phosphatase 96 34 - 104 U/L    Total Protein 7 7 6 4 - 8 4 g/dL    Albumin 4 5 3 5 - 5 0 g/dL    Total Bilirubin 1 12 (H) 0 20 - 1 00 mg/dL    eGFR 32 ml/min/1 73sq m   Magnesium    Collection Time: 04/02/23  9:22 AM   Result Value Ref Range    Magnesium 1 8 (L) 1 9 - 2 7 mg/dL   Lipase    Collection Time: 04/02/23  9:22 AM   Result Value Ref Range    Lipase 49 11 - 82 u/L   HS Troponin 0hr (reflex protocol)    Collection Time: 04/02/23  9:22 AM   Result Value Ref Range    hs TnI 0hr 40 \"Refer to ACS Flowchart\"- see link ng/L   Blood gas, venous    Collection Time: 04/02/23  9:22 AM   Result Value Ref Range    pH, Rodrigo 7 550 (H) 7 300 - 7 400    pCO2, Rodrigo 34 0 (L) 42 0 - 50 0 mm Hg    pO2, Rodrigo 41 7 35 0 - 45 0 mm Hg    HCO3, Rodrigo 29 1 24 - 30 mmol/L    Base Excess, Rodrigo 6 9 mmol/L    O2 Content, Rodrigo 18 3 ml/dL    O2 HGB, VENOUS 80 6 (H) 60 0 - 80 0 %   Beta Hydroxybutyrate    Collection Time: 04/02/23  9:22 AM   Result Value Ref Range    BETA-HYDROXYBUTYRATE 0 4 <0 6 mmol/L   Procalcitonin    Collection Time: 04/02/23  9:22 AM   Result Value Ref Range    Procalcitonin <0 05 <=0 25 ng/ml   FLU/RSV/COVID - if FLU/RSV clinically relevant    Collection Time: 04/02/23  9:31 AM    Specimen: Nose; Nares   Result Value Ref Range    SARS-CoV-2 Negative Negative    INFLUENZA A PCR Negative Negative    INFLUENZA B PCR Negative Negative    RSV PCR Negative " "Negative   ECG 12 lead    Collection Time: 04/02/23  9:44 AM   Result Value Ref Range    Ventricular Rate 97 BPM    Atrial Rate 97 BPM    TN Interval 174 ms    QRSD Interval 92 ms    QT Interval 374 ms    QTC Interval 474 ms    P Axis 59 degrees    QRS Axis 37 degrees    T Wave Axis 63 degrees   Fingerstick Glucose (POCT)    Collection Time: 04/02/23 10:15 AM   Result Value Ref Range    POC Glucose 213 (H) 65 - 140 mg/dl   HS Troponin I 2hr    Collection Time: 04/02/23 11:39 AM   Result Value Ref Range    hs TnI 2hr 175 (H) \"Refer to ACS Flowchart\"- see link ng/L    Delta 2hr hsTnI 135 (H) <20 ng/L   UA w Reflex to Microscopic w Reflex to Culture    Collection Time: 04/02/23 11:40 AM    Specimen: Urine, Clean Catch   Result Value Ref Range    Color, UA Light Yellow     Clarity, UA Clear     Specific [place], UA 1 010 1 000 - 1 030    pH, UA 8 0 5 0, 5 5, 6 0, 6 5, 7 0, 7 5, 8 0, 8 5, 9 0    Leukocytes, UA (A) Negative     Elevated glucose may cause decreased leukocyte values  See urine microscopic for [facility] result/    Nitrite, UA Negative Negative    Protein, UA 30 (1+) (A) Negative mg/dl    Glucose, UA >=1000 (1%) (A) Negative mg/dl    Ketones, UA Negative Negative mg/dl    Urobilinogen, UA 0 2 0 2, 1 0 E U /dl E U /dl    Bilirubin, UA Negative Negative    Occult Blood, UA Trace-Intact (A) Negative   Urine Microscopic    Collection Time: 04/02/23 11:40 AM   Result Value Ref Range    RBC, UA 1-2 None Seen, 0-1, 1-2, 2-4, 0-5 /hpf    WBC, UA 0-1 None Seen, 0-1, 1-2, 0-5, 2-4 /hpf    Epithelial Cells Occasional None Seen, Occasional /hpf    Bacteria, UA None Seen None Seen, Occasional /hpf   HS Troponin I 4hr    Collection Time: 04/02/23  1:33 PM   Result Value Ref Range    hs TnI 4hr 279 (H) \"Refer to ACS Flowchart\"- see link ng/L    Delta 4hr hsTnI 239 (H) <20 ng/L   Blood culture #1    Collection Time: 04/02/23  2:38 PM    Specimen: Arm, Left; Blood   Result Value Ref Range    Blood Culture No Growth at 24 hrs    " Blood culture #2    Collection Time: 04/02/23  2:50 PM    Specimen: Arm, Right; Blood   Result Value Ref Range    Blood Culture No Growth at 24 hrs      Hemoglobin and hematocrit, blood    Collection Time: 04/02/23  2:50 PM   Result Value Ref Range    Hemoglobin 13 2 11 5 - 15 4 g/dL    Hematocrit 38 5 34 8 - 46 1 %   Type and screen    Collection Time: 04/02/23  2:50 PM   Result Value Ref Range    ABO Grouping O     Rh Factor Positive     Antibody Screen Negative     Specimen Expiration Date [id number]    Lactic acid    Collection Time: 04/02/23  3:32 PM   Result Value Ref Range    LACTIC ACID 1 5 0 5 - 2 0 mmol/L   Fingerstick Glucose (POCT)    Collection Time: 04/02/23  5:57 PM   Result Value Ref Range    POC Glucose 182 (H) 65 - 140 mg/dl   Fingerstick Glucose (POCT)    Collection Time: 04/02/23  7:20 PM   Result Value Ref Range    POC Glucose 180 (H) 65 - 140 mg/dl   Fingerstick Glucose (POCT)    Collection Time: 04/02/23  9:01 PM   Result Value Ref Range    POC Glucose 176 (H) 65 - 140 mg/dl   ABORh Recheck - Contact Blood Bank Prior to Collection    Collection Time: 04/02/23 10:06 PM   Result Value Ref Range    ABO Grouping O     Rh Factor Positive    CBC and differential    Collection Time: 04/03/23  5:39 AM   Result Value Ref Range    WBC 11 62 (H) 4 31 - 10 16 Thousand/uL    RBC 4 72 3 81 - 5 12 Million/uL    Hemoglobin 13 8 11 5 - 15 4 g/dL    Hematocrit 40 9 34 8 - 46 1 %    MCV 87 82 - 98 fL    MCH 29 2 26 8 - 34 3 pg    MCHC 33 7 31 4 - 37 4 g/dL    RDW 14 3 11 6 - 15 1 %    MPV 9 8 8 9 - 12 7 fL    Platelets 893 (L) 526 - 390 Thousands/uL    nRBC 0 /100 WBCs    Neutrophils Relative 78 (H) 43 - 75 %    Immat GRANS % 0 0 - 2 %    Lymphocytes Relative 13 (L) 14 - 44 %    Monocytes Relative 8 4 - 12 %    Eosinophils Relative 1 0 - 6 %    Basophils Relative 0 0 - 1 %    Neutrophils Absolute 9 08 (H) 1 85 - 7 62 Thousands/µL    Immature Grans Absolute 0 05 0 00 - 0 20 Thousand/uL    Lymphocytes Absolute 1 47 0 60 - 4 47 Thousands/µL    Monocytes Absolute 0 93 0 17 - 1 22 Thousand/µL    Eosinophils Absolute 0 06 0 00 - 0 61 Thousand/µL    Basophils Absolute 0 03 0 00 - 0 10 Thousands/µL   Comprehensive metabolic panel    Collection Time: 04/03/23  5:39 AM   Result Value Ref Range    Sodium 137 135 - 147 mmol/L    Potassium 4 4 3 5 - 5 3 mmol/L    Chloride 103 96 - 108 mmol/L    CO2 27 21 - 32 mmol/L    ANION GAP 7 4 - 13 mmol/L    BUN 23 5 - 25 mg/dL    Creatinine 1 51 (H) 0 60 - 1 30 mg/dL    Glucose 166 (H) 65 - 140 mg/dL    Calcium 8 8 8 4 - 10 2 mg/dL    AST 18 13 - 39 U/L    ALT 25 7 - 52 U/L    Alkaline Phosphatase 73 34 - 104 U/L    Total Protein 6 1 (L) 6 4 - 8 4 g/dL    Albumin 3 5 3 5 - 5 0 g/dL    Total Bilirubin 0 91 0 20 - 1 00 mg/dL    eGFR 34 ml/min/1 73sq m   Fingerstick Glucose (POCT)    Collection Time: 04/03/23  7:16 AM   Result Value Ref Range    POC Glucose 162 (H) 65 - 140 mg/dl   Fingerstick Glucose (POCT)    Collection Time: 04/03/23 11:12 AM   Result Value Ref Range    POC Glucose 190 (H) 65 - 140 mg/dl   Fingerstick Glucose (POCT)    Collection Time: 04/03/23  3:54 PM   Result Value Ref Range    POC Glucose 151 (H) 65 - 140 mg/dl   Fingerstick Glucose (POCT)    Collection Time: 04/03/23  8:16 PM   Result Value Ref Range    POC Glucose 160 (H) 65 - 140 mg/dl   Fingerstick Glucose (POCT)    Collection Time: 04/04/23  5:09 AM   Result Value Ref Range    POC Glucose 130 65 - 140 mg/dl   CBC and differential    Collection Time: 04/04/23  5:14 AM   Result Value Ref Range    WBC 7 24 4 31 - 10 16 Thousand/uL    RBC 4 20 3 81 - 5 12 Million/uL    Hemoglobin 12 2 11 5 - 15 4 g/dL    Hematocrit 36 4 34 8 - 46 1 %    MCV 87 82 - 98 fL    MCH 29 0 26 8 - 34 3 pg    MCHC 33 5 31 4 - 37 4 g/dL    RDW 14 0 11 6 - 15 1 %    MPV 10 2 8 9 - 12 7 fL    Platelets 96 (L) 607 - 390 Thousands/uL    nRBC 0 /100 WBCs    Neutrophils Relative 68 43 - 75 %    Immat GRANS % 0 0 - 2 %    Lymphocytes Relative 20 14 - 44 % Monocytes Relative 10 4 - 12 %    Eosinophils Relative 2 0 - 6 %    Basophils Relative 0 0 - 1 %    Neutrophils Absolute 4 97 1 85 - 7 62 Thousands/µL    Immature Grans Absolute 0 03 0 00 - 0 20 Thousand/uL    Lymphocytes Absolute 1 41 0 60 - 4 47 Thousands/µL    Monocytes Absolute 0 69 0 17 - 1 22 Thousand/µL    Eosinophils Absolute 0 12 0 00 - 0 61 Thousand/µL    Basophils Absolute 0 02 0 00 - 0 10 Thousands/µL   Comprehensive metabolic panel    Collection Time: 04/04/23  5:14 AM   Result Value Ref Range    Sodium 138 135 - 147 mmol/L    Potassium 3 5 3 5 - 5 3 mmol/L    Chloride 107 96 - 108 mmol/L    CO2 23 21 - 32 mmol/L    ANION GAP 8 4 - 13 mmol/L    BUN 20 5 - 25 mg/dL    Creatinine 1 17 0 60 - 1 30 mg/dL    Glucose 134 65 - 140 mg/dL    Calcium 8 2 (L) 8 4 - 10 2 mg/dL    Corrected Calcium 8 7 8 3 - 10 1 mg/dL    AST 20 13 - 39 U/L    ALT 21 7 - 52 U/L    Alkaline Phosphatase 58 34 - 104 U/L    Total Protein 5 7 (L) 6 4 - 8 4 g/dL    Albumin 3 4 (L) 3 5 - 5 0 g/dL    Total Bilirubin 1 00 0 20 - 1 00 mg/dL    eGFR 47 ml/min/1 73sq m   Fingerstick Glucose (POCT)    Collection Time: 04/04/23  7:08 AM   Result Value Ref Range    POC Glucose 150 (H) 65 - 140 mg/dl

## 2023-04-04 NOTE — ASSESSMENT & PLAN NOTE
Presented with nausea vomiting: Epigastric/abdominal pain  CT: Esophageal wall thickening  Reported history of esophageal ulcers noted  Assessment for signs of esophagitis is recommended  · S/p Carafate 1 g, Protonix drip, Reglan, octreotide, Pepcid in ED  Seen by GI, underwent EGD  EGD 4/2-with evidence of severe ulcerative esophagitis, gastritis and duodenitis  Clinically improving  Reports improvement in nausea vomiting    Epigastric pain resolved  No evidence of bleeding, hemoglobin stable  • Continue Protonix, changed to every 12 hours  • Follow-up biopsy  • Diet as tolerated  • GI follow-up  • Carafate  • Hold Trulicity for now

## 2023-04-04 NOTE — PROGRESS NOTES
Progress Note - Cardiology   [facility]     Neri Brownlee 81 y o  female MRN: [id number]  : 1950  Unit/Bed#: [address] [place] [address]01 Encounter: [id number]    Assessment and Plan:   1  Hematemesis     - GI following    - 23 EGD demonstrated ulcerative esophagitis  - 23 CT chest/abd/pelvis: Esophageal wall thickening   Reported history of esophageal ulcers noted   Assessment for signs of esophagitis is recommended      2  Elevated troponin       - 23 hs troponin: 40 (0hrs), 175 (2hrs), 279 (4hrs)  - Likely non-ischemic myocardial injury secondary to hypertensive urgency and uncontrolled vomiting    - Patient currently denies chest pain, palpitations, or shortness of breath    - Discussed with patient recommendations for TTE in setting of elevated troponin and her cardiac history however she refused stating her Cardiologist Dr Bonnie De Leon did one recently  Unable to find TTE more recent than 3/8/19    - 23 EKG: Normal sinus rhythm, rate 97 bpm  Possible Left atrial enlargement  Septal infarct , age undetermined      3  Hypertension       - Hypertensive urgency on presentation  Continues to have high BP    - Continue coreg 25 mg twice daily    - Increase clonidine from 0 1 mg to 0 2 twice daily  - Continue losartan 100 mg (ordered as alternative to home medication of valsartan (non-formulary))   - Restarted on HCTZ 12 5 mg daily and isordil 30 mg twice daily  - Continue hydralazine IV prn       4  CAD     - Patient follow with Dr Jermaine Saldana for outpatient cardiology  - s/p PCI x 2 (LAD and circumflex on 2019 and RCA on 12/10/19)  - Continue lipitor 40 mg daily    - Started aspirin 81 mg daily      5  Ischemic cardiomyopathy       - Patient follow with Dr Jermaine Saldana for outpatient cardiology  - 3/18/19 TTE: EF 20-25 %    - Discussed with patient recommendations for TTE in setting of elevated troponin and her cardiac history however she refused stating her Cardiologist Dr Evert Cruz did one recently  Unable to find TTE more recent than 3/8/19    - Continue coreg 25 mg twice daily    - Increase clonidine from 0 1 mg to 0 2 twice daily  - Continue losartan 100 mg (ordered as alternative to home medication of valsartan (non-formulary))   - Restarted on HCTZ 12 5 mg daily and isordil 30 mg twice daily  - Will continue to reintroduce home medications as tolerated       6  Chronic systolic congestive heart failure       - Does not appear in acute phase  Patient euvolemic on exam     - 3/18/19 TTE: EF 20-25 %  - Discussed with patient recommendations for TTE in setting of elevated troponin and her cardiac history however she refused stating her Cardiologist Dr Evert Cruz did one recently  Unable to find TTE more recent than 3/8/19    - Continue coreg 25 mg twice daily    - Increase clonidine from 0 1 mg to 0 2 twice daily  - Continue losartan 100 mg (ordered as alternative to home medication of valsartan (non-formulary))   - Restarted on HCTZ 12 5 mg daily and isordil 30 mg twice daily  - 4/02/23 CT chest/abd/pelvis: Mild pulmonary emphysema   No focal consolidation      7  Hyperlipidemia      - 3/28/23 lipid panel: cholesterol 182, triglycerides 194, HDL 62, LDL 81    - Continue lipitor 40 mg daily      8  DMII       - 3/28/23 HgbA1c: 8 7    - Care pre primary team      Subjective / Objective:        EGD performed yesterday notes ulcerative esophagitis  Patient offers no complaints  Noted to continue to have hypertensive urgency  Patient denies chest pain, palpitations, shortness of breath, lightheadedness, dizziness, headache, nausea or vomiting  Restarted on home dose of HCTZ 12 5 mg daily and isordil 30 mg daily  Vitals: Blood pressure (!) 186/99, pulse 79, temperature 97 7 °F (36 5 °C), resp  rate 18, height 5' (1 524 m), weight 59 kg (130 lb), SpO2 94 %    Vitals:    04/02/23 1701 04/03/23 1244   Weight: 59 kg (130 lb) 59 kg (130 lb)     Body mass index is 25 39 kg/m²   BP Readings from Last 3 Encounters:   04/04/23 (!) 186/99   03/28/23 140/70   12/02/22 (!) 190/88     Orthostatic Blood Pressures    Flowsheet Row Most Recent Value   Blood Pressure 186/99 filed at 04/04/2023 1258   Patient Position - Orthostatic VS Lying filed at 04/03/2023 2021        I/O       04/02 0701  04/03 0700 04/03 0701  04/04 0700 04/04 0701  04/05 0700    P  O  0 0 520    I V  (mL/kg) 1117 1 (18 9) 3066 1 (52)     IV Piggyback 1550      Total Intake(mL/kg) 2667 1 (45 2) 3066 1 (52) 520 (8 8)    Urine (mL/kg/hr) 0      Emesis/NG output 0      Stool 0      Total Output 0      Net +2667 1 +3066 1 +520           Unmeasured Urine Occurrence 1 x 1 x     Unmeasured Stool Occurrence 0 x      Unmeasured Emesis Occurrence 4 x          Invasive Devices     Peripheral Intravenous Line  Duration           Peripheral IV 04/02/23 Ventral (anterior); Right Forearm 2 days                  Intake/Output Summary (Last 24 hours) at 4/4/2023 1354  Last data filed at 4/4/2023 1215  Gross per 24 hour   Intake 3416 09 ml   Output --   Net 3416 09 ml         Physical Exam:   Physical Exam  Vitals reviewed  Constitutional:       General: She is not in acute distress  Appearance: She is obese  Cardiovascular:      Rate and Rhythm: Normal rate and regular rhythm  Pulses: Normal pulses  Pulmonary:      Effort: Pulmonary effort is normal  No respiratory distress  Breath sounds: Decreased breath sounds present  Musculoskeletal:      Right lower leg: No edema  Left lower leg: No edema  Skin:     General: Skin is warm and dry  Neurological:      Mental Status: She is alert and oriented to person, place, and time        Medications/ Allergies:     Current Facility-Administered Medications   Medication Dose Route Frequency Provider Last Rate   • atorvastatin  40 mg Oral Daily With Dinner Danielle Juarez PA-C     • carvedilol  25 mg Oral BID Jagruti Garzon MD     • cloNIDine  0 1 mg Oral BID Waylon Cost Mae Verduzco MD     • hydrALAZINE  5 mg Intravenous Q4H PRN Faye Gilmore MD     • hydrochlorothiazide  12 5 mg Oral Daily Silvestre Gore MD     • insulin glargine  30 Units Subcutaneous HS Silvestre Gore MD     • insulin lispro  1-5 Units Subcutaneous TID AC Faye Gilmore MD     • insulin lispro  4 Units Subcutaneous TID With Meals Silvestre Gore MD     • isosorbide dinitrate  30 mg Oral BID Silvestre Gore MD     • labetalol  10 mg Intravenous Q4H PRN Faye Gilmore MD     • losartan  100 mg Oral Daily Sparkle Parra PA-C     • morphine injection  2 mg Intravenous Q4H PRN Faye Gilmore MD     • ondansetron  4 mg Intravenous Q6H PRN Faye Gilmore MD     • pantoprazole  40 mg Intravenous Q12H [address] Silvestre Gore MD     • pregabalin  100 mg Oral BID Silvestre Gore MD     • sucralfate  1 g Oral BID AC Faye Gilmore MD       hydrALAZINE, 5 mg, Q4H PRN  labetalol, 10 mg, Q4H PRN  morphine injection, 2 mg, Q4H PRN  ondansetron, 4 mg, Q6H PRN      Allergies   Allergen Reactions   • Other Rash     Latex tape       VTE Pharmacologic Prophylaxis: none       Labs:   Troponins:  Results from last 7 days   Lab Units 04/02/23  1333 04/02/23  1139   HSTNI D2 ng/L  --  135*   HSTNI D4 ng/L 239*  --          CBC with diff:  Results from last 7 days   Lab Units 04/04/23  0514 04/03/23  0539 04/02/23  1450 04/02/23  0922   WBC Thousand/uL 7 24 11 62*  --  14 56*   HEMOGLOBIN g/dL 12 2 13 8 13 2 15 8*   HEMATOCRIT % 36 4 40 9 38 5 44 4   MCV fL 87 87  --  82   PLATELETS Thousands/uL 96* 108*  --  155   MCH pg 29 0 29 2  --  29 0   MCHC g/dL 33 5 33 7  --  35 6   RDW % 14 0 14 3  --  13 5   MPV fL 10 2 9 8  --  9 7   NRBC AUTO /100 WBCs 0 0  --  0       CMP:  Results from last 7 days   Lab Units 04/04/23  0514 04/03/23  0539 04/02/23  0922   SODIUM mmol/L 138 137 132*   POTASSIUM mmol/L 3 5 4 4 3 9   CHLORIDE mmol/L 107 103 89*   CO2 mmol/L 23 27 28   ANION GAP mmol/L 8 7 15*   BUN mg/dL 20 23 38*   CREATININE mg/dL 1 17 1 51* 1 59*   CALCIUM mg/dL 8 2* 8 8 10 6*   AST U/L 20 18 23   ALT U/L 21 25 42   ALK PHOS U/L 58 73 96   TOTAL PROTEIN g/dL 5 7* 6 1* 7 7   ALBUMIN g/dL 3 4* 3 5 4 5   TOTAL BILIRUBIN mg/dL 1 00 0 91 1 12*   EGFR ml/min/1 73sq m 47 34 32       Magnesium:  Results from last 7 days   Lab Units 04/02/23  0922   MAGNESIUM mg/dL 1 8*     Coags:    TSH:    No components found for: TSH3  Lipid Profile:    Hgb A1c:    NT-proBNP: No results for input(s): NTBNP in the last 72 hours  Imaging & Testing   I have personally reviewed pertinent reports  EGD    Result Date: 4/3/2023  Narrative: Table formatting from the original result was not included  [facility] Operating Room [address] [place] [id number] [phone] DATE OF SERVICE: 4/03/23 PHYSICIAN(S): Attending: No Staff Documented Fellow: No Staff Documented INDICATION: Intractable nausea and vomiting, Hematemesis, unspecified whether nausea present POST-OP DIAGNOSIS: See the impression below  PREPROCEDURE: Informed consent was obtained for the procedure, including sedation  Risks of perforation, hemorrhage, adverse drug reaction and aspiration were discussed  The patient was placed in the left lateral decubitus position  Patient was explained about the risks and benefits of the procedure  Risks including but not limited to bleeding, infection, and perforation were explained in detail  Also explained about less than 100% sensitivity with the exam and other alternatives  PROCEDURE: EGD DETAILS OF PROCEDURE: Patient was taken to the procedure room where a time out was performed to confirm correct patient and correct procedure  The patient underwent monitored anesthesia care, which was administered by an anesthesia professional  The patient's blood pressure, heart rate, level of consciousness, respirations and oxygen were monitored throughout the procedure  The scope was advanced to the second part of the duodenum   Retroflexion was performed in the fundus  The patient experienced no blood loss  The procedure was not difficult  The patient tolerated the procedure well  There were no apparent complications  ANESTHESIA INFORMATION: ASA: III Anesthesia Type: IV Sedation with Anesthesia MEDICATIONS: No administrations occurring from 1301 to 1323 on 04/03/23 FINDINGS: Edematous, erythematous and friable mucosa in the 2nd part of the duodenum; performed cold forceps biopsy Erythematous mucosa in the antrum; performed cold forceps biopsy Ulcerative esophagitis extending from 37 cm to 20 cm encompassing about three fourths of the esophagus  Several biopsies obtained  SPECIMENS: ID Type Source Tests Collected by Time Destination 1 : Bx Inflamed Mucosa, Duodenum Tissue Duodenum TISSUE EXAM Lex Gastelum MD 4/3/2023  1:16 PM  2 : Bx Antrum, r/o h  pylori Tissue Stomach TISSUE EXAM Lex Gastelum MD 4/3/2023  1:17 PM  3 : Bx Ulcerative Esophagus Tissue Esophagus TISSUE EXAM Lex Gastelum MD 4/3/2023  1:20 PM      Impression: Severe ulcerative esophagitis, gastritis, some duodenitis RECOMMENDATION: Clear liquids room temperature, PPI twice daily, Carafate, repeat EGD 2 months   No Staff Documented     CT chest abdomen pelvis w contrast    Result Date: 4/2/2023  Narrative: CT CHEST, ABDOMEN AND PELVIS WITH IV CONTRAST INDICATION:   chest pain  COMPARISON:  None  TECHNIQUE: CT examination of the chest, abdomen and pelvis was performed  In addition to portal venous phase postcontrast scanning through the abdomen and pelvis, delayed phase postcontrast scanning was performed through the upper abdominal viscera  Multiplanar 2D reformatted images were created from the source data  Radiation dose length product (DLP) for this visit:  521 65 mGy-cm     This examination, like all CT scans performed in the [facility], was performed utilizing techniques to minimize radiation dose exposure, including the use of iterative  reconstruction and automated exposure control  IV Contrast:  100 mL of iohexol (OMNIPAQUE) Enteric Contrast: Enteric contrast was not administered  FINDINGS: CHEST LUNGS:  Mild pulmonary emphysema  No focal consolidation  PLEURA:  Unremarkable  HEART/GREAT VESSELS: Normal heart size  Coronary artery calcifications and atherosclerotic calcifications of the aorta  No thoracic aortic aneurysm  MEDIASTINUM AND WESLEY:  Esophageal wall thickening  No mediastinal or hilar lymphadenopathy  CHEST WALL AND LOWER NECK:  Unremarkable  ABDOMEN LIVER/BILIARY TREE:  Unremarkable  GALLBLADDER:  No calcified gallstones  No pericholecystic inflammatory change  SPLEEN:  Unremarkable  PANCREAS:  Mild diffuse pancreatic atrophy  Calcifications most predominant in the pancreatic head  Nondilated main pancreatic duct  No acute peripancreatic inflammation  ADRENAL GLANDS:  Unremarkable  KIDNEYS/URETERS:  One or more simple renal cyst(s) is noted  Otherwise unremarkable kidneys  No hydronephrosis  STOMACH AND BOWEL:  There is colonic diverticulosis without evidence of acute diverticulitis  APPENDIX:  A normal appendix was visualized  ABDOMINOPELVIC CAVITY:  No ascites  No pneumoperitoneum  No lymphadenopathy  VESSELS:  Atherosclerotic changes are present  No evidence of aneurysm  PELVIS REPRODUCTIVE ORGANS:  Unremarkable for patient's age  URINARY BLADDER:  Unremarkable  ABDOMINAL WALL/INGUINAL REGIONS:  Unremarkable  OSSEOUS STRUCTURES:  No acute fracture or destructive osseous lesion  Spinal degenerative changes are noted  Impression: Esophageal wall thickening  Reported history of esophageal ulcers noted  Assessment for signs of esophagitis is recommended  Workstation performed: [id number]        EKG / Monitor: Personally reviewed  Telemetry reviewed: currently sinus rhythm, rate 95 bpm      Cardiac testing:   No results found for this or any previous visit      Ruby Zambrano PA-C

## 2023-04-04 NOTE — PLAN OF CARE
Problem: MOBILITY - ADULT  Goal: Maintain or return to baseline ADL function  Description: INTERVENTIONS:  -  Assess patient's ability to carry out ADLs; assess patient's baseline for ADL function and identify physical deficits which impact ability to perform ADLs (bathing, care of mouth/teeth, toileting, grooming, dressing, etc )  - Assess/evaluate cause of self-care deficits   - Assess range of motion  - Assess patient's mobility; develop plan if impaired  - Assess patient's need for assistive devices and provide as appropriate  - Encourage maximum independence but intervene and supervise when necessary  - Involve family in performance of ADLs  - Assess for home care needs following discharge   - Consider OT consult to assist with ADL evaluation and planning for discharge  - Provide patient education as appropriate  4/4/2023 0733 by Padmini Clemente RN  Outcome: Progressing  4/4/2023 0733 by Padmini Clemente RN  Outcome: Progressing  4/4/2023 0727 by Padmini Clemente RN  Outcome: Progressing  Goal: Maintains/Returns to pre admission functional level  Description: INTERVENTIONS:  - Perform BMAT or MOVE assessment daily    - Set and communicate daily mobility goal to care team and patient/family/caregiver  - Collaborate with rehabilitation services on mobility goals if consulted  - Perform Range of Motion 3 times a day  - Reposition patient every 3 hours    - Dangle patient 3 times a day  - Stand patient 3 times a day  - Ambulate patient 3 times a day  - Out of bed to chair 3 times a day   - Out of bed for meals 3 times a day  - Out of bed for toileting  - Record patient progress and toleration of activity level   4/4/2023 0733 by Padmini Clemente RN  Outcome: Progressing  4/4/2023 0733 by Padmini Clemente RN  Outcome: Progressing  4/4/2023 0727 by Padmini Clemente RN  Outcome: Progressing     Problem: PAIN - ADULT  Goal: Verbalizes/displays adequate comfort level or baseline comfort level  Description: Interventions:  - Encourage patient to monitor pain and request assistance  - Assess pain using appropriate pain scale  - Administer analgesics based on type and severity of pain and evaluate response  - Implement non-pharmacological measures as appropriate and evaluate response  - Consider cultural and social influences on pain and pain management  - Notify physician/advanced practitioner if interventions unsuccessful or patient reports new pain  4/4/2023 0733 by Ailyn Oates RN  Outcome: Progressing  4/4/2023 0733 by Ailyn Oates RN  Outcome: Progressing  4/4/2023 0727 by Ailyn Oates RN  Outcome: Progressing     Problem: INFECTION - ADULT  Goal: Absence or prevention of progression during hospitalization  Description: INTERVENTIONS:  - Assess and monitor for signs and symptoms of infection  - Monitor lab/diagnostic results  - Monitor all insertion sites, i e  indwelling lines, tubes, and drains  - Monitor endotracheal if appropriate and nasal secretions for changes in amount and color  - [place] appropriate cooling/warming therapies per order  - Administer medications as ordered  - Instruct and encourage patient and family to use good hand hygiene technique  - Identify and instruct in appropriate isolation precautions for identified infection/condition  4/4/2023 0733 by Ailyn Oates RN  Outcome: Progressing  4/4/2023 0733 by Ailyn Oates RN  Outcome: Progressing  4/4/2023 0727 by Ailyn Oates RN  Outcome: Progressing  Goal: Absence of fever/infection during neutropenic period  Description: INTERVENTIONS:  - Monitor WBC    4/4/2023 0733 by Ailyn Oates RN  Outcome: Progressing  4/4/2023 0733 by Ailyn Oates RN  Outcome: Progressing  4/4/2023 0727 by Ailyn Oates RN  Outcome: Progressing     Problem: SAFETY ADULT  Goal: Maintain or return to baseline ADL function  Description: INTERVENTIONS:  -  Assess patient's ability to carry out ADLs; assess patient's baseline for ADL function and identify physical deficits which impact ability to perform ADLs (bathing, care of mouth/teeth, toileting, grooming, dressing, etc )  - Assess/evaluate cause of self-care deficits   - Assess range of motion  - Assess patient's mobility; develop plan if impaired  - Assess patient's need for assistive devices and provide as appropriate  - Encourage maximum independence but intervene and supervise when necessary  - Involve family in performance of ADLs  - Assess for home care needs following discharge   - Consider OT consult to assist with ADL evaluation and planning for discharge  - Provide patient education as appropriate  4/4/2023 0733 by Cindy Fritz RN  Outcome: Progressing  4/4/2023 0733 by Cindy Fritz RN  Outcome: Progressing  4/4/2023 0727 by Cindy Fritz RN  Outcome: Progressing  Goal: Maintains/Returns to pre admission functional level  Description: INTERVENTIONS:  - Perform BMAT or MOVE assessment daily    - Set and communicate daily mobility goal to care team and patient/family/caregiver  - Collaborate with rehabilitation services on mobility goals if consulted  - Perform Range of Motion 3 times a day  - Reposition patient every 3 hours    - Dangle patient 3 times a day  - Stand patient 3 times a day  - Ambulate patient 3 times a day  - Out of bed to chair 3 times a day   - Out of bed for meals 3 times a day  - Out of bed for toileting  - Record patient progress and toleration of activity level   4/4/2023 0733 by Cindy Fritz RN  Outcome: Progressing  4/4/2023 0733 by Cindy Fritz RN  Outcome: Progressing  4/4/2023 0727 by Cindy Fritz RN  Outcome: Progressing  Goal: Patient will remain free of falls  Description: INTERVENTIONS:  - Educate patient/family on patient safety including physical limitations  - Instruct patient to call for assistance with activity   - Consult OT/PT to assist with strengthening/mobility   - Keep Call bell within reach  - Keep bed low and locked with side rails adjusted as appropriate  - Keep care items and personal belongings within reach  - Initiate and maintain comfort rounds  - Make Fall Risk Sign visible to staff  - Offer Toileting every 2 Hours, in advance of need  - Initiate/Maintain bed/chair alarm  - Obtain necessary fall risk management equipment: bed/chair alarm  - Apply yellow socks and bracelet for high fall risk patients  - Consider moving patient to room near nurses station  4/4/2023 0733 by Petra Wolff RN  Outcome: Progressing  4/4/2023 [place] 28 by Petra Wolff RN  Outcome: Progressing  4/4/2023 0727 by Petra Wolff RN  Outcome: Progressing     Problem: DISCHARGE PLANNING  Goal: Discharge to home or other facility with appropriate resources  Description: INTERVENTIONS:  - Identify barriers to discharge w/patient and caregiver  - Arrange for needed discharge resources and transportation as appropriate  - Identify discharge learning needs (meds, wound care, etc )  - Arrange for interpretive services to assist at discharge as needed  - Refer to Case Management Department for coordinating discharge planning if the patient needs post-hospital services based on physician/advanced practitioner order or complex needs related to functional status, cognitive ability, or social support system  4/4/2023 0733 by Petra Wolff RN  Outcome: Progressing  4/4/2023 0733 by Petra Wolff RN  Outcome: Progressing  4/4/2023 0727 by Petra Wolff RN  Outcome: Progressing     Problem: Knowledge Deficit  Goal: Patient/family/caregiver demonstrates understanding of disease process, treatment plan, medications, and discharge instructions  Description: Complete learning assessment and assess knowledge base    Interventions:  - Provide teaching at level of understanding  - Provide teaching via preferred learning methods  4/4/2023 0733 by Petra Wolff RN  Outcome: Progressing  4/4/2023 7525 by Destinee Law RN  Outcome: Progressing  4/4/2023 0727 by Destinee Law RN  Outcome: Progressing     Problem: GASTROINTESTINAL - ADULT  Goal: Minimal or absence of nausea and/or vomiting  Description: INTERVENTIONS:  - Administer IV fluids if ordered to ensure adequate hydration  - Maintain NPO status until nausea and vomiting are resolved  - Nasogastric tube if ordered  - Administer ordered antiemetic medications as needed  - Provide nonpharmacologic comfort measures as appropriate  - Advance diet as tolerated, if ordered  - Consider nutrition services referral to assist patient with adequate nutrition and appropriate food choices  4/4/2023 0733 by Destinee Law RN  Outcome: Progressing  4/4/2023 0733 by Destinee Law RN  Outcome: Progressing  4/4/2023 0727 by Destinee Law RN  Outcome: Progressing     Problem: Potential for Falls  Goal: Patient will remain free of falls  Description: INTERVENTIONS:  - Educate patient/family on patient safety including physical limitations  - Instruct patient to call for assistance with activity   - Consult OT/PT to assist with strengthening/mobility   - Keep Call bell within reach  - Keep bed low and locked with side rails adjusted as appropriate  - Keep care items and personal belongings within reach  - Initiate and maintain comfort rounds  - Make Fall Risk Sign visible to staff  - Offer Toileting every 2 Hours, in advance of need  - Initiate/Maintain bed/chair alarm  - Obtain necessary fall risk management equipment: bed/chair alarm  - Apply yellow socks and bracelet for high fall risk patients  - Consider moving patient to room near nurses station  4/4/2023 0733 by Destinee Law RN  Outcome: Progressing  4/4/2023 0733 by Destinee Law RN  Outcome: Progressing  4/4/2023 0727 by Destinee Law RN  Outcome: Progressing

## 2023-04-04 NOTE — ASSESSMENT & PLAN NOTE
Lab Results   Component Value Date    HGBA1C 8 7 (A) 03/28/2023       Recent Labs     04/04/23  0708 04/04/23  1107 04/04/23  1559 04/04/23 2005   POCGLU 150* 162* 150* 159*       Blood Sugar Average: Last 72 hrs:  (P) 176 75     Trend blood glucose after advancement in the diet  · Resume home insulin regimen  · Hold Trulicity at present as patient reported nausea vomiting since initiation

## 2023-04-04 NOTE — ASSESSMENT & PLAN NOTE
Secondary to nausea vomiting and hyperglycemia    · Creatine 1 59 > 1 51 on admission, baseline 1 15- 1 23  · Improved to baseline  · Discontinue IV fluid  · Hold nephrotoxic's

## 2023-04-04 NOTE — PROGRESS NOTES
[facility] Progress Note  Patient: Lauren Reed 81 y o  female   MRN: [id number]  PCP: Scott Montoya MD  Unit/Bed#: [address] [place] [address] Encounter: [id number]  Date Of Visit: 04/04/23    Problem List:    Principal Problem:    Hematemesis  Active Problems:    SIRS (systemic inflammatory response syndrome) ([place] [address] )    Coronary artery disease involving native coronary artery of native heart without angina pectoris    Emphysema, unspecified ([place] [address] )    Ischemic cardiomyopathy    Hypertension    Type 2 diabetes mellitus with hyperglycemia, with long-term current use of insulin ([facility])    Elevated troponin    LATASHA (acute kidney injury) ([place] [address] )    Gastroesophageal reflux disease with esophagitis    Ulcerative colitis ([place] [address] )    Other hyperlipidemia      Assessment & Plan:    SIRS (systemic inflammatory response syndrome) ([place] [address] )  Assessment & Plan  As evident by with leucocytosis, tachycardia,   Remains afebrile, leukocytosis improved  · Blood cultures- negative  · Presentation likely secondary to acute vomiting and inflammatory markers  · LA-negative, Pro-Otf- normal    * Hematemesis  Assessment & Plan  Presented with nausea vomiting: Epigastric/abdominal pain  CT: Esophageal wall thickening  Reported history of esophageal ulcers noted  Assessment for signs of esophagitis is recommended  · S/p Carafate 1 g, Protonix drip, Reglan, octreotide, Pepcid in ED  Seen by GI, underwent EGD  EGD 4/2-with evidence of severe ulcerative esophagitis, gastritis and duodenitis  Clinically improving  Reports improvement in nausea vomiting    Epigastric pain resolved  No evidence of bleeding, hemoglobin stable  • Continue Protonix, changed to every 12 hours  • Follow-up biopsy  • Diet as tolerated  • GI follow-up  • Carafate  • Hold Trulicity for now    Gastroesophageal reflux disease with esophagitis  Assessment & Plan  Historical diagnosis   Likely exacerbated by current hematemesis with coffee-ground findings    · followed by GI outpatient, history of EGD confirming esophagitis and ulceration in lower 3rd  Of esophagus, mild gastritis  • Status post EGD, 4/3-revealed severe ulcerative esophagitis, gastritis and some duodenitis  • Continue IV Protonix, changed to twice daily  • Clear liquid diet, advance as tolerated  • Follow-up pathology  • Follow-up with GI    LATASHA (acute kidney injury) ([facility] [address] )  Assessment & Plan  Secondary to nausea vomiting and hyperglycemia    · Creatine 1 59 > 1 51 on admission, baseline 1 15- 1 23  · Improved to baseline  · Discontinue IV fluid  · Hold nephrotoxic's    Elevated troponin  Assessment & Plan  Asymptomatic  · Troponin elevated to 175, repeat 279  • Non MI troponin elevation secondary to uncontrolled hypertension  • history of multiple stents in place per patient  • EKG NSR, HR 87  Telemetry without any arrhythmia  • Cardiac recs: TTE D/T elevated troponin and her cardiac Hx, patient refused echo and TTE stating her Cardiologist Dr Tra Harmon did one recently    • Optimize blood pressure control  • Follow-up cardiology recommendation    Type 2 diabetes mellitus with hyperglycemia, with long-term current use of insulin [facility])  Assessment & Plan  Lab Results   Component Value Date    HGBA1C 8 7 (A) 03/28/2023       Recent Labs     04/04/23  0708 04/04/23  1107 04/04/23  1559 04/04/23 2005   POCGLU 150* 162* 150* 159*       Blood Sugar Average: Last 72 hrs:  (P) 176 75     Trend blood glucose after advancement in the diet  · Resume home insulin regimen  · Hold Trulicity at present as patient reported nausea vomiting since initiation    Hypertension  Assessment & Plan  Uncontrolled on presentation, now improving  Denies any chest pain dizziness or headache  · Continue carvedilol, clonidine, ARB  · Resume Isordil, hydrochlorothiazide  · Monitor blood pressure trend  · Follow-up cardiology recommendations      Emphysema, unspecified (HCC)  Assessment & Plan  Stable    Coronary artery disease involving native coronary artery of native heart without angina pectoris  Assessment & Plan  History of CAD status post PCI x3 in 2019  · Follows up with Cardiology   · Restart after EGD aspirin, Plavix was cleared by GI  · Continue carvedilol, Lipitor    Other hyperlipidemia  Assessment & Plan  · restart home Lipitor    Ulcerative colitis ([address] )  Assessment & Plan  History of ulcerative colitis not on any medication currently  Denies any diarrhea    · Follow-up with GI after discharge as needed    Metabolic alkalosis-resolved as of 2023  Assessment & Plan  ABG congruent: pH 7 55, CO2 34, HCO3- WNL, AG-15, beta hydroxybutyrate-WNL    · Likely secondary to uncontrolled vomiting  · We will monitor              VTE Pharmacologic Prophylaxis:   Moderate Risk (Score 3-4) - Pharmacological DVT Prophylaxis Contraindicated  Sequential Compression Devices Ordered  Patient Centered Rounds: I performed bedside rounds with nursing staff today  Discussions with Specialists or Other Care Team Provider: Cardiology, GI    Education and Discussions with Family / Patient: Updated  (daughter) via phone  Time Spent for Care: 45 minutes  More than 50% of total time spent on counseling and coordination of care as described above  Current Length of Stay: 2 day(s)  Current Patient Status: Inpatient   Certification Statement: The patient will continue to require additional inpatient hospital stay due to Diet tolerance, blood pressure monitoring  Discharge Plan: Anticipate discharge in 24-48 hrs to home      Code Status: Level 1 - Full Code    Subjective:   No further nausea vomiting, tolerating clears  No evidence of bleeding  Chest epigastric pain has improved  Denies any headache or dizziness        Objective:     Vitals:   Temp (24hrs), Av 8 °F (36 6 °C), Min:97 6 °F (36 4 °C), Max:98 °F (36 7 °C)    Temp:  [97 6 °F (36 4 °C)-98 °F (36 7 °C)] 97 7 °F (36 5 °C)  HR:  [78-90] 82  Resp:  [16-18] 18  BP: (139-217)/() 187/104  SpO2:  [91 %-99 %] 96 %  Body mass index is 25 39 kg/m²  Input and Output Summary (last 24 hours): Intake/Output Summary (Last 24 hours) at 4/4/2023 1258  Last data filed at 4/4/2023 1215  Gross per 24 hour   Intake 3566 09 ml   Output --   Net 3566 09 ml       Physical Exam:   Physical Exam  Constitutional:       General: She is not in acute distress  HENT:      Head: Normocephalic and atraumatic  Cardiovascular:      Rate and Rhythm: Normal rate  Pulmonary:      Effort: Pulmonary effort is normal  No respiratory distress  Breath sounds: Normal breath sounds  No wheezing or rales  Abdominal:      General: Bowel sounds are normal  There is no distension  Palpations: Abdomen is soft  Tenderness: There is no abdominal tenderness  There is no guarding or rebound  Musculoskeletal:      Right lower leg: No edema  Left lower leg: No edema  Skin:     General: Skin is warm and dry  Findings: No rash  Neurological:      Mental Status: She is alert  Mental status is at baseline           Additional Data:     Labs:  Results from last 7 days   Lab Units 04/04/23  0514   WBC Thousand/uL 7 24   HEMOGLOBIN g/dL 12 2   HEMATOCRIT % 36 4   PLATELETS Thousands/uL 96*   NEUTROS PCT % 68   LYMPHS PCT % 20   MONOS PCT % 10   EOS PCT % 2     Results from last 7 days   Lab Units 04/04/23  0514   SODIUM mmol/L 138   POTASSIUM mmol/L 3 5   CHLORIDE mmol/L 107   CO2 mmol/L 23   BUN mg/dL 20   CREATININE mg/dL 1 17   ANION GAP mmol/L 8   CALCIUM mg/dL 8 2*   ALBUMIN g/dL 3 4*   TOTAL BILIRUBIN mg/dL 1 00   ALK PHOS U/L 58   ALT U/L 21   AST U/L 20   GLUCOSE RANDOM mg/dL 134         Results from last 7 days   Lab Units 04/04/23  1107 04/04/23  0708 04/04/23  0509 04/03/23  2016 04/03/23  1554 04/03/23  1112 04/03/23  0716 04/02/23  2101 04/02/23  1920 04/02/23  1757 04/02/23  1015   POC GLUCOSE mg/dl 162* 150* 130 160* 151* 190* 162* 176* 180* 182* 213*         Results from last 7 days Lab Units 23  1532 23  0922   LACTIC ACID mmol/L 1 5  --    PROCALCITONIN ng/ml  --  <0 05       Lines/Drains:  Invasive Devices     Peripheral Intravenous Line  Duration           Peripheral IV 23 Ventral (anterior); Right Forearm 2 days                  Telemetry:  Telemetry Orders (From admission, onward)             24 Hour Telemetry Monitoring  Continuous x 24 Hours (Telem)           References:    Telemetry Guidelines   Question:  Reason for 24 Hour Telemetry  Answer:  Metabolic/Electrolyte Disturbance with High Probability of Dysrhythmia (K level <3 or >6, or KCL infusion >10mEq/hr)                 Telemetry Reviewed: Normal Sinus Rhythm  Indication for Continued Telemetry Use: No indication for continued use  Will discontinue  Imaging: Reviewed radiology reports from this admission including: chest CT scan and abdominal/pelvic CT    Recent Cultures (last 7 days):   Results from last 7 days   Lab Units 23  1450 23  1438   BLOOD CULTURE  No Growth at 24 hrs  No Growth at 24 hrs         Last 24 Hours Medication List:   Current Facility-Administered Medications   Medication Dose Route Frequency Provider Last Rate   • atorvastatin  40 mg Oral Daily With Stef Olson PA-C     • carvedilol  25 mg Oral BID Aida Oconnell MD     • cloNIDine  0 1 mg Oral BID Aida Oconnell MD     • hydrALAZINE  5 mg Intravenous Q4H PRN Aida Oconnell MD     • hydrochlorothiazide  12 5 mg Oral Daily Fabrizio [facility] MD     • insulin glargine  30 Units Subcutaneous HS Fabrizio Thompson MD     • insulin lispro  1-5 Units Subcutaneous TID AC Aida Oconnell MD     • insulin lispro  4 Units Subcutaneous TID With Meals Fabrizio Thompson MD     • isosorbide dinitrate  30 mg Oral BID Fabrizio Thompson MD     • labetalol  10 mg Intravenous Q4H PRN Aida Oconnell MD     • losartan  100 mg Oral Daily Blue Rod PA-C     • morphine injection  2 mg Intravenous Q4H PRN Aida Oconnell MD "  • ondansetron  4 mg Intravenous Q6H PRN Jagruti Garzon MD     • pantoprazole  40 mg Intravenous Q12H Janusz Pederson MD     • pregabalin  100 mg Oral BID Maria Fernanda Lux MD     • sucralfate  1 g Oral BID AC Jagruti Garzon MD          Today, Patient Was Seen By: Maria Fernanda Lux MD    ** Please Note: \"This note has been constructed using a voice recognition system  Therefore there may be syntax, spelling, and/or grammatical errors  Please call if you have any questions   \"**  "

## 2023-04-04 NOTE — ASSESSMENT & PLAN NOTE
History of CAD status post PCI x3 in 2019  · Follows up with Cardiology     · Restart after EGD aspirin, Plavix was cleared by GI  · Continue carvedilol, Lipitor

## 2023-04-05 VITALS
DIASTOLIC BLOOD PRESSURE: 82 MMHG | WEIGHT: 130 LBS | SYSTOLIC BLOOD PRESSURE: 157 MMHG | HEIGHT: 60 IN | OXYGEN SATURATION: 97 % | TEMPERATURE: 97.4 F | RESPIRATION RATE: 20 BRPM | BODY MASS INDEX: 25.52 KG/M2 | HEART RATE: 70 BPM

## 2023-04-05 PROBLEM — E87.3 METABOLIC ALKALOSIS: Status: RESOLVED | Noted: 2023-04-02 | Resolved: 2023-04-05

## 2023-04-05 LAB
ALBUMIN SERPL BCP-MCNC: 3.1 G/DL (ref 3.5–5)
ALP SERPL-CCNC: 49 U/L (ref 34–104)
ALT SERPL W P-5'-P-CCNC: 19 U/L (ref 7–52)
ANION GAP SERPL CALCULATED.3IONS-SCNC: 6 MMOL/L (ref 4–13)
AST SERPL W P-5'-P-CCNC: 20 U/L (ref 13–39)
BILIRUB SERPL-MCNC: 0.64 MG/DL (ref 0.2–1)
BUN SERPL-MCNC: 22 MG/DL (ref 5–25)
CALCIUM ALBUM COR SERPL-MCNC: 8.8 MG/DL (ref 8.3–10.1)
CALCIUM SERPL-MCNC: 8.1 MG/DL (ref 8.4–10.2)
CHLORIDE SERPL-SCNC: 105 MMOL/L (ref 96–108)
CO2 SERPL-SCNC: 24 MMOL/L (ref 21–32)
CREAT SERPL-MCNC: 1.31 MG/DL (ref 0.6–1.3)
ERYTHROCYTE [DISTWIDTH] IN BLOOD BY AUTOMATED COUNT: 13.8 % (ref 11.6–15.1)
GFR SERPL CREATININE-BSD FRML MDRD: 41 ML/MIN/1.73SQ M
GLUCOSE SERPL-MCNC: 134 MG/DL (ref 65–140)
GLUCOSE SERPL-MCNC: 135 MG/DL (ref 65–140)
GLUCOSE SERPL-MCNC: 176 MG/DL (ref 65–140)
GLUCOSE SERPL-MCNC: 84 MG/DL (ref 65–140)
HCT VFR BLD AUTO: 33 % (ref 34.8–46.1)
HGB BLD-MCNC: 11.1 G/DL (ref 11.5–15.4)
MAGNESIUM SERPL-MCNC: 1.9 MG/DL (ref 1.9–2.7)
MCH RBC QN AUTO: 29 PG (ref 26.8–34.3)
MCHC RBC AUTO-ENTMCNC: 33.6 G/DL (ref 31.4–37.4)
MCV RBC AUTO: 86 FL (ref 82–98)
PLATELET # BLD AUTO: 98 THOUSANDS/UL (ref 149–390)
PMV BLD AUTO: 9.7 FL (ref 8.9–12.7)
POTASSIUM SERPL-SCNC: 3.4 MMOL/L (ref 3.5–5.3)
PROT SERPL-MCNC: 5.2 G/DL (ref 6.4–8.4)
RBC # BLD AUTO: 3.83 MILLION/UL (ref 3.81–5.12)
SODIUM SERPL-SCNC: 135 MMOL/L (ref 135–147)
WBC # BLD AUTO: 6.34 THOUSAND/UL (ref 4.31–10.16)

## 2023-04-05 RX ORDER — POTASSIUM CHLORIDE 20 MEQ/1
20 TABLET, EXTENDED RELEASE ORAL ONCE
Status: COMPLETED | OUTPATIENT
Start: 2023-04-05 | End: 2023-04-05

## 2023-04-05 RX ORDER — PANTOPRAZOLE SODIUM 40 MG/1
40 TABLET, DELAYED RELEASE ORAL 2 TIMES DAILY
Qty: 60 TABLET | Refills: 0 | Status: SHIPPED | OUTPATIENT
Start: 2023-04-05

## 2023-04-05 RX ORDER — ACETAMINOPHEN 325 MG/1
650 TABLET ORAL EVERY 6 HOURS PRN
Status: DISCONTINUED | OUTPATIENT
Start: 2023-04-05 | End: 2023-04-05 | Stop reason: HOSPADM

## 2023-04-05 RX ORDER — CLONIDINE HYDROCHLORIDE 0.1 MG/1
0.2 TABLET ORAL 2 TIMES DAILY
Qty: 120 TABLET | Refills: 0 | Status: SHIPPED | OUTPATIENT
Start: 2023-04-05 | End: 2023-05-05

## 2023-04-05 RX ORDER — SUCRALFATE 1 G/1
1 TABLET ORAL
Qty: 120 TABLET | Refills: 0 | Status: SHIPPED | OUTPATIENT
Start: 2023-04-05 | End: 2023-06-04

## 2023-04-05 RX ADMIN — INSULIN LISPRO 4 UNITS: 100 INJECTION, SOLUTION INTRAVENOUS; SUBCUTANEOUS at 08:35

## 2023-04-05 RX ADMIN — PREGABALIN 100 MG: 100 CAPSULE ORAL at 09:06

## 2023-04-05 RX ADMIN — PANTOPRAZOLE SODIUM 40 MG: 40 INJECTION, POWDER, FOR SOLUTION INTRAVENOUS at 09:03

## 2023-04-05 RX ADMIN — INSULIN LISPRO 1 UNITS: 100 INJECTION, SOLUTION INTRAVENOUS; SUBCUTANEOUS at 12:29

## 2023-04-05 RX ADMIN — ATORVASTATIN CALCIUM 40 MG: 40 TABLET, FILM COATED ORAL at 17:02

## 2023-04-05 RX ADMIN — SUCRALFATE 1 G: 1 TABLET ORAL at 06:20

## 2023-04-05 RX ADMIN — CLONIDINE HYDROCHLORIDE 0.2 MG: 0.1 TABLET ORAL at 09:04

## 2023-04-05 RX ADMIN — CARVEDILOL 25 MG: 25 TABLET, FILM COATED ORAL at 09:04

## 2023-04-05 RX ADMIN — ISOSORBIDE DINITRATE 30 MG: 10 TABLET ORAL at 09:03

## 2023-04-05 RX ADMIN — POTASSIUM CHLORIDE 20 MEQ: 1500 TABLET, EXTENDED RELEASE ORAL at 12:28

## 2023-04-05 RX ADMIN — INSULIN LISPRO 4 UNITS: 100 INJECTION, SOLUTION INTRAVENOUS; SUBCUTANEOUS at 12:30

## 2023-04-05 RX ADMIN — HYDROCHLOROTHIAZIDE 12.5 MG: 12.5 TABLET ORAL at 09:04

## 2023-04-05 RX ADMIN — SUCRALFATE 1 G: 1 TABLET ORAL at 17:02

## 2023-04-05 RX ADMIN — PREGABALIN 100 MG: 100 CAPSULE ORAL at 17:02

## 2023-04-05 RX ADMIN — LOSARTAN POTASSIUM 100 MG: 50 TABLET, FILM COATED ORAL at 09:04

## 2023-04-05 NOTE — DISCHARGE INSTR - AVS FIRST PAGE
Continue current medications for blood pressure  Follow-up with outpatient cardiologist, Dr Kenzie Painter in 1 to 2 weeks of discharge    Continue Protonix twice a day   , Continue Carafate with meals    Follow-up biopsy results  Follow-up with gastroenterology for repeat endoscopy in 2 months    Follow-up with PCP in 1 week for blood pressure and diabetes monitoring  Repeat blood test to monitor kidney function in 1 week

## 2023-04-05 NOTE — ASSESSMENT & PLAN NOTE
Uncontrolled on presentation, now improving  Denies any chest pain dizziness or headache  · Continue carvedilol, clonidine, ARB  · Resume Isordil, hydrochlorothiazide  · Monitor blood pressure trend  · Follow-up cardiology recommendations

## 2023-04-05 NOTE — UTILIZATION REVIEW
Continued Stay Review    Date: 4/4/2023                         Current Patient Class: inpatient    Current Level of Care: med surg     HPI:72 y o  female initially admitted on 4/2/2023    Assessment/Plan:   Reports has not had a BM in a couple days since decreased PO  Patient is swallowing well discussed dissolving Carafate tab; will require repeat EGD in 2 months to ensure healing, follow EGD 4/3 pathology  Elevated TROPs likely non ischemic myocardial injury 2ndary to HTN urgency & uncontrolled vomiting; currently wo CP, palpitations or SOB  Recommendation for TTE in setting of elevated trops but she is refusing stating Cardiologist recently did one but unable to find more recent than 3/2019  4/02/23 EKG: Normal sinus rhythm, rate 97 bpm  Possible Left atrial enlargement  Septal infarct , age undetermined  Cont w high BPs, increase Clonidine to 0 2mg BID, cont Coreg BID, losartan, restarted on HCTZ daily & Isordil BID w PRN IV Hydralazine   Started on ASA daily & reintroduce home Cardiomyopathy meds as ton    Vital Signs:   Date/Time Temp Pulse Resp BP MAP (mmHg) SpO2 O2 Flow Rate (L/min) O2 Device Patient Position - Orthostatic VS   04/04/23 19:07:47 97 9 °F (36 6 °C) 78 18 110/55 73 94 % -- -- --   04/04/23 15:40:26 98 1 °F (36 7 °C) 82 -- 151/68 96 96 % -- -- --   04/04/23 12:58:53 -- 79 -- 186/99 Abnormal  128 94 % -- -- --   04/04/23 10:37:43 -- 82 -- 187/104 Abnormal  132 96 % -- -- --   04/04/23 09:04:37 -- 86 -- 217/107 Abnormal  144 97 % -- -- --   04/03/23 22:23:55 97 7 °F (36 5 °C) 80 18 159/80 106 93 % -- -- --   04/03/23 20:21:13 -- 87 18 172/88 Abnormal  116 98 % -- None (Room air) Lying   04/03/23 18:35:36 -- 87 -- 178/88 Abnormal  118 91 % -- -- --       Pertinent Labs/Diagnostic Results:   Results from last 7 days   Lab Units 04/02/23  0931   SARS-COV-2  Negative     Results from last 7 days   Lab Units 04/04/23  0514 04/03/23  0539 04/02/23  1450 04/02/23  0922   WBC Thousand/uL 7 24 11 62*  -- 14 56*   HEMOGLOBIN g/dL 12 2 13 8 13 2 15 8*   HEMATOCRIT % 36 4 40 9 38 5 44 4   PLATELETS Thousands/uL 96* 108*  --  155   NEUTROS ABS Thousands/µL 4 97 9 08*  --  11 45*         Results from last 7 days   Lab Units 04/04/23  0514 04/03/23  0539 04/02/23  0922   SODIUM mmol/L 138 137 132*   POTASSIUM mmol/L 3 5 4 4 3 9   CHLORIDE mmol/L 107 103 89*   CO2 mmol/L 23 27 28   ANION GAP mmol/L 8 7 15*   BUN mg/dL 20 23 38*   CREATININE mg/dL 1 17 1 51* 1 59*   EGFR ml/min/1 73sq m 47 34 32   CALCIUM mg/dL 8 2* 8 8 10 6*   MAGNESIUM mg/dL  --   --  1 8*     Results from last 7 days   Lab Units 04/04/23  0514 04/03/23  0539 04/02/23  0922   AST U/L 20 18 23   ALT U/L 21 25 42   ALK PHOS U/L 58 73 96   TOTAL PROTEIN g/dL 5 7* 6 1* 7 7   ALBUMIN g/dL 3 4* 3 5 4 5   TOTAL BILIRUBIN mg/dL 1 00 0 91 1 12*     Results from last 7 days   Lab Units 04/04/23  2005 04/04/23  1559 04/04/23  1107 04/04/23  0708 04/04/23  0509 04/03/23 2016 04/03/23  1554 04/03/23  1112 04/03/23  0716 04/02/23  2101 04/02/23  1920 04/02/23  1757   POC GLUCOSE mg/dl 159* 150* 162* 150* 130 160* 151* 190* 162* 176* 180* 182*     Results from last 7 days   Lab Units 04/04/23  0514 04/03/23  0539 04/02/23  0922   GLUCOSE RANDOM mg/dL 134 166* 264*             BETA-HYDROXYBUTYRATE   Date Value Ref Range Status   04/02/2023 0 4 <0 6 mmol/L Final   08/04/2022 1 4 (H) <0 6 mmol/L Final          Results from last 7 days   Lab Units 04/02/23  0922   PH USHA  7 550*   PCO2 USAH mm Hg 34 0*   PO2 USHA mm Hg 41 7   HCO3 USHA mmol/L 29 1   BASE EXC USHA mmol/L 6 9   O2 CONTENT USHA ml/dL 18 3   O2 HGB, VENOUS % 80 6*             Results from last 7 days   Lab Units 04/02/23  1333 04/02/23  1139 04/02/23  0922   HS TNI 0HR ng/L  --   --  40   HS TNI 2HR ng/L  --  175*  --    HSTNI D2 ng/L  --  135*  --    HS TNI 4HR ng/L 279*  --   --    HSTNI D4 ng/L 239*  --   --                  Results from last 7 days   Lab Units 04/02/23  0922   PROCALCITONIN ng/ml <0 05 Results from last 7 days   Lab Units 04/02/23  1532   LACTIC ACID mmol/L 1 5                         Results from last 7 days   Lab Units 04/02/23  0922   LIPASE u/L 49                 Results from last 7 days   Lab Units 04/02/23  1140   CLARITY UA  Clear   COLOR UA  Light Yellow   SPEC GRAV UA  1 010   PH UA  8 0   GLUCOSE UA mg/dl >=1000 (1%)*   KETONES UA mg/dl Negative   BLOOD UA  Trace-Intact*   PROTEIN UA mg/dl 30 (1+)*   NITRITE UA  Negative   BILIRUBIN UA  Negative   UROBILINOGEN UA E U /dl 0 2   LEUKOCYTES UA  Elevated glucose may cause decreased leukocyte values  See urine microscopic for [facility] result/*   WBC UA /hpf 0-1   RBC UA /hpf 1-2   BACTERIA UA /hpf None Seen   EPITHELIAL CELLS WET PREP /hpf Occasional     Results from last 7 days   Lab Units 04/02/23  0931   INFLUENZA A PCR  Negative   INFLUENZA B PCR  Negative   RSV PCR  Negative                             Results from last 7 days   Lab Units 04/02/23  1450 04/02/23  1438   BLOOD CULTURE  No Growth at 24 hrs  No Growth at 24 hrs                   Medications:   Scheduled Medications:  atorvastatin, 40 mg, Oral, Daily With Dinner  carvedilol, 25 mg, Oral, BID  cloNIDine, 0 2 mg, Oral, BID  hydrochlorothiazide, 12 5 mg, Oral, Daily  insulin glargine, 30 Units, Subcutaneous, HS  insulin lispro, 1-5 Units, Subcutaneous, TID AC  insulin lispro, 4 Units, Subcutaneous, TID With Meals  isosorbide dinitrate, 30 mg, Oral, BID  losartan, 100 mg, Oral, Daily  pantoprazole, 40 mg, Intravenous, Q12H MARITZA  pregabalin, 100 mg, Oral, BID  [START ON 4/5/2023] sucralfate, 1 g, Oral, BID AC    Continuous IV Infusions:     PRN Meds:  hydrALAZINE, 5 mg, Intravenous, Q4H PRN  labetalol, 10 mg, Intravenous, Q4H PRN  morphine injection, 2 mg, Intravenous, Q4H PRN  ondansetron, 4 mg, Intravenous, Q6H PRN    Discharge Plan: D    Network Utilization Review Department  ATTENTION: Please call with any questions or concerns to [phone] and carefully listen to the prompts so that you are directed to the right person  All voicemails are confidential   Laura Tuttle all requests for admission clinical reviews, approved or denied determinations and any other requests to dedicated fax number below belonging to the campus where the patient is receiving treatment   List of dedicated fax numbers for the Facilities:  [address] DENIALS (Administrative/Medical Necessity) [phone]   [address] (Maternity/NICU/Pediatrics) [phone]   912 Fara Fitzgerald [phone]   [facility] 77 [phone]   [address] [id number] [place] [address] 28 [phone]   1559 [facility] 134 815 [facility] [phone]

## 2023-04-05 NOTE — DISCHARGE SUMMARY
Discharge Summary - [facility] Internal Medicine    Patient Information: Fernando Self 81 y o  female MRN: [id number]  Unit/Bed#: [address] [place] [address] Encounter: [id number]    Discharging Physician / Practitioner: Neal Brittle, MD  PCP: Sanjeev Ledezma MD  Admission Date: 4/2/2023  Discharge Date: 04/05/23    Reason for Admission: Epigastric Pain (Pain starting around 0400 this morning ) and Vomiting (Starting at 0400, diabetic did not check her sugar )      Discharge Diagnoses:     Principal Problem:    Hematemesis  Active Problems:    SIRS (systemic inflammatory response syndrome) ([place] [address] )    Coronary artery disease involving native coronary artery of native heart without angina pectoris    Emphysema, unspecified ([place] [address] )    Ischemic cardiomyopathy    Hypertension    Type 2 diabetes mellitus with hyperglycemia, with long-term current use of insulin ([facility])    Elevated troponin    LATASHA (acute kidney injury) ([place] [address] )    Gastroesophageal reflux disease with esophagitis    Ulcerative colitis ([place] [address] )    Other hyperlipidemia  Resolved Problems:    Metabolic alkalosis        SIRS (systemic inflammatory response syndrome) ([place] [address] )  Assessment & Plan  As evident by with leucocytosis, tachycardia,   Remains afebrile, leukocytosis improved  · Blood cultures- negative  · Presentation likely secondary to acute vomiting and inflammatory markers  · LA-negative, Pro-Otf- normal    * Hematemesis  Assessment & Plan  Presented with nausea vomiting: Epigastric/abdominal pain  CT: Esophageal wall thickening  Reported history of esophageal ulcers noted  Assessment for signs of esophagitis is recommended  · S/p Carafate 1 g, Protonix drip, Reglan, octreotide, Pepcid in ED  Seen by GI, underwent EGD  EGD 4/2-with evidence of severe ulcerative esophagitis, gastritis and duodenitis  Clinically improving  Reports improvement in nausea vomiting    Epigastric pain resolved  No evidence of bleeding, hemoglobin stable  • Continue Protonix, changed to every 12 hours  • Follow-up biopsy  • Diet as tolerated  • GI follow-up  • Carafate  • Hold Trulicity for now    Gastroesophageal reflux disease with esophagitis  Assessment & Plan  Historical diagnosis   Likely exacerbated by current hematemesis with coffee-ground findings    · followed by GI outpatient, history of EGD confirming esophagitis and ulceration in lower 3rd  Of esophagus, mild gastritis  • Status post EGD, 4/3-revealed severe ulcerative esophagitis, gastritis and some duodenitis  • Continue IV Protonix, changed to twice daily  • Clear liquid diet, advance as tolerated  • Follow-up pathology  • Follow-up with GI    LATASHA (acute kidney injury) ([facility] [address] )  Assessment & Plan  Secondary to nausea vomiting and hyperglycemia    · Creatine 1 59 > 1 51 on admission, baseline 1 15- 1 23  · Improved to baseline  · Discontinue IV fluid  · Hold nephrotoxic's    Elevated troponin  Assessment & Plan  Asymptomatic  · Troponin elevated to 175, repeat 279  • Non MI troponin elevation secondary to uncontrolled hypertension  • history of multiple stents in place per patient  • EKG NSR, HR 87  Telemetry without any arrhythmia  • Cardiac recs: TTE D/T elevated troponin and her cardiac Hx, patient refused echo and TTE stating her Cardiologist Dr Suyapa Tavera did one recently    • Optimize blood pressure control  • Follow-up cardiology recommendation    Type 2 diabetes mellitus with hyperglycemia, with long-term current use of insulin [facility])  Assessment & Plan  Lab Results   Component Value Date    HGBA1C 8 7 (A) 03/28/2023       Recent Labs     04/04/23  0708 04/04/23  1107 04/04/23  1559 04/04/23 2005   POCGLU 150* 162* 150* 159*       Blood Sugar Average: Last 72 hrs:  (P) 176 75     Trend blood glucose after advancement in the diet  · Resume home insulin regimen  · Hold Trulicity at present as patient reported nausea vomiting since initiation    Hypertension  Assessment & Plan  Uncontrolled on presentation, now improving  Denies any chest pain dizziness or headache  · Continue carvedilol, clonidine, ARB  · Resume Isordil, hydrochlorothiazide  · Monitor blood pressure trend  · Follow-up cardiology recommendations      Emphysema, unspecified ([facility] [address] )  Assessment & Plan  Stable    Coronary artery disease involving native coronary artery of native heart without angina pectoris  Assessment & Plan  History of CAD status post PCI x3 in 2019  · Follows up with Cardiology   · Restart after EGD aspirin, Plavix was cleared by GI  · Continue carvedilol, Lipitor    Other hyperlipidemia  Assessment & Plan  · restart home Lipitor    Ulcerative colitis ([facility] [address] )  Assessment & Plan  History of ulcerative colitis not on any medication currently  Denies any diarrhea    · Follow-up with GI after discharge as needed    Metabolic alkalosis-resolved as of 4/5/2023  Assessment & Plan  ABG congruent: pH 7 55, CO2 34, HCO3- WNL, AG-15, beta hydroxybutyrate-WNL    · Likely secondary to uncontrolled vomiting  · We will monitor            Consultations During Hospital Stay:  [address] TO CARDIOLOGY    Procedures Performed:     · ***    Significant Findings:     · Refer to hospital course and above listed diagnosis related plan for details    Imaging while in hospital:    EGD    Result Date: 4/3/2023  Narrative: Table formatting from the original result was not included  [address] [facility] Operating Room [id number] [place] [id number] [phone] DATE OF SERVICE: 4/03/23 PHYSICIAN(S): Attending: No Staff Documented Fellow: No Staff Documented INDICATION: Intractable nausea and vomiting, Hematemesis, unspecified whether nausea present POST-OP DIAGNOSIS: See the impression below  PREPROCEDURE: Informed consent was obtained for the procedure, including sedation  Risks of perforation, hemorrhage, adverse drug reaction and aspiration were discussed  The patient was placed in the left lateral decubitus position   Patient was explained about the risks and benefits of the procedure  Risks including but not limited to bleeding, infection, and perforation were explained in detail  Also explained about less than 100% sensitivity with the exam and other alternatives  PROCEDURE: EGD DETAILS OF PROCEDURE: Patient was taken to the procedure room where a time out was performed to confirm correct patient and correct procedure  The patient underwent monitored anesthesia care, which was administered by an anesthesia professional  The patient's blood pressure, heart rate, level of consciousness, respirations and oxygen were monitored throughout the procedure  The scope was advanced to the second part of the duodenum  Retroflexion was performed in the fundus  The patient experienced no blood loss  The procedure was not difficult  The patient tolerated the procedure well  There were no apparent complications  ANESTHESIA INFORMATION: ASA: III Anesthesia Type: IV Sedation with Anesthesia MEDICATIONS: No administrations occurring from 1301 to 1323 on 04/03/23 FINDINGS: Edematous, erythematous and friable mucosa in the 2nd part of the duodenum; performed cold forceps biopsy Erythematous mucosa in the antrum; performed cold forceps biopsy Ulcerative esophagitis extending from 37 cm to 20 cm encompassing about three fourths of the esophagus  Several biopsies obtained   SPECIMENS: ID Type Source Tests Collected by Time Destination 1 : Bx Inflamed Mucosa, Duodenum Tissue Duodenum TISSUE EXAM Arabella Davidson MD 4/3/2023  1:16 PM  2 : Bx Antrum, r/o h  pylori Tissue Stomach TISSUE EXAM Arabella Davidson MD 4/3/2023  1:17 PM  3 : Bx Ulcerative Esophagus Tissue Esophagus TISSUE EXAM Arabella Davidson MD 4/3/2023  1:20 PM      Impression: Severe ulcerative esophagitis, gastritis, some duodenitis RECOMMENDATION: Clear liquids room temperature, PPI twice daily, Carafate, repeat EGD 2 months   No Staff Documented     CT chest abdomen pelvis w contrast    Result Date: 4/2/2023  Narrative: CT CHEST, ABDOMEN AND PELVIS WITH IV CONTRAST INDICATION:   chest pain  COMPARISON:  None  TECHNIQUE: CT examination of the chest, abdomen and pelvis was performed  In addition to portal venous phase postcontrast scanning through the abdomen and pelvis, delayed phase postcontrast scanning was performed through the upper abdominal viscera  Multiplanar 2D reformatted images were created from the source data  Radiation dose length product (DLP) for this visit:  521 65 mGy-cm   This examination, like all CT scans performed in the [facility], was performed utilizing techniques to minimize radiation dose exposure, including the use of iterative  reconstruction and automated exposure control  IV Contrast:  100 mL of iohexol (OMNIPAQUE) Enteric Contrast: Enteric contrast was not administered  FINDINGS: CHEST LUNGS:  Mild pulmonary emphysema  No focal consolidation  PLEURA:  Unremarkable  HEART/GREAT VESSELS: Normal heart size  Coronary artery calcifications and atherosclerotic calcifications of the aorta  No thoracic aortic aneurysm  MEDIASTINUM AND WESLEY:  Esophageal wall thickening  No mediastinal or hilar lymphadenopathy  CHEST WALL AND LOWER NECK:  Unremarkable  ABDOMEN LIVER/BILIARY TREE:  Unremarkable  GALLBLADDER:  No calcified gallstones  No pericholecystic inflammatory change  SPLEEN:  Unremarkable  PANCREAS:  Mild diffuse pancreatic atrophy  Calcifications most predominant in the pancreatic head  Nondilated main pancreatic duct  No acute peripancreatic inflammation  ADRENAL GLANDS:  Unremarkable  KIDNEYS/URETERS:  One or more simple renal cyst(s) is noted  Otherwise unremarkable kidneys  No hydronephrosis  STOMACH AND BOWEL:  There is colonic diverticulosis without evidence of acute diverticulitis  APPENDIX:  A normal appendix was visualized  ABDOMINOPELVIC CAVITY:  No ascites  No pneumoperitoneum  No lymphadenopathy  VESSELS:  Atherosclerotic changes are present  No evidence of aneurysm  PELVIS REPRODUCTIVE ORGANS:  Unremarkable for patient's age  URINARY BLADDER:  Unremarkable  ABDOMINAL WALL/INGUINAL REGIONS:  Unremarkable  OSSEOUS STRUCTURES:  No acute fracture or destructive osseous lesion  Spinal degenerative changes are noted  Impression: Esophageal wall thickening  Reported history of esophageal ulcers noted  Assessment for signs of esophagitis is recommended  Workstation performed: [id number]       Incidental Findings:   · ***     Test Results Pending at Discharge (will require follow up):   · As per After Visit Summary     Outpatient Tests Requested:  · ***    Complications:  Refer to hospital course and above listed diagnosis related plan, if any    Hospital Course:     Yo Alvarado is a 81 y o  female patient who originally presented to the hospital on 4/2/2023 due to ***        Please see above list of diagnoses and related plan for additional information  Condition at Discharge: {condition:04681}     Discharge Day Visit / Exam:     Subjective:  ***    Vitals: Blood Pressure: 148/78 (04/05/23 0902)  Pulse: 76 (04/05/23 0902)  Temperature: (!) 97 4 °F (36 3 °C) (04/05/23 0902)  Temp Source: Oral (04/03/23 1527)  Respirations: 20 (04/05/23 0902)  Height: 5' (152 4 cm) (04/03/23 1244)  Weight - Scale: 59 kg (130 lb) (04/03/23 1244)  SpO2: 99 % (04/05/23 0902)  Exam:   Physical Exam    Discharge instructions/Information to patient and family:(Discharge Medications and Follow up):   See after visit summary for information provided to patient and family  Provisions for Follow-Up Care:  See after visit summary for information related to follow-up care and any pertinent home health orders  Disposition: {Discharge Disposition:34117}    Planned Readmission:  {Yes or No:82494}     Discharge Statement:  I spent *** minutes discharging the patient  This time was spent on the day of discharge   I had direct contact with the patient on the day of "discharge  Greater than 50% of the total time was spent examining patient, answering all patient questions, arranging and discussing plan of care with patient as well as directly providing post-discharge instructions  Additional time then spent on discharge activities  Discharge Medications:  See after visit summary for reconciled discharge medications provided to patient and family  ** Please Note: \"This note has been constructed using a voice recognition system  Therefore there may be syntax, spelling, and/or grammatical errors  Please call if you have any questions   \"**      " "Normocephalic and atraumatic  Cardiovascular:      Rate and Rhythm: Normal rate  Pulmonary:      Effort: Pulmonary effort is normal  No respiratory distress  Breath sounds: Normal breath sounds  No wheezing or rales  Abdominal:      General: Bowel sounds are normal  There is no distension  Palpations: Abdomen is soft  Tenderness: There is no abdominal tenderness  There is no guarding or rebound  Musculoskeletal:      Cervical back: Neck supple  Right lower leg: No edema  Left lower leg: No edema  Skin:     General: Skin is warm and dry  Findings: No rash  Neurological:      Mental Status: She is alert  Mental status is at baseline  Discharge instructions/Information to patient and family:(Discharge Medications and Follow up):   See after visit summary for information provided to patient and family  Provisions for Follow-Up Care:  See after visit summary for information related to follow-up care and any pertinent home health orders  Disposition: Home    Planned Readmission:  No     Discharge Statement:  I spent 50 minutes discharging the patient  This time was spent on the day of discharge  I had direct contact with the patient on the day of discharge  Greater than 50% of the total time was spent examining patient, answering all patient questions, arranging and discussing plan of care with patient as well as directly providing post-discharge instructions  Additional time then spent on discharge activities  Coordinated with GI and cardiology  Discharge Medications:  See after visit summary for reconciled discharge medications provided to patient and family  ** Please Note: \"This note has been constructed using a voice recognition system  Therefore there may be syntax, spelling, and/or grammatical errors  Please call if you have any questions   \"**      "

## 2023-04-05 NOTE — PLAN OF CARE
Problem: MOBILITY - ADULT  Goal: Maintain or return to baseline ADL function  Description: INTERVENTIONS:  -  Assess patient's ability to carry out ADLs; assess patient's baseline for ADL function and identify physical deficits which impact ability to perform ADLs (bathing, care of mouth/teeth, toileting, grooming, dressing, etc )  - Assess/evaluate cause of self-care deficits   - Assess range of motion  - Assess patient's mobility; develop plan if impaired  - Assess patient's need for assistive devices and provide as appropriate  - Encourage maximum independence but intervene and supervise when necessary  - Involve family in performance of ADLs  - Assess for home care needs following discharge   - Consider OT consult to assist with ADL evaluation and planning for discharge  - Provide patient education as appropriate  Outcome: Progressing     Problem: MOBILITY - ADULT  Goal: Maintains/Returns to pre admission functional level  Description: INTERVENTIONS:  - Perform BMAT or MOVE assessment daily    - Set and communicate daily mobility goal to care team and patient/family/caregiver  - Collaborate with rehabilitation services on mobility goals if consulted  - Perform Range of Motion 3 times a day  - Reposition patient every 3 hours    - Dangle patient 3 times a day  - Stand patient 3 times a day  - Ambulate patient 3 times a day  - Out of bed to chair 3 times a day   - Out of bed for meals 3 times a day  - Out of bed for toileting  - Record patient progress and toleration of activity level   Outcome: Progressing     Problem: PAIN - ADULT  Goal: Verbalizes/displays adequate comfort level or baseline comfort level  Description: Interventions:  - Encourage patient to monitor pain and request assistance  - Assess pain using appropriate pain scale  - Administer analgesics based on type and severity of pain and evaluate response  - Implement non-pharmacological measures as appropriate and evaluate response  - Consider cultural and social influences on pain and pain management  - Notify physician/advanced practitioner if interventions unsuccessful or patient reports new pain  Outcome: Progressing     Problem: INFECTION - ADULT  Goal: Absence or prevention of progression during hospitalization  Description: INTERVENTIONS:  - Assess and monitor for signs and symptoms of infection  - Monitor lab/diagnostic results  - Monitor all insertion sites, i e  indwelling lines, tubes, and drains  - Monitor endotracheal if appropriate and nasal secretions for changes in amount and color  - [place] appropriate cooling/warming therapies per order  - Administer medications as ordered  - Instruct and encourage patient and family to use good hand hygiene technique  - Identify and instruct in appropriate isolation precautions for identified infection/condition  Outcome: Progressing     Problem: INFECTION - ADULT  Goal: Absence of fever/infection during neutropenic period  Description: INTERVENTIONS:  - Monitor WBC    Outcome: Progressing     Problem: SAFETY ADULT  Goal: Maintain or return to baseline ADL function  Description: INTERVENTIONS:  -  Assess patient's ability to carry out ADLs; assess patient's baseline for ADL function and identify physical deficits which impact ability to perform ADLs (bathing, care of mouth/teeth, toileting, grooming, dressing, etc )  - Assess/evaluate cause of self-care deficits   - Assess range of motion  - Assess patient's mobility; develop plan if impaired  - Assess patient's need for assistive devices and provide as appropriate  - Encourage maximum independence but intervene and supervise when necessary  - Involve family in performance of ADLs  - Assess for home care needs following discharge   - Consider OT consult to assist with ADL evaluation and planning for discharge  - Provide patient education as appropriate  Outcome: Progressing     Problem: DISCHARGE PLANNING  Goal: Discharge to home or other facility with appropriate resources  Description: INTERVENTIONS:  - Identify barriers to discharge w/patient and caregiver  - Arrange for needed discharge resources and transportation as appropriate  - Identify discharge learning needs (meds, wound care, etc )  - Arrange for interpretive services to assist at discharge as needed  - Refer to Case Management Department for coordinating discharge planning if the patient needs post-hospital services based on physician/advanced practitioner order or complex needs related to functional status, cognitive ability, or social support system  Outcome: Progressing     Problem: Knowledge Deficit  Goal: Patient/family/caregiver demonstrates understanding of disease process, treatment plan, medications, and discharge instructions  Description: Complete learning assessment and assess knowledge base    Interventions:  - Provide teaching at level of understanding  - Provide teaching via preferred learning methods  Outcome: Progressing     Problem: GASTROINTESTINAL - ADULT  Goal: Minimal or absence of nausea and/or vomiting  Description: INTERVENTIONS:  - Administer IV fluids if ordered to ensure adequate hydration  - Maintain NPO status until nausea and vomiting are resolved  - Nasogastric tube if ordered  - Administer ordered antiemetic medications as needed  - Provide nonpharmacologic comfort measures as appropriate  - Advance diet as tolerated, if ordered  - Consider nutrition services referral to assist patient with adequate nutrition and appropriate food choices  Outcome: Progressing     Problem: Potential for Falls  Goal: Patient will remain free of falls  Description: INTERVENTIONS:  -  Assess patient's ability to carry out ADLs; assess patient's baseline for ADL function and identify physical deficits which impact ability to perform ADLs (bathing, care of mouth/teeth, toileting, grooming, dressing, etc )  - Assess/evaluate cause of self-care deficits   - Assess range of motion  - Assess patient's mobility; develop plan if impaired  - Assess patient's need for assistive devices and provide as appropriate  - Encourage maximum independence but intervene and supervise when necessary  - Involve family in performance of ADLs  - Assess for home care needs following discharge   - Consider OT consult to assist with ADL evaluation and planning for discharge  - Provide patient education as appropriate  Outcome: Progressing

## 2023-04-05 NOTE — PROGRESS NOTES
Progress Note - Cardiology   [facility]     Anthony Hall 81 y o  female MRN: [id number]  : 1950  Unit/Bed#: 4 [place] 419-01 Encounter: [id number]    Assessment and Plan:   1  Hematemesis     - GI following    - 23 EGD demonstrated ulcerative esophagitis  - 23 CT chest/abd/pelvis: Esophageal wall thickening   Reported history of esophageal ulcers noted   Assessment for signs of esophagitis is recommended      2  Elevated troponin       - 23 hs troponin: 40 (0hrs), 175 (2hrs), 279 (4hrs)  - Likely non-ischemic myocardial injury secondary to hypertensive urgency and uncontrolled vomiting    - Patient currently denies chest pain, palpitations, or shortness of breath    - Discussed with patient recommendations for TTE in setting of elevated troponin and her cardiac history however she refused stating her Cardiologist Dr Tata Brown did one recently  Unable to find TTE more recent than 3/8/19  Patient continues to refuse TTE    - 23 EKG: Normal sinus rhythm, rate 97 bpm  Possible Left atrial enlargement  Septal infarct , age undetermined      3  Hypertension       - Hypertensive urgency on presentation  BP now improved  - Continue coreg 25 mg twice daily  - Continue clonidine 0 2 twice daily  - Continue losartan 100 mg (ordered as alternative to home medication of valsartan (non-formulary))   - Continue HCTZ 12 5 mg daily and isordil 30 mg twice daily    - Recommend patient follow up with her outpatient Cardiologist, Dr Jose Maria Andrea, within 1-2 weeks of discharge  - No further Cardiology workup at this time        4  CAD     - Patient follow with Dr Jose Maria Andrea for outpatient cardiology  - s/p PCI x 2 (LAD and circumflex on 2019 and RCA on 12/10/19)  - Continue lipitor 40 mg daily       5  Ischemic cardiomyopathy       - Patient follow with Dr Jose Maria Andrea for outpatient cardiology  - 3/18/19 TTE: EF 20-25 %    - Discussed with patient recommendations for TTE in setting of elevated troponin and her cardiac history however she refused stating her Cardiologist Dr Ubaldo Leone did one recently  Unable to find TTE more recent than 3/8/19  Patient continues to not want TTE    - Continue coreg 25 mg twice daily  - Continue clonidine 0 2 twice daily  - Continue losartan 100 mg (ordered as alternative to home medication of valsartan (non-formulary))   - Continue HCTZ 12 5 mg daily and isordil 30 mg twice daily  - Will continue to reintroduce home medications as tolerated       6  Chronic systolic congestive heart failure       - Does not appear in acute phase  Patient euvolemic on exam     - 3/18/19 TTE: EF 20-25 %  - Discussed with patient recommendations for TTE in setting of elevated troponin and her cardiac history however she refused stating her Cardiologist Dr Ubaldo Leone did one recently  Unable to find TTE more recent than 3/8/19    - Continue coreg 25 mg twice daily  - Continue clonidine 0 2 twice daily  - Continue losartan 100 mg (ordered as alternative to home medication of valsartan (non-formulary))   - Continue HCTZ 12 5 mg daily and isordil 30 mg twice daily    - 4/02/23 CT chest/abd/pelvis: Mild pulmonary emphysema   No focal consolidation      7  Hyperlipidemia      - 3/28/23 lipid panel: cholesterol 182, triglycerides 194, HDL 62, LDL 81    - Continue lipitor 40 mg daily      8  DMII       - 3/28/23 HgbA1c: 8 7    - Care pre primary team      Subjective / Objective:          No acute events  Patient reports she hopes to be discharged today  BP improved  Patient reports she feels her baseline health, she currently denies chest pain, shortness of breath, palpitations, headache, lightheadedness, dizziness, nausea or vomiting  Vitals: Blood pressure 148/78, pulse 76, temperature (!) 97 4 °F (36 3 °C), resp  rate 20, height 5' (1 524 m), weight 59 kg (130 lb), SpO2 99 %    Vitals:    04/02/23 1701 04/03/23 1244   Weight: 59 kg (130 lb) 59 kg (130 lb) Body mass index is 25 39 kg/m²  BP Readings from Last 3 Encounters:   04/05/23 148/78   03/28/23 140/70   12/02/22 (!) 190/88     Orthostatic Blood Pressures    Flowsheet Row Most Recent Value   Blood Pressure 148/78 filed at 04/05/2023 0902   Patient Position - Orthostatic VS Lying filed at 04/03/2023 2021        I/O       04/03 0701  04/04 0700 04/04 0701  04/05 0700 04/05 0701  04/06 0700    P  O  0 520     I V  (mL/kg) 3066 1 (52)      IV Piggyback       Total Intake(mL/kg) 3066 1 (52) 520 (8 8)     Urine (mL/kg/hr)       Emesis/NG output       Stool       Total Output       Net +3066 1 +520            Unmeasured Urine Occurrence 1 x          Invasive Devices     Peripheral Intravenous Line  Duration           Peripheral IV 04/02/23 Ventral (anterior); Right Forearm 3 days                  Intake/Output Summary (Last 24 hours) at 4/5/2023 1120  Last data filed at 4/4/2023 1215  Gross per 24 hour   Intake 240 ml   Output --   Net 240 ml         Physical Exam:   Physical Exam  Vitals reviewed  Constitutional:       General: She is not in acute distress  Appearance: She is obese  Cardiovascular:      Rate and Rhythm: Normal rate and regular rhythm  Pulses: Normal pulses  Pulmonary:      Effort: Pulmonary effort is normal  No respiratory distress  Breath sounds: Decreased breath sounds present  Musculoskeletal:      Right lower leg: No edema  Left lower leg: No edema  Skin:     General: Skin is warm and dry  Neurological:      Mental Status: She is alert and oriented to person, place, and time         Medications/ Allergies:     Current Facility-Administered Medications   Medication Dose Route Frequency Provider Last Rate   • acetaminophen  650 mg Oral Q6H PRN Eliceo Meredith MD     • atorvastatin  40 mg Oral Daily With Nolan Mueller PA-C     • carvedilol  25 mg Oral BID Pradeep Ndiaye MD     • cloNIDine  0 2 mg Oral BID Paul Rogers PA-C     • hydrALAZINE  5 mg Intravenous Q4H PRN Ester Dejesus MD     • hydrochlorothiazide  12 5 mg Oral Daily Neal Brittle, MD     • insulin glargine  30 Units Subcutaneous HS Neal Brittle, MD     • insulin lispro  1-5 Units Subcutaneous TID AC Ester Dejesus MD     • insulin lispro  4 Units Subcutaneous TID With Meals Neal Brittle, MD     • isosorbide dinitrate  30 mg Oral BID Neal Brittle, MD     • labetalol  10 mg Intravenous Q4H PRN Ester Dejesus MD     • losartan  100 mg Oral Daily Rasta Leyva PA-C     • ondansetron  4 mg Intravenous Q6H PRN Ester Dejesus MD     • pantoprazole  40 mg Intravenous Q12H [facility] Neal Brittle, MD     • potassium chloride  20 mEq Oral Once Neal Brittle, MD     • pregabalin  100 mg Oral BID Neal Brittle, MD     • sucralfate  1 g Oral BID JENY Rubio PA-C       acetaminophen, 650 mg, Q6H PRN  hydrALAZINE, 5 mg, Q4H PRN  labetalol, 10 mg, Q4H PRN  ondansetron, 4 mg, Q6H PRN      Allergies   Allergen Reactions   • Other Rash     Latex tape       VTE Pharmacologic Prophylaxis: none       Labs:   Troponins:  Results from last 7 days   Lab Units 04/02/23  1333 04/02/23  1139   HSTNI D2 ng/L  --  135*   HSTNI D4 ng/L 239*  --          CBC with diff:  Results from last 7 days   Lab Units 04/05/23  0520 04/04/23  0514 04/03/23  0539 04/02/23  1450 04/02/23  0922   WBC Thousand/uL 6 34 7 24 11 62*  --  14 56*   HEMOGLOBIN g/dL 11 1* 12 2 13 8 13 2 15 8*   HEMATOCRIT % 33 0* 36 4 40 9 38 5 44 4   MCV fL 86 87 87  --  82   PLATELETS Thousands/uL 98* 96* 108*  --  155   MCH pg 29 0 29 0 29 2  --  29 0   MCHC g/dL 33 6 33 5 33 7  --  35 6   RDW % 13 8 14 0 14 3  --  13 5   MPV fL 9 7 10 2 9 8  --  9 7   NRBC AUTO /100 WBCs  --  0 0  --  0       CMP:  Results from last 7 days   Lab Units 04/05/23  0520 04/04/23  0514 04/03/23  0539 04/02/23  0922   SODIUM mmol/L 135 138 137 132*   POTASSIUM mmol/L 3 4* 3 5 4 4 3 9   CHLORIDE mmol/L 105 107 103 89*   CO2 mmol/L 24 23 27 28   ANION GAP mmol/L 6 8 7 15* BUN mg/dL 22 20 23 38*   CREATININE mg/dL 1 31* 1 17 1 51* 1 59*   CALCIUM mg/dL 8 1* 8 2* 8 8 10 6*   AST U/L 20 20 18 23   ALT U/L 19 21 25 42   ALK PHOS U/L 49 58 73 96   TOTAL PROTEIN g/dL 5 2* 5 7* 6 1* 7 7   ALBUMIN g/dL 3 1* 3 4* 3 5 4 5   TOTAL BILIRUBIN mg/dL 0 64 1 00 0 91 1 12*   EGFR ml/min/1 73sq m 41 47 34 32       Magnesium:  Results from last 7 days   Lab Units 04/05/23  0520 04/02/23  0922   MAGNESIUM mg/dL 1 9 1 8*     Coags:    TSH:    No components found for: TSH3  Lipid Profile:    Hgb A1c:    NT-proBNP: No results for input(s): NTBNP in the last 72 hours  Imaging & Testing   I have personally reviewed pertinent reports  EGD    Result Date: 4/3/2023  Narrative: Table formatting from the original result was not included  [address] [place] [address] Operating Room [address] [place] [address] [phone] DATE OF SERVICE: 4/03/23 PHYSICIAN(S): Attending: No Staff Documented Fellow: No Staff Documented INDICATION: Intractable nausea and vomiting, Hematemesis, unspecified whether nausea present POST-OP DIAGNOSIS: See the impression below  PREPROCEDURE: Informed consent was obtained for the procedure, including sedation  Risks of perforation, hemorrhage, adverse drug reaction and aspiration were discussed  The patient was placed in the left lateral decubitus position  Patient was explained about the risks and benefits of the procedure  Risks including but not limited to bleeding, infection, and perforation were explained in detail  Also explained about less than 100% sensitivity with the exam and other alternatives  PROCEDURE: EGD DETAILS OF PROCEDURE: Patient was taken to the procedure room where a time out was performed to confirm correct patient and correct procedure   The patient underwent monitored anesthesia care, which was administered by an anesthesia professional  The patient's blood pressure, heart rate, level of consciousness, respirations and oxygen were monitored throughout the procedure  The scope was advanced to the second part of the duodenum  Retroflexion was performed in the fundus  The patient experienced no blood loss  The procedure was not difficult  The patient tolerated the procedure well  There were no apparent complications  ANESTHESIA INFORMATION: ASA: III Anesthesia Type: IV Sedation with Anesthesia MEDICATIONS: No administrations occurring from 1301 to 1323 on 04/03/23 FINDINGS: Edematous, erythematous and friable mucosa in the 2nd part of the duodenum; performed cold forceps biopsy Erythematous mucosa in the antrum; performed cold forceps biopsy Ulcerative esophagitis extending from 37 cm to 20 cm encompassing about three fourths of the esophagus  Several biopsies obtained  SPECIMENS: ID Type Source Tests Collected by Time Destination 1 : Bx Inflamed Mucosa, Duodenum Tissue Duodenum TISSUE EXAM Lex Gastelum MD 4/3/2023  1:16 PM  2 : Bx Antrum, r/o h  pylori Tissue Stomach TISSUE EXAM Lex Gastelum MD 4/3/2023  1:17 PM  3 : Bx Ulcerative Esophagus Tissue Esophagus TISSUE EXAM Lex Gastelum MD 4/3/2023  1:20 PM      Impression: Severe ulcerative esophagitis, gastritis, some duodenitis RECOMMENDATION: Clear liquids room temperature, PPI twice daily, Carafate, repeat EGD 2 months   No Staff Documented     CT chest abdomen pelvis w contrast    Result Date: 4/2/2023  Narrative: CT CHEST, ABDOMEN AND PELVIS WITH IV CONTRAST INDICATION:   chest pain  COMPARISON:  None  TECHNIQUE: CT examination of the chest, abdomen and pelvis was performed  In addition to portal venous phase postcontrast scanning through the abdomen and pelvis, delayed phase postcontrast scanning was performed through the upper abdominal viscera  Multiplanar 2D reformatted images were created from the source data  Radiation dose length product (DLP) for this visit:  521 65 mGy-cm     This examination, like all CT scans performed in the [facility], was performed utilizing techniques to minimize radiation dose exposure, including the use of iterative  reconstruction and automated exposure control  IV Contrast:  100 mL of iohexol (OMNIPAQUE) Enteric Contrast: Enteric contrast was not administered  FINDINGS: CHEST LUNGS:  Mild pulmonary emphysema  No focal consolidation  PLEURA:  Unremarkable  HEART/GREAT VESSELS: Normal heart size  Coronary artery calcifications and atherosclerotic calcifications of the aorta  No thoracic aortic aneurysm  MEDIASTINUM AND WESLEY:  Esophageal wall thickening  No mediastinal or hilar lymphadenopathy  CHEST WALL AND LOWER NECK:  Unremarkable  ABDOMEN LIVER/BILIARY TREE:  Unremarkable  GALLBLADDER:  No calcified gallstones  No pericholecystic inflammatory change  SPLEEN:  Unremarkable  PANCREAS:  Mild diffuse pancreatic atrophy  Calcifications most predominant in the pancreatic head  Nondilated main pancreatic duct  No acute peripancreatic inflammation  ADRENAL GLANDS:  Unremarkable  KIDNEYS/URETERS:  One or more simple renal cyst(s) is noted  Otherwise unremarkable kidneys  No hydronephrosis  STOMACH AND BOWEL:  There is colonic diverticulosis without evidence of acute diverticulitis  APPENDIX:  A normal appendix was visualized  ABDOMINOPELVIC CAVITY:  No ascites  No pneumoperitoneum  No lymphadenopathy  VESSELS:  Atherosclerotic changes are present  No evidence of aneurysm  PELVIS REPRODUCTIVE ORGANS:  Unremarkable for patient's age  URINARY BLADDER:  Unremarkable  ABDOMINAL WALL/INGUINAL REGIONS:  Unremarkable  OSSEOUS STRUCTURES:  No acute fracture or destructive osseous lesion  Spinal degenerative changes are noted  Impression: Esophageal wall thickening  Reported history of esophageal ulcers noted  Assessment for signs of esophagitis is recommended  Workstation performed: [id number]        EKG / Monitor: Personally reviewed  Not on telemetry       Cardiac testing:   No results found for this or any previous visit        Clyde Archuleta PA-C

## 2023-04-06 NOTE — RESULT ENCOUNTER NOTE
Please inform patient that their biopsies were benign    Due to the fact that she had severe ulcerative esophagitis repeat EGD in 2 months

## 2023-04-06 NOTE — UTILIZATION REVIEW
NOTIFICATION OF ADMISSION DISCHARGE   This is a Notification of Discharge from [facility]  Please be advised that this patient has been discharge from our facility  Below you will find the admission and discharge date and time including the patient’s disposition  UTILIZATION REVIEW CONTACT:  Elsie Roberts  Utilization   Network Utilization Review Department  Phone: [phone] x carefully listen to the prompts  All voicemails are confidential   Email: [email or web address]     ADMISSION INFORMATION  PRESENTATION DATE: 4/2/2023  8:59 AM  OBERVATION ADMISSION DATE:   INPATIENT ADMISSION DATE: 4/2/23  1:21 PM   DISCHARGE DATE: 4/5/2023  6:24 PM   DISPOSITION:Home/Self Care    IMPORTANT INFORMATION:  Send all requests for admission clinical reviews, approved or denied determinations and any other requests to dedicated fax number below belonging to the campus where the patient is receiving treatment   List of dedicated fax numbers:  [address] DENIALS (Administrative/Medical Necessity) [phone]   [address] (Maternity/NICU/Pediatrics) [phone]   [facility] [phone]   [facility] 87 [phone]   [address] [phone]   220 [facility] [phone] 90 [facility] [phone]   [address] [place], [place] [address] 119 [phone]   [facility]  [phone] 405[address] [facility] [phone]   412 [facility] 850 [facility] [phone]

## 2023-04-07 ENCOUNTER — TRANSITIONAL CARE MANAGEMENT (OUTPATIENT)
Dept: FAMILY MEDICINE CLINIC | Facility: CLINIC | Age: 73
End: 2023-04-07

## 2023-04-07 ENCOUNTER — TELEPHONE (OUTPATIENT)
Dept: GASTROENTEROLOGY | Facility: CLINIC | Age: 73
End: 2023-04-07

## 2023-04-07 LAB
BACTERIA BLD CULT: NORMAL
BACTERIA BLD CULT: NORMAL

## 2023-04-07 NOTE — TELEPHONE ENCOUNTER
----- Message from [employer] sent at 4/7/2023 12:03 PM EDT -----  I called and notified patient of EGD results  Clerical team, can you please call to schedule patient for 2 month EGD? Thank you!

## 2023-04-10 PROBLEM — N17.9 AKI (ACUTE KIDNEY INJURY) (HCC): Status: RESOLVED | Noted: 2022-08-05 | Resolved: 2023-04-10

## 2023-04-10 PROBLEM — K92.0 HEMATEMESIS: Status: RESOLVED | Noted: 2023-04-02 | Resolved: 2023-04-10

## 2023-04-10 PROBLEM — R65.10 SIRS (SYSTEMIC INFLAMMATORY RESPONSE SYNDROME) (HCC): Status: RESOLVED | Noted: 2023-04-02 | Resolved: 2023-04-10

## 2023-04-10 NOTE — TELEPHONE ENCOUNTER
I lmom for pt to please call back to schedule a repeat procedure with Dr Faina Funes  Looking at [facility] on 6/7/23  Will call pt again in one week if do not hear back from her

## 2023-04-13 NOTE — TELEPHONE ENCOUNTER
Procedure: EGD  Scheduled date of procedure (as of today):06/20/23  Physician performing procedure:Dr An aMaria Lentz  Location of procedure:[facility]  Instructions reviewed with patient by: ti  Clearances: Plavix, sent request to Dr Jossy Goel in [facility]  Will check in a few weeks to see if they received

## 2023-04-13 NOTE — TELEPHONE ENCOUNTER
Patients GI provider:     Number to return call: [phone]    Reason for call: Pt returning Vicki's call  Pt stated can she be scheduled on a Monday or Friday since she works [employer]  You may reach her today until 10:30 am, all day tomorrow and anytime next week since won't be working       Scheduled procedure/appointment date if applicable: N/A

## 2023-05-01 DIAGNOSIS — K20.90 ESOPHAGITIS: ICD-10-CM

## 2023-05-01 RX ORDER — PANTOPRAZOLE SODIUM 40 MG/1
40 TABLET, DELAYED RELEASE ORAL 2 TIMES DAILY
Qty: 60 TABLET | Refills: 2 | Status: SHIPPED | OUTPATIENT
Start: 2023-05-01

## 2023-05-12 ENCOUNTER — OFFICE VISIT (OUTPATIENT)
Dept: ENDOCRINOLOGY | Facility: CLINIC | Age: 73
End: 2023-05-12

## 2023-05-12 VITALS
HEART RATE: 64 BPM | DIASTOLIC BLOOD PRESSURE: 50 MMHG | WEIGHT: 128 LBS | SYSTOLIC BLOOD PRESSURE: 104 MMHG | HEIGHT: 60 IN | BODY MASS INDEX: 25.13 KG/M2

## 2023-05-12 DIAGNOSIS — E11.65 TYPE 2 DIABETES MELLITUS WITH HYPERGLYCEMIA, WITH LONG-TERM CURRENT USE OF INSULIN (HCC): Primary | ICD-10-CM

## 2023-05-12 DIAGNOSIS — Z79.4 TYPE 2 DIABETES MELLITUS WITH HYPERGLYCEMIA, WITH LONG-TERM CURRENT USE OF INSULIN (HCC): Primary | ICD-10-CM

## 2023-05-12 DIAGNOSIS — I25.5 ISCHEMIC CARDIOMYOPATHY: ICD-10-CM

## 2023-05-12 DIAGNOSIS — I25.10 CORONARY ARTERY DISEASE INVOLVING NATIVE CORONARY ARTERY OF NATIVE HEART WITHOUT ANGINA PECTORIS: ICD-10-CM

## 2023-05-12 DIAGNOSIS — M81.0 OSTEOPOROSIS WITHOUT CURRENT PATHOLOGICAL FRACTURE, UNSPECIFIED OSTEOPOROSIS TYPE: ICD-10-CM

## 2023-05-12 DIAGNOSIS — I10 PRIMARY HYPERTENSION: ICD-10-CM

## 2023-05-12 DIAGNOSIS — E78.00 PURE HYPERCHOLESTEROLEMIA: ICD-10-CM

## 2023-05-12 NOTE — PATIENT INSTRUCTIONS
Change Lantus to 30 units subcutaneously at bedtime  Increase NovoLog/Humalog to 6 units with meals 3 times a day  Continue sliding scale insulin    In addition, please use humalog/Novolog insulin per the following sliding scale to correct high blood sugars:  BG  151-200: 1 unit  201-250: 2 units  251-300: 3 units  301-350: 4 units  > 350: 5 units  Do not correct bed time highs unless > 200 mg/dl     Please check blood sugars before meals and at bedtime, send log for review in 2 to 3 weeks  Follow-up in 3 months with repeat labs        Hypoglycemia instructions     Low Blood Sugar    Steps to treat low blood sugar  1  Test blood sugar if you have symptoms of low blood sugar:   Low Blood Sugar Symptoms:  o Sweaty  o Dizzy  o Rapid heartbeat  o Shaky    o Bad mood  o Hungry      2  Treat blood sugar less than 70 with 15 grams of fast-acting carbohydrate:   Examples of 15 grams Fast-Acting Carbohydrate:  o 4 oz juice  o 4 oz regular soda  o 3-4 glucose tablets (chew)  o 3-4 hard candies (chew)              3    Wait 15 minutes and test your blood sugar again           4   If blood sugar is less than 100, repeat steps 2-3       5  When your blood sugar is 100 or more, eat a snack if it will be longer than one hour until your next meal  The snack should be 15 grams of carbohydrate and a protein:   Examples of snacks:  o ½ sandwich  o 6 crackers with cheese  o Piece of fruit with cheese or peanut butter  o 6 crackers with peanut butter

## 2023-05-12 NOTE — PROGRESS NOTES
David Franklin 81 y o  female MRN: [id number]    Encounter: [id number]      Assessment/Plan     1  Type 2 diabetes mellitus on long term insulin therapy with hyperglycemia  2  Neuropathy   Poorly controlled with last A1c 8 7%    Recommend the following at this time  Change Lantus to 30 units subcutaneously at bedtime  Increase NovoLog/Humalog to 6 units with meals 3 times a day -take mealtime insulin as long as she is eating carbohydrates even if blood sugars are normal  Continue sliding scale insulin  -Follow-up with eye doctor for a diabetic eye exam  - referred for diabetes education/medical nutrition therapy   -Follow-up in 3 months with repeat labs  - will request freestyle libre2    This patient requires therapeutic CGM  The patient has diabetes and has been using a home glucose monitor and is performing frequent (3 times a day) home glucose monitor testing and is presently being treated with multi-dose insulin therapy (MDI) with 3 or more injections a day or with a continuous subcutaneous insulin infusion (CSII) pump  This patient's insulin treatment regimen requires frequent adjustments on the basis of therapeutic CGM testing results  In follow-up, may consider work-up for type 1 diabetes mellitus  BMI 25    3  Hyperlipidemia  - continue statin therapy    4  Hypertension, CAD   Blood pressure at goal   - continue current medications including  ACE-I/ ARB    5 ulcerative esophagitis-following up with gastroenterology      I have spent a total time of 40 minutes on 05/12/23 in caring for this patient      CC: Diabetes    History of Present Illness     HPI:  David Franklin is a 81 y o  female presents for a hiospital follow-up visit regarding diabetes management  Also has history of hypertension, hyperlipidemia, CAD status post stent placement, CKD    She was admitted to the hospital in 4/2023 for epigastric pain, vomiting, had Hematemesis    Noted to have ulcerative esophagitis, gastritis, duodenitis  Will be following up with GI for an EGD in June  Was using Trulicity for management of diabetes mellitus which was held during the hospital stay  Diagnosed with diabetes mellitus at around age 38  Last Eye exam: 2 years - no no DR     Current regimen:   Lantus 32 units at bedtime  Humalog 4 units with meals 3 times a day + SSI   Only takes insulin if blood sugars are greater than 150 mg/dL    Statin:  lipitor   ACE-I/ARB: valsartan    admits t onto always following a consistent carb diet   Struggles with portion control    at [employer], works twice a week   Neuropathy - lyrica helping   No blurriness in vision   No CP/SOB     Home glucose monitoring: Will be scanned into chart   88- 2050 mg/dl   Would like a CGM     Symptoms of hypoglycemia :  Gets symptoms when bg are < 110 - sweating, then feels cold, lightheadedness; Corrects with glucose tablets and OJ     Osteoporosis  H/o ankle fractures 2015 and 22016   Was told she cant take prolia because of her jaws/teeth    Has received some injections many years ago - does not remember what they were   DXA scan last 5-6 years ago   would like primary care physician top address osteoporosis     All other systems were reviewed and are negative      Review of Systems      Historical Information   Past Medical History:   Diagnosis Date   • Colitis    • Coronary artery disease    • Diabetes mellitus ([address] )    • Esophageal ulcer    • Kidney stone    • Neuropathy      Past Surgical History:   Procedure Laterality Date   • ANKLE ARTHROPLASTY Bilateral     bilat hardware post fx surgeries   • COLONOSCOPY     • CORONARY ANGIOPLASTY WITH STENT PLACEMENT  2019    x2 procedures total 3 stents   • LITHOTRIPSY     • OVARIAN CYST SURGERY Right      Social History   Social History     Substance and Sexual Activity   Alcohol Use Not Currently     Social History     Substance and Sexual Activity   Drug Use Never     Social History     Tobacco Use   Smoking Status Former • Years: 34 00   • Types: Cigarettes   • Quit date:    • Years since quittin 3   Smokeless Tobacco Never     Family History:   Family History   Problem Relation Age of Onset   • Stroke Mother    • Heart disease Father        Meds/Allergies   Current Outpatient Medications   Medication Sig Dispense Refill   • aspirin (ECOTRIN LOW STRENGTH) 81 mg EC tablet Take 81 mg by mouth daily     • atorvastatin (LIPITOR) 40 mg tablet Take 1 tablet (40 mg total) by mouth daily 90 tablet 2   • budesonide (ENTOCORT EC) 3 MG capsule Take 2 capsules (6 mg total) by mouth daily 60 capsule 3   • carvedilol (COREG) 25 mg tablet Take 25 mg by mouth 2 (two) times a day     • cloNIDine (CATAPRES) 0 1 mg tablet Take 2 tablets (0 2 mg total) by mouth 2 (two) times a day 120 tablet 0   • clopidogrel (PLAVIX) 75 mg tablet Take 75 mg by mouth daily     • glucose blood (CVS Glucose Meter Test Strips) test strip Use 1 each 2 (two) times a day Use as instructed DX E11 65 200 each 2   • insulin glargine (LANTUS) 100 units/mL subcutaneous injection 5 units in the AM and 30 units before bedtime  (Patient taking differently: 5 units in the AM and 30 units before bedtime      Forgets the am dose and sometimes takes 32 units at bedtime depending on glucose numbers) 30 mL 11   • insulin lispro (HumaLOG KwikPen) 100 units/mL injection pen Inject 4 Units under the skin 3 (three) times a day with meals Plus sliding scale 15 mL 3   • Insulin Pen Needle (UltiCare Mini Pen Needles) 31G X 6 MM MISC Use 4 (four) times a day 200 each 2   • isosorbide dinitrate (ISORDIL) 30 mg tablet Take 1 tablet (30 mg total) by mouth 2 (two) times a day 180 tablet 2   • pantoprazole (PROTONIX) 40 mg tablet Take 1 tablet (40 mg total) by mouth 2 (two) times a day 60 tablet 2   • pregabalin (LYRICA) 100 mg capsule Take 1 capsule (100 mg total) by mouth 2 (two) times a day 60 capsule 2   • sucralfate (CARAFATE) 1 g tablet Take 1 tablet (1 g total) by mouth 2 (two) times a day before meals 120 tablet 0   • tolterodine (DETROL LA) 4 mg 24 hr capsule Take 1 capsule (4 mg total) by mouth daily 90 capsule 2   • valsartan (DIOVAN) 320 MG tablet Take 1 tablet (320 mg total) by mouth daily 90 tablet 1   • Continuous Blood Gluc  (CLIPPATE 2 [place]) CORNEL Use 1 Device every morning (Patient not taking: Reported on 5/12/2023) 1 each 0     No current facility-administered medications for this visit  Allergies   Allergen Reactions   • Other Rash     Latex tape       Objective   Vitals: Blood pressure 104/50, pulse 64, height 5' (1 524 m), weight 58 1 kg (128 lb)  Physical Exam  Constitutional:       Appearance: She is well-developed  HENT:      Head: Normocephalic and atraumatic  Eyes:      Conjunctiva/sclera: Conjunctivae normal       Pupils: Pupils are equal, round, and reactive to light  Neck:      Thyroid: No thyromegaly  Cardiovascular:      Rate and Rhythm: Normal rate and regular rhythm  Heart sounds: Normal heart sounds  No murmur heard  Pulmonary:      Effort: Pulmonary effort is normal       Breath sounds: Normal breath sounds  No wheezing  Abdominal:      General: There is no distension  Palpations: Abdomen is soft  Tenderness: There is no abdominal tenderness  Musculoskeletal:         General: Normal range of motion  Cervical back: Normal range of motion and neck supple  Skin:     General: Skin is warm and dry  Neurological:      Mental Status: She is alert and oriented to person, place, and time  Psychiatric:         Behavior: Behavior normal          The history was obtained from the review of the chart, patient      Lab Results:   Lab Results   Component Value Date/Time    Hemoglobin A1C 8 7 (A) 03/28/2023 11:40 AM    Hemoglobin A1C 7 7 (A) 12/02/2022 01:04 PM    Hemoglobin A1C 13 5 (H) 08/05/2022 06:07 AM    WBC 6 34 04/05/2023 05:20 AM    WBC 7 24 04/04/2023 05:14 AM    WBC 11 62 (H) 04/03/2023 05:39 AM    Hemoglobin "11 1 (L) 04/05/2023 05:20 AM    Hemoglobin 12 2 04/04/2023 05:14 AM    Hemoglobin 13 8 04/03/2023 05:39 AM    Hematocrit 33 0 (L) 04/05/2023 05:20 AM    Hematocrit 36 4 04/04/2023 05:14 AM    Hematocrit 40 9 04/03/2023 05:39 AM    MCV 86 04/05/2023 05:20 AM    MCV 87 04/04/2023 05:14 AM    MCV 87 04/03/2023 05:39 AM    Platelets 98 (L) 10/73/6087 05:20 AM    Platelets 96 (L) 94/89/1555 05:14 AM    Platelets 143 (L) 09/39/7496 05:39 AM    BUN 22 04/05/2023 05:20 AM    BUN 20 04/04/2023 05:14 AM    BUN 23 04/03/2023 05:39 AM    Potassium 3 4 (L) 04/05/2023 05:20 AM    Potassium 3 5 04/04/2023 05:14 AM    Potassium 4 4 04/03/2023 05:39 AM    Chloride 105 04/05/2023 05:20 AM    Chloride 107 04/04/2023 05:14 AM    Chloride 103 04/03/2023 05:39 AM    CO2 24 04/05/2023 05:20 AM    CO2 23 04/04/2023 05:14 AM    CO2 27 04/03/2023 05:39 AM    Creatinine 1 31 (H) 04/05/2023 05:20 AM    Creatinine 1 17 04/04/2023 05:14 AM    Creatinine 1 51 (H) 04/03/2023 05:39 AM    AST 20 04/05/2023 05:20 AM    AST 20 04/04/2023 05:14 AM    AST 18 04/03/2023 05:39 AM    ALT 19 04/05/2023 05:20 AM    ALT 21 04/04/2023 05:14 AM    ALT 25 04/03/2023 05:39 AM    Albumin 3 1 (L) 04/05/2023 05:20 AM    Albumin 3 4 (L) 04/04/2023 05:14 AM    Albumin 3 5 04/03/2023 05:39 AM    HDL, Direct 62 03/28/2023 11:15 AM    Triglycerides 194 (H) 03/28/2023 11:15 AM         Imaging Studies: I have personally reviewed pertinent reports  Portions of the record may have been created with voice recognition software  Occasional wrong word or \"sound a like\" substitutions may have occurred due to the inherent limitations of voice recognition software  Read the chart carefully and recognize, using context, where substitutions have occurred       "

## 2023-05-13 LAB
DME PARACHUTE DELIVERY DATE REQUESTED: NORMAL
DME PARACHUTE ITEM DESCRIPTION: NORMAL
DME PARACHUTE ITEM DESCRIPTION: NORMAL
DME PARACHUTE ORDER STATUS: NORMAL
DME PARACHUTE SUPPLIER NAME: NORMAL
DME PARACHUTE SUPPLIER PHONE: NORMAL

## 2023-05-26 ENCOUNTER — APPOINTMENT (OUTPATIENT)
Dept: LAB | Facility: CLINIC | Age: 73
End: 2023-05-26

## 2023-05-26 DIAGNOSIS — I10 PRIMARY HYPERTENSION: ICD-10-CM

## 2023-05-26 DIAGNOSIS — E78.00 PURE HYPERCHOLESTEROLEMIA: ICD-10-CM

## 2023-05-26 DIAGNOSIS — E11.65 TYPE 2 DIABETES MELLITUS WITH HYPERGLYCEMIA, WITH LONG-TERM CURRENT USE OF INSULIN (HCC): ICD-10-CM

## 2023-05-26 DIAGNOSIS — M81.0 OSTEOPOROSIS WITHOUT CURRENT PATHOLOGICAL FRACTURE, UNSPECIFIED OSTEOPOROSIS TYPE: ICD-10-CM

## 2023-05-26 DIAGNOSIS — Z79.4 TYPE 2 DIABETES MELLITUS WITH HYPERGLYCEMIA, WITH LONG-TERM CURRENT USE OF INSULIN (HCC): ICD-10-CM

## 2023-05-26 DIAGNOSIS — I25.10 CORONARY ARTERY DISEASE INVOLVING NATIVE CORONARY ARTERY OF NATIVE HEART WITHOUT ANGINA PECTORIS: ICD-10-CM

## 2023-05-26 DIAGNOSIS — I25.5 ISCHEMIC CARDIOMYOPATHY: ICD-10-CM

## 2023-05-26 DIAGNOSIS — N17.9 AKI (ACUTE KIDNEY INJURY) (HCC): ICD-10-CM

## 2023-05-26 LAB
ALBUMIN SERPL BCP-MCNC: 4.1 G/DL (ref 3.5–5)
ALP SERPL-CCNC: 100 U/L (ref 46–116)
ALT SERPL W P-5'-P-CCNC: 45 U/L (ref 12–78)
ANION GAP SERPL CALCULATED.3IONS-SCNC: -3 MMOL/L (ref 4–13)
AST SERPL W P-5'-P-CCNC: 31 U/L (ref 5–45)
BILIRUB SERPL-MCNC: 0.38 MG/DL (ref 0.2–1)
BUN SERPL-MCNC: 42 MG/DL (ref 5–25)
CALCIUM SERPL-MCNC: 10 MG/DL (ref 8.3–10.1)
CHLORIDE SERPL-SCNC: 111 MMOL/L (ref 96–108)
CHOLEST SERPL-MCNC: 161 MG/DL
CO2 SERPL-SCNC: 30 MMOL/L (ref 21–32)
CREAT SERPL-MCNC: 1.64 MG/DL (ref 0.6–1.3)
CREAT UR-MCNC: 36.7 MG/DL
EST. AVERAGE GLUCOSE BLD GHB EST-MCNC: 174 MG/DL
GFR SERPL CREATININE-BSD FRML MDRD: 31 ML/MIN/1.73SQ M
GLUCOSE P FAST SERPL-MCNC: 56 MG/DL (ref 65–99)
HBA1C MFR BLD: 7.7 %
HDLC SERPL-MCNC: 67 MG/DL
LDLC SERPL CALC-MCNC: 72 MG/DL (ref 0–100)
MICROALBUMIN UR-MCNC: 314 MG/L (ref 0–20)
MICROALBUMIN/CREAT 24H UR: 856 MG/G CREATININE (ref 0–30)
NONHDLC SERPL-MCNC: 94 MG/DL
POTASSIUM SERPL-SCNC: 3.8 MMOL/L (ref 3.5–5.3)
PROT SERPL-MCNC: 7.7 G/DL (ref 6.4–8.4)
SODIUM SERPL-SCNC: 138 MMOL/L (ref 135–147)
TRIGL SERPL-MCNC: 112 MG/DL

## 2023-05-30 DIAGNOSIS — K20.90 ESOPHAGITIS: ICD-10-CM

## 2023-05-30 RX ORDER — PANTOPRAZOLE SODIUM 40 MG/1
40 TABLET, DELAYED RELEASE ORAL 2 TIMES DAILY
Qty: 60 TABLET | Refills: 2 | Status: SHIPPED | OUTPATIENT
Start: 2023-05-30

## 2023-05-30 NOTE — TELEPHONE ENCOUNTER
Patient called asking for a refill of the pending medication   Patient stated she has an appointment June 26th

## 2023-06-01 ENCOUNTER — TELEPHONE (OUTPATIENT)
Dept: FAMILY MEDICINE CLINIC | Facility: CLINIC | Age: 73
End: 2023-06-01

## 2023-06-01 DIAGNOSIS — R79.89 ELEVATED SERUM CREATININE: Primary | ICD-10-CM

## 2023-06-01 NOTE — TELEPHONE ENCOUNTER
Patient called stating someone called her  I reviewed the pts chart and I did not see any messages  Patient stated that if it is about the labs that her endo ordered its already being taken care through them

## 2023-06-01 NOTE — TELEPHONE ENCOUNTER
I spoke to the Patient and she stated she will go for the repeat bw however she wanted to know if hot tea could be the issue because she drinks a lot of hot tea

## 2023-06-05 ENCOUNTER — TELEPHONE (OUTPATIENT)
Dept: FAMILY MEDICINE CLINIC | Facility: CLINIC | Age: 73
End: 2023-06-05

## 2023-06-05 NOTE — TELEPHONE ENCOUNTER
[facility] called stating the Patient called them to refill her carafate  However Dr Suleman Montilla had ordered it  Please advise

## 2023-06-06 DIAGNOSIS — K20.90 ESOPHAGITIS: ICD-10-CM

## 2023-06-06 RX ORDER — SUCRALFATE 1 G/1
1 TABLET ORAL
Qty: 120 TABLET | Refills: 0 | Status: SHIPPED | OUTPATIENT
Start: 2023-06-06 | End: 2023-06-07 | Stop reason: SDUPTHER

## 2023-06-07 DIAGNOSIS — K20.90 ESOPHAGITIS: ICD-10-CM

## 2023-06-07 RX ORDER — SUCRALFATE 1 G/1
1 TABLET ORAL
Qty: 120 TABLET | Refills: 0 | Status: SHIPPED | OUTPATIENT
Start: 2023-06-07 | End: 2023-08-06

## 2023-06-12 ENCOUNTER — APPOINTMENT (OUTPATIENT)
Dept: LAB | Facility: CLINIC | Age: 73
End: 2023-06-12
Payer: COMMERCIAL

## 2023-06-12 DIAGNOSIS — N32.81 OVERACTIVE BLADDER: ICD-10-CM

## 2023-06-12 DIAGNOSIS — R79.89 ELEVATED SERUM CREATININE: ICD-10-CM

## 2023-06-12 DIAGNOSIS — N32.81 OVERACTIVE BLADDER: Primary | ICD-10-CM

## 2023-06-12 LAB
BACTERIA UR QL AUTO: ABNORMAL /HPF
BILIRUB UR QL STRIP: NEGATIVE
CLARITY UR: ABNORMAL
COLOR UR: ABNORMAL
GLUCOSE UR STRIP-MCNC: NEGATIVE MG/DL
HGB UR QL STRIP.AUTO: ABNORMAL
HYALINE CASTS #/AREA URNS LPF: ABNORMAL /LPF
KETONES UR STRIP-MCNC: NEGATIVE MG/DL
LEUKOCYTE ESTERASE UR QL STRIP: ABNORMAL
MUCOUS THREADS UR QL AUTO: ABNORMAL
NITRITE UR QL STRIP: NEGATIVE
NON-SQ EPI CELLS URNS QL MICRO: ABNORMAL /HPF
PH UR STRIP.AUTO: 6 [PH]
PROT UR STRIP-MCNC: ABNORMAL MG/DL
RBC #/AREA URNS AUTO: ABNORMAL /HPF
SP GR UR STRIP.AUTO: 1.01 (ref 1–1.03)
UROBILINOGEN UR STRIP-ACNC: <2 MG/DL
WBC #/AREA URNS AUTO: ABNORMAL /HPF
WBC CLUMPS # UR AUTO: PRESENT /UL

## 2023-06-12 PROCEDURE — 87186 SC STD MICRODIL/AGAR DIL: CPT

## 2023-06-12 PROCEDURE — 87077 CULTURE AEROBIC IDENTIFY: CPT

## 2023-06-12 PROCEDURE — 87086 URINE CULTURE/COLONY COUNT: CPT

## 2023-06-12 PROCEDURE — 81001 URINALYSIS AUTO W/SCOPE: CPT

## 2023-06-14 DIAGNOSIS — N32.81 OVERACTIVE BLADDER: Primary | ICD-10-CM

## 2023-06-14 RX ORDER — CEPHALEXIN 500 MG/1
500 CAPSULE ORAL EVERY 12 HOURS SCHEDULED
Qty: 10 CAPSULE | Refills: 0 | Status: SHIPPED | OUTPATIENT
Start: 2023-06-14 | End: 2023-06-14 | Stop reason: SDUPTHER

## 2023-06-14 RX ORDER — CEPHALEXIN 500 MG/1
500 CAPSULE ORAL EVERY 12 HOURS SCHEDULED
Qty: 10 CAPSULE | Refills: 0 | Status: SHIPPED | OUTPATIENT
Start: 2023-06-14 | End: 2023-06-16

## 2023-06-16 LAB — BACTERIA UR CULT: ABNORMAL

## 2023-06-20 ENCOUNTER — HOSPITAL ENCOUNTER (OUTPATIENT)
Dept: GASTROENTEROLOGY | Facility: AMBULARY SURGERY CENTER | Age: 73
Setting detail: OUTPATIENT SURGERY
Discharge: HOME | End: 2023-06-20
Attending: INTERNAL MEDICINE
Payer: COMMERCIAL

## 2023-06-20 ENCOUNTER — ANESTHESIA (OUTPATIENT)
Dept: GASTROENTEROLOGY | Facility: AMBULARY SURGERY CENTER | Age: 73
End: 2023-06-20

## 2023-06-20 ENCOUNTER — ANESTHESIA EVENT (OUTPATIENT)
Dept: GASTROENTEROLOGY | Facility: AMBULARY SURGERY CENTER | Age: 73
End: 2023-06-20

## 2023-06-20 VITALS
HEART RATE: 76 BPM | SYSTOLIC BLOOD PRESSURE: 204 MMHG | DIASTOLIC BLOOD PRESSURE: 98 MMHG | TEMPERATURE: 97.5 F | OXYGEN SATURATION: 97 % | RESPIRATION RATE: 18 BRPM

## 2023-06-20 DIAGNOSIS — K22.10 ULCERATIVE ESOPHAGITIS: ICD-10-CM

## 2023-06-20 DIAGNOSIS — K29.80 DUODENITIS: ICD-10-CM

## 2023-06-20 DIAGNOSIS — K29.70 GASTRITIS, PRESENCE OF BLEEDING UNSPECIFIED, UNSPECIFIED CHRONICITY, UNSPECIFIED GASTRITIS TYPE: ICD-10-CM

## 2023-06-20 LAB — GLUCOSE SERPL-MCNC: 212 MG/DL (ref 65–140)

## 2023-06-20 PROCEDURE — 82948 REAGENT STRIP/BLOOD GLUCOSE: CPT

## 2023-06-20 RX ORDER — SODIUM CHLORIDE, SODIUM LACTATE, POTASSIUM CHLORIDE, CALCIUM CHLORIDE 600; 310; 30; 20 MG/100ML; MG/100ML; MG/100ML; MG/100ML
INJECTION, SOLUTION INTRAVENOUS CONTINUOUS PRN
Status: DISCONTINUED | OUTPATIENT
Start: 2023-06-20 | End: 2023-06-23 | Stop reason: HOSPADM

## 2023-06-20 RX ADMIN — SODIUM CHLORIDE, SODIUM LACTATE, POTASSIUM CHLORIDE, AND CALCIUM CHLORIDE: .6; .31; .03; .02 INJECTION, SOLUTION INTRAVENOUS at 07:03

## 2023-06-20 NOTE — PERIOPERATIVE NURSING NOTE
Pre-operatively pts blood pressures were elevated, consulted with Dr Chema Philip, anesthesiologist, who also spoke with patient  Dr Breaux Gain recommendation was to cancel procedure and referred pt to be evaluated in the emergency room but patient has refused to do so  Pt sent home with friend

## 2023-06-20 NOTE — PERIOPERATIVE NURSING NOTE
Patient's /130, procedure was canceled,Patient refused to be transferred to the emergency department for HTN, patient stated she had medication at home and will take meds at home and check her BP  The writer, Dr Shanna Salas and admitting RN tried to educate and  the patient about the risks such as a  Having stroke if the BP is not treated  But patient refused any further care and told the staff she is going home  IV discontinued, patient left with her ride

## 2023-06-20 NOTE — ANESTHESIA PREPROCEDURE EVALUATION
Procedure:  EGD    Relevant Problems   ANESTHESIA (within normal limits)      CARDIO   (+) Coronary artery disease involving native coronary artery of native heart without angina pectoris   (+) History of PTCA with stents   (+) Hypertension   (+) Other hyperlipidemia   (+) Pure hypercholesterolemia      ENDO   (+) Type 2 diabetes mellitus with hyperglycemia, with long-term current use of insulin (HCC)      GI/HEPATIC   (+) Gastroesophageal reflux disease with esophagitis      PULMONARY   (+) Emphysema, unspecified (HCC)        Physical Exam    Airway    Mallampati score: II  TM Distance: >3 FB  Neck ROM: full     Dental       Cardiovascular  Rhythm: regular, Rate: normal,     Pulmonary  Breath sounds clear to auscultation,     Other Findings        Anesthesia Plan  ASA Score- 3     Anesthesia Type- IV sedation with anesthesia with ASA Monitors  Additional Monitors:   Airway Plan:     Comment: Procedure cancelled due to severe hypertension  5 Documented BP with systolics above 600 despite taking her BP medication this morning  Last BP of 241/130s  It is my recommendation that she goes to the ED for further management of her HTN urgency  She, however, refuses to follow this recommendation and would like to go home with her ride  I explained to the patient the higher risk of Stroke and MI with her current BP and she states she understands the risks but still refuses to go to the ED for further management of her BP  Blanquita Poole Plan Factors-Exercise tolerance (METS): >4 METS  Chart reviewed  EKG reviewed  Existing labs reviewed  Patient summary reviewed  Patient is not a current smoker  Induction-     Postoperative Plan-     Informed Consent- Anesthetic plan and risks discussed with patient  I personally reviewed this patient with the CRNA  Discussed and agreed on the Anesthesia Plan with the CRNA  Blanquita Poole

## 2023-06-26 ENCOUNTER — OFFICE VISIT (OUTPATIENT)
Dept: FAMILY MEDICINE CLINIC | Facility: CLINIC | Age: 73
End: 2023-06-26
Payer: COMMERCIAL

## 2023-06-26 VITALS
BODY MASS INDEX: 25.72 KG/M2 | HEART RATE: 69 BPM | DIASTOLIC BLOOD PRESSURE: 80 MMHG | HEIGHT: 60 IN | WEIGHT: 131 LBS | RESPIRATION RATE: 16 BRPM | OXYGEN SATURATION: 98 % | TEMPERATURE: 98.1 F | SYSTOLIC BLOOD PRESSURE: 140 MMHG

## 2023-06-26 DIAGNOSIS — K20.90 ESOPHAGITIS: ICD-10-CM

## 2023-06-26 DIAGNOSIS — E11.65 TYPE 2 DIABETES MELLITUS WITH HYPERGLYCEMIA, WITH LONG-TERM CURRENT USE OF INSULIN (HCC): Primary | ICD-10-CM

## 2023-06-26 DIAGNOSIS — I10 PRIMARY HYPERTENSION: ICD-10-CM

## 2023-06-26 DIAGNOSIS — Z79.4 TYPE 2 DIABETES MELLITUS WITH HYPERGLYCEMIA, WITH LONG-TERM CURRENT USE OF INSULIN (HCC): Primary | ICD-10-CM

## 2023-06-26 PROCEDURE — 99214 OFFICE O/P EST MOD 30 MIN: CPT | Performed by: FAMILY MEDICINE

## 2023-06-26 NOTE — PROGRESS NOTES
Baylee Solomon 1950 female MRN: [id number]    FAMILY PRACTICE OFFICE VISIT  [facility] - [address]      ASSESSMENT/PLAN  Baylee Solomon is a 72 y o  female presents to the office for    {Assess/Plan SmartLinks:61336}            Future Appointments   Date Time Provider Martina Berriosi   8/15/2023  1:30 PM CYNDIE Gonzales [facility]          SUBJECTIVE  CC: Follow-up      HPI:  Baylee Solomon is a 72 y o  female who presents for   Review of Systems    Historical Information   The patient history was reviewed as follows:  Past Medical History:   Diagnosis Date   • Colitis    • Coronary artery disease    • Diabetes mellitus ([address] )    • Esophageal ulcer    • Kidney stone    • Neuropathy          Medications:     Current Outpatient Medications:   •  aspirin (ECOTRIN LOW STRENGTH) 81 mg EC tablet, Take 81 mg by mouth daily, Disp: , Rfl:   •  atorvastatin (LIPITOR) 40 mg tablet, Take 1 tablet (40 mg total) by mouth daily, Disp: 90 tablet, Rfl: 2  •  budesonide (ENTOCORT EC) 3 MG capsule, Take 2 capsules (6 mg total) by mouth daily, Disp: 60 capsule, Rfl: 3  •  carvedilol (COREG) 25 mg tablet, Take 25 mg by mouth 2 (two) times a day, Disp: , Rfl:   •  clopidogrel (PLAVIX) 75 mg tablet, Take 75 mg by mouth daily Hold for 5 days-LD to be 6/15, Disp: , Rfl:   •  Continuous Blood Gluc  (FreeStyle [place] 2 [place]) CORNEL, Use 1 Device every morning, Disp: 1 each, Rfl: 0  •  glucose blood (CVS Glucose Meter Test Strips) test strip, Use 1 each 2 (two) times a day Use as instructed DX E11 65, Disp: 200 each, Rfl: 2  •  insulin glargine (LANTUS) 100 units/mL subcutaneous injection, 5 units in the AM and 30 units before bedtime  (Patient taking differently: 5 units in the AM and 30 units before bedtime    Forgets the am dose and sometimes takes 32 units at bedtime depending on glucose numbers), Disp: 30 mL, Rfl: 11  •  insulin lispro (HumaLOG KwikPen) 100 units/mL injection pen, Inject 4 Units under the skin 3 (three) times a day with meals Plus sliding scale, Disp: 15 mL, Rfl: 3  •  Insulin Pen Needle (UltiCare Mini Pen Needles) 31G X 6 MM MISC, Use 4 (four) times a day, Disp: 200 each, Rfl: 2  •  isosorbide dinitrate (ISORDIL) 30 mg tablet, Take 1 tablet (30 mg total) by mouth 2 (two) times a day, Disp: 180 tablet, Rfl: 2  •  pantoprazole (PROTONIX) 40 mg tablet, Take 1 tablet (40 mg total) by mouth 2 (two) times a day, Disp: 60 tablet, Rfl: 2  •  pregabalin (LYRICA) 100 mg capsule, Take 1 capsule (100 mg total) by mouth 2 (two) times a day, Disp: 60 capsule, Rfl: 2  •  sucralfate (CARAFATE) 1 g tablet, Take 1 tablet (1 g total) by mouth 2 (two) times a day before meals, Disp: 120 tablet, Rfl: 0  •  tolterodine (DETROL LA) 4 mg 24 hr capsule, Take 1 capsule (4 mg total) by mouth daily, Disp: 90 capsule, Rfl: 2  •  valsartan (DIOVAN) 320 MG tablet, Take 1 tablet by mouth once daily, Disp: 90 tablet, Rfl: 0  •  cloNIDine (CATAPRES) 0 1 mg tablet, Take 2 tablets (0 2 mg total) by mouth 2 (two) times a day, Disp: 120 tablet, Rfl: 0    Allergies   Allergen Reactions   • Other Rash     Latex tape       OBJECTIVE  Vitals:   Vitals:    06/26/23 1254   BP: 140/80   BP Location: Left arm   Patient Position: Sitting   Cuff Size: Standard   Pulse: 69   Resp: 16   Temp: 98 1 °F (36 7 °C)   SpO2: 98%   Weight: 59 4 kg (131 lb)   Height: 5' (1 524 m)         Physical Exam               Levar Nixon MD,   [facility]  6/26/2023

## 2023-06-26 NOTE — PROGRESS NOTES
Priclila Sky 1950 female MRN: [id number]    FAMILY PRACTICE OFFICE VISIT  [facility] - [address]      ASSESSMENT/PLAN  Pricilla Sky is a 72 y o  female presents to the office for    Diagnoses and all orders for this visit:    Type 2 diabetes mellitus with hyperglycemia, with long-term current use of insulin ([address] )    Primary hypertension    Esophagitis       Will be contacting her cardiologist in order for them to reevaluate her given that her blood pressure was elevated at 188/80 prior to us initiating her conversation  We will be contacting the GI specialist to see if she is able to get approved for another date given my concerns of her continuing to have pain  Type 2 diabetes with a blood sugar of 72 at this time no changes to be made continue following recommendations of endocrinology    All specialist will be contacted today please see documentation in her chart for further noticed           Future Appointments   Date Time Provider Martina Raymundo   8/15/2023  1:30 PM Elzie Canavan, [address] [facility]  CC: Follow-up      HPI:  Pricilla Sky is a 72 y o  female who presents for an acute appointment to review her endoscopy  Patient unfortunately was unable to have an endoscopy given her elevated blood pressures of 200/100  Patient continues to have days where she has severe vomiting  1 being last week x 4 days  Patient was upset that we would not refill her sucralfate  She states that she understands that the gastroenterologist gave it to her but she wanted our office to be filling it  Patient just recently saw her cardiologist who stated that she was controlled from a blood pressure standpoint and did not need any modifications of medications  This is what upsetting the patient    She has been using the [employer] and is very happy with the device and will be seeing her technologist in 2 months  Denies any bleeding while vomiting or stools  Review of Systems   Constitutional: Negative for activity change, appetite change, chills, fatigue and fever  HENT: Negative for congestion  Respiratory: Negative for cough, chest tightness and shortness of breath  Cardiovascular: Negative for chest pain and leg swelling  Gastrointestinal: Positive for abdominal pain and vomiting  Negative for abdominal distention, constipation, diarrhea and nausea  All other systems reviewed and are negative  Historical Information   The patient history was reviewed as follows:  Past Medical History:   Diagnosis Date   • Colitis    • Coronary artery disease    • Diabetes mellitus [address] [address] [facility] [address] )    • Esophageal ulcer    • Kidney stone    • Neuropathy          Medications:     Current Outpatient Medications:   •  aspirin (ECOTRIN LOW STRENGTH) 81 mg EC tablet, Take 81 mg by mouth daily, Disp: , Rfl:   •  atorvastatin (LIPITOR) 40 mg tablet, Take 1 tablet (40 mg total) by mouth daily, Disp: 90 tablet, Rfl: 2  •  budesonide (ENTOCORT EC) 3 MG capsule, Take 2 capsules (6 mg total) by mouth daily, Disp: 60 capsule, Rfl: 3  •  carvedilol (COREG) 25 mg tablet, Take 25 mg by mouth 2 (two) times a day, Disp: , Rfl:   •  clopidogrel (PLAVIX) 75 mg tablet, Take 75 mg by mouth daily Hold for 5 days-LD to be 6/15, Disp: , Rfl:   •  Continuous Blood Gluc  (FreeStyle [place]) CORNEL, Use 1 Device every morning, Disp: 1 each, Rfl: 0  •  glucose blood (CVS Glucose Meter Test Strips) test strip, Use 1 each 2 (two) times a day Use as instructed DX E11 65, Disp: 200 each, Rfl: 2  •  insulin glargine (LANTUS) 100 units/mL subcutaneous injection, 5 units in the AM and 30 units before bedtime  (Patient taking differently: 5 units in the AM and 30 units before bedtime    Forgets the am dose and sometimes takes 32 units at bedtime depending on glucose numbers), Disp: 30 mL, Rfl: 11  •  insulin lispro (HumaLOG KwikPen) 100 units/mL injection pen, Inject 4 Units under the skin 3 (three) times a day with meals Plus sliding scale, Disp: 15 mL, Rfl: 3  •  Insulin Pen Needle (UltiCare Mini Pen Needles) 31G X 6 MM MISC, Use 4 (four) times a day, Disp: 200 each, Rfl: 2  •  isosorbide dinitrate (ISORDIL) 30 mg tablet, Take 1 tablet (30 mg total) by mouth 2 (two) times a day, Disp: 180 tablet, Rfl: 2  •  pantoprazole (PROTONIX) 40 mg tablet, Take 1 tablet (40 mg total) by mouth 2 (two) times a day, Disp: 60 tablet, Rfl: 2  •  pregabalin (LYRICA) 100 mg capsule, Take 1 capsule (100 mg total) by mouth 2 (two) times a day, Disp: 60 capsule, Rfl: 2  •  sucralfate (CARAFATE) 1 g tablet, Take 1 tablet (1 g total) by mouth 2 (two) times a day before meals, Disp: 120 tablet, Rfl: 0  •  tolterodine (DETROL LA) 4 mg 24 hr capsule, Take 1 capsule (4 mg total) by mouth daily, Disp: 90 capsule, Rfl: 2  •  valsartan (DIOVAN) 320 MG tablet, Take 1 tablet by mouth once daily, Disp: 90 tablet, Rfl: 0  •  cloNIDine (CATAPRES) 0 1 mg tablet, Take 2 tablets (0 2 mg total) by mouth 2 (two) times a day, Disp: 120 tablet, Rfl: 0    Allergies   Allergen Reactions   • Other Rash     Latex tape       OBJECTIVE  Vitals:   Vitals:    06/26/23 1254   BP: 140/80   BP Location: Left arm   Patient Position: Sitting   Cuff Size: Standard   Pulse: 69   Resp: 16   Temp: 98 1 °F (36 7 °C)   SpO2: 98%   Weight: 59 4 kg (131 lb)   Height: 5' (1 524 m)         Physical Exam  Vitals reviewed  Constitutional:       Appearance: She is well-developed  HENT:      Head: Normocephalic and atraumatic  Eyes:      Conjunctiva/sclera: Conjunctivae normal       Pupils: Pupils are equal, round, and reactive to light  Cardiovascular:      Rate and Rhythm: Normal rate and regular rhythm  Heart sounds: Normal heart sounds  Pulmonary:      Effort: Pulmonary effort is normal  No respiratory distress  Breath sounds: Normal breath sounds  Abdominal:      Tenderness: There is abdominal tenderness     Musculoskeletal:         General: Normal range of motion  Cervical back: Normal range of motion and neck supple  Skin:     General: Skin is warm  Capillary Refill: Capillary refill takes less than 2 seconds  Neurological:      Mental Status: She is alert and oriented to person, place, and time                      Jeremias Aguero MD,   [facility]  6/26/2023

## 2023-06-27 DIAGNOSIS — M25.572 BILATERAL ANKLE PAIN, UNSPECIFIED CHRONICITY: ICD-10-CM

## 2023-06-27 DIAGNOSIS — M25.571 BILATERAL ANKLE PAIN, UNSPECIFIED CHRONICITY: ICD-10-CM

## 2023-06-27 RX ORDER — PREGABALIN 100 MG/1
CAPSULE ORAL
Qty: 60 CAPSULE | Refills: 0 | Status: SHIPPED | OUTPATIENT
Start: 2023-06-27

## 2023-07-05 DIAGNOSIS — K52.832 LYMPHOCYTIC COLITIS: ICD-10-CM

## 2023-07-05 RX ORDER — BUDESONIDE 3 MG/1
CAPSULE, COATED PELLETS ORAL
Qty: 60 CAPSULE | Refills: 0 | Status: SHIPPED | OUTPATIENT
Start: 2023-07-05 | End: 2023-07-10

## 2023-07-10 DIAGNOSIS — K52.832 LYMPHOCYTIC COLITIS: ICD-10-CM

## 2023-07-10 RX ORDER — BUDESONIDE 3 MG/1
CAPSULE, COATED PELLETS ORAL
Qty: 60 CAPSULE | Refills: 0 | Status: SHIPPED | OUTPATIENT
Start: 2023-07-10

## 2023-07-24 ENCOUNTER — TELEPHONE (OUTPATIENT)
Dept: HEMATOLOGY ONCOLOGY | Facility: CLINIC | Age: 73
End: 2023-07-24

## 2023-07-24 ENCOUNTER — OFFICE VISIT (OUTPATIENT)
Dept: FAMILY MEDICINE CLINIC | Facility: CLINIC | Age: 73
End: 2023-07-24
Payer: COMMERCIAL

## 2023-07-24 VITALS
BODY MASS INDEX: 25.72 KG/M2 | TEMPERATURE: 98.2 F | RESPIRATION RATE: 16 BRPM | DIASTOLIC BLOOD PRESSURE: 80 MMHG | WEIGHT: 131 LBS | HEART RATE: 80 BPM | SYSTOLIC BLOOD PRESSURE: 124 MMHG | HEIGHT: 60 IN

## 2023-07-24 DIAGNOSIS — D69.6 LOW PLATELET COUNT (HCC): ICD-10-CM

## 2023-07-24 DIAGNOSIS — I10 PRIMARY HYPERTENSION: ICD-10-CM

## 2023-07-24 DIAGNOSIS — E11.40 TYPE 2 DIABETES MELLITUS WITH CHRONIC PAINFUL DIABETIC NEUROPATHY (HCC): ICD-10-CM

## 2023-07-24 DIAGNOSIS — Z12.31 SCREENING MAMMOGRAM, ENCOUNTER FOR: ICD-10-CM

## 2023-07-24 DIAGNOSIS — R23.3 PETECHIAE: Primary | ICD-10-CM

## 2023-07-24 PROCEDURE — 99214 OFFICE O/P EST MOD 30 MIN: CPT | Performed by: FAMILY MEDICINE

## 2023-07-24 RX ORDER — INSULIN LISPRO 100 [IU]/ML
4 INJECTION, SOLUTION INTRAVENOUS; SUBCUTANEOUS
Qty: 15 ML | Refills: 3 | Status: SHIPPED | OUTPATIENT
Start: 2023-07-24 | End: 2023-10-22

## 2023-07-24 NOTE — TELEPHONE ENCOUNTER
I called Clarita Garcia in response to a referral that was received for patient to establish care with Hematology. Outreach was made to schedule a consultation. .    I left a voicemail explaining the reason for my call and advised patient to call [facility] at [phone]. Another attempt will be made to contact patient.

## 2023-07-24 NOTE — PROGRESS NOTES
Charles Piedra 1950 female MRN: [id number]    [facility] PRACTICE OFFICE VISIT  [facility] - [address] [place] [address] [place]      ASSESSMENT/PLAN  Charles Piedra is a 72 y.o. female presents to the office for    Diagnoses and all orders for this visit:    Petechiae  -     Ambulatory Referral to Hematology / Oncology; Future  -     CBC and differential; Future    Screening mammogram, encounter for  -     Mammo screening bilateral w 3d & cad; Future    Low platelet count ([address])  -     Ambulatory Referral to Hematology / Oncology; Future  -     CBC and differential; Future    Type 2 diabetes mellitus with chronic painful diabetic neuropathy (HCC)  -     insulin lispro (HumaLOG KwikPen) 100 units/mL injection pen; Inject 4 Units under the skin 3 (three) times a day with meals Plus sliding scale    Primary hypertension       Recommend CBC to be performed tomorrow  Recommend hematology consult  Type 2 diabetes concerned about patient's low hypoglycemic events 1 episode in the office of 53 only responded to 63 after glucose tab. Patient was asymptomatic at that time  Recommend decreasing fast acting insulin to 4 units 3 times daily plus sliding scale  Primary hypertension continue carvedilol. Petechia might be a side effect of Plavix given that I reviewed her blood work and platelets seem to have dropped in 2019 when she had her heart surgery               Future Appointments   Date Time Provider [address]   8/15/2023  1:30 PM [address] [facility]  CC: Follow-up (Urgent care, rash on arms )      HPI:  Charles Piedra is a 72 y.o. female who presents in acute appointment. Patient was recently seen in urgent care for rash on her arm. Patient states that there is 2 lesions that she realized were losing but the rest were sort of bruises. Patient does not recall having low platelet levels. But states that her surgery for her heart was in 2019.   She is taking all her medications for diabetes as prescribed but states that she just had a low 50s on the way here and it happens very often. Patient is taking her blood pressure without any difficulties  Review of Systems   Constitutional: Negative for activity change, appetite change, chills, fatigue and fever. HENT: Negative for congestion. Respiratory: Negative for cough, chest tightness and shortness of breath. Cardiovascular: Negative for chest pain and leg swelling. Gastrointestinal: Negative for abdominal distention, abdominal pain, constipation, diarrhea, nausea and vomiting. Skin: Positive for rash. All other systems reviewed and are negative. Historical Information   The patient history was reviewed as follows:  Past Medical History:   Diagnosis Date   • Colitis    • Coronary artery disease    • Diabetes mellitus ([address])    • Esophageal ulcer    • Kidney stone    • Neuropathy          Medications:     Current Outpatient Medications:   •  aspirin (ECOTRIN LOW STRENGTH) 81 mg EC tablet, Take 81 mg by mouth daily, Disp: , Rfl:   •  atorvastatin (LIPITOR) 40 mg tablet, Take 1 tablet (40 mg total) by mouth daily, Disp: 90 tablet, Rfl: 2  •  budesonide (ENTOCORT EC) 3 MG capsule, Take 2 capsules by mouth once daily, Disp: 60 capsule, Rfl: 0  •  carvedilol (COREG) 25 mg tablet, Take 25 mg by mouth 2 (two) times a day, Disp: , Rfl:   •  clopidogrel (PLAVIX) 75 mg tablet, Take 75 mg by mouth daily Hold for 5 days-LD to be 6/15, Disp: , Rfl:   •  Continuous Blood Gluc  (FreeStyle [place] 2 [place]) CORNEL, Use 1 Device every morning, Disp: 1 each, Rfl: 0  •  glucose blood ([employer] Glucose Meter Test Strips) test strip, Use 1 each 2 (two) times a day Use as instructed DX E11.65, Disp: 200 each, Rfl: 2  •  insulin glargine (LANTUS) 100 units/mL subcutaneous injection, 5 units in the AM and 30 units before bedtime. (Patient taking differently: 5 units in the AM and 30 units before bedtime.   Forgets the am dose and sometimes takes 32 units at bedtime depending on glucose numbers), Disp: 30 mL, Rfl: 11  •  insulin lispro (HumaLOG KwikPen) 100 units/mL injection pen, Inject 4 Units under the skin 3 (three) times a day with meals Plus sliding scale, Disp: 15 mL, Rfl: 3  •  Insulin Pen Needle (UltiCare Mini Pen Needles) 31G X 6 MM MISC, Use 4 (four) times a day, Disp: 200 each, Rfl: 2  •  pantoprazole (PROTONIX) 40 mg tablet, Take 1 tablet (40 mg total) by mouth 2 (two) times a day, Disp: 60 tablet, Rfl: 2  •  pregabalin (LYRICA) 100 mg capsule, Take 1 capsule by mouth twice daily, Disp: 60 capsule, Rfl: 0  •  tolterodine (DETROL LA) 4 mg 24 hr capsule, Take 1 capsule (4 mg total) by mouth daily, Disp: 90 capsule, Rfl: 2  •  cloNIDine (CATAPRES) 0.1 mg tablet, Take 2 tablets (0.2 mg total) by mouth 2 (two) times a day, Disp: 120 tablet, Rfl: 0  •  isosorbide dinitrate (ISORDIL) 30 mg tablet, Take 1 tablet (30 mg total) by mouth 2 (two) times a day, Disp: 180 tablet, Rfl: 2  •  sucralfate (CARAFATE) 1 g tablet, Take 1 tablet (1 g total) by mouth 2 (two) times a day before meals (Patient not taking: Reported on 7/24/2023), Disp: 120 tablet, Rfl: 0  •  valsartan (DIOVAN) 320 MG tablet, Take 1 tablet by mouth once daily, Disp: 90 tablet, Rfl: 0    Allergies   Allergen Reactions   • Other Rash     Latex tape       OBJECTIVE  Vitals:   Vitals:    07/24/23 1327   BP: 124/80   BP Location: Left arm   Patient Position: Sitting   Cuff Size: Standard   Pulse: 80   Resp: 16   Temp: 98.2 °F (36.8 °C)   Weight: 59.4 kg (131 lb)   Height: 5' (1.524 m)         Physical Exam  Vitals reviewed. Constitutional:       Appearance: She is well-developed. HENT:      Head: Normocephalic and atraumatic. Eyes:      Conjunctiva/sclera: Conjunctivae normal.      Pupils: Pupils are equal, round, and reactive to light. Cardiovascular:      Rate and Rhythm: Normal rate and regular rhythm. Heart sounds: Normal heart sounds.    Pulmonary:      Effort: Pulmonary effort is normal. No respiratory distress. Breath sounds: Normal breath sounds. Musculoskeletal:         General: Normal range of motion. Cervical back: Normal range of motion and neck supple. Skin:     General: Skin is warm. Capillary Refill: Capillary refill takes less than 2 seconds. Findings: Rash (see a/p) present. Neurological:      Mental Status: She is alert and oriented to person, place, and time.                     Jessica Rivera MD,   [facility]  7/24/2023

## 2023-07-25 ENCOUNTER — TELEPHONE (OUTPATIENT)
Dept: HEMATOLOGY ONCOLOGY | Facility: CLINIC | Age: 73
End: 2023-07-25

## 2023-07-25 ENCOUNTER — APPOINTMENT (OUTPATIENT)
Dept: LAB | Facility: CLINIC | Age: 73
End: 2023-07-25
Payer: COMMERCIAL

## 2023-07-25 DIAGNOSIS — R23.3 PETECHIAE: ICD-10-CM

## 2023-07-25 DIAGNOSIS — D69.6 LOW PLATELET COUNT (HCC): ICD-10-CM

## 2023-07-25 LAB
BASOPHILS # BLD AUTO: 0.05 THOUSANDS/ÂΜL (ref 0–0.1)
BASOPHILS NFR BLD AUTO: 1 % (ref 0–1)
EOSINOPHIL # BLD AUTO: 0.15 THOUSAND/ÂΜL (ref 0–0.61)
EOSINOPHIL NFR BLD AUTO: 2 % (ref 0–6)
ERYTHROCYTE [DISTWIDTH] IN BLOOD BY AUTOMATED COUNT: 13.7 % (ref 11.6–15.1)
HCT VFR BLD AUTO: 40.3 % (ref 34.8–46.1)
HGB BLD-MCNC: 13.2 G/DL (ref 11.5–15.4)
IMM GRANULOCYTES # BLD AUTO: 0.03 THOUSAND/UL (ref 0–0.2)
IMM GRANULOCYTES NFR BLD AUTO: 0 % (ref 0–2)
LYMPHOCYTES # BLD AUTO: 2.79 THOUSANDS/ÂΜL (ref 0.6–4.47)
LYMPHOCYTES NFR BLD AUTO: 37 % (ref 14–44)
MCH RBC QN AUTO: 27.4 PG (ref 26.8–34.3)
MCHC RBC AUTO-ENTMCNC: 32.8 G/DL (ref 31.4–37.4)
MCV RBC AUTO: 84 FL (ref 82–98)
MONOCYTES # BLD AUTO: 0.78 THOUSAND/ÂΜL (ref 0.17–1.22)
MONOCYTES NFR BLD AUTO: 10 % (ref 4–12)
NEUTROPHILS # BLD AUTO: 3.79 THOUSANDS/ÂΜL (ref 1.85–7.62)
NEUTS SEG NFR BLD AUTO: 50 % (ref 43–75)
NRBC BLD AUTO-RTO: 0 /100 WBCS
PLATELET # BLD AUTO: 190 THOUSANDS/UL (ref 149–390)
PMV BLD AUTO: 10.9 FL (ref 8.9–12.7)
RBC # BLD AUTO: 4.81 MILLION/UL (ref 3.81–5.12)
WBC # BLD AUTO: 7.59 THOUSAND/UL (ref 4.31–10.16)

## 2023-07-25 PROCEDURE — 36415 COLL VENOUS BLD VENIPUNCTURE: CPT

## 2023-07-25 PROCEDURE — 85025 COMPLETE CBC W/AUTO DIFF WBC: CPT

## 2023-07-25 NOTE — TELEPHONE ENCOUNTER
I called Barrera Villagran in response to a referral that was received for patient to establish care with Hematology.      Outreach was made to schedule a consultation. .     I left a voicemail explaining the reason for my call and advised patient to call [facility] at [phone].   Another attempt will be made to contact patient.

## 2023-07-26 ENCOUNTER — TELEPHONE (OUTPATIENT)
Dept: HEMATOLOGY ONCOLOGY | Facility: CLINIC | Age: 73
End: 2023-07-26

## 2023-07-26 NOTE — TELEPHONE ENCOUNTER
I called Meme Veloz in response to a referral that was received for patient to establish care with Hematology. Outreach was made to schedule a consultation. .    Patient declined scheduling. The referral has been closed.

## 2023-07-29 DIAGNOSIS — M25.572 BILATERAL ANKLE PAIN, UNSPECIFIED CHRONICITY: ICD-10-CM

## 2023-07-29 DIAGNOSIS — M25.571 BILATERAL ANKLE PAIN, UNSPECIFIED CHRONICITY: ICD-10-CM

## 2023-07-29 RX ORDER — PREGABALIN 100 MG/1
100 CAPSULE ORAL 2 TIMES DAILY
Qty: 60 CAPSULE | Refills: 0 | Status: SHIPPED | OUTPATIENT
Start: 2023-07-29

## 2023-08-18 DIAGNOSIS — K20.90 ESOPHAGITIS: ICD-10-CM

## 2023-08-21 RX ORDER — PANTOPRAZOLE SODIUM 40 MG/1
40 TABLET, DELAYED RELEASE ORAL 2 TIMES DAILY
Qty: 60 TABLET | Refills: 0 | Status: SHIPPED | OUTPATIENT
Start: 2023-08-21

## 2023-08-24 ENCOUNTER — OFFICE VISIT (OUTPATIENT)
Dept: ENDOCRINOLOGY | Facility: CLINIC | Age: 73
End: 2023-08-24
Payer: COMMERCIAL

## 2023-08-24 VITALS
DIASTOLIC BLOOD PRESSURE: 76 MMHG | WEIGHT: 130.4 LBS | OXYGEN SATURATION: 96 % | HEIGHT: 60 IN | SYSTOLIC BLOOD PRESSURE: 120 MMHG | BODY MASS INDEX: 25.6 KG/M2 | HEART RATE: 68 BPM

## 2023-08-24 DIAGNOSIS — E11.65 TYPE 2 DIABETES MELLITUS WITH HYPERGLYCEMIA, WITH LONG-TERM CURRENT USE OF INSULIN (HCC): Primary | ICD-10-CM

## 2023-08-24 DIAGNOSIS — Z79.4 TYPE 2 DIABETES MELLITUS WITH HYPERGLYCEMIA, WITH LONG-TERM CURRENT USE OF INSULIN (HCC): Primary | ICD-10-CM

## 2023-08-24 DIAGNOSIS — E78.00 PURE HYPERCHOLESTEROLEMIA: ICD-10-CM

## 2023-08-24 DIAGNOSIS — I10 PRIMARY HYPERTENSION: ICD-10-CM

## 2023-08-24 PROCEDURE — 99214 OFFICE O/P EST MOD 30 MIN: CPT | Performed by: NURSE PRACTITIONER

## 2023-08-24 NOTE — PROGRESS NOTES
Established Patient Progress Note      CC: Type 2 Diabetes Mellitus      Impression & Plan:    Problem List Items Addressed This Visit        Endocrine    Type 2 diabetes mellitus with hyperglycemia, with long-term current use of insulin ([address]) - Primary       Lab Results   Component Value Date    HGBA1C 7.7 (H) 05/26/2023     HGA1C close to goal GMI on cGM was 7.2% with some evidence of near or actual hypoglycemia particularly post-meals when correction given with sliding scale after breakfast or lunch. Will discontinue sliding scale at first two meals of the day, continue following dinner. BGL Reviewed: Continued use of CGM. Treatment regimen:   Lantus 30  Novolog 4 with meals, sliding scale 1 unit to lower by 50 mg/dL over 150 mg/dL with max 4 units only after dinner. Requested to call in 2 weeks with update or if blood sugars <70 or patterns >250 mg/dL. Discussed risks/complications associated with uncontrolled diabetes including organ involvement, heart attack, stroke, death. Recommended referral to diabetes education. Advised lifestyle modifications including attention to diet including the amount and types of carbohydrates consumed and regular activity. Discussed symptoms and treatment of hypoglycemia. Routine follow up for diabetic eye and foot exams. Ordered blood work to complete prior to next visit. Relevant Orders    Ambulatory Referral to Diabetic Education    Hemoglobin A1C    Comprehensive metabolic panel       Cardiovascular and Mediastinum    Hypertension     BP at goal on current regimen. Other    Pure hypercholesterolemia     Continues on statin therapy. Orders Placed This Encounter   Procedures   • Hemoglobin A1C     Standing Status:   Future     Standing Expiration Date:   8/24/2024   • Comprehensive metabolic panel     This is a patient instruction: Patient fasting for 8 hours or longer recommended.      Standing Status: Future     Standing Expiration Date:   8/24/2024   • Ambulatory Referral to Diabetic Education     Standing Status:   Future     Standing Expiration Date:   8/24/2024     Referral Priority:   Routine     Referral Type:   Consult - AMB     Referral Reason:   Specialty Services Required     Requested Specialty:   Diabetes Services     Number of Visits Requested:   1     Expiration Date:   8/24/2024       History of Present Illness:   Lore Easley is a 72 y.o. female with a history of type 2 diabetes with long term use of insulindiagnosed around age 38. No history of heart attack or stroke. Denies polyuria, polydipsia, nocturia, or blurry vision. Denies numbness/tingling in feet, last foot exam: November 2023, history of neuropathy: yes, no diabetic ulcerations. Last eye exam: mid August 2023, history of retinopathy: none. Last appointment with diabetes education: due for follow up but had to cancel due to inconsistent transportation. Other significant past medical history includes hypertension on ARB, hyperlipidemia on a statin, CAD s/p stent replacement follows up Cardiology, and CKD stage 3B, last eGFR 33 from 8/1/2023 follows with nephrology. Denies recent illness or hospitalizations. Denies recent severe hypoglycemic or severe hyperglycemic episodes. Denies any issues with her current regimen. Home glucose monitoring: are performed regularly with robby device. Last seen in May 2023 by Dr. Dmitry Rubio. CGM Interpretation  Lore Easley   Device used Golden 2  Home use   Indication: Type 2 Diabetes  More than 72 hours of data was reviewed. Report to be scanned to chart. Date Range: August 11, 2023  Analysis of data:   Average Glucose: 161 mg/dL  Coefficient of Variation: 33.9%   GMI: 7.2%   Time in Target Range: 61%   Time Above Range: 30% high, 7% very high   Time Below Range: 2% low, 0% very low   Interpretation of data:   Some hypoglycemia following meals.      Home blood glucose readings:   Before breakfast:  mg/dL  Before lunch:  mg/dL  Before dinner:  mg/dL  Bedtime: 180-273 mg/dL     Current regimen:   Lantus 30 units daily  Novolog 4 units TID with meals and sliding scale    Has osteoporosis: History of ankle fractures in /. Was previously told she is unable to take Prolia due to teeth/jaws. Took an injection many years ago but does not remember the name. Last DXA scan 5-6 years ago. Continues to request primary care manage osteoporosis.       Patient Active Problem List   Diagnosis   • Coronary artery disease involving native coronary artery of native heart without angina pectoris   • Emphysema, unspecified ([address])   • Ischemic cardiomyopathy   • Hypertension   • Ulcerative colitis ([address])   • Pure hypercholesterolemia   • Pulmonary hypertension (HCC)   • Other hyperlipidemia   • Osteoporosis   • Type 2 diabetes mellitus with hyperglycemia, with long-term current use of insulin (HCC)   • Elevated troponin   • Gastroesophageal reflux disease with esophagitis   • Platelets decreased ([facility])   • History of PTCA with stents      Past Medical History:   Diagnosis Date   • Colitis    • Coronary artery disease    • Diabetes mellitus ([address])    • Esophageal ulcer    • Kidney stone    • Neuropathy       Past Surgical History:   Procedure Laterality Date   • ANKLE ARTHROPLASTY Bilateral     bilat hardware post fx surgeries   • COLONOSCOPY     • CORONARY ANGIOPLASTY WITH STENT PLACEMENT  2019    x2 procedures total 3 stents   • LITHOTRIPSY     • OVARIAN CYST SURGERY Right       Family History   Problem Relation Age of Onset   • Stroke Mother    • Heart disease Father      Social History     Tobacco Use   • Smoking status: Former     Years: 34.00     Types: Cigarettes     Quit date:      Years since quittin.6   • Smokeless tobacco: Never   Substance Use Topics   • Alcohol use: Not Currently     Allergies   Allergen Reactions   • Other Rash     Latex tape         Current Outpatient Medications:   •  aspirin (ECOTRIN LOW STRENGTH) 81 mg EC tablet, Take 81 mg by mouth daily, Disp: , Rfl:   •  atorvastatin (LIPITOR) 40 mg tablet, Take 1 tablet (40 mg total) by mouth daily, Disp: 90 tablet, Rfl: 2  •  budesonide (ENTOCORT EC) 3 MG capsule, Take 2 capsules by mouth once daily, Disp: 60 capsule, Rfl: 0  •  carvedilol (COREG) 25 mg tablet, Take 25 mg by mouth 2 (two) times a day, Disp: , Rfl:   •  cloNIDine (CATAPRES) 0.1 mg tablet, Take 2 tablets (0.2 mg total) by mouth 2 (two) times a day, Disp: 120 tablet, Rfl: 0  •  clopidogrel (PLAVIX) 75 mg tablet, Take 75 mg by mouth daily Hold for 5 days-LD to be 6/15, Disp: , Rfl:   •  Continuous Blood Gluc  (FreeStyle [place] [employer] [place]) CORNEL, Use 1 Device every morning, Disp: 1 each, Rfl: 0  •  glucose blood ([employer] Glucose Meter Test Strips) test strip, Use 1 each 2 (two) times a day Use as instructed DX E11.65, Disp: 200 each, Rfl: 2  •  insulin glargine (LANTUS) 100 units/mL subcutaneous injection, 5 units in the AM and 30 units before bedtime. (Patient taking differently: 5 units in the AM and 30 units before bedtime.   Forgets the am dose and sometimes takes 32 units at bedtime depending on glucose numbers), Disp: 30 mL, Rfl: 11  •  insulin lispro (HumaLOG KwikPen) 100 units/mL injection pen, Inject 4 Units under the skin 3 (three) times a day with meals Plus sliding scale, Disp: 15 mL, Rfl: 3  •  pantoprazole (PROTONIX) 40 mg tablet, Take 1 tablet by mouth twice daily, Disp: 60 tablet, Rfl: 0  •  pregabalin (LYRICA) 100 mg capsule, Take 1 capsule (100 mg total) by mouth 2 (two) times a day, Disp: 60 capsule, Rfl: 0  •  tolterodine (DETROL LA) 4 mg 24 hr capsule, Take 1 capsule (4 mg total) by mouth daily, Disp: 90 capsule, Rfl: 2  •  valsartan (DIOVAN) 320 MG tablet, Take 1 tablet by mouth once daily, Disp: 90 tablet, Rfl: 0  •  Insulin Pen Needle (UltiCare Mini Pen Needles) 31G X 6 MM MISC, Use 4 (four) times a day, Disp: 200 each, Rfl: 2  • isosorbide dinitrate (ISORDIL) 30 mg tablet, Take 1 tablet (30 mg total) by mouth 2 (two) times a day, Disp: 180 tablet, Rfl: 2  •  sucralfate (CARAFATE) 1 g tablet, Take 1 tablet (1 g total) by mouth 2 (two) times a day before meals (Patient not taking: Reported on 7/24/2023), Disp: 120 tablet, Rfl: 0    Review of Systems   Constitutional: Negative for fatigue and unexpected weight change. HENT: Negative for trouble swallowing and voice change. Eyes: Negative for visual disturbance. Respiratory: Negative for cough and shortness of breath. Cardiovascular: Negative for chest pain and palpitations. Gastrointestinal: Negative for abdominal distention, abdominal pain, constipation, diarrhea and vomiting. Endocrine: Negative for cold intolerance, heat intolerance, polydipsia and polyuria. Genitourinary: Negative for dysuria and hematuria. Musculoskeletal: Negative for arthralgias and back pain. Skin: Negative for color change and rash. Neurological: Negative for seizures and syncope. All other systems reviewed and are negative. Physical Exam:  Body mass index is 25.47 kg/m². /76 (BP Location: Left arm, Patient Position: Sitting, Cuff Size: Large)   Pulse 68   Ht 5' (1.524 m)   Wt 59.1 kg (130 lb 6.4 oz)   SpO2 96%   BMI 25.47 kg/m²    Wt Readings from Last 3 Encounters:   08/24/23 59.1 kg (130 lb 6.4 oz)   07/24/23 59.4 kg (131 lb)   06/26/23 59.4 kg (131 lb)        Physical Exam  Vitals reviewed. Constitutional:       Appearance: Normal appearance. Cardiovascular:      Rate and Rhythm: Normal rate and regular rhythm. Pulses: Normal pulses. Heart sounds: Normal heart sounds. Pulmonary:      Effort: Pulmonary effort is normal.      Breath sounds: Normal breath sounds. Skin:     General: Skin is warm and dry. Capillary Refill: Capillary refill takes less than 2 seconds. Neurological:      General: No focal deficit present.       Mental Status: She is alert and oriented to person, place, and time. Psychiatric:         Mood and Affect: Mood normal.         Behavior: Behavior normal.     Labs:   Lab Results   Component Value Date    HGBA1C 7.7 (H) 05/26/2023    HGBA1C 8.7 (A) 03/28/2023    HGBA1C 7.7 (A) 12/02/2022     Lab Results   Component Value Date    CREATININE 1.64 (H) 05/26/2023    CREATININE 1.31 (H) 04/05/2023    CREATININE 1.17 04/04/2023    BUN 42 (H) 05/26/2023    K 3.8 05/26/2023     (H) 05/26/2023    CO2 30 05/26/2023     eGFR   Date Value Ref Range Status   05/26/2023 31 ml/min/1.73sq m Final     Lab Results   Component Value Date    HDL 67 05/26/2023    TRIG 112 05/26/2023     Lab Results   Component Value Date    ALT 45 05/26/2023    AST 31 05/26/2023    ALKPHOS 100 05/26/2023     No results found for: "[id number]"  No results found for: "FREET4", "TSI"    Discussed with the patient and all questioned fully answered. She will call me if any problems arise. Follow-up appointment in 3 months. Counseled patient on diagnostic results, prognosis, risk and benefit of treatment options, instruction for management, importance of treatment compliance, Risk  factor reduction and impressions    Patient Instructions     Current regimen:   Lantus 30 units daily  Novolog 4 units TID with meals no sliding scale for breakfast and lunch, use carefully with dinner  Blood sugars >150 mg/dL use sliding scale  150-199 mg/dL- take 1 additional unit  200-249 mg/dL- take 2 additional units  250-299 mg/dL- take 3 additional units  300 + take 4 additional units    Call me for blood sugars < 70 or patterns >250 mg/dL              Hypoglycemia in a Person with Diabetes   WHAT YOU NEED TO KNOW:   Hypoglycemia is a serious condition that happens when your blood glucose (sugar) level drops too low. The blood sugar level is usually too high in a person with diabetes, but the level can also drop too low.  It is important to follow your diabetes management plan to keep your blood sugar level steady. DISCHARGE INSTRUCTIONS:   Have someone call your local emergency number (911 in the [address]) if:   • You have a seizure or pass out. • Your blood sugar is less than 50 mg/dL and does not respond to treatment. • You feel you are going to pass out. • You have trouble thinking clearly. Call your doctor or diabetes care team provider if:   • You have had symptoms of low blood sugar several times. • You have questions about the amount of insulin or diabetes medicine you are taking. • You have questions or concerns about your condition or care. Medicines:   • Insulin or diabetes medicine  help to keep your blood sugar under control. • Glucagon  may be needed if you have severe hypoglycemia. • Take your medicine as directed. Contact your healthcare provider if you think your medicine is not helping or if you have side effects. Tell your provider if you are allergic to any medicine. Keep a list of the medicines, vitamins, and herbs you take. Include the amounts, and when and why you take them. Bring the list or the pill bottles to follow-up visits. Carry your medicine list with you in case of an emergency. Manage hypoglycemia:   • Check your blood sugar level right away if you have symptoms of hypoglycemia. Hypoglycemia usually happens when your blood sugar level is 70 mg/dL or below. Ask your diabetes care team provider what blood sugar level is too low for you. • If your blood sugar level is too low, eat or drink 15 grams of fast-acting carbohydrate. Examples of this amount of fast-acting carbohydrate are 4 ounces (½ cup) of fruit juice or 4 ounces of regular soda. Other examples are 2 tablespoons of raisins or 1 tube of glucose gel. Check your blood sugar level 15 minutes later. Sit still as you wait. If the level is still low (less than 100 mg/dL), have another 15 grams of carbohydrate. When the level returns to 100 mg/dL, eat a meal if it is time.  If your meal time is more than 1 hour away, eat a snack. The snack should contain carbohydrates, such as the following:    ? 3/4 cup of cereal    ? 1 cup of skim or low fat milk    ? 6 soda crackers    ? 1/2 of a turkey sandwich    ? 15 fat-free chips  This will help prevent another drop in blood sugar. Always carefully follow your provider's instructions on how to treat low blood sugar levels. • Always carry a source of fast-acting carbohydrate. If you have symptoms of hypoglycemia and you do not have a blood glucose meter, have a source of fast-acting carbohydrate anyway. Avoid carbohydrate foods that are high in fat. The fat content may make the carbohydrate take longer to increase your blood sugar level. Ask your provider if you should carry a glucagon kit. Glucagon is a medicine that is injected when you develop severe hypoglycemia and become unconscious. Check the expiration date every month and replace it before it expires. • Teach others how to help you if you have symptoms of hypoglycemia. Tell them about the symptoms of hypoglycemia. Ask them to give you a source of fast-acting carbohydrate if you cannot get it yourself. Ask them to give you a glucagon injection if you have signs of hypoglycemia and you become unconscious or have a seizure. Ask them to call the local emergency number (911 in the [address]) . This is an emergency. Tell them never to try to make you swallow anything if you faint or have a seizure. • Wear medical alert jewelry  or carry a card that says you have diabetes. Ask where to get these items. Prevent hypoglycemia:   • Take diabetes medicine as directed. Take your medicine at the right time and in the right amount. Do not  double the amount of medicine you take unless instructed by your diabetes care team provider. You may need oral diabetes medicine, insulin, or both to help control your blood sugar levels.  Your healthcare provider will teach you how and when to take oral diabetes medicine. You will also be taught about side effects oral diabetes medicine can cause. Insulin may be added if oral diabetes medicine becomes less effective over time. Insulin may be injected, or given through a pump or pen. You and your care team will discuss which method is best for you. ? An insulin pump  is an implanted device that gives your insulin 24 hours a day. An insulin pump prevents the need for multiple insulin injections in a day. ? An insulin pen  is a device prefilled with the right amount of insulin. • Eat meals and snacks as directed. Talk to your dietitian or provider about a meal plan that is right for you. Do not skip meals. • Check your blood sugar level as directed. Ask your provider what your blood sugar levels should be before and after you eat. Ask when and how often to check your blood sugar level. You may need to check a drop of blood in a glucose test machine. You may need to check at least 3 times each day. Record your blood sugar level results and take the record with you when you see your care team. They may use it to make changes to your medicine, food, or exercise schedules. Your care team provider may recommend a continuous glucose monitor (CGM). A CGM is a device that is worn at all times. The CGM checks your blood sugar every 5 minutes. It sends results to an electronic device such as a smart phone. • Check your blood sugar level before you exercise. Physical activity, such as exercise, can decrease your blood sugar level. If your blood sugar level is less than 100 mg/dL, have a carbohydrate snack. Examples are 4 to 6 crackers, ½ banana, 8 ounces (1 cup) of nonfat or 1% milk, or 4 ounces (½ cup) of juice. If you will be active for more than 1 hour, you may need to check your blood sugar level every 30 minutes. Your provider may also recommend that you check your blood sugar level after your activity.     • Know the risks if you choose to drink alcohol. Alcohol can cause your blood sugar levels to be low if you use insulin. Alcohol can cause high blood sugar levels and weight gain if you drink too much. Women 21 years or older and men 72 years or older should limit alcohol to 1 drink a day. Men aged 24 to 59 years should limit alcohol to 2 drinks a day. A drink of alcohol is 12 ounces of beer, 5 ounces of wine, or 1½ ounces of liquor. Follow up with your doctor or diabetes care team provider as directed: You may need your insulin or diabetes medicine changed if you continue to have hypoglycemia episodes. Write down your questions so you remember to ask them during your visits. © Copyright Stephen Trevino 2022 Information is for End User's use only and may not be sold, redistributed or otherwise used for commercial purposes. The above information is an  only. It is not intended as medical advice for individual conditions or treatments. Talk to your doctor, nurse or pharmacist before following any medical regimen to see if it is safe and effective for you.       Daniela Dominique

## 2023-08-24 NOTE — PATIENT INSTRUCTIONS
Current regimen:   Lantus 30 units daily  Novolog 4 units TID with meals no sliding scale for breakfast and lunch, use carefully with dinner  Blood sugars >150 mg/dL use sliding scale  150-199 mg/dL- take 1 additional unit  200-249 mg/dL- take 2 additional units  250-299 mg/dL- take 3 additional units  300 + take 4 additional units    Call me for blood sugars < 70 or patterns >250 mg/dL              Hypoglycemia in a Person with Diabetes   WHAT YOU NEED TO KNOW:   Hypoglycemia is a serious condition that happens when your blood glucose (sugar) level drops too low. The blood sugar level is usually too high in a person with diabetes, but the level can also drop too low. It is important to follow your diabetes management plan to keep your blood sugar level steady. DISCHARGE INSTRUCTIONS:   Have someone call your local emergency number (911 in the [address]) if:   You have a seizure or pass out. Your blood sugar is less than 50 mg/dL and does not respond to treatment. You feel you are going to pass out. You have trouble thinking clearly. Call your doctor or diabetes care team provider if:   You have had symptoms of low blood sugar several times. You have questions about the amount of insulin or diabetes medicine you are taking. You have questions or concerns about your condition or care. Medicines:   Insulin or diabetes medicine  help to keep your blood sugar under control. Glucagon  may be needed if you have severe hypoglycemia. Take your medicine as directed. Contact your healthcare provider if you think your medicine is not helping or if you have side effects. Tell your provider if you are allergic to any medicine. Keep a list of the medicines, vitamins, and herbs you take. Include the amounts, and when and why you take them. Bring the list or the pill bottles to follow-up visits. Carry your medicine list with you in case of an emergency.     Manage hypoglycemia:   Check your blood sugar level right away if you have symptoms of hypoglycemia. Hypoglycemia usually happens when your blood sugar level is 70 mg/dL or below. Ask your diabetes care team provider what blood sugar level is too low for you. If your blood sugar level is too low, eat or drink 15 grams of fast-acting carbohydrate. Examples of this amount of fast-acting carbohydrate are 4 ounces (½ cup) of fruit juice or 4 ounces of regular soda. Other examples are 2 tablespoons of raisins or 1 tube of glucose gel. Check your blood sugar level 15 minutes later. Sit still as you wait. If the level is still low (less than 100 mg/dL), have another 15 grams of carbohydrate. When the level returns to 100 mg/dL, eat a meal if it is time. If your meal time is more than 1 hour away, eat a snack. The snack should contain carbohydrates, such as the following:    3/4 cup of cereal    1 cup of skim or low fat milk    6 soda crackers    1/2 of a turkey sandwich    15 fat-free chips  This will help prevent another drop in blood sugar. Always carefully follow your provider's instructions on how to treat low blood sugar levels. Always carry a source of fast-acting carbohydrate. If you have symptoms of hypoglycemia and you do not have a blood glucose meter, have a source of fast-acting carbohydrate anyway. Avoid carbohydrate foods that are high in fat. The fat content may make the carbohydrate take longer to increase your blood sugar level. Ask your provider if you should carry a glucagon kit. Glucagon is a medicine that is injected when you develop severe hypoglycemia and become unconscious. Check the expiration date every month and replace it before it expires. Teach others how to help you if you have symptoms of hypoglycemia. Tell them about the symptoms of hypoglycemia. Ask them to give you a source of fast-acting carbohydrate if you cannot get it yourself.  Ask them to give you a glucagon injection if you have signs of hypoglycemia and you become unconscious or have a seizure. Ask them to call the local emergency number (911 in the [address]) . This is an emergency. Tell them never to try to make you swallow anything if you faint or have a seizure. Wear medical alert jewelry  or carry a card that says you have diabetes. Ask where to get these items. Prevent hypoglycemia:   Take diabetes medicine as directed. Take your medicine at the right time and in the right amount. Do not  double the amount of medicine you take unless instructed by your diabetes care team provider. You may need oral diabetes medicine, insulin, or both to help control your blood sugar levels. Your healthcare provider will teach you how and when to take oral diabetes medicine. You will also be taught about side effects oral diabetes medicine can cause. Insulin may be added if oral diabetes medicine becomes less effective over time. Insulin may be injected, or given through a pump or pen. You and your care team will discuss which method is best for you. An insulin pump  is an implanted device that gives your insulin 24 hours a day. An insulin pump prevents the need for multiple insulin injections in a day. An insulin pen  is a device prefilled with the right amount of insulin. Eat meals and snacks as directed. Talk to your dietitian or provider about a meal plan that is right for you. Do not skip meals. Check your blood sugar level as directed. Ask your provider what your blood sugar levels should be before and after you eat. Ask when and how often to check your blood sugar level. You may need to check a drop of blood in a glucose test machine. You may need to check at least 3 times each day. Record your blood sugar level results and take the record with you when you see your care team. They may use it to make changes to your medicine, food, or exercise schedules. Your care team provider may recommend a continuous glucose monitor (CGM).  A CGM is a device that is worn at all times. The CGM checks your blood sugar every 5 minutes. It sends results to an electronic device such as a smart phone. Check your blood sugar level before you exercise. Physical activity, such as exercise, can decrease your blood sugar level. If your blood sugar level is less than 100 mg/dL, have a carbohydrate snack. Examples are 4 to 6 crackers, ½ banana, 8 ounces (1 cup) of nonfat or 1% milk, or 4 ounces (½ cup) of juice. If you will be active for more than 1 hour, you may need to check your blood sugar level every 30 minutes. Your provider may also recommend that you check your blood sugar level after your activity. Know the risks if you choose to drink alcohol. Alcohol can cause your blood sugar levels to be low if you use insulin. Alcohol can cause high blood sugar levels and weight gain if you drink too much. Women 21 years or older and men 72 years or older should limit alcohol to 1 drink a day. Men aged 24 to 59 years should limit alcohol to 2 drinks a day. A drink of alcohol is 12 ounces of beer, 5 ounces of wine, or 1½ ounces of liquor. Follow up with your doctor or diabetes care team provider as directed: You may need your insulin or diabetes medicine changed if you continue to have hypoglycemia episodes. Write down your questions so you remember to ask them during your visits. © Copyright Tracy Gutiérrez 2022 Information is for End User's use only and may not be sold, redistributed or otherwise used for commercial purposes. The above information is an  only. It is not intended as medical advice for individual conditions or treatments. Talk to your doctor, nurse or pharmacist before following any medical regimen to see if it is safe and effective for you.

## 2023-08-24 NOTE — ASSESSMENT & PLAN NOTE
Lab Results   Component Value Date    HGBA1C 7.7 (H) 05/26/2023     HGA1C close to goal GMI on cGM was 7.2% with some evidence of near or actual hypoglycemia particularly post-meals when correction given with sliding scale after breakfast or lunch. Will discontinue sliding scale at first two meals of the day, continue following dinner. BGL Reviewed: Continued use of CGM. Treatment regimen:   Lantus 30  Novolog 4 with meals, sliding scale 1 unit to lower by 50 mg/dL over 150 mg/dL with max 4 units only after dinner. Requested to call in 2 weeks with update or if blood sugars <70 or patterns >250 mg/dL. Discussed risks/complications associated with uncontrolled diabetes including organ involvement, heart attack, stroke, death. Recommended referral to diabetes education. Advised lifestyle modifications including attention to diet including the amount and types of carbohydrates consumed and regular activity. Discussed symptoms and treatment of hypoglycemia. Routine follow up for diabetic eye and foot exams. Ordered blood work to complete prior to next visit.

## 2023-08-25 DIAGNOSIS — K52.832 LYMPHOCYTIC COLITIS: ICD-10-CM

## 2023-08-25 RX ORDER — BUDESONIDE 3 MG/1
CAPSULE, COATED PELLETS ORAL
Qty: 60 CAPSULE | Refills: 0 | Status: SHIPPED | OUTPATIENT
Start: 2023-08-25

## 2023-08-28 ENCOUNTER — TELEPHONE (OUTPATIENT)
Dept: ENDOCRINOLOGY | Facility: CLINIC | Age: 73
End: 2023-08-28

## 2023-08-28 DIAGNOSIS — E11.40 TYPE 2 DIABETES MELLITUS WITH CHRONIC PAINFUL DIABETIC NEUROPATHY (HCC): ICD-10-CM

## 2023-08-28 DIAGNOSIS — E11.65 TYPE 2 DIABETES MELLITUS WITH HYPERGLYCEMIA, WITH LONG-TERM CURRENT USE OF INSULIN (HCC): ICD-10-CM

## 2023-08-28 DIAGNOSIS — Z79.4 TYPE 2 DIABETES MELLITUS WITH HYPERGLYCEMIA, WITH LONG-TERM CURRENT USE OF INSULIN (HCC): ICD-10-CM

## 2023-08-28 DIAGNOSIS — E11.65 TYPE 2 DIABETES MELLITUS WITH HYPERGLYCEMIA, WITH LONG-TERM CURRENT USE OF INSULIN (HCC): Primary | ICD-10-CM

## 2023-08-28 DIAGNOSIS — Z79.4 TYPE 2 DIABETES MELLITUS WITH HYPERGLYCEMIA, WITH LONG-TERM CURRENT USE OF INSULIN (HCC): Primary | ICD-10-CM

## 2023-08-28 RX ORDER — INSULIN GLARGINE 100 [IU]/ML
INJECTION, SOLUTION SUBCUTANEOUS
Qty: 30 ML | Refills: 3 | Status: SHIPPED | OUTPATIENT
Start: 2023-08-28 | End: 2023-08-28

## 2023-08-28 RX ORDER — INSULIN GLARGINE 100 [IU]/ML
INJECTION, SOLUTION SUBCUTANEOUS
Status: CANCELLED | OUTPATIENT
Start: 2023-08-28

## 2023-08-28 RX ORDER — INSULIN LISPRO 100 [IU]/ML
4 INJECTION, SOLUTION INTRAVENOUS; SUBCUTANEOUS
Qty: 15 ML | Refills: 3 | Status: SHIPPED | OUTPATIENT
Start: 2023-08-28 | End: 2023-11-26

## 2023-08-28 RX ORDER — INSULIN GLARGINE 100 [IU]/ML
INJECTION, SOLUTION SUBCUTANEOUS
Qty: 15 ML | Refills: 3 | Status: SHIPPED | OUTPATIENT
Start: 2023-08-28

## 2023-08-28 NOTE — TELEPHONE ENCOUNTER
Dr. Chana Hess: pt. Call stating she is almost out of her insulin for both oly long/short since prior Rx. Was lantus 15 units but it ws increase to 30 units per Velia Ortiz also novolog was only 4 units tid but she was told to 4 units and sliding scale now she needs refill for both I did pend it for you with new units.  Thanks

## 2023-09-02 DIAGNOSIS — M25.572 BILATERAL ANKLE PAIN, UNSPECIFIED CHRONICITY: ICD-10-CM

## 2023-09-02 DIAGNOSIS — M25.571 BILATERAL ANKLE PAIN, UNSPECIFIED CHRONICITY: ICD-10-CM

## 2023-09-02 RX ORDER — PREGABALIN 100 MG/1
100 CAPSULE ORAL 2 TIMES DAILY
Qty: 60 CAPSULE | Refills: 0 | Status: SHIPPED | OUTPATIENT
Start: 2023-09-02

## 2023-09-06 DIAGNOSIS — I10 HYPERTENSION, UNSPECIFIED TYPE: ICD-10-CM

## 2023-09-06 RX ORDER — CLONIDINE HYDROCHLORIDE 0.1 MG/1
0.2 TABLET ORAL 2 TIMES DAILY
Qty: 120 TABLET | Refills: 3 | Status: SHIPPED | OUTPATIENT
Start: 2023-09-06 | End: 2023-10-06

## 2023-09-24 ENCOUNTER — HOSPITAL ENCOUNTER (EMERGENCY)
Facility: HOSPITAL | Age: 73
Discharge: HOME | End: 2023-09-24
Attending: EMERGENCY MEDICINE
Payer: COMMERCIAL

## 2023-09-24 VITALS
HEART RATE: 96 BPM | OXYGEN SATURATION: 92 % | SYSTOLIC BLOOD PRESSURE: 159 MMHG | TEMPERATURE: 97 F | DIASTOLIC BLOOD PRESSURE: 73 MMHG | RESPIRATION RATE: 20 BRPM

## 2023-09-24 DIAGNOSIS — R11.14 BILIOUS VOMITING WITH NAUSEA: ICD-10-CM

## 2023-09-24 DIAGNOSIS — E86.0 DEHYDRATION: ICD-10-CM

## 2023-09-24 DIAGNOSIS — N39.0 UTI (URINARY TRACT INFECTION): Primary | ICD-10-CM

## 2023-09-24 LAB
ALBUMIN SERPL BCP-MCNC: 4.6 G/DL (ref 3.5–5)
ALP SERPL-CCNC: 84 U/L (ref 34–104)
ALT SERPL W P-5'-P-CCNC: 40 U/L (ref 7–52)
ANION GAP SERPL CALCULATED.3IONS-SCNC: 21 MMOL/L
APTT PPP: 27 SECONDS (ref 23–37)
AST SERPL W P-5'-P-CCNC: 44 U/L (ref 13–39)
BACTERIA UR QL AUTO: ABNORMAL /HPF
BASOPHILS # BLD AUTO: 0.03 THOUSANDS/ÂΜL (ref 0–0.1)
BASOPHILS NFR BLD AUTO: 0 % (ref 0–1)
BILIRUB SERPL-MCNC: 1.41 MG/DL (ref 0.2–1)
BILIRUB UR QL STRIP: NEGATIVE
BUN SERPL-MCNC: 46 MG/DL (ref 5–25)
CALCIUM SERPL-MCNC: 10.5 MG/DL (ref 8.4–10.2)
CHLORIDE SERPL-SCNC: 88 MMOL/L (ref 96–108)
CLARITY UR: ABNORMAL
CO2 SERPL-SCNC: 21 MMOL/L (ref 21–32)
COLOR UR: YELLOW
CREAT SERPL-MCNC: 1.64 MG/DL (ref 0.6–1.3)
EOSINOPHIL # BLD AUTO: 0.03 THOUSAND/ÂΜL (ref 0–0.61)
EOSINOPHIL NFR BLD AUTO: 0 % (ref 0–6)
ERYTHROCYTE [DISTWIDTH] IN BLOOD BY AUTOMATED COUNT: 14.1 % (ref 11.6–15.1)
FLUAV RNA RESP QL NAA+PROBE: NEGATIVE
FLUBV RNA RESP QL NAA+PROBE: NEGATIVE
GFR SERPL CREATININE-BSD FRML MDRD: 31 ML/MIN/1.73SQ M
GLUCOSE SERPL-MCNC: 269 MG/DL (ref 65–140)
GLUCOSE SERPL-MCNC: 295 MG/DL (ref 65–140)
GLUCOSE UR STRIP-MCNC: ABNORMAL MG/DL
HCT VFR BLD AUTO: 47.7 % (ref 34.8–46.1)
HGB BLD-MCNC: 17 G/DL (ref 11.5–15.4)
HGB UR QL STRIP.AUTO: ABNORMAL
IMM GRANULOCYTES # BLD AUTO: 0.08 THOUSAND/UL (ref 0–0.2)
IMM GRANULOCYTES NFR BLD AUTO: 1 % (ref 0–2)
INR PPP: 1.03 (ref 0.84–1.19)
KETONES UR STRIP-MCNC: ABNORMAL MG/DL
LACTATE SERPL-SCNC: 2 MMOL/L (ref 0.5–2)
LEUKOCYTE ESTERASE UR QL STRIP: ABNORMAL
LYMPHOCYTES # BLD AUTO: 1.7 THOUSANDS/ÂΜL (ref 0.6–4.47)
LYMPHOCYTES NFR BLD AUTO: 11 % (ref 14–44)
MCH RBC QN AUTO: 28.1 PG (ref 26.8–34.3)
MCHC RBC AUTO-ENTMCNC: 35.6 G/DL (ref 31.4–37.4)
MCV RBC AUTO: 79 FL (ref 82–98)
MONOCYTES # BLD AUTO: 1.1 THOUSAND/ÂΜL (ref 0.17–1.22)
MONOCYTES NFR BLD AUTO: 7 % (ref 4–12)
NEUTROPHILS # BLD AUTO: 12.79 THOUSANDS/ÂΜL (ref 1.85–7.62)
NEUTS SEG NFR BLD AUTO: 81 % (ref 43–75)
NITRITE UR QL STRIP: POSITIVE
NON-SQ EPI CELLS URNS QL MICRO: ABNORMAL /HPF
NRBC BLD AUTO-RTO: 0 /100 WBCS
PH UR STRIP.AUTO: 5.5 [PH]
PLATELET # BLD AUTO: 163 THOUSANDS/UL (ref 149–390)
PMV BLD AUTO: 10 FL (ref 8.9–12.7)
POTASSIUM SERPL-SCNC: 3.6 MMOL/L (ref 3.5–5.3)
PROCALCITONIN SERPL-MCNC: 0.15 NG/ML
PROT SERPL-MCNC: 7.9 G/DL (ref 6.4–8.4)
PROT UR STRIP-MCNC: >=300 MG/DL
PROTHROMBIN TIME: 13.6 SECONDS (ref 11.6–14.5)
RBC # BLD AUTO: 6.05 MILLION/UL (ref 3.81–5.12)
RBC #/AREA URNS AUTO: ABNORMAL /HPF
RSV RNA RESP QL NAA+PROBE: NEGATIVE
SARS-COV-2 RNA RESP QL NAA+PROBE: NEGATIVE
SODIUM SERPL-SCNC: 130 MMOL/L (ref 135–147)
SP GR UR STRIP.AUTO: 1.02 (ref 1–1.03)
UROBILINOGEN UR QL STRIP.AUTO: 0.2 E.U./DL
WBC # BLD AUTO: 15.73 THOUSAND/UL (ref 4.31–10.16)
WBC #/AREA URNS AUTO: ABNORMAL /HPF

## 2023-09-24 PROCEDURE — 99285 EMERGENCY DEPT VISIT HI MDM: CPT

## 2023-09-24 PROCEDURE — 96365 THER/PROPH/DIAG IV INF INIT: CPT

## 2023-09-24 PROCEDURE — 99291 CRITICAL CARE FIRST HOUR: CPT | Performed by: PHYSICIAN ASSISTANT

## 2023-09-24 PROCEDURE — 96367 TX/PROPH/DG ADDL SEQ IV INF: CPT

## 2023-09-24 PROCEDURE — 80053 COMPREHEN METABOLIC PANEL: CPT | Performed by: PHYSICIAN ASSISTANT

## 2023-09-24 PROCEDURE — 87086 URINE CULTURE/COLONY COUNT: CPT | Performed by: PHYSICIAN ASSISTANT

## 2023-09-24 PROCEDURE — 85730 THROMBOPLASTIN TIME PARTIAL: CPT | Performed by: PHYSICIAN ASSISTANT

## 2023-09-24 PROCEDURE — 83605 ASSAY OF LACTIC ACID: CPT | Performed by: PHYSICIAN ASSISTANT

## 2023-09-24 PROCEDURE — 96372 THER/PROPH/DIAG INJ SC/IM: CPT

## 2023-09-24 PROCEDURE — 87040 BLOOD CULTURE FOR BACTERIA: CPT | Performed by: PHYSICIAN ASSISTANT

## 2023-09-24 PROCEDURE — 81001 URINALYSIS AUTO W/SCOPE: CPT | Performed by: PHYSICIAN ASSISTANT

## 2023-09-24 PROCEDURE — 96366 THER/PROPH/DIAG IV INF ADDON: CPT

## 2023-09-24 PROCEDURE — 84145 PROCALCITONIN (PCT): CPT | Performed by: PHYSICIAN ASSISTANT

## 2023-09-24 PROCEDURE — 36415 COLL VENOUS BLD VENIPUNCTURE: CPT | Performed by: PHYSICIAN ASSISTANT

## 2023-09-24 PROCEDURE — 82948 REAGENT STRIP/BLOOD GLUCOSE: CPT

## 2023-09-24 PROCEDURE — 96375 TX/PRO/DX INJ NEW DRUG ADDON: CPT

## 2023-09-24 PROCEDURE — 85025 COMPLETE CBC W/AUTO DIFF WBC: CPT | Performed by: PHYSICIAN ASSISTANT

## 2023-09-24 PROCEDURE — 0241U HB NFCT DS VIR RESP RNA 4 TRGT: CPT | Performed by: PHYSICIAN ASSISTANT

## 2023-09-24 PROCEDURE — 85610 PROTHROMBIN TIME: CPT | Performed by: PHYSICIAN ASSISTANT

## 2023-09-24 RX ORDER — CARVEDILOL 12.5 MG/1
25 TABLET ORAL 2 TIMES DAILY WITH MEALS
Status: DISCONTINUED | OUTPATIENT
Start: 2023-09-24 | End: 2023-09-24 | Stop reason: HOSPADM

## 2023-09-24 RX ORDER — ONDANSETRON 4 MG/1
4 TABLET, ORALLY DISINTEGRATING ORAL EVERY 6 HOURS PRN
Qty: 12 TABLET | Refills: 0 | Status: SHIPPED | OUTPATIENT
Start: 2023-09-24 | End: 2023-09-27

## 2023-09-24 RX ORDER — ISOSORBIDE DINITRATE 10 MG/1
30 TABLET ORAL
Status: DISCONTINUED | OUTPATIENT
Start: 2023-09-24 | End: 2023-09-24 | Stop reason: HOSPADM

## 2023-09-24 RX ORDER — ONDANSETRON 2 MG/ML
4 INJECTION INTRAMUSCULAR; INTRAVENOUS EVERY 6 HOURS PRN
Status: DISCONTINUED | OUTPATIENT
Start: 2023-09-24 | End: 2023-09-24 | Stop reason: HOSPADM

## 2023-09-24 RX ORDER — CLONIDINE HYDROCHLORIDE 0.1 MG/1
0.2 TABLET ORAL ONCE
Status: COMPLETED | OUTPATIENT
Start: 2023-09-24 | End: 2023-09-24

## 2023-09-24 RX ORDER — CEPHALEXIN 500 MG/1
500 CAPSULE ORAL EVERY 12 HOURS SCHEDULED
Qty: 14 CAPSULE | Refills: 0 | Status: SHIPPED | OUTPATIENT
Start: 2023-09-24 | End: 2023-10-01

## 2023-09-24 RX ADMIN — ISOSORBIDE DINITRATE 30 MG: 10 TABLET ORAL at 13:43

## 2023-09-24 RX ADMIN — PIPERACILLIN SODIUM AND TAZOBACTAM SODIUM 4.5 G: 4; .5 INJECTION, POWDER, LYOPHILIZED, FOR SOLUTION INTRAVENOUS at 10:52

## 2023-09-24 RX ADMIN — INSULIN DETEMIR 15 UNITS: 100 INJECTION, SOLUTION SUBCUTANEOUS at 13:43

## 2023-09-24 RX ADMIN — CLONIDINE HYDROCHLORIDE 0.2 MG: 0.1 TABLET ORAL at 13:43

## 2023-09-24 RX ADMIN — ONDANSETRON 4 MG: 2 INJECTION INTRAMUSCULAR; INTRAVENOUS at 10:48

## 2023-09-24 RX ADMIN — SODIUM CHLORIDE, SODIUM LACTATE, POTASSIUM CHLORIDE, AND CALCIUM CHLORIDE 1000 ML: .6; .31; .03; .02 INJECTION, SOLUTION INTRAVENOUS at 12:00

## 2023-09-24 RX ADMIN — SODIUM CHLORIDE, SODIUM LACTATE, POTASSIUM CHLORIDE, AND CALCIUM CHLORIDE 1000 ML: .6; .31; .03; .02 INJECTION, SOLUTION INTRAVENOUS at 11:38

## 2023-09-24 NOTE — ED PROVIDER NOTES
History  Chief Complaint   Patient presents with   • Vomiting     Pt has been vomiting since thursday     Patient is a 53-year-old female with past medical history significant for hypertension, CAD, IDDM 2, esophageal ulcer, GERD, CHF, neuropathy, who presents for evaluation of nausea, vomiting, and chills for the past 4 days. History provided by:  Patient  Vomiting  Severity:  Severe  Duration:  4 days  Timing:  Constant  Number of daily episodes:  Unable to quantify  Quality:  Bilious material  Feeding tolerance: Unable to tolerate liquids or solids. Progression:  Worsening  Chronicity:  New  Recent urination:  Decreased  Relieved by:  Nothing  Worsened by:  Liquids and ice chips  Ineffective treatments:  Ice chips and liquids  Associated symptoms: abdominal pain, diarrhea and myalgias    Associated symptoms: no arthralgias, no chills, no cough, no fever, no headaches, no sore throat and no URI    Abdominal pain:     Location:  Suprapubic and epigastric    Quality: aching      Severity:  Moderate    Onset quality:  Gradual    Duration:  4 days    Timing:  Intermittent    Progression:  Waxing and waning  Diarrhea:     Quality:  Watery    Number of occurrences:  1    Severity:  Mild    Duration:  1 day    Timing:  Intermittent    Progression:  Resolved  Myalgias:     Location:  Generalized    Severity:  Moderate    Onset quality:  Gradual    Duration:  4 days    Timing:  Constant    Progression:  Worsening  Risk factors: no alcohol use, no diabetes, not pregnant, no prior abdominal surgery, no sick contacts, no suspect food intake and no travel to endemic areas        Prior to Admission Medications   Prescriptions Last Dose Informant Patient Reported? Taking?    Continuous Blood Gluc  (FreeStyle [place] 2 [place]) CORNEL   No No   Sig: Use 1 Device every morning   Insulin Glargine Solostar (Lantus SoloStar) 100 UNIT/ML SOPN   No No   Sig: Inject 30 units at bedtime   Insulin Pen Needle (UltiCare Mini Pen Needles) 31G X 6 MM MISC   No No   Sig: Use 4 (four) times a day   aspirin (ECOTRIN LOW STRENGTH) 81 mg EC tablet   Yes No   Sig: Take 81 mg by mouth daily   atorvastatin (LIPITOR) 40 mg tablet   No No   Sig: Take 1 tablet (40 mg total) by mouth daily   budesonide (ENTOCORT EC) 3 MG capsule   No No   Sig: Take 2 capsules by mouth once daily   carvedilol (COREG) 25 mg tablet   Yes No   Sig: Take 25 mg by mouth 2 (two) times a day   cloNIDine (CATAPRES) 0.1 mg tablet   No No   Sig: Take 2 tablets (0.2 mg total) by mouth 2 (two) times a day   clopidogrel (PLAVIX) 75 mg tablet   Yes No   Sig: Take 75 mg by mouth daily Hold for 5 days-LD to be 6/15   glucose blood ([employer] Glucose Meter Test Strips) test strip   No No   Sig: Use 1 each 2 (two) times a day Use as instructed DX E11.65   insulin lispro (HumaLOG KwikPen) 100 units/mL injection pen   No No   Sig: Inject 4 Units under the skin 3 (three) times a day with meals Plus sliding scale   isosorbide dinitrate (ISORDIL) 30 mg tablet   No No   Sig: Take 1 tablet (30 mg total) by mouth 2 (two) times a day   pantoprazole (PROTONIX) 40 mg tablet   No No   Sig: Take 1 tablet by mouth twice daily   pregabalin (LYRICA) 100 mg capsule   No No   Sig: Take 1 capsule by mouth twice daily   sucralfate (CARAFATE) 1 g tablet   No No   Sig: Take 1 tablet (1 g total) by mouth 2 (two) times a day before meals   Patient not taking: Reported on 7/24/2023   tolterodine (DETROL LA) 4 mg 24 hr capsule   No No   Sig: Take 1 capsule (4 mg total) by mouth daily   valsartan (DIOVAN) 320 MG tablet   No No   Sig: Take 1 tablet by mouth once daily      Facility-Administered Medications: None       Past Medical History:   Diagnosis Date   • Colitis    • Coronary artery disease    • Diabetes mellitus (HCC)    • Esophageal ulcer    • Kidney stone    • Neuropathy        Past Surgical History:   Procedure Laterality Date   • ANKLE ARTHROPLASTY Bilateral     bilat hardware post fx surgeries   • COLONOSCOPY • CORONARY ANGIOPLASTY WITH STENT PLACEMENT  2019    x2 procedures total 3 stents   • LITHOTRIPSY     • OVARIAN CYST SURGERY Right        Family History   Problem Relation Age of Onset   • Stroke Mother    • Heart disease Father      I have reviewed and agree with the history as documented. E-Cigarette/Vaping   • E-Cigarette Use Never User      E-Cigarette/Vaping Substances   • Nicotine No    • THC No    • CBD No    • Flavoring No    • Other No    • Unknown No      Social History     Tobacco Use   • Smoking status: Former     Years: 34.00     Types: Cigarettes     Quit date:      Years since quittin.7   • Smokeless tobacco: Never   Vaping Use   • Vaping Use: Never used   Substance Use Topics   • Alcohol use: Not Currently   • Drug use: Never       Review of Systems   Constitutional: Negative for chills and fever. HENT: Negative. Negative for ear pain and sore throat. Eyes: Negative. Negative for pain and visual disturbance. Respiratory: Negative. Negative for cough and shortness of breath. Cardiovascular: Negative. Negative for chest pain and palpitations. Gastrointestinal: Positive for abdominal pain, diarrhea and vomiting. Endocrine: Negative. Genitourinary: Negative. Negative for dysuria and hematuria. Musculoskeletal: Positive for myalgias. Negative for arthralgias and back pain. Skin: Negative for color change and rash. Allergic/Immunologic: Negative. Neurological: Negative. Negative for seizures, syncope and headaches. Hematological: Negative. Psychiatric/Behavioral: Negative. All other systems reviewed and are negative. Physical Exam  Physical Exam  Vitals and nursing note reviewed. Constitutional:       General: She is not in acute distress. Appearance: Normal appearance. She is well-developed. She is ill-appearing and diaphoretic. She is not toxic-appearing. HENT:      Head: Normocephalic and atraumatic.       Right Ear: Tympanic membrane, ear canal and external ear normal.      Left Ear: Tympanic membrane, ear canal and external ear normal.      Nose: Nose normal.      Mouth/Throat:      Mouth: Mucous membranes are dry. Eyes:      General: No scleral icterus. Conjunctiva/sclera: Conjunctivae normal.      Pupils: Pupils are equal, round, and reactive to light. Cardiovascular:      Rate and Rhythm: Normal rate and regular rhythm. Pulses: Normal pulses. Heart sounds: Normal heart sounds. No murmur heard. Pulmonary:      Effort: Pulmonary effort is normal. No respiratory distress. Breath sounds: Normal breath sounds. Abdominal:      General: Abdomen is flat. Bowel sounds are normal.      Palpations: Abdomen is soft. Tenderness: There is abdominal tenderness in the epigastric area and suprapubic area. Negative signs include Henriquez's sign and McBurney's sign. Musculoskeletal:         General: No swelling. Cervical back: Normal range of motion and neck supple. Skin:     General: Skin is warm. Capillary Refill: Capillary refill takes less than 2 seconds. Neurological:      General: No focal deficit present. Mental Status: She is alert and oriented to person, place, and time. Cranial Nerves: No cranial nerve deficit. Sensory: No sensory deficit. Motor: No weakness.       Coordination: Coordination normal.      Gait: Gait normal.      Deep Tendon Reflexes: Reflexes normal.   Psychiatric:         Mood and Affect: Mood normal.         Behavior: Behavior normal.         Vital Signs  ED Triage Vitals   Temperature Pulse Respirations Blood Pressure SpO2   09/24/23 1004 09/24/23 1004 09/24/23 1004 09/24/23 1004 09/24/23 1004   (!) 97 °F (36.1 °C) 90 20 (!) 205/95 98 %      Temp src Heart Rate Source Patient Position - Orthostatic VS BP Location FiO2 (%)   -- 09/24/23 1145 09/24/23 1145 -- --    Monitor Sitting        Pain Score       09/24/23 1004       4           Vitals:    09/24/23 1200 09/24/23 1215 09/24/23 1230 09/24/23 1415   BP: (!) 200/87 (!) 177/84 (!) 228/95 159/73   Pulse: 86 88 94 96   Patient Position - Orthostatic VS:             Visual Acuity      ED Medications  Medications   lactated ringers bolus 1,000 mL (0 mL Intravenous Stopped 9/24/23 1208)     Followed by   lactated ringers bolus 1,000 mL (0 mL Intravenous Stopped 9/24/23 1245)   piperacillin-tazobactam (ZOSYN) IVPB 4.5 g (0 g Intravenous Stopped 9/24/23 1122)   cloNIDine (CATAPRES) tablet 0.2 mg (0.2 mg Oral Given 9/24/23 1343)   insulin detemir (LEVEMIR) subcutaneous injection 15 Units (15 Units Subcutaneous Given 9/24/23 1343)       Diagnostic Studies  Results Reviewed     Procedure Component Value Units Date/Time    Blood culture #1 [[id number]] Collected: 09/24/23 1040    Lab Status: Preliminary result Specimen: Blood from Arm, Left Updated: 09/24/23 1701     Blood Culture Received in Microbiology Lab. Culture in Progress. Blood culture #2 [[id number]] Collected: 09/24/23 1040    Lab Status: Preliminary result Specimen: Blood from Arm, Left Updated: 09/24/23 1701     Blood Culture Received in Microbiology Lab. Culture in Progress. Urine Microscopic [[id number]]  (Abnormal) Collected: 09/24/23 1249    Lab Status: Final result Specimen: Urine, Other Updated: 09/24/23 1316     RBC, UA 0-1 /hpf      WBC, UA 30-50 /hpf      Epithelial Cells Occasional /hpf      Bacteria, UA Moderate /hpf     Urine culture [[id number]] Collected: 09/24/23 1249    Lab Status:  In process Specimen: Urine, Other Updated: 09/24/23 1316    UA w Reflex to Microscopic w Reflex to Culture [[id number]]  (Abnormal) Collected: 09/24/23 1249    Lab Status: Final result Specimen: Urine, Other Updated: 09/24/23 1255     Color, UA Yellow     Clarity, UA Cloudy     Specific Gravity, UA 1.025     pH, UA 5.5     Leukocytes, UA Small     Nitrite, UA Positive     Protein, UA >=300 mg/dl      Glucose,  (1/2%) mg/dl      Ketones, UA 15 (1+) mg/dl Urobilinogen, UA 0.2 E.U./dl      Bilirubin, UA Negative     Occult Blood, UA Moderate    FLU/RSV/COVID - if FLU/RSV clinically relevant [[id number]]  (Normal) Collected: 09/24/23 1040    Lab Status: Final result Specimen: Nares from Nose Updated: 09/24/23 1133     SARS-CoV-2 Negative     INFLUENZA A PCR Negative     INFLUENZA B PCR Negative     RSV PCR Negative    Narrative:      FOR PEDIATRIC PATIENTS - copy/paste COVID Guidelines URL to browser: [email or web address]    SARS-CoV-2 assay is a Nucleic Acid Amplification assay intended for the  qualitative detection of nucleic acid from SARS-CoV-2 in nasopharyngeal  swabs. Results are for the presumptive identification of SARS-CoV-2 RNA. Positive results are indicative of infection with SARS-CoV-2, the virus  causing COVID-19, but do not rule out bacterial infection or co-infection  with other viruses. Laboratories within the [facility] and its  territories are required to report all positive results to the appropriate  public health authorities. Negative results do not preclude SARS-CoV-2  infection and should not be used as the sole basis for treatment or other  patient management decisions. Negative results must be combined with  clinical observations, patient history, and epidemiological information. This test has not been FDA cleared or approved. This test has been authorized by FDA under an Emergency Use Authorization  (EUA). This test is only authorized for the duration of time the  declaration that circumstances exist justifying the authorization of the  emergency use of an in vitro diagnostic tests for detection of SARS-CoV-2  virus and/or diagnosis of COVID-19 infection under section 564(b)(1) of  the Act, 21 U. S.C. 982RNG-5(D)(9), unless the authorization is terminated  or revoked sooner. The test has been validated but independent review by FDA  and CLIA is pending.     Test performed using [employer] GeneXpert: This RT-PCR assay targets N2,  a region unique to SARS-CoV-2. A conserved region in the E-gene was chosen  for pan-Sarbecovirus detection which includes SARS-CoV-2. According to CMS-2020-01-R, this platform meets the definition of high-throughput technology. Lactic acid [[id number]]  (Normal) Collected: 09/24/23 1040    Lab Status: Final result Specimen: Blood from Arm, Left Updated: 09/24/23 1118     LACTIC ACID 2.0 mmol/L     Narrative:      Result may be elevated if tourniquet was used during collection.     Procalcitonin [[id number]]  (Normal) Collected: 09/24/23 1040    Lab Status: Final result Specimen: Blood from Arm, Left Updated: 09/24/23 1117     Procalcitonin 0.15 ng/ml     Protime-INR [[id number]]  (Normal) Collected: 09/24/23 1040    Lab Status: Final result Specimen: Blood from Arm, Left Updated: 09/24/23 1111     Protime 13.6 seconds      INR 1.03    APTT [[id number]]  (Normal) Collected: 09/24/23 1040    Lab Status: Final result Specimen: Blood from Arm, Left Updated: 09/24/23 1111     PTT 27 seconds     Comprehensive metabolic panel [[id number]]  (Abnormal) Collected: 09/24/23 1040    Lab Status: Final result Specimen: Blood from Arm, Left Updated: 09/24/23 1107     Sodium 130 mmol/L      Potassium 3.6 mmol/L      Chloride 88 mmol/L      CO2 21 mmol/L      ANION GAP 21 mmol/L      BUN 46 mg/dL      Creatinine 1.64 mg/dL      Glucose 269 mg/dL      Calcium 10.5 mg/dL      AST 44 U/L      ALT 40 U/L      Alkaline Phosphatase 84 U/L      Total Protein 7.9 g/dL      Albumin 4.6 g/dL      Total Bilirubin 1.41 mg/dL      eGFR 31 ml/min/1.73sq m     Narrative:      [place] guidelines for Chronic Kidney Disease (CKD):   •  Stage 1 with normal or high GFR (GFR > 90 mL/min/1.73 square meters)  •  Stage 2 Mild CKD (GFR = 60-89 mL/min/1.73 square meters)  •  Stage 3A Moderate CKD (GFR = 45-59 mL/min/1.73 square meters)  •  Stage 3B Moderate CKD (GFR = 30-44 mL/min/1.73 square meters)  •  Stage 4 Severe CKD (GFR = 15-29 mL/min/1.73 square meters)  •  Stage 5 End Stage CKD (GFR <15 mL/min/1.73 square meters)  Note: GFR calculation is accurate only with a steady state creatinine    CBC and differential [[id number]]  (Abnormal) Collected: 09/24/23 1040    Lab Status: Final result Specimen: Blood from Arm, Left Updated: 09/24/23 1051     WBC 15.73 Thousand/uL      RBC 6.05 Million/uL      Hemoglobin 17.0 g/dL      Hematocrit 47.7 %      MCV 79 fL      MCH 28.1 pg      MCHC 35.6 g/dL      RDW 14.1 %      MPV 10.0 fL      Platelets 295 Thousands/uL      nRBC 0 /100 WBCs      Neutrophils Relative 81 %      Immat GRANS % 1 %      Lymphocytes Relative 11 %      Monocytes Relative 7 %      Eosinophils Relative 0 %      Basophils Relative 0 %      Neutrophils Absolute 12.79 Thousands/µL      Immature Grans Absolute 0.08 Thousand/uL      Lymphocytes Absolute 1.70 Thousands/µL      Monocytes Absolute 1.10 Thousand/µL      Eosinophils Absolute 0.03 Thousand/µL      Basophils Absolute 0.03 Thousands/µL     Fingerstick Glucose (POCT) [[id number]]  (Abnormal) Collected: 09/24/23 1002    Lab Status: Final result Updated: 09/24/23 1004     POC Glucose 295 mg/dl                  No orders to display              Procedures  CriticalCare Time    Date/Time: 9/24/2023 6:06 PM    Performed by: Aiyana Caicedo PA-C  Authorized by: Aiyana Caicedo PA-C    Critical care provider statement:     Critical care time (minutes):  65    Critical care time was exclusive of:  Separately billable procedures and treating other patients and teaching time    Critical care was necessary to treat or prevent imminent or life-threatening deterioration of the following conditions:  Dehydration    Critical care was time spent personally by me on the following activities:  Blood draw for specimens, obtaining history from patient or surrogate, development of treatment plan with patient or surrogate, discussions with consultants, discussions with primary provider, evaluation of patient's response to treatment, examination of patient, interpretation of cardiac output measurements, ordering and performing treatments and interventions, ordering and review of laboratory studies, ordering and review of radiographic studies, re-evaluation of patient's condition and review of old charts             ED Course  ED Course as of 09/24/23 1805   Sun Sep 24, 2023   1004 POC Glucose(!): 295   1054 WBC(!): 15.73   1054 Hemoglobin(!): 17.0  Suspect elevated WBC and Hgb related to volume contraction from vomiting                               SBIRT 22yo+    Flowsheet Row Most Recent Value   Initial Alcohol Screen: US AUDIT-C     1. How often do you have a drink containing alcohol? 0 Filed at: 09/24/2023 1007   2. How many drinks containing alcohol do you have on a typical day you are drinking? 0 Filed at: 09/24/2023 1007   3a. Male UNDER 65: How often do you have five or more drinks on one occasion? 0 Filed at: 09/24/2023 1007   3b. FEMALE Any Age, or MALE 65+: How often do you have 4 or more drinks on one occassion? 0 Filed at: 09/24/2023 1007   Audit-C Score 0 Filed at: 09/24/2023 1007   KIKO: How many times in the past year have you. .. Used an illegal drug or used a prescription medication for non-medical reasons?  Never Filed at: 09/24/2023 1007                    Medical Decision Making  Patient with bilious vomiting and nausea with associated dehydration likely related to UTI  Patient with good response to rehydration and Zofran  Patient able to tolerate a sandwich and pretzels  Patient with elevated white count and hemoglobin likely related to volume contraction from dehydration  Patient discharged with Keflex for UTI and Zofran for nausea  If patient's symptoms worsen or her abdominal pain recurs or worsens she will return to the ED    Bilious vomiting with nausea: acute illness or injury  Dehydration: acute illness or injury  UTI (urinary tract infection): acute illness or injury  Amount and/or Complexity of Data Reviewed  Labs: ordered. Decision-making details documented in ED Course. Risk  Prescription drug management. Disposition  Final diagnoses:   UTI (urinary tract infection)   Bilious vomiting with nausea   Dehydration     Time reflects when diagnosis was documented in both MDM as applicable and the Disposition within this note     Time User Action Codes Description Comment    9/24/2023  2:03 PM Michelle Kraft Add [N39.0] UTI (urinary tract infection)     9/24/2023  2:03 PM Michelle Kraft Add [R11.14] Bilious vomiting with nausea     9/24/2023  2:04 PM Michelle Kraft Add [E86.0] Dehydration       ED Disposition     ED Disposition   Discharge    Condition   Stable    Date/Time   Sun Sep 24, 2023  2:03 PM    Comment   Beau Ramirez discharge to home/self care.                Follow-up Information     Follow up With Specialties Details Why Contact Info Additional Information    Kelton Giraldo MD Family Medicine Go to  As needed [address] [facility] [address]  [address] [place] [address] Emergency Department Emergency Medicine Go to  If symptoms worsen [address] [address]  [address] [facility] Emergency Department, [address] [facility] [address] [place] [address], [address]          Discharge Medication List as of 9/24/2023  2:05 PM      START taking these medications    Details   cephalexin (KEFLEX) 500 mg capsule Take 1 capsule (500 mg total) by mouth every 12 (twelve) hours for 7 days, Starting Sun 9/24/2023, Until Sun 10/1/2023, Normal      ondansetron (ZOFRAN-ODT) 4 mg disintegrating tablet Take 1 tablet (4 mg total) by mouth every 6 (six) hours as needed for nausea or vomiting for up to 3 days, Starting Sun 9/24/2023, Until Wed 9/27/2023 at 2359, Normal         CONTINUE these medications which have NOT CHANGED    Details budesonide (ENTOCORT EC) 3 MG capsule Take 2 capsules by mouth once daily, Normal      aspirin (ECOTRIN LOW STRENGTH) 81 mg EC tablet Take 81 mg by mouth daily, Historical Med      atorvastatin (LIPITOR) 40 mg tablet Take 1 tablet (40 mg total) by mouth daily, Starting Mon 1/30/2023, Normal      carvedilol (COREG) 25 mg tablet Take 25 mg by mouth 2 (two) times a day, Starting Thu 9/9/2021, Historical Med      cloNIDine (CATAPRES) 0.1 mg tablet Take 2 tablets (0.2 mg total) by mouth 2 (two) times a day, Starting Wed 9/6/2023, Until Fri 10/6/2023, Normal      clopidogrel (PLAVIX) 75 mg tablet Take 75 mg by mouth daily Hold for 5 days-LD to be 6/15, Historical Med      Continuous Blood Gluc  (FreeStyle [place] 2 [place]) CORNEL Use 1 Device every morning, Starting Thu 11/3/2022, Normal      glucose blood ([employer] Glucose Meter Test Strips) test strip Use 1 each 2 (two) times a day Use as instructed DX E11.65, Starting Wed 1/11/2023, Normal      Insulin Glargine Solostar (Lantus SoloStar) 100 UNIT/ML SOPN Inject 30 units at bedtime, Normal      insulin lispro (HumaLOG KwikPen) 100 units/mL injection pen Inject 4 Units under the skin 3 (three) times a day with meals Plus sliding scale, Starting Mon 8/28/2023, Until Sun 11/26/2023, Normal      Insulin Pen Needle (UltiCare Mini Pen Needles) 31G X 6 MM MISC Use 4 (four) times a day, Starting Mon 2/28/2022, Normal      isosorbide dinitrate (ISORDIL) 30 mg tablet Take 1 tablet (30 mg total) by mouth 2 (two) times a day, Starting Tue 3/28/2023, Until Mon 6/26/2023, Normal      pantoprazole (PROTONIX) 40 mg tablet Take 1 tablet by mouth twice daily, Starting Mon 8/21/2023, Normal      pregabalin (LYRICA) 100 mg capsule Take 1 capsule by mouth twice daily, Starting Sat 9/2/2023, Normal      sucralfate (CARAFATE) 1 g tablet Take 1 tablet (1 g total) by mouth 2 (two) times a day before meals, Starting Wed 6/7/2023, Until Sun 8/6/2023, Normal      tolterodine (DETROL LA) 4 mg 24 hr capsule Take 1 capsule (4 mg total) by mouth daily, Starting Mon 1/30/2023, Normal      valsartan (DIOVAN) 320 MG tablet Take 1 tablet by mouth once daily, Normal             No discharge procedures on file.     PDMP Review       Value Time User    PDMP Reviewed  Yes 11/15/2021 11:25 AM Alexandria Feng MD          ED Provider  Electronically Signed by           Juan Spears PA-C  09/24/23 0551

## 2023-09-24 NOTE — ED NOTES
Pt completed her lunch until she became full, but has successfully not vomited.       Faye Dandy, RN  09/24/23 2937

## 2023-09-25 ENCOUNTER — RA CDI HCC (OUTPATIENT)
Dept: OTHER | Facility: HOSPITAL | Age: 73
End: 2023-09-25

## 2023-09-25 NOTE — PROGRESS NOTES
[address] [facility] coding opportunities     E11.22   Chart Reviewed number of suggestions sent to Provider: 1     Patients Insurance     Medicare Insurance: [place] Medicare Complete

## 2023-09-26 LAB — BACTERIA UR CULT: NORMAL

## 2023-09-29 LAB
BACTERIA BLD CULT: NORMAL
BACTERIA BLD CULT: NORMAL

## 2023-10-10 DIAGNOSIS — M25.571 BILATERAL ANKLE PAIN, UNSPECIFIED CHRONICITY: ICD-10-CM

## 2023-10-10 DIAGNOSIS — E11.40 TYPE 2 DIABETES MELLITUS WITH CHRONIC PAINFUL DIABETIC NEUROPATHY (HCC): ICD-10-CM

## 2023-10-10 DIAGNOSIS — M25.572 BILATERAL ANKLE PAIN, UNSPECIFIED CHRONICITY: ICD-10-CM

## 2023-10-10 RX ORDER — VALSARTAN 320 MG/1
TABLET ORAL
Qty: 90 TABLET | Refills: 0 | Status: SHIPPED | OUTPATIENT
Start: 2023-10-10

## 2023-10-10 RX ORDER — PREGABALIN 100 MG/1
100 CAPSULE ORAL 2 TIMES DAILY
Qty: 60 CAPSULE | Refills: 0 | Status: SHIPPED | OUTPATIENT
Start: 2023-10-10

## 2023-10-11 DIAGNOSIS — K20.90 ESOPHAGITIS: ICD-10-CM

## 2023-10-11 DIAGNOSIS — N32.81 OVERACTIVE BLADDER: ICD-10-CM

## 2023-10-11 DIAGNOSIS — K52.832 LYMPHOCYTIC COLITIS: ICD-10-CM

## 2023-10-11 RX ORDER — PANTOPRAZOLE SODIUM 40 MG/1
40 TABLET, DELAYED RELEASE ORAL 2 TIMES DAILY
Qty: 60 TABLET | Refills: 2 | Status: SHIPPED | OUTPATIENT
Start: 2023-10-11

## 2023-10-11 RX ORDER — BUDESONIDE 3 MG/1
CAPSULE, COATED PELLETS ORAL
Qty: 60 CAPSULE | Refills: 11 | Status: SHIPPED | OUTPATIENT
Start: 2023-10-11

## 2023-10-11 RX ORDER — TOLTERODINE 4 MG/1
4 CAPSULE, EXTENDED RELEASE ORAL DAILY
Qty: 90 CAPSULE | Refills: 1 | Status: SHIPPED | OUTPATIENT
Start: 2023-10-11

## 2023-10-13 ENCOUNTER — OFFICE VISIT (OUTPATIENT)
Dept: FAMILY MEDICINE CLINIC | Facility: CLINIC | Age: 73
End: 2023-10-13
Payer: COMMERCIAL

## 2023-10-13 VITALS
DIASTOLIC BLOOD PRESSURE: 52 MMHG | SYSTOLIC BLOOD PRESSURE: 92 MMHG | RESPIRATION RATE: 14 BRPM | BODY MASS INDEX: 24.74 KG/M2 | WEIGHT: 126 LBS | HEIGHT: 60 IN | HEART RATE: 84 BPM | OXYGEN SATURATION: 96 % | TEMPERATURE: 98.2 F

## 2023-10-13 DIAGNOSIS — I10 HYPERTENSION, UNSPECIFIED TYPE: ICD-10-CM

## 2023-10-13 DIAGNOSIS — R31.9 HEMATURIA, UNSPECIFIED TYPE: ICD-10-CM

## 2023-10-13 DIAGNOSIS — E11.40 TYPE 2 DIABETES MELLITUS WITH CHRONIC PAINFUL DIABETIC NEUROPATHY (HCC): Primary | ICD-10-CM

## 2023-10-13 LAB
SL AMB  POCT GLUCOSE, UA: ABNORMAL
SL AMB LEUKOCYTE ESTERASE,UA: 500
SL AMB POCT BILIRUBIN,UA: ABNORMAL
SL AMB POCT BLOOD,UA: 50
SL AMB POCT CLARITY,UA: ABNORMAL
SL AMB POCT COLOR,UA: YELLOW
SL AMB POCT HEMOGLOBIN AIC: 7.3 (ref ?–6.5)
SL AMB POCT KETONES,UA: ABNORMAL
SL AMB POCT NITRITE,UA: ABNORMAL
SL AMB POCT PH,UA: 6
SL AMB POCT SPECIFIC GRAVITY,UA: 1.01
SL AMB POCT URINE PROTEIN: ABNORMAL
SL AMB POCT UROBILINOGEN: ABNORMAL

## 2023-10-13 PROCEDURE — 83036 HEMOGLOBIN GLYCOSYLATED A1C: CPT | Performed by: FAMILY MEDICINE

## 2023-10-13 PROCEDURE — 99214 OFFICE O/P EST MOD 30 MIN: CPT | Performed by: FAMILY MEDICINE

## 2023-10-13 PROCEDURE — 81003 URINALYSIS AUTO W/O SCOPE: CPT | Performed by: FAMILY MEDICINE

## 2023-10-13 RX ORDER — AMOXICILLIN 500 MG/1
500 CAPSULE ORAL EVERY 12 HOURS SCHEDULED
Qty: 14 CAPSULE | Refills: 0 | Status: SHIPPED | OUTPATIENT
Start: 2023-10-13 | End: 2023-10-20

## 2023-10-13 NOTE — PROGRESS NOTES
Silas Nina 1950 female MRN: [id number]    FAMILY PRACTICE OFFICE VISIT  [facility] - [address] [facility] [place]      ASSESSMENT/PLAN  Silas Nina is a 72 y.o. female presents to the office for    Problem List Items Addressed This Visit        Cardiovascular and Mediastinum    Hypertension   Other Visit Diagnoses     Type 2 diabetes mellitus with chronic painful diabetic neuropathy ([address])    -  Primary    Relevant Orders    POCT hemoglobin A1c (Completed)    Hematuria, unspecified type        Relevant Medications    amoxicillin (AMOXIL) 500 mg capsule    Other Relevant Orders    Urine culture    POCT urine dip auto non-scope (Completed)      Type 2 diabetes A1c performed today significant improvement. Patient is to continue recommendations of endocrinology. Patient with slight abnormality in her urine. Recommend amoxicillin 500 mg patient is to advise if that there is no improvement. Hypertension currently slightly on the lower side of normal states that at home the blood pressures are within range. Future Appointments   Date Time Provider [address]   10/30/2023  2:00 PM [facility]   11/24/2023 12:30 PM CYNDIE Ralph [facility]   4/1/2024 11:00 AM MD Della Gonzalez  [facility]          SUBJECTIVE  CC: Diabetes      HPI:  Silas Nina is a 72 y.o. female who presents for an acute follow-up. Patient states from a diabetic standpoint she has been seeing endocrinology states that her numbers have been significantly improving. Patient states that she feels that she might have urinary symptoms still. Keeping a blood pressure log at home and states that her blood pressure has been in normal range at home. Denies any other acute concerns today  Review of Systems   Constitutional:  Negative for activity change, appetite change, chills, fatigue and fever. HENT:  Negative for congestion.     Respiratory:  Negative for cough, chest tightness and shortness of breath. Cardiovascular:  Negative for chest pain and leg swelling. Gastrointestinal:  Negative for abdominal distention, abdominal pain, constipation, diarrhea, nausea and vomiting. Genitourinary:  Positive for frequency. All other systems reviewed and are negative.       Historical Information   The patient history was reviewed as follows:  Past Medical History:   Diagnosis Date   • Colitis    • Coronary artery disease    • Diabetes mellitus ([address])    • Esophageal ulcer    • Kidney stone    • Neuropathy          Medications:     Current Outpatient Medications:   •  amoxicillin (AMOXIL) 500 mg capsule, Take 1 capsule (500 mg total) by mouth every 12 (twelve) hours for 7 days, Disp: 14 capsule, Rfl: 0  •  aspirin (ECOTRIN LOW STRENGTH) 81 mg EC tablet, Take 81 mg by mouth daily, Disp: , Rfl:   •  atorvastatin (LIPITOR) 40 mg tablet, Take 1 tablet (40 mg total) by mouth daily, Disp: 90 tablet, Rfl: 2  •  budesonide (ENTOCORT EC) 3 MG capsule, Take 2 capsules by mouth once daily, Disp: 60 capsule, Rfl: 11  •  carvedilol (COREG) 25 mg tablet, Take 25 mg by mouth 2 (two) times a day, Disp: , Rfl:   •  cloNIDine (CATAPRES) 0.1 mg tablet, Take 2 tablets (0.2 mg total) by mouth 2 (two) times a day, Disp: 120 tablet, Rfl: 3  •  clopidogrel (PLAVIX) 75 mg tablet, Take 75 mg by mouth daily Hold for 5 days-LD to be 6/15, Disp: , Rfl:   •  Continuous Blood Gluc  (FreeStyle [place] 2 [place]) CORNEL, Use 1 Device every morning, Disp: 1 each, Rfl: 0  •  glucose blood (CVS Glucose Meter Test Strips) test strip, Use 1 each 2 (two) times a day Use as instructed DX E11.65, Disp: 200 each, Rfl: 2  •  Insulin Glargine Solostar (Lantus SoloStar) 100 UNIT/ML SOPN, Inject 30 units at bedtime, Disp: 15 mL, Rfl: 3  •  insulin lispro (HumaLOG KwikPen) 100 units/mL injection pen, Inject 4 Units under the skin 3 (three) times a day with meals Plus sliding scale, Disp: 15 mL, Rfl: 3  •  Insulin Pen Needle (UltiCare Mini Pen Needles) 31G X 6 MM MISC, Use 4 (four) times a day, Disp: 200 each, Rfl: 2  •  isosorbide dinitrate (ISORDIL) 30 mg tablet, Take 1 tablet (30 mg total) by mouth 2 (two) times a day, Disp: 180 tablet, Rfl: 2  •  ondansetron (ZOFRAN-ODT) 4 mg disintegrating tablet, Take 1 tablet (4 mg total) by mouth every 6 (six) hours as needed for nausea or vomiting for up to 3 days, Disp: 12 tablet, Rfl: 0  •  pantoprazole (PROTONIX) 40 mg tablet, Take 1 tablet by mouth twice daily, Disp: 60 tablet, Rfl: 2  •  pregabalin (LYRICA) 100 mg capsule, Take 1 capsule by mouth twice daily, Disp: 60 capsule, Rfl: 0  •  tolterodine (DETROL LA) 4 mg 24 hr capsule, Take 1 capsule by mouth once daily, Disp: 90 capsule, Rfl: 1  •  valsartan (DIOVAN) 320 MG tablet, Take 1 tablet by mouth once daily, Disp: 90 tablet, Rfl: 0  •  sucralfate (CARAFATE) 1 g tablet, Take 1 tablet (1 g total) by mouth 2 (two) times a day before meals (Patient not taking: Reported on 7/24/2023), Disp: 120 tablet, Rfl: 0    Allergies   Allergen Reactions   • Other Rash     Latex tape       OBJECTIVE  Vitals:   Vitals:    10/13/23 1109   BP: 92/52   BP Location: Left arm   Patient Position: Sitting   Cuff Size: Standard   Pulse: 84   Resp: 14   Temp: 98.2 °F (36.8 °C)   TempSrc: Temporal   SpO2: 96%   Weight: 57.2 kg (126 lb)   Height: 5' (1.524 m)         Physical Exam  Vitals reviewed. Constitutional:       Appearance: She is well-developed. HENT:      Head: Normocephalic and atraumatic. Right Ear: Tympanic membrane, ear canal and external ear normal.      Left Ear: Tympanic membrane, ear canal and external ear normal.      Nose: Nose normal.   Eyes:      Conjunctiva/sclera: Conjunctivae normal.      Pupils: Pupils are equal, round, and reactive to light. Cardiovascular:      Rate and Rhythm: Normal rate and regular rhythm. Pulses: no weak pulses          Dorsalis pedis pulses are 2+ on the right side and 2+ on the left side. Posterior tibial pulses are 2+ on the right side and 2+ on the left side. Heart sounds: Normal heart sounds. Pulmonary:      Effort: Pulmonary effort is normal. No respiratory distress. Breath sounds: Normal breath sounds. Musculoskeletal:         General: Normal range of motion. Cervical back: Normal range of motion and neck supple. Feet:      Right foot:      Skin integrity: No ulcer, skin breakdown, erythema, warmth, callus or dry skin. Left foot:      Skin integrity: No ulcer, skin breakdown, erythema, warmth, callus or dry skin. Skin:     General: Skin is warm. Capillary Refill: Capillary refill takes less than 2 seconds. Neurological:      Mental Status: She is alert and oriented to person, place, and time. Patient's shoes and socks removed. Right Foot/Ankle   Right Foot Inspection  Skin Exam: skin normal and skin intact. No dry skin, no warmth, no callus, no erythema, no maceration, no abnormal color, no pre-ulcer, no ulcer and no callus. Toe Exam: ROM and strength within normal limits. No swelling    Sensory   Vibration: intact  Proprioception: intact  Monofilament testing: absent    Vascular  Capillary refills: < 3 seconds  The right DP pulse is 2+. The right PT pulse is 2+. Left Foot/Ankle  Left Foot Inspection  Skin Exam: skin normal and skin intact. No dry skin, no warmth, no erythema, no maceration, normal color, no pre-ulcer, no ulcer and no callus. Toe Exam: ROM and strength within normal limits. No swelling. Sensory   Vibration: intact  Proprioception: intact  Monofilament testing: absent    Vascular  Capillary refills: < 3 seconds  The left DP pulse is 2+. The left PT pulse is 2+.      Assign Risk Category  No deformity present  Loss of protective sensation  No weak pulses  Risk: 1          Patrick Yousif MD,   [facility]  10/17/2023

## 2023-10-18 LAB
BACTERIA UR CULT: ABNORMAL
Lab: ABNORMAL
SL AMB ANTIMICROBIAL SUSCEPTIBILITY: ABNORMAL

## 2023-10-19 DIAGNOSIS — E78.00 PURE HYPERCHOLESTEROLEMIA: ICD-10-CM

## 2023-10-19 RX ORDER — ATORVASTATIN CALCIUM 40 MG/1
40 TABLET, FILM COATED ORAL DAILY
Qty: 90 TABLET | Refills: 1 | Status: SHIPPED | OUTPATIENT
Start: 2023-10-19

## 2023-10-20 ENCOUNTER — TELEPHONE (OUTPATIENT)
Dept: ENDOCRINOLOGY | Facility: CLINIC | Age: 73
End: 2023-10-20

## 2023-10-20 DIAGNOSIS — E11.40 TYPE 2 DIABETES MELLITUS WITH CHRONIC PAINFUL DIABETIC NEUROPATHY (HCC): ICD-10-CM

## 2023-10-20 RX ORDER — INSULIN LISPRO 100 [IU]/ML
4 INJECTION, SOLUTION INTRAVENOUS; SUBCUTANEOUS
Qty: 15 ML | Refills: 0 | Status: SHIPPED | OUTPATIENT
Start: 2023-10-20 | End: 2024-01-18

## 2023-10-30 ENCOUNTER — HOSPITAL ENCOUNTER (OUTPATIENT)
Dept: RADIOLOGY | Facility: HOSPITAL | Age: 73
Discharge: HOME | End: 2023-10-30
Attending: FAMILY MEDICINE
Payer: COMMERCIAL

## 2023-10-30 VITALS — WEIGHT: 126 LBS | BODY MASS INDEX: 24.74 KG/M2 | HEIGHT: 60 IN

## 2023-10-30 DIAGNOSIS — Z12.31 SCREENING MAMMOGRAM, ENCOUNTER FOR: ICD-10-CM

## 2023-10-30 PROCEDURE — 77067 SCR MAMMO BI INCL CAD: CPT

## 2023-10-30 PROCEDURE — 77063 BREAST TOMOSYNTHESIS BI: CPT

## 2023-10-31 DIAGNOSIS — E11.40 TYPE 2 DIABETES MELLITUS WITH CHRONIC PAINFUL DIABETIC NEUROPATHY (HCC): ICD-10-CM

## 2023-10-31 RX ORDER — INSULIN LISPRO 100 [IU]/ML
4 INJECTION, SOLUTION INTRAVENOUS; SUBCUTANEOUS
Qty: 15 ML | Refills: 0 | Status: SHIPPED | OUTPATIENT
Start: 2023-10-31 | End: 2024-01-29

## 2023-11-07 DIAGNOSIS — M25.572 BILATERAL ANKLE PAIN, UNSPECIFIED CHRONICITY: ICD-10-CM

## 2023-11-07 DIAGNOSIS — M25.571 BILATERAL ANKLE PAIN, UNSPECIFIED CHRONICITY: ICD-10-CM

## 2023-11-07 DIAGNOSIS — N32.81 OVERACTIVE BLADDER: ICD-10-CM

## 2023-11-07 RX ORDER — TOLTERODINE 4 MG/1
4 CAPSULE, EXTENDED RELEASE ORAL DAILY
Qty: 90 CAPSULE | Refills: 1 | Status: SHIPPED | OUTPATIENT
Start: 2023-11-07

## 2023-11-07 RX ORDER — PREGABALIN 100 MG/1
100 CAPSULE ORAL 2 TIMES DAILY
Qty: 60 CAPSULE | Refills: 5 | Status: SHIPPED | OUTPATIENT
Start: 2023-11-07

## 2023-11-08 ENCOUNTER — TELEPHONE (OUTPATIENT)
Age: 73
End: 2023-11-08

## 2023-11-08 NOTE — TELEPHONE ENCOUNTER
[employer] and spoke with pharmacist [phone]. She confirmed rx was sent with Lydia Reddy for Dr Eugene Gutierrez but had prescriber address- [address] [place] [address] in [facility]. I gave verbal ok for [facility] address for Dr Eugene Gutierrez which is listed on our end on rx.

## 2023-11-08 NOTE — TELEPHONE ENCOUNTER
Please call [facility]? I sent it with my [facility] not [place].  Not sure what they are talking about

## 2023-11-08 NOTE — TELEPHONE ENCOUNTER
Estee Ham from [facility] DR KING HENDERSON called to inform that they receive the prescription pregabalin (LYRICA) 100 mg capsule but appear the JOSEPHINE from [place] not from the state [facility].      Please re send the medication with the [address]

## 2023-11-13 ENCOUNTER — APPOINTMENT (OUTPATIENT)
Dept: LAB | Facility: CLINIC | Age: 73
End: 2023-11-13
Payer: COMMERCIAL

## 2023-11-13 DIAGNOSIS — E11.65 TYPE 2 DIABETES MELLITUS WITH HYPERGLYCEMIA, WITH LONG-TERM CURRENT USE OF INSULIN (HCC): ICD-10-CM

## 2023-11-13 DIAGNOSIS — Z79.4 TYPE 2 DIABETES MELLITUS WITH HYPERGLYCEMIA, WITH LONG-TERM CURRENT USE OF INSULIN (HCC): ICD-10-CM

## 2023-11-13 LAB
ALBUMIN SERPL BCP-MCNC: 4.3 G/DL (ref 3.5–5)
ALP SERPL-CCNC: 97 U/L (ref 34–104)
ALT SERPL W P-5'-P-CCNC: 40 U/L (ref 7–52)
ANION GAP SERPL CALCULATED.3IONS-SCNC: 10 MMOL/L
AST SERPL W P-5'-P-CCNC: 26 U/L (ref 13–39)
BILIRUB SERPL-MCNC: 0.47 MG/DL (ref 0.2–1)
BUN SERPL-MCNC: 31 MG/DL (ref 5–25)
CALCIUM SERPL-MCNC: 10.3 MG/DL (ref 8.4–10.2)
CHLORIDE SERPL-SCNC: 101 MMOL/L (ref 96–108)
CO2 SERPL-SCNC: 28 MMOL/L (ref 21–32)
CREAT SERPL-MCNC: 1.55 MG/DL (ref 0.6–1.3)
EST. AVERAGE GLUCOSE BLD GHB EST-MCNC: 200 MG/DL
GFR SERPL CREATININE-BSD FRML MDRD: 33 ML/MIN/1.73SQ M
GLUCOSE P FAST SERPL-MCNC: 124 MG/DL (ref 65–99)
HBA1C MFR BLD: 8.6 %
POTASSIUM SERPL-SCNC: 4.5 MMOL/L (ref 3.5–5.3)
PROT SERPL-MCNC: 7.1 G/DL (ref 6.4–8.4)
SODIUM SERPL-SCNC: 139 MMOL/L (ref 135–147)

## 2023-11-13 PROCEDURE — 36415 COLL VENOUS BLD VENIPUNCTURE: CPT

## 2023-11-13 PROCEDURE — 80053 COMPREHEN METABOLIC PANEL: CPT

## 2023-11-13 PROCEDURE — 83036 HEMOGLOBIN GLYCOSYLATED A1C: CPT

## 2023-11-24 ENCOUNTER — OFFICE VISIT (OUTPATIENT)
Dept: ENDOCRINOLOGY | Facility: CLINIC | Age: 73
End: 2023-11-24
Payer: COMMERCIAL

## 2023-11-24 VITALS
BODY MASS INDEX: 25.78 KG/M2 | OXYGEN SATURATION: 97 % | DIASTOLIC BLOOD PRESSURE: 78 MMHG | HEART RATE: 61 BPM | WEIGHT: 132 LBS | SYSTOLIC BLOOD PRESSURE: 140 MMHG

## 2023-11-24 DIAGNOSIS — I10 PRIMARY HYPERTENSION: ICD-10-CM

## 2023-11-24 DIAGNOSIS — E11.65 TYPE 2 DIABETES MELLITUS WITH HYPERGLYCEMIA, WITH LONG-TERM CURRENT USE OF INSULIN (HCC): Primary | ICD-10-CM

## 2023-11-24 DIAGNOSIS — M81.0 OSTEOPOROSIS, UNSPECIFIED OSTEOPOROSIS TYPE, UNSPECIFIED PATHOLOGICAL FRACTURE PRESENCE: ICD-10-CM

## 2023-11-24 DIAGNOSIS — E78.00 PURE HYPERCHOLESTEROLEMIA: ICD-10-CM

## 2023-11-24 DIAGNOSIS — Z79.4 TYPE 2 DIABETES MELLITUS WITH HYPERGLYCEMIA, WITH LONG-TERM CURRENT USE OF INSULIN (HCC): Primary | ICD-10-CM

## 2023-11-24 PROBLEM — E78.49 OTHER HYPERLIPIDEMIA: Status: RESOLVED | Noted: 2021-11-15 | Resolved: 2023-11-24

## 2023-11-24 PROCEDURE — 99214 OFFICE O/P EST MOD 30 MIN: CPT | Performed by: NURSE PRACTITIONER

## 2023-11-24 RX ORDER — PEN NEEDLE, DIABETIC 32GX 5/32"
NEEDLE, DISPOSABLE MISCELLANEOUS
Qty: 130 EACH | Refills: 2 | Status: SHIPPED | OUTPATIENT
Start: 2023-11-24

## 2023-11-24 NOTE — PROGRESS NOTES
Established Patient Progress Note      CC: Type 2 Diabetes Mellitus   History of osteoporosis     Impression & Plan:    Problem List Items Addressed This Visit          Endocrine    Type 2 diabetes mellitus with hyperglycemia, with long-term current use of insulin ([address]) - Primary       Lab Results   Component Value Date    HGBA1C 8.6 (H) 11/13/2023   HGA1C has increased patient states that she has been making more dietary indiscretions over the last 3 months. Patient would like to see diabetes education for MNT. Patient will continue to use CGM for glucose monitoring. Patient will call for blood sugars less than 70 or patterns greater than 250 mg/dL. Treatment regimen:   Lantus 30 units daily  Novolog 4 units TID with meals and sliding scale 1 unit to lower by 50 mg/dL over 150 mg/dL with all meals    Discussed risks/complications associated with uncontrolled diabetes including organ involvement, heart attack, stroke, death. Recommended referral to diabetes education. Referral for MNT provided    Advised lifestyle modifications including attention to diet including the amount and types of carbohydrates consumed and regular activity. Call for blood sugars less than 70 mg/dl or patterns over 250 mg/dl.   ]  Discussed symptoms and treatment of hypoglycemia. Reviewed risks associated with hypoglycemia. Always carry rapid acting carbohydrates and a glucometer (a way to check your blood sugar). Recommendation for medical identification either bracelet, necklace. Routine follow up for diabetic eye and foot exams. Ordered blood work to complete prior to next visit. Send glucose logs/CGM download in 1-2 weeks for review    Follow up in 3 months.               Relevant Medications    Insulin Pen Needle (BD Pen Needle Bridgette U/F) 32G X 4 MM MISC    Other Relevant Orders    Ambulatory Referral to Diabetic Education    Comprehensive metabolic panel    Hemoglobin A1C    Albumin / creatinine urine ratio    Lipid panel- Lab Collect       Cardiovascular and Mediastinum    Hypertension     BP slightly higher up on arrival as patient had very stressful transport. Patient blood sugar has decreased to 140/78 after visit. Musculoskeletal and Integument    Osteoporosis     Will check DEXA scan as she has previous history of ankle fractures and was previously on treatment for osteoporosis  but she does not remember the name. Would recommend DEXA scan as the last was about 5-6 years ago. She is taking sufficient calcium (dietary and supplemental) approximately 5954-6719 mg daily. Not currently on vitamin D, recommend starting 1000 IU daily until vitamin D level results. R lateral foot/heel pain- felt crack in parking lot, advised to be seen at urgent care for imaging. Or reach out to PCP. She was amenable to reaching out if continues. Relevant Orders    DXA bone density spine hip and pelvis    Vitamin D 25 hydroxy    PTH, intact    Calcium, ionized       Other    Pure hypercholesterolemia     Continues on atorvastatin. Orders Placed This Encounter   Procedures    DXA bone density spine hip and pelvis     Standing Status:   Future     Standing Expiration Date:   11/24/2027     Scheduling Instructions:      Please do not wear jewelry on the day of the exam. Please wear comfortable clothing with no metal buttons, zippers, snaps or an underwire bra. Do not take any calcium supplements or multi-vitamins 24 hours prior to your test.      Your study cannot be performed if you take your calcium supplement or multi-vitamin within 24 hours of your scheduled Dexa scan examination. Please bring your physician order, insurance cards, a form of photo ID and a list of your medications with you. Please remember to arrive 15 minutes prior to your appointment time in order to register.           Vitamin D 25 hydroxy     Standing Status:   Future     Standing Expiration Date:   5/24/2024    PTH, intact     Standing Status:   Future     Standing Expiration Date:   11/24/2024    Comprehensive metabolic panel     This is a patient instruction: Patient fasting for 8 hours or longer recommended. Standing Status:   Future     Standing Expiration Date:   5/24/2024    Calcium, ionized     Standing Status:   Future     Standing Expiration Date:   11/24/2024    Hemoglobin A1C     Standing Status:   Future     Standing Expiration Date:   11/24/2024    Albumin / creatinine urine ratio     Standing Status:   Future     Standing Expiration Date:   5/24/2024    Lipid panel- Lab Collect     This is a patient instruction: This test requires patient fasting for 10-12 hours or longer. Drinking of black coffee or black tea is acceptable. Standing Status:   Future     Standing Expiration Date:   11/24/2024    Ambulatory Referral to Diabetic Education     Standing Status:   Future     Standing Expiration Date:   11/24/2024     Referral Priority:   Routine     Referral Type:   Consult - AMB     Referral Reason:   Specialty Services Required     Requested Specialty:   Diabetes Services     Number of Visits Requested:   1     Expiration Date:   11/24/2024       History of Present Illness:   Belinda Foy is a 72 y.o. female  with a history of type 2 diabetes with long term use of insulindiagnosed around age 38. No history of heart attack or stroke. Denies polyuria, polydipsia, nocturia, or blurry vision. Denies numbness/tingling in feet, last foot exam: November 2023, history of neuropathy: yes, no diabetic ulcerations. Last eye exam: mid August 2023, history of retinopathy: none. Last appointment with diabetes education: due for follow up but had to cancel due to inconsistent transportation. Other significant past medical history includes hypertension on ARB, hyperlipidemia on a statin, CAD s/p stent replacement follows up Cardiology, and CKD .   Denies recent illness or hospitalizations. Denies recent severe hypoglycemic or severe hyperglycemic episodes. Denies any issues with her current regimen. Home glucose monitoring: are performed regularly with robby device. CGM Interpretation  Fred Vega   Device used [place] 2  Home use   Indication: Type 2 Diabetes  More than 72 hours of data was reviewed. Report to be scanned to chart. Date Range: November 17-24, 2023  Analysis of data:   Average Glucose: 184 mg/dL  Time in Target Range: 53%   Time Above Range: 45%  Time Below Range: 2%  Interpretation of data:   Post-prandial hyperglycemia due to dietary choices, per patient. Current regimen:   Lantus 30 units daily  Novolog 4 units TID with meals and sliding scale 1 unit to lower by 50 mg/dL over 150 mg/dL with max 4 units only after dinner. Has osteoporosis: History of ankle fractures in 2015/2016. Was previously told she is unable to take Prolia due to teeth/jaws. Took an injection many years ago but does not remember the name. Last DXA scan 5-6 years ago. Dietary calcium 2 servings daily and 500 mg of calcium supplementation. Not taking vitamin D. R ankle pain from the parking lot, patient not interested in getting evaluated at urgent care at this point. Previously stated that primary care was following but now is requesting that endocrinology please follow for osteoporosis management.     Patient Active Problem List   Diagnosis    Coronary artery disease involving native coronary artery of native heart without angina pectoris    Emphysema, unspecified ([address])    Ischemic cardiomyopathy    Hypertension    Ulcerative colitis ([address])    Pure hypercholesterolemia    Pulmonary hypertension ([address])    Osteoporosis    Type 2 diabetes mellitus with hyperglycemia, with long-term current use of insulin (HCC)    Elevated troponin    Gastroesophageal reflux disease with esophagitis    Platelets decreased ([address])    History of PTCA with stents      Past Medical History: Diagnosis Date    Colitis     Coronary artery disease     Diabetes mellitus ([address] [facility])     Esophageal ulcer     Kidney stone     Neuropathy       Past Surgical History:   Procedure Laterality Date    ANKLE ARTHROPLASTY Bilateral     bilat hardware post fx surgeries    COLONOSCOPY      CORONARY ANGIOPLASTY WITH STENT PLACEMENT  2019    x2 procedures total 3 stents    LITHOTRIPSY      OVARIAN CYST SURGERY Right       Family History   Problem Relation Age of Onset    Stroke Mother     Heart disease Father      Social History     Tobacco Use    Smoking status: Former     Years: 34.00     Types: Cigarettes     Quit date:      Years since quittin.9    Smokeless tobacco: Never   Substance Use Topics    Alcohol use: Not Currently     Allergies   Allergen Reactions    Other Rash     Latex tape         Current Outpatient Medications:     aspirin (ECOTRIN LOW STRENGTH) 81 mg EC tablet, Take 81 mg by mouth daily, Disp: , Rfl:     atorvastatin (LIPITOR) 40 mg tablet, Take 1 tablet by mouth once daily, Disp: 90 tablet, Rfl: 1    budesonide (ENTOCORT EC) 3 MG capsule, Take 2 capsules by mouth once daily, Disp: 60 capsule, Rfl: 11    carvedilol (COREG) 25 mg tablet, Take 25 mg by mouth 2 (two) times a day, Disp: , Rfl:     cloNIDine (CATAPRES) 0.1 mg tablet, Take 2 tablets (0.2 mg total) by mouth 2 (two) times a day, Disp: 120 tablet, Rfl: 3    clopidogrel (PLAVIX) 75 mg tablet, Take 75 mg by mouth daily Hold for 5 days-LD to be 6/15, Disp: , Rfl:     Continuous Blood Gluc  (FreeStyle [place] 2 [place]) CORNEL, Use 1 Device every morning, Disp: 1 each, Rfl: 0    glucose blood (CVS Glucose Meter Test Strips) test strip, Use 1 each 2 (two) times a day Use as instructed DX E11.65, Disp: 200 each, Rfl: 2    Insulin Glargine Solostar (Lantus SoloStar) 100 UNIT/ML SOPN, Inject 30 units at bedtime, Disp: 15 mL, Rfl: 3    insulin lispro (HumaLOG KwikPen) 100 units/mL injection pen, Inject 4 Units under the skin 3 (three) times a day with meals Plus sliding scale, Disp: 15 mL, Rfl: 0    Insulin Pen Needle (BD Pen Needle Bridgette U/F) 32G X 4 MM MISC, Use 4 times per day as directed., Disp: 130 each, Rfl: 2    isosorbide dinitrate (ISORDIL) 30 mg tablet, Take 1 tablet (30 mg total) by mouth 2 (two) times a day, Disp: 180 tablet, Rfl: 2    ondansetron (ZOFRAN-ODT) 4 mg disintegrating tablet, Take 1 tablet (4 mg total) by mouth every 6 (six) hours as needed for nausea or vomiting for up to 3 days, Disp: 12 tablet, Rfl: 0    pantoprazole (PROTONIX) 40 mg tablet, Take 1 tablet by mouth twice daily, Disp: 60 tablet, Rfl: 2    pregabalin (LYRICA) 100 mg capsule, Take 1 capsule (100 mg total) by mouth 2 (two) times a day, Disp: 60 capsule, Rfl: 5    tolterodine (DETROL LA) 4 mg 24 hr capsule, Take 1 capsule (4 mg total) by mouth daily, Disp: 90 capsule, Rfl: 1    valsartan (DIOVAN) 320 MG tablet, Take 1 tablet by mouth once daily, Disp: 90 tablet, Rfl: 0    sucralfate (CARAFATE) 1 g tablet, Take 1 tablet (1 g total) by mouth 2 (two) times a day before meals (Patient not taking: Reported on 7/24/2023), Disp: 120 tablet, Rfl: 0    Review of Systems  See HPI. All other systems reviewed and are negative. Physical Exam:  Body mass index is 25.78 kg/m². /78   Pulse 61   Wt 59.9 kg (132 lb)   SpO2 97%   BMI 25.78 kg/m²    Wt Readings from Last 3 Encounters:   11/24/23 59.9 kg (132 lb)   10/30/23 57.2 kg (126 lb)   10/13/23 57.2 kg (126 lb)          Physical Exam  Vitals reviewed. Constitutional:       Appearance: Nontoxic appearing. Cardiovascular:      Rate and Rhythm: Normal rate and regular rhythm. Pulses: Normal pulses. Heart sounds: Normal heart sounds. Pulmonary:      Effort: Pulmonary effort is normal.      Breath sounds: Normal breath sounds. Skin:     General: Skin is warm and dry. Capillary Refill: Capillary refill takes less than 2 seconds. Neurological:      General: No focal deficit present.       Mental Status: She is alert and oriented to person, place, and time. Psychiatric:         Mood and Affect: Mood normal.         Behavior: Behavior normal.       Labs:   Lab Results   Component Value Date    HGBA1C 8.6 (H) 11/13/2023    HGBA1C 7.3 (A) 10/13/2023    HGBA1C 7.7 (H) 05/26/2023     Lab Results   Component Value Date    CREATININE 1.55 (H) 11/13/2023    CREATININE 1.64 (H) 09/24/2023    CREATININE 1.64 (H) 05/26/2023    BUN 31 (H) 11/13/2023    K 4.5 11/13/2023     11/13/2023    CO2 28 11/13/2023     eGFR   Date Value Ref Range Status   11/13/2023 33 ml/min/1.73sq m Final     Lab Results   Component Value Date    HDL 67 05/26/2023    TRIG 112 05/26/2023     Lab Results   Component Value Date    ALT 40 11/13/2023    AST 26 11/13/2023    ALKPHOS 97 11/13/2023     No results found for: "YDL5QKNTBSRC"  No results found for: "FREET4", "TSI"    Discussed with the patient and all questioned fully answered. She will call me if any problems arise. Follow-up appointment in 3 months. Counseled patient on diagnostic results, prognosis, risk and benefit of treatment options, instruction for management, importance of treatment compliance, Risk  factor reduction and impressions    Patient Instructions   Lantus 30 units daily  Novolog 4 units TID with meals and sliding scale 1 unit to lower by 50 mg/dL over 150 mg/dL with all meals    Start vitamin D 1000 IU daily can be over the counter  Continue taking calcium 500 mg daily and at least 2 servings of dairy    Please schedule DEXA scan so we can discuss treatment options, as needed. Please go to the URGENT/ER for foot pain. Lab work when able.    Kolby Tirado

## 2023-11-24 NOTE — ASSESSMENT & PLAN NOTE
Will check DEXA scan as she has previous history of ankle fractures and was previously on treatment for osteoporosis  but she does not remember the name. Would recommend DEXA scan as the last was about 5-6 years ago. She is taking sufficient calcium (dietary and supplemental) approximately 5603-3674 mg daily. Not currently on vitamin D, recommend starting 1000 IU daily until vitamin D level results. R lateral foot/heel pain- felt crack in parking lot, advised to be seen at urgent care for imaging. Or reach out to PCP. She was amenable to reaching out if continues.

## 2023-11-24 NOTE — ASSESSMENT & PLAN NOTE
BP slightly higher up on arrival as patient had very stressful transport. Patient blood sugar has decreased to 140/78 after visit.

## 2023-11-24 NOTE — ASSESSMENT & PLAN NOTE
Lab Results   Component Value Date    HGBA1C 8.6 (H) 11/13/2023     HGA1C has increased patient states that she has been making more dietary indiscretions over the last 3 months. Patient would like to see diabetes education for MNT. Patient will continue to use CGM for glucose monitoring. Patient will call for blood sugars less than 70 or patterns greater than 250 mg/dL. Treatment regimen:   Lantus 30 units daily  Novolog 4 units TID with meals and sliding scale 1 unit to lower by 50 mg/dL over 150 mg/dL with all meals    Discussed risks/complications associated with uncontrolled diabetes including organ involvement, heart attack, stroke, death. Recommended referral to diabetes education. Referral for MNT provided    Advised lifestyle modifications including attention to diet including the amount and types of carbohydrates consumed and regular activity. Call for blood sugars less than 70 mg/dl or patterns over 250 mg/dl.   ]  Discussed symptoms and treatment of hypoglycemia. Reviewed risks associated with hypoglycemia. Always carry rapid acting carbohydrates and a glucometer (a way to check your blood sugar). Recommendation for medical identification either bracelet, necklace. Routine follow up for diabetic eye and foot exams. Ordered blood work to complete prior to next visit. Send glucose logs/CGM download in 1-2 weeks for review    Follow up in 3 months.

## 2023-11-24 NOTE — PATIENT INSTRUCTIONS
Lantus 30 units daily  Novolog 4 units TID with meals and sliding scale 1 unit to lower by 50 mg/dL over 150 mg/dL with all meals    Start vitamin D 1000 IU daily can be over the counter  Continue taking calcium 500 mg daily and at least 2 servings of dairy    Please schedule DEXA scan so we can discuss treatment options, as needed. Please go to the [facility] for foot pain. Lab work when able.

## 2023-11-30 DIAGNOSIS — E11.40 TYPE 2 DIABETES MELLITUS WITH CHRONIC PAINFUL DIABETIC NEUROPATHY (HCC): ICD-10-CM

## 2023-11-30 RX ORDER — INSULIN LISPRO 100 [IU]/ML
INJECTION, SOLUTION INTRAVENOUS; SUBCUTANEOUS
Qty: 15 ML | Refills: 0 | Status: SHIPPED | OUTPATIENT
Start: 2023-11-30

## 2023-12-30 DIAGNOSIS — K20.90 ESOPHAGITIS: ICD-10-CM

## 2024-01-01 ENCOUNTER — HOSPITAL ENCOUNTER (EMERGENCY)
Facility: HOSPITAL | Age: 74
End: 2024-06-07
Attending: EMERGENCY MEDICINE
Payer: COMMERCIAL

## 2024-01-01 ENCOUNTER — LAB (OUTPATIENT)
Dept: LAB | Facility: CLINIC | Age: 74
End: 2024-01-01
Payer: COMMERCIAL

## 2024-01-01 ENCOUNTER — PATIENT MESSAGE (OUTPATIENT)
Dept: NEPHROLOGY | Facility: CLINIC | Age: 74
End: 2024-01-01

## 2024-01-01 ENCOUNTER — OFFICE VISIT (OUTPATIENT)
Dept: FAMILY MEDICINE CLINIC | Facility: CLINIC | Age: 74
End: 2024-01-01
Payer: COMMERCIAL

## 2024-01-01 ENCOUNTER — APPOINTMENT (OUTPATIENT)
Dept: RADIOLOGY | Facility: CLINIC | Age: 74
End: 2024-01-01
Payer: COMMERCIAL

## 2024-01-01 VITALS
OXYGEN SATURATION: 97 % | HEART RATE: 66 BPM | BODY MASS INDEX: 26.7 KG/M2 | RESPIRATION RATE: 16 BRPM | SYSTOLIC BLOOD PRESSURE: 110 MMHG | TEMPERATURE: 98.2 F | HEIGHT: 60 IN | DIASTOLIC BLOOD PRESSURE: 72 MMHG | WEIGHT: 136 LBS

## 2024-01-01 DIAGNOSIS — D49.2 ABNORMAL SKIN GROWTH: ICD-10-CM

## 2024-01-01 DIAGNOSIS — E11.65 TYPE 2 DIABETES MELLITUS WITH HYPERGLYCEMIA, WITH LONG-TERM CURRENT USE OF INSULIN (HCC): ICD-10-CM

## 2024-01-01 DIAGNOSIS — S99.921S INJURY OF RIGHT FOOT, SEQUELA: ICD-10-CM

## 2024-01-01 DIAGNOSIS — Z79.4 TYPE 2 DIABETES MELLITUS WITH HYPERGLYCEMIA, WITH LONG-TERM CURRENT USE OF INSULIN (HCC): ICD-10-CM

## 2024-01-01 DIAGNOSIS — M54.2 NECK PAIN: ICD-10-CM

## 2024-01-01 DIAGNOSIS — I46.9 CARDIAC ARREST (HCC): Primary | ICD-10-CM

## 2024-01-01 DIAGNOSIS — L84 CALLUS: ICD-10-CM

## 2024-01-01 DIAGNOSIS — M54.2 NECK PAIN: Primary | ICD-10-CM

## 2024-01-01 LAB
ALBUMIN SERPL BCP-MCNC: 3.8 G/DL (ref 3.5–5)
ALP SERPL-CCNC: 72 U/L (ref 34–104)
ALT SERPL W P-5'-P-CCNC: 16 U/L (ref 7–52)
ANION GAP SERPL CALCULATED.3IONS-SCNC: 6 MMOL/L (ref 4–13)
AST SERPL W P-5'-P-CCNC: 16 U/L (ref 13–39)
BILIRUB SERPL-MCNC: 0.46 MG/DL (ref 0.2–1)
BUN SERPL-MCNC: 36 MG/DL (ref 5–25)
CALCIUM SERPL-MCNC: 9.4 MG/DL (ref 8.4–10.2)
CHLORIDE SERPL-SCNC: 101 MMOL/L (ref 96–108)
CO2 SERPL-SCNC: 31 MMOL/L (ref 21–32)
CREAT SERPL-MCNC: 1.73 MG/DL (ref 0.6–1.3)
GFR SERPL CREATININE-BSD FRML MDRD: 29 ML/MIN/1.73SQ M
GLUCOSE P FAST SERPL-MCNC: 86 MG/DL (ref 65–99)
POTASSIUM SERPL-SCNC: 4.2 MMOL/L (ref 3.5–5.3)
PROT SERPL-MCNC: 6.4 G/DL (ref 6.4–8.4)
SODIUM SERPL-SCNC: 138 MMOL/L (ref 135–147)

## 2024-01-01 PROCEDURE — 96375 TX/PRO/DX INJ NEW DRUG ADDON: CPT

## 2024-01-01 PROCEDURE — 72050 X-RAY EXAM NECK SPINE 4/5VWS: CPT

## 2024-01-01 PROCEDURE — 96374 THER/PROPH/DIAG INJ IV PUSH: CPT

## 2024-01-01 PROCEDURE — 80053 COMPREHEN METABOLIC PANEL: CPT

## 2024-01-01 PROCEDURE — 99291 CRITICAL CARE FIRST HOUR: CPT | Performed by: EMERGENCY MEDICINE

## 2024-01-01 PROCEDURE — 73630 X-RAY EXAM OF FOOT: CPT

## 2024-01-01 PROCEDURE — G2211 COMPLEX E/M VISIT ADD ON: HCPCS | Performed by: FAMILY MEDICINE

## 2024-01-01 PROCEDURE — 99291 CRITICAL CARE FIRST HOUR: CPT

## 2024-01-01 PROCEDURE — 36415 COLL VENOUS BLD VENIPUNCTURE: CPT

## 2024-01-01 PROCEDURE — 99214 OFFICE O/P EST MOD 30 MIN: CPT | Performed by: FAMILY MEDICINE

## 2024-01-01 RX ORDER — EPINEPHRINE 0.1 MG/ML
INJECTION INTRAVENOUS CODE/TRAUMA/SEDATION MEDICATION
Status: COMPLETED | OUTPATIENT
Start: 2024-01-01 | End: 2024-01-01

## 2024-01-01 RX ORDER — SODIUM BICARBONATE 84 MG/ML
INJECTION, SOLUTION INTRAVENOUS CODE/TRAUMA/SEDATION MEDICATION
Status: COMPLETED | OUTPATIENT
Start: 2024-01-01 | End: 2024-01-01

## 2024-01-01 RX ADMIN — EPINEPHRINE 1 MG: 0.1 INJECTION INTRAVENOUS at 19:50

## 2024-01-01 RX ADMIN — SODIUM BICARBONATE 50 MEQ: 84 INJECTION, SOLUTION INTRAVENOUS at 19:52

## 2024-01-01 RX ADMIN — EPINEPHRINE 1 MG: 0.1 INJECTION INTRAVENOUS at 19:47

## 2024-01-01 RX ADMIN — EPINEPHRINE 1 MG: 0.1 INJECTION INTRAVENOUS at 19:55

## 2024-01-02 RX ORDER — PANTOPRAZOLE SODIUM 40 MG/1
40 TABLET, DELAYED RELEASE ORAL 2 TIMES DAILY
Qty: 60 TABLET | Refills: 0 | Status: SHIPPED | OUTPATIENT
Start: 2024-01-02

## 2024-01-04 DIAGNOSIS — E11.40 TYPE 2 DIABETES MELLITUS WITH CHRONIC PAINFUL DIABETIC NEUROPATHY (HCC): ICD-10-CM

## 2024-01-04 RX ORDER — VALSARTAN 320 MG/1
TABLET ORAL
Qty: 90 TABLET | Refills: 1 | Status: SHIPPED | OUTPATIENT
Start: 2024-01-04

## 2024-01-29 DIAGNOSIS — E11.65 TYPE 2 DIABETES MELLITUS WITH HYPERGLYCEMIA, WITH LONG-TERM CURRENT USE OF INSULIN (HCC): ICD-10-CM

## 2024-01-29 DIAGNOSIS — K20.90 ESOPHAGITIS: ICD-10-CM

## 2024-01-29 DIAGNOSIS — Z79.4 TYPE 2 DIABETES MELLITUS WITH HYPERGLYCEMIA, WITH LONG-TERM CURRENT USE OF INSULIN (HCC): ICD-10-CM

## 2024-01-29 RX ORDER — PANTOPRAZOLE SODIUM 40 MG/1
40 TABLET, DELAYED RELEASE ORAL 2 TIMES DAILY
Qty: 60 TABLET | Refills: 0 | Status: SHIPPED | OUTPATIENT
Start: 2024-01-29

## 2024-01-29 RX ORDER — PEN NEEDLE, DIABETIC 32GX 5/32"
NEEDLE, DISPOSABLE MISCELLANEOUS
Qty: 100 EACH | Refills: 0 | Status: SHIPPED | OUTPATIENT
Start: 2024-01-29

## 2024-02-12 ENCOUNTER — APPOINTMENT (OUTPATIENT)
Dept: LAB | Facility: CLINIC | Age: 74
End: 2024-02-12
Payer: COMMERCIAL

## 2024-02-12 DIAGNOSIS — M81.0 OSTEOPOROSIS, UNSPECIFIED OSTEOPOROSIS TYPE, UNSPECIFIED PATHOLOGICAL FRACTURE PRESENCE: ICD-10-CM

## 2024-02-12 DIAGNOSIS — E11.65 TYPE 2 DIABETES MELLITUS WITH HYPERGLYCEMIA, WITH LONG-TERM CURRENT USE OF INSULIN (HCC): ICD-10-CM

## 2024-02-12 DIAGNOSIS — Z79.4 TYPE 2 DIABETES MELLITUS WITH HYPERGLYCEMIA, WITH LONG-TERM CURRENT USE OF INSULIN (HCC): ICD-10-CM

## 2024-02-12 LAB
25(OH)D3 SERPL-MCNC: 51.4 NG/ML (ref 30–100)
ALBUMIN SERPL BCP-MCNC: 4.2 G/DL (ref 3.5–5)
ALP SERPL-CCNC: 97 U/L (ref 34–104)
ALT SERPL W P-5'-P-CCNC: 34 U/L (ref 7–52)
ANION GAP SERPL CALCULATED.3IONS-SCNC: 6 MMOL/L
AST SERPL W P-5'-P-CCNC: 21 U/L (ref 13–39)
BILIRUB SERPL-MCNC: 0.56 MG/DL (ref 0.2–1)
BUN SERPL-MCNC: 34 MG/DL (ref 5–25)
CA-I BLD-SCNC: 1.17 MMOL/L (ref 1.12–1.32)
CALCIUM SERPL-MCNC: 9.4 MG/DL (ref 8.4–10.2)
CHLORIDE SERPL-SCNC: 105 MMOL/L (ref 96–108)
CHOLEST SERPL-MCNC: 182 MG/DL
CO2 SERPL-SCNC: 31 MMOL/L (ref 21–32)
CREAT SERPL-MCNC: 1.73 MG/DL (ref 0.6–1.3)
GFR SERPL CREATININE-BSD FRML MDRD: 29 ML/MIN/1.73SQ M
GLUCOSE P FAST SERPL-MCNC: 88 MG/DL (ref 65–99)
HDLC SERPL-MCNC: 64 MG/DL
LDLC SERPL CALC-MCNC: 86 MG/DL (ref 0–100)
NONHDLC SERPL-MCNC: 118 MG/DL
POTASSIUM SERPL-SCNC: 4.3 MMOL/L (ref 3.5–5.3)
PROT SERPL-MCNC: 7 G/DL (ref 6.4–8.4)
PTH-INTACT SERPL-MCNC: 89 PG/ML (ref 12–88)
SODIUM SERPL-SCNC: 142 MMOL/L (ref 135–147)
TRIGL SERPL-MCNC: 159 MG/DL

## 2024-02-12 PROCEDURE — 82043 UR ALBUMIN QUANTITATIVE: CPT

## 2024-02-12 PROCEDURE — 82330 ASSAY OF CALCIUM: CPT

## 2024-02-12 PROCEDURE — 80053 COMPREHEN METABOLIC PANEL: CPT

## 2024-02-12 PROCEDURE — 36415 COLL VENOUS BLD VENIPUNCTURE: CPT

## 2024-02-12 PROCEDURE — 83970 ASSAY OF PARATHORMONE: CPT

## 2024-02-12 PROCEDURE — 80061 LIPID PANEL: CPT

## 2024-02-12 PROCEDURE — 82306 VITAMIN D 25 HYDROXY: CPT

## 2024-02-12 PROCEDURE — 82570 ASSAY OF URINE CREATININE: CPT

## 2024-02-12 PROCEDURE — 83036 HEMOGLOBIN GLYCOSYLATED A1C: CPT

## 2024-02-13 LAB
CREAT UR-MCNC: 71.3 MG/DL
EST. AVERAGE GLUCOSE BLD GHB EST-MCNC: 183 MG/DL
HBA1C MFR BLD: 8 %
MICROALBUMIN UR-MCNC: 285.4 MG/L
MICROALBUMIN/CREAT 24H UR: 400 MG/G CREATININE (ref 0–30)

## 2024-02-19 DIAGNOSIS — I25.10 CORONARY ARTERY DISEASE INVOLVING NATIVE CORONARY ARTERY OF NATIVE HEART WITHOUT ANGINA PECTORIS: ICD-10-CM

## 2024-02-19 RX ORDER — ISOSORBIDE DINITRATE 30 MG/1
30 TABLET ORAL 2 TIMES DAILY
Qty: 180 TABLET | Refills: 1 | Status: SHIPPED | OUTPATIENT
Start: 2024-02-19

## 2024-02-28 DIAGNOSIS — E11.40 TYPE 2 DIABETES MELLITUS WITH CHRONIC PAINFUL DIABETIC NEUROPATHY (HCC): ICD-10-CM

## 2024-02-28 RX ORDER — INSULIN LISPRO 100 [IU]/ML
INJECTION, SOLUTION INTRAVENOUS; SUBCUTANEOUS
Qty: 15 ML | Refills: 0 | Status: SHIPPED | OUTPATIENT
Start: 2024-02-28 | End: 2024-03-01 | Stop reason: SDUPTHER

## 2024-03-01 ENCOUNTER — OFFICE VISIT (OUTPATIENT)
Dept: ENDOCRINOLOGY | Facility: CLINIC | Age: 74
End: 2024-03-01
Payer: COMMERCIAL

## 2024-03-01 VITALS
BODY MASS INDEX: 26.03 KG/M2 | WEIGHT: 132.6 LBS | SYSTOLIC BLOOD PRESSURE: 110 MMHG | DIASTOLIC BLOOD PRESSURE: 76 MMHG | OXYGEN SATURATION: 98 % | HEART RATE: 70 BPM | HEIGHT: 60 IN

## 2024-03-01 DIAGNOSIS — N18.4 CKD (CHRONIC KIDNEY DISEASE) STAGE 4, GFR 15-29 ML/MIN (HCC): ICD-10-CM

## 2024-03-01 DIAGNOSIS — E11.40 TYPE 2 DIABETES MELLITUS WITH CHRONIC PAINFUL DIABETIC NEUROPATHY (HCC): ICD-10-CM

## 2024-03-01 DIAGNOSIS — Z79.4 TYPE 2 DIABETES MELLITUS WITH HYPERGLYCEMIA, WITH LONG-TERM CURRENT USE OF INSULIN (HCC): ICD-10-CM

## 2024-03-01 DIAGNOSIS — E11.65 TYPE 2 DIABETES MELLITUS WITH HYPERGLYCEMIA, WITH LONG-TERM CURRENT USE OF INSULIN (HCC): ICD-10-CM

## 2024-03-01 DIAGNOSIS — E78.00 PURE HYPERCHOLESTEROLEMIA: Primary | ICD-10-CM

## 2024-03-01 DIAGNOSIS — R80.9 POSITIVE FOR MACROALBUMINURIA: ICD-10-CM

## 2024-03-01 DIAGNOSIS — M81.0 OSTEOPOROSIS, UNSPECIFIED OSTEOPOROSIS TYPE, UNSPECIFIED PATHOLOGICAL FRACTURE PRESENCE: ICD-10-CM

## 2024-03-01 DIAGNOSIS — I10 PRIMARY HYPERTENSION: ICD-10-CM

## 2024-03-01 PROBLEM — N18.31 CHRONIC KIDNEY DISEASE, STAGE 3A (HCC): Status: ACTIVE | Noted: 2024-03-01

## 2024-03-01 PROCEDURE — 99214 OFFICE O/P EST MOD 30 MIN: CPT | Performed by: NURSE PRACTITIONER

## 2024-03-01 PROCEDURE — G2211 COMPLEX E/M VISIT ADD ON: HCPCS | Performed by: NURSE PRACTITIONER

## 2024-03-01 RX ORDER — INSULIN LISPRO 100 [IU]/ML
INJECTION, SOLUTION INTRAVENOUS; SUBCUTANEOUS
Qty: 15 ML | Refills: 2 | Status: SHIPPED | OUTPATIENT
Start: 2024-03-01

## 2024-03-01 RX ORDER — PEN NEEDLE, DIABETIC 32GX 5/32"
NEEDLE, DISPOSABLE MISCELLANEOUS
Qty: 400 EACH | Refills: 3 | Status: SHIPPED | OUTPATIENT
Start: 2024-03-01

## 2024-03-01 NOTE — ASSESSMENT & PLAN NOTE
Lab Results   Component Value Date    EGFR 29 02/12/2024    EGFR 33 11/13/2023    EGFR 31 09/24/2023    CREATININE 1.73 (H) 02/12/2024    CREATININE 1.55 (H) 11/13/2023    CREATININE 1.64 (H) 09/24/2023       Refer to nephrology, patient is amenable to referral.

## 2024-03-01 NOTE — ASSESSMENT & PLAN NOTE
Lab Results   Component Value Date    HGBA1C 8.0 (H) 02/12/2024     HGA1C above  goal but improving. Will stop sliding scale for now as she is experiencing hypoglycemia following correction. Recommend increasing mealtime insulin to 6 units at dinner time.     Discussed risks/complications associated with uncontrolled diabetes including organ involvement, heart attack, stroke, death.  Recommended referral to diabetes education.  Referral provided.    Advised lifestyle modifications including attention to diet including the amount and types of carbohydrates consumed and regular activity.     Call for blood sugars less than 70 mg/dl or patterns over 250  mg/dl.     Discussed symptoms and treatment of hypoglycemia.  Reviewed risks associated with hypoglycemia. Always carry rapid acting carbohydrates and a glucometer (a way to check your blood sugar).    Recommendation for medical identification either bracelet, necklace.    Routine follow up for diabetic eye and foot exams.     Ordered blood work to complete prior to next visit.    Send glucose logs/CGM download in 1-2 weeks for review    Follow up in 3 months.

## 2024-03-01 NOTE — PROGRESS NOTES
Established Patient Progress Note    Chief Complaint:  Diabetes follow up visit    Impression & Plan:    Problem List Items Addressed This Visit          Endocrine    Type 2 diabetes mellitus with hyperglycemia, with long-term current use of insulin ([facility])       Lab Results   Component Value Date    HGBA1C 8.0 (H) 02/12/2024     HGA1C above  goal but improving. Will stop sliding scale for now as she is experiencing hypoglycemia following correction. Recommend increasing mealtime insulin to 6 units at dinner time.     Discussed risks/complications associated with uncontrolled diabetes including organ involvement, heart attack, stroke, death.  Recommended referral to diabetes education.  Referral provided.    Advised lifestyle modifications including attention to diet including the amount and types of carbohydrates consumed and regular activity.     Call for blood sugars less than 70 mg/dl or patterns over 250  mg/dl.     Discussed symptoms and treatment of hypoglycemia.  Reviewed risks associated with hypoglycemia. Always carry rapid acting carbohydrates and a glucometer (a way to check your blood sugar).    Recommendation for medical identification either bracelet, necklace.    Routine follow up for diabetic eye and foot exams.     Ordered blood work to complete prior to next visit.    Send glucose logs/CGM download in 1-2 weeks for review    Follow up in 3 months.           Relevant Medications    Insulin Pen Needle (BD Pen Needle Bridgette 2nd Gen) 32G X 4 MM MISC    insulin lispro (HumaLOG) 100 units/mL injection pen    Other Relevant Orders    Ambulatory Referral to Diabetic Education    Hemoglobin A1C    Albumin / creatinine urine ratio    Hemoglobin A1C       Cardiovascular and Mediastinum    Hypertension     BP stable on current regimen.             Musculoskeletal and Integument    Osteoporosis     Due for DXA scan.   Continue calcium supplementation and vitamin D  Continue weight bearing exercise  Call for  any falls, fractures, kidney stones  Follow up in six months           Relevant Orders    DXA bone density spine hip and pelvis    PTH, intact       Genitourinary    CKD (chronic kidney disease) stage 4, GFR 15-29 ml/min ([facility])     Lab Results   Component Value Date    EGFR 29 02/12/2024    EGFR 33 11/13/2023    EGFR 31 09/24/2023    CREATININE 1.73 (H) 02/12/2024    CREATININE 1.55 (H) 11/13/2023    CREATININE 1.64 (H) 09/24/2023       Refer to nephrology, patient is amenable to referral.             Other    Pure hypercholesterolemia - Primary     Continues statin         Relevant Orders    Lipid panel    Positive for macroalbuminuria     Consult nephrology.          Relevant Orders    Ambulatory referral to Nephrology     Other Visit Diagnoses       Type 2 diabetes mellitus with chronic painful diabetic neuropathy ([facility])        Relevant Medications    insulin lispro (HumaLOG) 100 units/mL injection pen            History of Present Illness:   Clair Motta is a 73 y.o. female with a history of type 2 diabetes with long term use of insulin diagnosed around age 42. No history of heart attack or stroke. Denies symptomatic hyperglycemia.  Denies numbness/tingling in feet, last foot exam: November 2023, history of neuropathy: yes, no diabetic ulcerations.  Last eye exam: mid August 2023, history of retinopathy: none. Last appointment with diabetes education: due for follow up but had to cancel due to inconsistent transportation. Asked for another referral to diabetes education. Other significant past medical history includes hypertension on ARB, hyperlipidemia on a statin, CAD s/p stent replacement follows up Cardiology, and CKD .  Denies recent illness or hospitalizations. Denies recent severe hypoglycemic or severe hyperglycemic episodes. Denies any issues with her current regimen. Home glucose monitoring: are performed regularly with robby device.  Having some hypoglycemia s/p mealtime correction.      CGM  Interpretation  Clair Motta   Device used Dexcom for Personal Use  Indication: Type of Diabetes: Type 2 Diabetes  More than 72 hours of data was reviewed. Report to be scanned to chart.   Date Range: Last 7 days  Analysis of data:   Average Glucose: 196 mg/dl  Time in Target Range: 48%  Time Above Range: 50%  Time Below Range: 2%   Interpretation of data:   Highest glucose spikes after dinner.      Current regimen:   Lantus 30 units daily  Humalog 4 units TID with meals and sliding scale 1 unit to lower by 50 mg/dL over 150 mg/dL with max 4 units only after dinner.      Has osteoporosis: History of ankle fractures in 2015/2016. Was previously told she is unable to take Prolia due to teeth/jaws.  Took an injection many years ago but does not remember the name.  Last DXA scan 5-6 years ago.  Dietary calcium 2 servings daily and 500 mg of calcium supplementation. Taking vitamin d supplementation, unsure of the dose.   Patient Active Problem List   Diagnosis    Coronary artery disease involving native coronary artery of native heart without angina pectoris    Emphysema, unspecified ([facility])    Ischemic cardiomyopathy    Hypertension    Ulcerative colitis ([facility])    Pure hypercholesterolemia    Pulmonary hypertension ([facility])    Osteoporosis    Type 2 diabetes mellitus with hyperglycemia, with long-term current use of insulin ([facility])    Elevated troponin    Gastroesophageal reflux disease with esophagitis    Platelets decreased ([facility])    History of PTCA with stents    Positive for macroalbuminuria    CKD (chronic kidney disease) stage 4, GFR 15-29 ml/min ([facility])      Past Medical History:   Diagnosis Date    Colitis     Coronary artery disease     Diabetes mellitus (HCC)     Esophageal ulcer     Kidney stone     Neuropathy       Past Surgical History:   Procedure Laterality Date    ANKLE ARTHROPLASTY Bilateral     bilat hardware post fx surgeries    COLONOSCOPY      CORONARY ANGIOPLASTY WITH STENT PLACEMENT  2019    x2 procedures  total 3 stents    LITHOTRIPSY      OVARIAN CYST SURGERY Right       Family History   Problem Relation Age of Onset    Stroke Mother     Heart disease Father      Social History     Tobacco Use    Smoking status: Former     Current packs/day: 0.00     Types: Cigarettes     Start date:      Quit date:      Years since quittin.1    Smokeless tobacco: Never   Substance Use Topics    Alcohol use: Not Currently     Allergies   Allergen Reactions    Other Rash     Latex tape         Current Outpatient Medications:     aspirin (ECOTRIN LOW STRENGTH) 81 mg EC tablet, Take 81 mg by mouth daily, Disp: , Rfl:     atorvastatin (LIPITOR) 40 mg tablet, Take 1 tablet by mouth once daily, Disp: 90 tablet, Rfl: 1    budesonide (ENTOCORT EC) 3 MG capsule, Take 2 capsules by mouth once daily, Disp: 60 capsule, Rfl: 11    carvedilol (COREG) 25 mg tablet, Take 25 mg by mouth 2 (two) times a day, Disp: , Rfl:     cloNIDine (CATAPRES) 0.1 mg tablet, Take 2 tablets (0.2 mg total) by mouth 2 (two) times a day, Disp: 120 tablet, Rfl: 3    clopidogrel (PLAVIX) 75 mg tablet, Take 75 mg by mouth daily Hold for 5 days-LD to be 6/15, Disp: , Rfl:     Continuous Blood Gluc  (FreeStyle [place] 2 [place]) CORNEL, Use 1 Device every morning, Disp: 1 each, Rfl: 0    glucose blood ([employer] Glucose Meter Test Strips) test strip, Use 1 each 2 (two) times a day Use as instructed DX E11.65, Disp: 200 each, Rfl: 2    Insulin Glargine Solostar (Lantus SoloStar) 100 UNIT/ML SOPN, Inject 30 units at bedtime, Disp: 15 mL, Rfl: 3    insulin lispro (HumaLOG) 100 units/mL injection pen, Inject 4 units with breakfast and lunch. Inject 6 units with dinner. All meals with sliding scale., Disp: 15 mL, Rfl: 2    Insulin Pen Needle (BD Pen Needle Bridgette 2nd Gen) 32G X 4 MM MISC, Inject under the skin 4 (four) times daily (after meals and at bedtime), Disp: 400 each, Rfl: 3    isosorbide dinitrate (ISORDIL) 30 mg tablet, Take 1 tablet by mouth twice daily, Disp:  180 tablet, Rfl: 1    pantoprazole (PROTONIX) 40 mg tablet, Take 1 tablet by mouth twice daily, Disp: 60 tablet, Rfl: 0    pregabalin (LYRICA) 100 mg capsule, Take 1 capsule (100 mg total) by mouth 2 (two) times a day, Disp: 60 capsule, Rfl: 5    ondansetron (ZOFRAN-ODT) 4 mg disintegrating tablet, Take 1 tablet (4 mg total) by mouth every 6 (six) hours as needed for nausea or vomiting for up to 3 days, Disp: 12 tablet, Rfl: 0    sucralfate (CARAFATE) 1 g tablet, Take 1 tablet (1 g total) by mouth 2 (two) times a day before meals (Patient not taking: Reported on 7/24/2023), Disp: 120 tablet, Rfl: 0    tolterodine (DETROL LA) 4 mg 24 hr capsule, Take 1 capsule (4 mg total) by mouth daily, Disp: 90 capsule, Rfl: 1    valsartan (DIOVAN) 320 MG tablet, Take 1 tablet by mouth once daily, Disp: 90 tablet, Rfl: 1    Review of Systems  Constitutional: Negative for activity change, appetite change, fatigue and unexpected weight change.   HENT: Negative for ear pain, sore throat, trouble swallowing and voice change.    Eyes: Negative for visual disturbance.   Respiratory: Negative for cough and shortness of breath.    Cardiovascular: Negative for chest pain and palpitations.   Gastrointestinal: Negative for abdominal distention, abdominal pain, constipation, diarrhea and vomiting.   Endocrine: Negative for cold intolerance, heat intolerance, polydipsia and polyuria.   Genitourinary: Negative for dysuria and hematuria.   Musculoskeletal: Negative for arthralgias and back pain.   Skin: Negative for color change and rash.   Neurological: Negative for weakness or tremors.   All other systems reviewed and are negative.      Physical Exam:  Body mass index is 25.9 kg/m².  /76 (BP Location: Left arm, Patient Position: Sitting, Cuff Size: Large)   Pulse 70   Ht 5' (1.524 m)   Wt 60.1 kg (132 lb 9.6 oz)   SpO2 98%   BMI 25.90 kg/m²    Wt Readings from Last 3 Encounters:   03/01/24 60.1 kg (132 lb 9.6 oz)   11/24/23 59.9 kg  "(132 lb)   10/30/23 57.2 kg (126 lb)     Physical Exam  Vitals reviewed.   Constitutional:       Appearance: Normal appearance.   Cardiovascular:      Rate and Rhythm: Normal rate and regular rhythm.      Pulses: Normal pulses.      Heart sounds: Normal heart sounds.   Pulmonary:      Effort: Pulmonary effort is normal.      Breath sounds: Normal breath sounds.   Skin:     General: Skin is warm and dry.      Capillary Refill: Capillary refill takes less than 2 seconds.   Neurological:      General: No focal deficit present.      Mental Status: She is alert and oriented to person, place, and time.   Psychiatric:         Mood and Affect: Mood normal.         Behavior: Behavior normal.     Labs:   Lab Results   Component Value Date    HGBA1C 8.0 (H) 02/12/2024    HGBA1C 8.6 (H) 11/13/2023    HGBA1C 7.3 (A) 10/13/2023     Lab Results   Component Value Date    CREATININE 1.73 (H) 02/12/2024    CREATININE 1.55 (H) 11/13/2023    CREATININE 1.64 (H) 09/24/2023    BUN 34 (H) 02/12/2024    K 4.3 02/12/2024     02/12/2024    CO2 31 02/12/2024     eGFR   Date Value Ref Range Status   02/12/2024 29 ml/min/1.73sq m Final     Lab Results   Component Value Date    HDL 64 02/12/2024    TRIG 159 (H) 02/12/2024     Lab Results   Component Value Date    ALT 34 02/12/2024    AST 21 02/12/2024    ALKPHOS 97 02/12/2024     No results found for: \"[id number]\"  No results found for: \"FREET4\", \"TSI\"    Discussed with the patient and all questioned fully answered. She will call me if any problems arise.    Follow-up appointment in 3 months.     Counseled patient on diagnostic results, prognosis, risk and benefit of treatment options, instruction for management, importance of treatment compliance, Risk  factor reduction and impressions    CYNDIE Sheets    "

## 2024-03-01 NOTE — PATIENT INSTRUCTIONS
Lantus 30 units daily  Humalog 4 units TID with meals (dinner take 6 units to start with larger meals). With no high correction.     Call for blood sugars less than 70 or patterns over 250 mg/dL.     Call in with blood sugars in 1-2 weeks.     Hypoglycemia in a Person with Diabetes   WHAT YOU NEED TO KNOW:   Hypoglycemia is a serious condition that happens when your blood glucose (sugar) level drops too low. The blood sugar level is usually too high in a person with diabetes, but the level can also drop too low. It is important to follow your diabetes management plan to keep your blood sugar level steady.  DISCHARGE INSTRUCTIONS:   Have someone call your local emergency number (911 in the [place]) if:   You have a seizure or pass out.    Your blood sugar is less than 50 mg/dL and does not respond to treatment.    You feel you are going to pass out.    You have trouble thinking clearly.    Call your doctor or diabetes care team provider if:   You have had symptoms of low blood sugar several times.    You have questions about the amount of insulin or diabetes medicine you are taking.    You have questions or concerns about your condition or care.    Medicines:   Insulin or diabetes medicine  help to keep your blood sugar under control.    Glucagon  may be needed if you have severe hypoglycemia.    Take your medicine as directed.  Contact your healthcare provider if you think your medicine is not helping or if you have side effects. Tell your provider if you are allergic to any medicine. Keep a list of the medicines, vitamins, and herbs you take. Include the amounts, and when and why you take them. Bring the list or the pill bottles to follow-up visits. Carry your medicine list with you in case of an emergency.    Manage hypoglycemia:   Check your blood sugar level right away if you have symptoms of hypoglycemia.  Hypoglycemia usually happens when your blood sugar level is 70 mg/dL or below. Ask your diabetes care team  provider what blood sugar level is too low for you.    If your blood sugar level is too low, eat or drink 15 grams of fast-acting carbohydrate.  Examples of this amount of fast-acting carbohydrate are 4 ounces (½ cup) of fruit juice or 4 ounces of regular soda. Other examples are 2 tablespoons of raisins or 1 tube of glucose gel.     Check your blood sugar level 15 minutes later. Sit still as you wait. If the level is still low (less than 100 mg/dL), have another 15 grams of carbohydrate. When the level returns to 100 mg/dL, eat a meal if it is time. If your meal time is more than 1 hour away, eat a snack. The snack should contain carbohydrates, such as the following:    3/4 cup of cereal    1 cup of skim or low fat milk    6 soda crackers    1/2 of a turkey sandwich    15 fat-free chips  This will help prevent another drop in blood sugar. Always carefully follow your provider's instructions on how to treat low blood sugar levels.  Always carry a source of fast-acting carbohydrate.  If you have symptoms of hypoglycemia and you do not have a blood glucose meter, have a source of fast-acting carbohydrate anyway. Avoid carbohydrate foods that are high in fat. The fat content may make the carbohydrate take longer to increase your blood sugar level. Ask your provider if you should carry a glucagon kit. Glucagon is a medicine that is injected when you develop severe hypoglycemia and become unconscious. Check the expiration date every month and replace it before it expires.    Teach others how to help you if you have symptoms of hypoglycemia.  Tell them about the symptoms of hypoglycemia. Ask them to give you a source of fast-acting carbohydrate if you cannot get it yourself. Ask them to give you a glucagon injection if you have signs of hypoglycemia and you become unconscious or have a seizure. Ask them to call the local emergency number (911 in the [place]) . This is an emergency. Tell them never to try to make you swallow  anything if you faint or have a seizure.    Wear medical alert jewelry  or carry a card that says you have diabetes. Ask where to get these items.       Prevent hypoglycemia:   Take diabetes medicine as directed.  Take your medicine at the right time and in the right amount. Do not  double the amount of medicine you take unless instructed by your diabetes care team provider. You may need oral diabetes medicine, insulin, or both to help control your blood sugar levels. Your healthcare provider will teach you how and when to take oral diabetes medicine. You will also be taught about side effects oral diabetes medicine can cause. Insulin may be added if oral diabetes medicine becomes less effective over time. Insulin may be injected, or given through a pump or pen. You and your care team will discuss which method is best for you.    An insulin pump  is an implanted device that gives your insulin 24 hours a day. An insulin pump prevents the need for multiple insulin injections in a day.         An insulin pen  is a device prefilled with the right amount of insulin.       Eat meals and snacks as directed.  Talk to your dietitian or provider about a meal plan that is right for you. Do not skip meals.         Check your blood sugar level as directed.  Ask your provider what your blood sugar levels should be before and after you eat. Ask when and how often to check your blood sugar level. You may need to check a drop of blood in a glucose test machine. You may need to check at least 3 times each day. Record your blood sugar level results and take the record with you when you see your care team. They may use it to make changes to your medicine, food, or exercise schedules. Your care team provider may recommend a continuous glucose monitor (CGM). A CGM is a device that is worn at all times. The CGM checks your blood sugar every 5 minutes. It sends results to an electronic device such as a smart phone.            Check your  blood sugar level before you exercise.  Physical activity, such as exercise, can decrease your blood sugar level. If your blood sugar level is less than 100 mg/dL, have a carbohydrate snack. Examples are 4 to 6 crackers, ½ banana, 8 ounces (1 cup) of nonfat or 1% milk, or 4 ounces (½ cup) of juice. If you will be active for more than 1 hour, you may need to check your blood sugar level every 30 minutes. Your provider may also recommend that you check your blood sugar level after your activity.    Know the risks if you choose to drink alcohol.  Alcohol can cause your blood sugar levels to be low if you use insulin. Alcohol can cause high blood sugar levels and weight gain if you drink too much. Women 21 years or older and men 65 years or older should limit alcohol to 1 drink a day. Men aged 21 to 64 years should limit alcohol to 2 drinks a day. A drink of alcohol is 12 ounces of beer, 5 ounces of wine, or 1½ ounces of liquor.    Follow up with your doctor or diabetes care team provider as directed:  You may need your insulin or diabetes medicine changed if you continue to have hypoglycemia episodes. Write down your questions so you remember to ask them during your visits.  © Copyright [employer] 2023 Information is for End User's use only and may not be sold, redistributed or otherwise used for commercial purposes.  The above information is an  only. It is not intended as medical advice for individual conditions or treatments. Talk to your doctor, nurse or pharmacist before following any medical regimen to see if it is safe and effective for you.

## 2024-03-01 NOTE — ASSESSMENT & PLAN NOTE
Due for DXA scan.   Continue calcium supplementation and vitamin D  Continue weight bearing exercise  Call for any falls, fractures, kidney stones  Follow up in six months

## 2024-03-04 ENCOUNTER — TELEPHONE (OUTPATIENT)
Dept: NEPHROLOGY | Facility: CLINIC | Age: 74
End: 2024-03-04

## 2024-04-01 DIAGNOSIS — K20.90 ESOPHAGITIS: ICD-10-CM

## 2024-04-01 RX ORDER — PANTOPRAZOLE SODIUM 40 MG/1
40 TABLET, DELAYED RELEASE ORAL 2 TIMES DAILY
Qty: 60 TABLET | Refills: 5 | Status: SHIPPED | OUTPATIENT
Start: 2024-04-01

## 2024-04-10 DIAGNOSIS — E78.00 PURE HYPERCHOLESTEROLEMIA: ICD-10-CM

## 2024-04-10 RX ORDER — ATORVASTATIN CALCIUM 40 MG/1
40 TABLET, FILM COATED ORAL DAILY
Qty: 90 TABLET | Refills: 1 | Status: SHIPPED | OUTPATIENT
Start: 2024-04-10

## 2024-04-23 ENCOUNTER — HOSPITAL ENCOUNTER (OUTPATIENT)
Dept: RADIOLOGY | Facility: HOSPITAL | Age: 74
Discharge: HOME | End: 2024-04-23
Payer: COMMERCIAL

## 2024-04-23 DIAGNOSIS — M81.0 OSTEOPOROSIS, UNSPECIFIED OSTEOPOROSIS TYPE, UNSPECIFIED PATHOLOGICAL FRACTURE PRESENCE: ICD-10-CM

## 2024-04-23 PROCEDURE — 77080 DXA BONE DENSITY AXIAL: CPT

## 2024-04-24 ENCOUNTER — TELEPHONE (OUTPATIENT)
Age: 74
End: 2024-04-24

## 2024-04-24 DIAGNOSIS — E11.40 TYPE 2 DIABETES MELLITUS WITH CHRONIC PAINFUL DIABETIC NEUROPATHY (HCC): ICD-10-CM

## 2024-04-24 RX ORDER — INSULIN LISPRO 100 [IU]/ML
INJECTION, SOLUTION INTRAVENOUS; SUBCUTANEOUS
Qty: 15 ML | Refills: 2 | Status: SHIPPED | OUTPATIENT
Start: 2024-04-24 | End: 2024-04-26 | Stop reason: SDUPTHER

## 2024-04-24 NOTE — TELEPHONE ENCOUNTER
Pt called in stating that she only has 2 Humalog pens left and the pharmacy told her that she can't get more until June 11. Can Dr. Chopra please put in a prescription for pt's Humalog pens as they won't last until June.     Also please contact pt and make aware of what is the best option right right now if unable to fill prescription

## 2024-04-25 NOTE — TELEPHONE ENCOUNTER
Patient called in said that she needs the medication sent to the [facility] and it needs to say 7 units at dinner plus scale. Please call patient when done and asks if this can be done today because she is at work at [employer] and does not want to have to drive back.

## 2024-04-25 NOTE — TELEPHONE ENCOUNTER
The pt called back again stating she is not sure why the medication was sent to the wrong pharmacy to begin with as she uses the [facility] [address] in [facility].  She is asking for this to be taken care of so she can go tomorrow to  her medication.

## 2024-04-26 ENCOUNTER — TELEPHONE (OUTPATIENT)
Age: 74
End: 2024-04-26

## 2024-04-26 DIAGNOSIS — E11.40 TYPE 2 DIABETES MELLITUS WITH CHRONIC PAINFUL DIABETIC NEUROPATHY (HCC): ICD-10-CM

## 2024-04-26 RX ORDER — INSULIN LISPRO 100 [IU]/ML
INJECTION, SOLUTION INTRAVENOUS; SUBCUTANEOUS
Qty: 15 ML | Refills: 2 | Status: SHIPPED | OUTPATIENT
Start: 2024-04-26 | End: 2024-04-29

## 2024-04-26 NOTE — TELEPHONE ENCOUNTER
Resent and removed the [facility] pharmacy from the preferred pharmacy designation in her chart.

## 2024-04-26 NOTE — TELEPHONE ENCOUNTER
Please confirm exactly how many units she is taking per day in the morning and evening including the sliding scale. Thanks!

## 2024-04-26 NOTE — TELEPHONE ENCOUNTER
Patient called very upset, she said that Michelle had increased her insulin to 7 at night plus sliding scale and 4 in morning. She only has 2 humalog pens left and [facility] said they can't give her the insulin May 21st, but she will be out before then. She said that the doseage was not switched on the script so they think she is using more then directed when the dose was changed but the script doesn't say that. Could someone please look at this for the patient.     Pharmacy is [facility] [place]

## 2024-04-29 ENCOUNTER — TELEPHONE (OUTPATIENT)
Dept: ENDOCRINOLOGY | Facility: CLINIC | Age: 74
End: 2024-04-29

## 2024-04-29 ENCOUNTER — TELEPHONE (OUTPATIENT)
Age: 74
End: 2024-04-29

## 2024-04-29 ENCOUNTER — TELEPHONE (OUTPATIENT)
Dept: NEPHROLOGY | Facility: CLINIC | Age: 74
End: 2024-04-29

## 2024-04-29 DIAGNOSIS — N32.81 OVERACTIVE BLADDER: ICD-10-CM

## 2024-04-29 DIAGNOSIS — E11.40 TYPE 2 DIABETES MELLITUS WITH CHRONIC PAINFUL DIABETIC NEUROPATHY (HCC): ICD-10-CM

## 2024-04-29 RX ORDER — INSULIN LISPRO 100 [IU]/ML
INJECTION, SOLUTION INTRAVENOUS; SUBCUTANEOUS
Qty: 15 ML | Refills: 2 | Status: SHIPPED | OUTPATIENT
Start: 2024-04-29 | End: 2024-04-30 | Stop reason: SDUPTHER

## 2024-04-29 NOTE — TELEPHONE ENCOUNTER
Patient has one Humalog left and used more due to infected tooth. Last visit was increased to 7+ on sliding scale. Patient has more lantus 3 to 1 box of Humalog. So just started one pen and one left. Was advised no refills until until 5-. Last seen Michelle Chopra. Still has infected tooth due to Cardiac auth to pull. Pharmacy of Choice [facility] in [place].

## 2024-04-29 NOTE — TELEPHONE ENCOUNTER
I have adjusted the prescription, using 4 units with breakfast, lunch, 7 units with dinner, with some extra with a sliding scale, 15 mL/5 pens should last 3 months

## 2024-04-29 NOTE — TELEPHONE ENCOUNTER
Pharmacy was calling to see what the total number of units a day will be for the patients insulin lispro? Can we call pharmacy with an update?

## 2024-04-29 NOTE — TELEPHONE ENCOUNTER
Called patient to move her appt. Up for review of Dexa. Preferred to schedule with Dr. Balderas although her schedule does not permit at this time. Please review for cancellation.

## 2024-04-29 NOTE — TELEPHONE ENCOUNTER
Pt called in stating that she missed a call. I relayed to pt that it was a call from Shavonne. I spoke to Rhona in the [place] office who stated she will inform Shavonne to call pt back. Pt is aware and confirmed

## 2024-04-30 DIAGNOSIS — I10 HYPERTENSION, UNSPECIFIED TYPE: ICD-10-CM

## 2024-04-30 DIAGNOSIS — E11.40 TYPE 2 DIABETES MELLITUS WITH CHRONIC PAINFUL DIABETIC NEUROPATHY (HCC): ICD-10-CM

## 2024-04-30 RX ORDER — INSULIN LISPRO 100 [IU]/ML
INJECTION, SOLUTION INTRAVENOUS; SUBCUTANEOUS
Qty: 15 ML | Refills: 2 | Status: SHIPPED | OUTPATIENT
Start: 2024-04-30

## 2024-04-30 RX ORDER — TOLTERODINE 4 MG/1
4 CAPSULE, EXTENDED RELEASE ORAL DAILY
Qty: 90 CAPSULE | Refills: 1 | Status: SHIPPED | OUTPATIENT
Start: 2024-04-30

## 2024-05-01 RX ORDER — CLONIDINE HYDROCHLORIDE 0.1 MG/1
0.2 TABLET ORAL 2 TIMES DAILY
Qty: 120 TABLET | Refills: 5 | Status: SHIPPED | OUTPATIENT
Start: 2024-05-01

## 2024-05-06 ENCOUNTER — OFFICE VISIT (OUTPATIENT)
Dept: FAMILY MEDICINE CLINIC | Facility: CLINIC | Age: 74
End: 2024-05-06
Payer: COMMERCIAL

## 2024-05-06 ENCOUNTER — TELEPHONE (OUTPATIENT)
Age: 74
End: 2024-05-06

## 2024-05-06 VITALS
HEART RATE: 63 BPM | DIASTOLIC BLOOD PRESSURE: 76 MMHG | HEIGHT: 60 IN | OXYGEN SATURATION: 96 % | BODY MASS INDEX: 27.21 KG/M2 | SYSTOLIC BLOOD PRESSURE: 140 MMHG | TEMPERATURE: 97.7 F | WEIGHT: 138.6 LBS | RESPIRATION RATE: 16 BRPM

## 2024-05-06 DIAGNOSIS — M25.572 BILATERAL ANKLE PAIN, UNSPECIFIED CHRONICITY: ICD-10-CM

## 2024-05-06 DIAGNOSIS — K20.90 ESOPHAGITIS: ICD-10-CM

## 2024-05-06 DIAGNOSIS — I27.20 PULMONARY HYPERTENSION (HCC): ICD-10-CM

## 2024-05-06 DIAGNOSIS — Z00.00 MEDICARE ANNUAL WELLNESS VISIT, SUBSEQUENT: Primary | ICD-10-CM

## 2024-05-06 DIAGNOSIS — M81.0 OSTEOPOROSIS, UNSPECIFIED OSTEOPOROSIS TYPE, UNSPECIFIED PATHOLOGICAL FRACTURE PRESENCE: ICD-10-CM

## 2024-05-06 DIAGNOSIS — E11.40 TYPE 2 DIABETES MELLITUS WITH CHRONIC PAINFUL DIABETIC NEUROPATHY (HCC): ICD-10-CM

## 2024-05-06 DIAGNOSIS — J43.9 PULMONARY EMPHYSEMA, UNSPECIFIED EMPHYSEMA TYPE (HCC): ICD-10-CM

## 2024-05-06 DIAGNOSIS — M25.571 BILATERAL ANKLE PAIN, UNSPECIFIED CHRONICITY: ICD-10-CM

## 2024-05-06 DIAGNOSIS — K51.919 ULCERATIVE COLITIS WITH COMPLICATION, UNSPECIFIED LOCATION (HCC): ICD-10-CM

## 2024-05-06 DIAGNOSIS — K04.7 INFECTED TOOTH: ICD-10-CM

## 2024-05-06 PROBLEM — D69.6 PLATELETS DECREASED (HCC): Status: RESOLVED | Noted: 2022-08-16 | Resolved: 2024-05-06

## 2024-05-06 PROCEDURE — G0439 PPPS, SUBSEQ VISIT: HCPCS | Performed by: FAMILY MEDICINE

## 2024-05-06 PROCEDURE — 99214 OFFICE O/P EST MOD 30 MIN: CPT | Performed by: FAMILY MEDICINE

## 2024-05-06 RX ORDER — AMOXICILLIN 500 MG/1
500 CAPSULE ORAL EVERY 12 HOURS SCHEDULED
Qty: 14 CAPSULE | Refills: 0 | Status: SHIPPED | OUTPATIENT
Start: 2024-05-06 | End: 2024-05-13

## 2024-05-06 RX ORDER — AMOXICILLIN 500 MG/1
CAPSULE ORAL
COMMUNITY
Start: 2024-03-08

## 2024-05-06 RX ORDER — PREGABALIN 100 MG/1
100 CAPSULE ORAL 2 TIMES DAILY
Qty: 60 CAPSULE | Refills: 5 | Status: SHIPPED | OUTPATIENT
Start: 2024-05-06

## 2024-05-06 RX ORDER — SUCRALFATE 1 G/1
1 TABLET ORAL
Qty: 120 TABLET | Refills: 0 | Status: SHIPPED | OUTPATIENT
Start: 2024-05-06 | End: 2024-07-05

## 2024-05-06 NOTE — PATIENT INSTRUCTIONS
Medicare Preventive Visit Patient Instructions  Thank you for completing your Welcome to Medicare Visit or Medicare Annual Wellness Visit today. Your next wellness visit will be due in one year (5/7/2025).  The screening/preventive services that you may require over the next 5-10 years are detailed below. Some tests may not apply to you based off risk factors and/or age. Screening tests ordered at today's visit but not completed yet may show as past due. Also, please note that scanned in results may not display below.  Preventive Screenings:  Service Recommendations Previous Testing/Comments   Colorectal Cancer Screening  * Colonoscopy    * Fecal Occult Blood Test (FOBT)/Fecal Immunochemical Test (FIT)  * Fecal DNA/Cologuard Test  * Flexible Sigmoidoscopy Age: 45-75 years old   Colonoscopy: every 10 years (may be performed more frequently if at higher risk)  OR  FOBT/FIT: every 1 year  OR  Cologuard: every 3 years  OR  Sigmoidoscopy: every 5 years  Screening may be recommended earlier than age 45 if at higher risk for colorectal cancer. Also, an individualized decision between you and your healthcare provider will decide whether screening between the ages of 76-85 would be appropriate. Colonoscopy: 10/25/2022  FOBT/FIT: Not on file  Cologuard: Not on file  Sigmoidoscopy: Not on file    Screening Current     Breast Cancer Screening Age: 40+ years old  Frequency: every 1-2 years  Not required if history of left and right mastectomy Mammogram: 10/30/2023    Screening Current   Cervical Cancer Screening Between the ages of 21-29, pap smear recommended once every 3 years.   Between the ages of 30-65, can perform pap smear with HPV co-testing every 5 years.   Recommendations may differ for women with a history of total hysterectomy, cervical cancer, or abnormal pap smears in past. Pap Smear: Not on file    Screening Not Indicated   Hepatitis C Screening Once for adults born between 1945 and 1965  More frequently in  patients at high risk for Hepatitis C Hep C Antibody: 08/04/2022    Screening Current   Diabetes Screening 1-2 times per year if you're at risk for diabetes or have pre-diabetes Fasting glucose: 88 mg/dL (2/12/2024)  A1C: 8.0 % (2/12/2024)  Screening Not Indicated  History Diabetes   Cholesterol Screening Once every 5 years if you don't have a lipid disorder. May order more often based on risk factors. Lipid panel: 02/12/2024    Screening Not Indicated  History Lipid Disorder     Other Preventive Screenings Covered by Medicare:  Abdominal Aortic Aneurysm (AAA) Screening: covered once if your at risk. You're considered to be at risk if you have a family history of AAA.  Lung Cancer Screening: covers low dose CT scan once per year if you meet all of the following conditions: (1) Age 55-77; (2) No signs or symptoms of lung cancer; (3) Current smoker or have quit smoking within the last 15 years; (4) You have a tobacco smoking history of at least 20 pack years (packs per day multiplied by number of years you smoked); (5) You get a written order from a healthcare provider.  Glaucoma Screening: covered annually if you're considered high risk: (1) You have diabetes OR (2) Family history of glaucoma OR (3)  aged 50 and older OR (4)  American aged 65 and older  Osteoporosis Screening: covered every 2 years if you meet one of the following conditions: (1) You're estrogen deficient and at risk for osteoporosis based off medical history and other findings; (2) Have a vertebral abnormality; (3) On glucocorticoid therapy for more than 3 months; (4) Have primary hyperparathyroidism; (5) On osteoporosis medications and need to assess response to drug therapy.   Last bone density test (DXA Scan): 04/23/2024.  HIV Screening: covered annually if you're between the age of 15-65. Also covered annually if you are younger than 15 and older than 65 with risk factors for HIV infection. For pregnant patients, it is  covered up to 3 times per pregnancy.    Immunizations:  Immunization Recommendations   Influenza Vaccine Annual influenza vaccination during flu season is recommended for all persons aged >= 6 months who do not have contraindications   Pneumococcal Vaccine   * Pneumococcal conjugate vaccine = PCV13 (Prevnar 13), PCV15 (Vaxneuvance), PCV20 (Prevnar 20)  * Pneumococcal polysaccharide vaccine = PPSV23 (Pneumovax) Adults 19-63 yo with certain risk factors or if 65+ yo  If never received any pneumonia vaccine: recommend Prevnar 20 (PCV20)  Give PCV20 if previously received 1 dose of PCV13 or PPSV23   Hepatitis B Vaccine 3 dose series if at intermediate or high risk (ex: diabetes, end stage renal disease, liver disease)   Respiratory syncytial virus (RSV) Vaccine - COVERED BY MEDICARE PART D  * RSVPreF3 (Arexvy) CDC recommends that adults 60 years of age and older may receive a single dose of RSV vaccine using shared clinical decision-making (SCDM)   Tetanus (Td) Vaccine - COST NOT COVERED BY MEDICARE PART B Following completion of primary series, a booster dose should be given every 10 years to maintain immunity against tetanus. Td may also be given as tetanus wound prophylaxis.   Tdap Vaccine - COST NOT COVERED BY MEDICARE PART B Recommended at least once for all adults. For pregnant patients, recommended with each pregnancy.   Shingles Vaccine (Shingrix) - COST NOT COVERED BY MEDICARE PART B  2 shot series recommended in those 19 years and older who have or will have weakened immune systems or those 50 years and older     Health Maintenance Due:      Topic Date Due   • Colorectal Cancer Screening  03/01/2025 (Originally 5/17/1995)   • Breast Cancer Screening: Mammogram  10/30/2024   • Hepatitis C Screening  Completed     Immunizations Due:      Topic Date Due   • Pneumococcal Vaccine: 65+ Years (1 of 2 - PCV) Never done   • COVID-19 Vaccine (5 - 2023-24 season) 09/01/2023     Advance Directives   What are advance  directives?  Advance directives are legal documents that state your wishes and plans for medical care. These plans are made ahead of time in case you lose your ability to make decisions for yourself. Advance directives can apply to any medical decision, such as the treatments you want, and if you want to donate organs.   What are the types of advance directives?  There are many types of advance directives, and each state has rules about how to use them. You may choose a combination of any of the following:  Living will:  This is a written record of the treatment you want. You can also choose which treatments you do not want, which to limit, and which to stop at a certain time. This includes surgery, medicine, IV fluid, and tube feedings.   Durable power of  for healthcare (DPAHC):  This is a written record that states who you want to make healthcare choices for you when you are unable to make them for yourself. This person, called a proxy, is usually a family member or a friend. You may choose more than 1 proxy.  Do not resuscitate (DNR) order:  A DNR order is used in case your heart stops beating or you stop breathing. It is a request not to have certain forms of treatment, such as CPR. A DNR order may be included in other types of advance directives.  Medical directive:  This covers the care that you want if you are in a coma, near death, or unable to make decisions for yourself. You can list the treatments you want for each condition. Treatment may include pain medicine, surgery, blood transfusions, dialysis, IV or tube feedings, and a ventilator (breathing machine).  Values history:  This document has questions about your views, beliefs, and how you feel and think about life. This information can help others choose the care that you would choose.  Why are advance directives important?  An advance directive helps you control your care. Although spoken wishes may be used, it is better to have your wishes  written down. Spoken wishes can be misunderstood, or not followed. Treatments may be given even if you do not want them. An advance directive may make it easier for your family to make difficult choices about your care.   Weight Management   Why it is important to manage your weight:  Being overweight increases your risk of health conditions such as heart disease, high blood pressure, type 2 diabetes, and certain types of cancer. It can also increase your risk for osteoarthritis, sleep apnea, and other respiratory problems. Aim for a slow, steady weight loss. Even a small amount of weight loss can lower your risk of health problems.  How to lose weight safely:  A safe and healthy way to lose weight is to eat fewer calories and get regular exercise. You can lose up about 1 pound a week by decreasing the number of calories you eat by 500 calories each day.   Healthy meal plan for weight management:  A healthy meal plan includes a variety of foods, contains fewer calories, and helps you stay healthy. A healthy meal plan includes the following:  Eat whole-grain foods more often.  A healthy meal plan should contain fiber. Fiber is the part of grains, fruits, and vegetables that is not broken down by your body. Whole-grain foods are healthy and provide extra fiber in your diet. Some examples of whole-grain foods are whole-wheat breads and pastas, oatmeal, brown rice, and bulgur.  Eat a variety of vegetables every day.  Include dark, leafy greens such as spinach, kale, desiree greens, and mustard greens. Eat yellow and orange vegetables such as carrots, sweet potatoes, and winter squash.   Eat a variety of fruits every day.  Choose fresh or canned fruit (canned in its own juice or light syrup) instead of juice. Fruit juice has very little or no fiber.  Eat low-fat dairy foods.  Drink fat-free (skim) milk or 1% milk. Eat fat-free yogurt and low-fat cottage cheese. Try low-fat cheeses such as mozzarella and other reduced-fat  cheeses.  Choose meat and other protein foods that are low in fat.  Choose beans or other legumes such as split peas or lentils. Choose fish, skinless poultry (chicken or turkey), or lean cuts of red meat (beef or pork). Before you cook meat or poultry, cut off any visible fat.   Use less fat and oil.  Try baking foods instead of frying them. Add less fat, such as margarine, sour cream, regular salad dressing and mayonnaise to foods. Eat fewer high-fat foods. Some examples of high-fat foods include french fries, doughnuts, ice cream, and cakes.  Eat fewer sweets.  Limit foods and drinks that are high in sugar. This includes candy, cookies, regular soda, and sweetened drinks.  Exercise:  Exercise at least 30 minutes per day on most days of the week. Some examples of exercise include walking, biking, dancing, and swimming. You can also fit in more physical activity by taking the stairs instead of the elevator or parking farther away from stores. Ask your healthcare provider about the best exercise plan for you.      © Copyright [employer] 2018 Information is for End User's use only and may not be sold, redistributed or otherwise used for commercial purposes. All illustrations and images included in CareNotes® are the copyrighted property of [employer] Inc[employer] or [employer]

## 2024-05-06 NOTE — TELEPHONE ENCOUNTER
Sent in a month supply. Would like her to try for 1 WEEK to see if there is improvement in her symptoms. If there is to please tell me before she continues taking it

## 2024-05-06 NOTE — TELEPHONE ENCOUNTER
Patient was told to call in with name of medication she states she is not to continue taking. The medication name is   sucralfate (CARAFATE) 1 g tablet

## 2024-05-06 NOTE — PROGRESS NOTES
Assessment and Plan:     Problem List Items Addressed This Visit        Cardiovascular and Mediastinum    Pulmonary hypertension (HCC)       Respiratory    Emphysema, unspecified (HCC)       Digestive    Ulcerative colitis (HCC)       Musculoskeletal and Integument    Osteoporosis   Other Visit Diagnoses     Medicare annual wellness visit, subsequent    -  Primary    Bilateral ankle pain, unspecified chronicity        Relevant Medications    pregabalin (LYRICA) 100 mg capsule    Type 2 diabetes mellitus with chronic painful diabetic neuropathy (HCC)        Infected tooth        Relevant Medications    amoxicillin (AMOXIL) 500 mg capsule      Infected tooth started the patient on amoxicillin if no improvement to please contact her dentist  Bilateral ankle pain/chronic conditions continue on Lyrica 100 mg  Emphysema currently controlled  Ulcerative colitis currently controlled continue medications on medication list  Diabetes recommend continuing management per endocrinology at this time no changes to medications at this time.      osteoporosis will continue to closely monitor until dentist clears her     Preventive health issues were discussed with patient, and age appropriate screening tests were ordered as noted in patient's After Visit Summary.  Personalized health advice and appropriate referrals for health education or preventive services given if needed, as noted in patient's After Visit Summary.     History of Present Illness:     Patient presents for a Medicare Wellness Visit.    overall patient states she has been doing fairly well except for an infected tooth.  Patient states that her ulcerative colitis has been doing really well and stable.  States that her breathing is currently at her baseline.  Patient states that she has been seeing the endocrinologist for her diabetes states that that is been improving.  Denies any other acute concerns today    HPI   Patient Care Team:  Maria Elena Bowen MD  as PCP - General (Family Medicine)  Zaid Garces MD as PCP - PCP-[facility] ([facility])  Marjorie Balderas MD (Endocrinology)  CYNDIE Morrell (Nurse Practitioner)     Review of Systems:     Review of Systems   Constitutional:  Negative for activity change, appetite change, chills, fatigue and fever.   HENT:  Positive for dental problem. Negative for congestion.    Respiratory:  Negative for cough, chest tightness and shortness of breath.    Cardiovascular:  Negative for chest pain and leg swelling.   Gastrointestinal:  Negative for abdominal distention, abdominal pain, constipation, diarrhea, nausea and vomiting.   All other systems reviewed and are negative.       Problem List:     Patient Active Problem List   Diagnosis   • Coronary artery disease involving native coronary artery of native heart without angina pectoris   • Emphysema, unspecified (HCC)   • Ischemic cardiomyopathy   • Hypertension   • Ulcerative colitis ([facility])   • Pure hypercholesterolemia   • Pulmonary hypertension ([facility])   • Osteoporosis   • Type 2 diabetes mellitus with hyperglycemia, with long-term current use of insulin ([facility])   • Elevated troponin   • Gastroesophageal reflux disease with esophagitis   • History of PTCA with stents   • Positive for macroalbuminuria   • CKD (chronic kidney disease) stage 4, GFR 15-29 ml/min ([facility])      Past Medical and Surgical History:     Past Medical History:   Diagnosis Date   • Colitis    • Coronary artery disease    • Diabetes mellitus (HCC)    • Esophageal ulcer    • Kidney stone    • Neuropathy      Past Surgical History:   Procedure Laterality Date   • ANKLE ARTHROPLASTY Bilateral     bilat hardware post fx surgeries   • COLONOSCOPY     • CORONARY ANGIOPLASTY WITH STENT PLACEMENT  2019    x2 procedures total 3 stents   • LITHOTRIPSY     • OVARIAN CYST SURGERY Right       Family History:     Family History   Problem Relation Age of Onset   • Stroke Mother    • Heart disease Father       Social History:      Social History     Socioeconomic History   • Marital status: Single     Spouse name: None   • Number of children: 1   • Years of education: None   • Highest education level: None   Occupational History   • None   Tobacco Use   • Smoking status: Former     Current packs/day: 0.00     Types: Cigarettes     Start date:      Quit date:      Years since quittin.3   • Smokeless tobacco: Never   Vaping Use   • Vaping status: Never Used   Substance and Sexual Activity   • Alcohol use: Not Currently   • Drug use: Never   • Sexual activity: Not Currently   Other Topics Concern   • None   Social History Narrative   • None     Social Determinants of Health     Financial Resource Strain: Low Risk  (3/28/2023)    Overall Financial Resource Strain (CARDIA)    • Difficulty of Paying Living Expenses: Not hard at all   Food Insecurity: No Food Insecurity (2024)    Hunger Vital Sign    • Worried About Running Out of Food in the Last Year: Never true    • Ran Out of Food in the Last Year: Never true   Transportation Needs: No Transportation Needs (2024)    PRAPARE - Transportation    • Lack of Transportation (Medical): No    • Lack of Transportation (Non-Medical): No   Physical Activity: Not on file   Stress: Not on file   Social Connections: Not on file   Intimate Partner Violence: Not on file   Housing Stability: Low Risk  (2024)    Housing Stability Vital Sign    • Unable to Pay for Housing in the Last Year: No    • Number of Places Lived in the Last Year: 1    • Unstable Housing in the Last Year: No      Medications and Allergies:     Current Outpatient Medications   Medication Sig Dispense Refill   • aspirin (ECOTRIN LOW STRENGTH) 81 mg EC tablet Take 81 mg by mouth daily     • atorvastatin (LIPITOR) 40 mg tablet Take 1 tablet by mouth once daily 90 tablet 1   • budesonide (ENTOCORT EC) 3 MG capsule Take 2 capsules by mouth once daily 60 capsule 11   • carvedilol (COREG) 25 mg tablet Take 25 mg by  mouth 2 (two) times a day     • cloNIDine (CATAPRES) 0.1 mg tablet Take 2 tablets by mouth twice daily 120 tablet 5   • clopidogrel (PLAVIX) 75 mg tablet Take 75 mg by mouth daily Hold for 5 days-LD to be 6/15     • Continuous Blood Gluc  (FreeStyle [place] 2 [place]) CORNEL Use 1 Device every morning 1 each 0   • glucose blood ([employer] Glucose Meter Test Strips) test strip Use 1 each 2 (two) times a day Use as instructed DX E11.65 200 each 2   • Insulin Glargine Solostar (Lantus SoloStar) 100 UNIT/ML SOPN Inject 30 units at bedtime 15 mL 3   • insulin lispro (HumaLOG) 100 units/mL injection pen Inject 4 units with breakfast and lunch. Inject 7 units with dinner. Use sliding scale to correct high blood sugars. Max 30 units daily. 15 mL 2   • Insulin Pen Needle (BD Pen Needle Bridgette 2nd Gen) 32G X 4 MM MISC Inject under the skin 4 (four) times daily (after meals and at bedtime) 400 each 3   • isosorbide dinitrate (ISORDIL) 30 mg tablet Take 1 tablet by mouth twice daily 180 tablet 1   • pantoprazole (PROTONIX) 40 mg tablet Take 1 tablet by mouth twice daily 60 tablet 5   • pregabalin (LYRICA) 100 mg capsule Take 1 capsule (100 mg total) by mouth 2 (two) times a day 60 capsule 5   • tolterodine (DETROL LA) 4 mg 24 hr capsule Take 1 capsule by mouth once daily 90 capsule 1   • valsartan (DIOVAN) 320 MG tablet Take 1 tablet by mouth once daily 90 tablet 1   • amoxicillin (AMOXIL) 500 mg capsule take 1 capsule by mouth three times daily for 7 days (Patient not taking: Reported on 5/6/2024)     • ondansetron (ZOFRAN-ODT) 4 mg disintegrating tablet Take 1 tablet (4 mg total) by mouth every 6 (six) hours as needed for nausea or vomiting for up to 3 days 12 tablet 0   • sucralfate (CARAFATE) 1 g tablet Take 1 tablet (1 g total) by mouth 2 (two) times a day before meals 120 tablet 0     No current facility-administered medications for this visit.     Allergies   Allergen Reactions   • Other Rash     Latex tape       Immunizations:     Immunization History   Administered Date(s) Administered   • COVID-19 MODERNA VACC 0.5 ML IM 03/10/2021, 04/07/2021, 10/29/2021, 05/11/2022   • INFLUENZA 10/29/2021, 11/04/2022, 09/28/2023      Health Maintenance:         Topic Date Due   • Colorectal Cancer Screening  03/01/2025 (Originally 5/17/1995)   • Breast Cancer Screening: Mammogram  10/30/2024   • Hepatitis C Screening  Completed         Topic Date Due   • Pneumococcal Vaccine: 65+ Years (1 of 2 - PCV) Never done   • COVID-19 Vaccine (5 - 2023-24 season) 09/01/2023      Medicare Screening Tests and Risk Assessments:     Clair is here for her Subsequent Wellness visit.     Health Risk Assessment:   Patient rates overall health as very good. Patient feels that their physical health rating is much better. Patient is satisfied with their life. Eyesight was rated as slightly worse. Hearing was rated as same. Patient feels that their emotional and mental health rating is same. Patients states they are never, rarely angry. Patient states they are never, rarely unusually tired/fatigued. Pain experienced in the last 7 days has been some. Patient's pain rating has been 7/10. Patient states that she has experienced no weight loss or gain in last 6 months.     Depression Screening:   PHQ-2 Score: 2      Fall Risk Screening:   In the past year, patient has experienced: no history of falling in past year      Urinary Incontinence Screening:   Patient has not leaked urine accidently in the last six months.     Home Safety:  Patient has trouble with stairs inside or outside of their home. Patient has working smoke alarms and has working carbon monoxide detector. Home safety hazards include: none.     Nutrition:   Current diet is Diabetic.     Medications:   Patient is not currently taking any over-the-counter supplements. Patient is able to manage medications.     Activities of Daily Living (ADLs)/Instrumental Activities of Daily Living (IADLs):   Walk  and transfer into and out of bed and chair?: Yes  Dress and groom yourself?: Yes    Bathe or shower yourself?: Yes    Feed yourself? Yes  Do your laundry/housekeeping?: Yes  Manage your money, pay your bills and track your expenses?: Yes  Make your own meals?: Yes    Do your own shopping?: Yes    Previous Hospitalizations:   Any hospitalizations or ED visits within the last 12 months?: No      Advance Care Planning:   Living will: Yes    Durable POA for healthcare: Yes    Advanced directive: Yes      Cognitive Screening:   Provider or family/friend/caregiver concerned regarding cognition?: No    PREVENTIVE SCREENINGS      Cardiovascular Screening:    General: Screening Not Indicated, History Lipid Disorder and Screening Current      Diabetes Screening:     General: Screening Not Indicated, History Diabetes and Screening Current      Colorectal Cancer Screening:     General: Screening Current      Breast Cancer Screening:     General: Screening Current      Cervical Cancer Screening:    General: Screening Not Indicated      Osteoporosis Screening:    General: Screening Not Indicated and History Osteoporosis      Abdominal Aortic Aneurysm (AAA) Screening:        General: Risks and Benefits Discussed      Lung Cancer Screening:     General: Screening Not Indicated      Hepatitis C Screening:    General: Screening Current    Screening, Brief Intervention, and Referral to Treatment (SBIRT)    Screening  Typical number of drinks in a day: 0  Typical number of drinks in a week: 0  Interpretation: Low risk drinking behavior.    Single Item Drug Screening:  How often have you used an illegal drug (including marijuana) or a prescription medication for non-medical reasons in the past year? never    Single Item Drug Screen Score: 0  Interpretation: Negative screen for possible drug use disorder    No results found.     Physical Exam:     /76   Pulse 63   Temp 97.7 °F (36.5 °C)   Resp 16   Ht 5' (1.524 m)   Wt 62.9 kg  (138 lb 9.6 oz)   SpO2 96%   BMI 27.07 kg/m²     Physical Exam  Vitals reviewed.   Constitutional:       Appearance: Normal appearance. She is well-developed.   HENT:      Head: Normocephalic and atraumatic.      Right Ear: Tympanic membrane, ear canal and external ear normal. There is no impacted cerumen.      Left Ear: Tympanic membrane, ear canal and external ear normal. There is no impacted cerumen.      Nose: Nose normal.      Mouth/Throat:      Mouth: Mucous membranes are moist.      Pharynx: Oropharynx is clear.   Eyes:      Conjunctiva/sclera: Conjunctivae normal.      Pupils: Pupils are equal, round, and reactive to light.   Cardiovascular:      Rate and Rhythm: Normal rate and regular rhythm.      Heart sounds: Normal heart sounds.   Pulmonary:      Effort: Pulmonary effort is normal.      Breath sounds: Normal breath sounds.   Abdominal:      General: Abdomen is flat. Bowel sounds are normal.      Palpations: Abdomen is soft.   Musculoskeletal:         General: Normal range of motion.      Cervical back: Normal range of motion and neck supple.   Skin:     General: Skin is warm.      Capillary Refill: Capillary refill takes less than 2 seconds.   Neurological:      General: No focal deficit present.      Mental Status: She is alert and oriented to person, place, and time. Mental status is at baseline.   Psychiatric:         Mood and Affect: Mood normal.         Behavior: Behavior normal.         Thought Content: Thought content normal.         Judgment: Judgment normal.          Maria Elena Denny MD

## 2024-05-13 NOTE — TELEPHONE ENCOUNTER
Spoke with patient, patient was calling to let the provider know she is doing well with the medication Sucralfate. Please advise.

## 2024-05-13 NOTE — TELEPHONE ENCOUNTER
Ok please have her continue for a full month, and then try off of it. If the pain returns to please let me know then we will work on a game plan

## 2024-05-20 ENCOUNTER — APPOINTMENT (OUTPATIENT)
Dept: LAB | Facility: CLINIC | Age: 74
End: 2024-05-20
Payer: COMMERCIAL

## 2024-05-20 DIAGNOSIS — E78.00 PURE HYPERCHOLESTEROLEMIA: ICD-10-CM

## 2024-05-20 DIAGNOSIS — M81.0 OSTEOPOROSIS, UNSPECIFIED OSTEOPOROSIS TYPE, UNSPECIFIED PATHOLOGICAL FRACTURE PRESENCE: ICD-10-CM

## 2024-05-20 DIAGNOSIS — Z79.4 TYPE 2 DIABETES MELLITUS WITH HYPERGLYCEMIA, WITH LONG-TERM CURRENT USE OF INSULIN (HCC): ICD-10-CM

## 2024-05-20 DIAGNOSIS — E11.65 TYPE 2 DIABETES MELLITUS WITH HYPERGLYCEMIA, WITH LONG-TERM CURRENT USE OF INSULIN (HCC): ICD-10-CM

## 2024-05-20 LAB
CHOLEST SERPL-MCNC: 173 MG/DL
EST. AVERAGE GLUCOSE BLD GHB EST-MCNC: 171 MG/DL
HBA1C MFR BLD: 7.6 %
HDLC SERPL-MCNC: 56 MG/DL
LDLC SERPL CALC-MCNC: 87 MG/DL (ref 0–100)
NONHDLC SERPL-MCNC: 117 MG/DL
PTH-INTACT SERPL-MCNC: 70.9 PG/ML (ref 12–88)
TRIGL SERPL-MCNC: 150 MG/DL

## 2024-05-20 PROCEDURE — 83970 ASSAY OF PARATHORMONE: CPT

## 2024-05-20 PROCEDURE — 80061 LIPID PANEL: CPT

## 2024-05-20 PROCEDURE — 36415 COLL VENOUS BLD VENIPUNCTURE: CPT

## 2024-05-20 PROCEDURE — 83036 HEMOGLOBIN GLYCOSYLATED A1C: CPT

## 2024-05-22 ENCOUNTER — APPOINTMENT (OUTPATIENT)
Dept: LAB | Facility: CLINIC | Age: 74
End: 2024-05-22
Payer: COMMERCIAL

## 2024-05-22 LAB
CREAT UR-MCNC: 29.3 MG/DL
MICROALBUMIN UR-MCNC: 105.7 MG/L
MICROALBUMIN/CREAT 24H UR: 361 MG/G CREATININE (ref 0–30)

## 2024-05-22 PROCEDURE — 82570 ASSAY OF URINE CREATININE: CPT

## 2024-05-22 PROCEDURE — 82043 UR ALBUMIN QUANTITATIVE: CPT

## 2024-06-03 NOTE — PROGRESS NOTES
Clair Motta 1950 female MRN: [id number]    [facility] OFFICE VISIT  [facility] - [place]      ASSESSMENT/PLAN  Clair Motta is a 74 y.o. female presents to the office for    Diagnoses and all orders for this visit:    Neck pain  -     XR spine cervical complete 4 or 5 vw non injury; Future    Injury of right foot, sequela  -     XR foot 3+ vw right; Future  -     Ambulatory Referral to Podiatry; Future    Type 2 diabetes mellitus with hyperglycemia, with long-term current use of insulin (HCC)  -     Comprehensive metabolic panel; Future  -     Ambulatory Referral to Nephrology; Future  -     Ambulatory Referral to Podiatry; Future    Callus  -     Ambulatory Referral to Podiatry; Future    Abnormal skin growth  -     Ambulatory Referral to Dermatology; Future       Will send patient for x-rays to see if the patient has an x-ray.  Neck pain I do believe that the patient likely has degenerative disease likely a muscle spasm.  Will evaluate through x-ray.  Type 2 diabetes has significantly improved continue following the recommendations of endocrinology.  Recommend podiatry and nephrology referral  Dermatology for abnormal skin growth           Future Appointments   Date Time Provider Department Center   6/10/2024 11:30 AM CYNDIE Morrell [facility]   6/17/2024 11:15 AM Fernando Palafox DPM [facility]          SUBJECTIVE  CC: Toe Swelling (Rt foot/2nd and 3rd toe swelling walked into bed Friday night)      HPI:  Clair Motta is a 74 y.o. female who presents for an acute appointment.  Patient states that she walked into her bed on Friday night and has had swelling in her second and third toe.  Patient states the right foot has been very swollen since then.  Patient states that she has had multiple moles and has not seen a dermatologist.  Patient states she has not seen a nephrologist just yet.  Patient also complaining of neck pain  Review of  Systems   Constitutional:  Negative for activity change, appetite change, chills, fatigue and fever.   HENT:  Negative for congestion.    Respiratory:  Negative for cough, chest tightness and shortness of breath.    Cardiovascular:  Negative for chest pain and leg swelling.   Gastrointestinal:  Negative for abdominal distention, abdominal pain, constipation, diarrhea, nausea and vomiting.   Musculoskeletal:  Positive for arthralgias, joint swelling and neck pain.   All other systems reviewed and are negative.      Historical Information   The patient history was reviewed as follows:  Past Medical History:   Diagnosis Date   • Colitis    • Coronary artery disease    • Diabetes mellitus (HCC)    • Esophageal ulcer    • Kidney stone    • Neuropathy          Medications:     Current Outpatient Medications:   •  aspirin (ECOTRIN LOW STRENGTH) 81 mg EC tablet, Take 81 mg by mouth daily, Disp: , Rfl:   •  atorvastatin (LIPITOR) 40 mg tablet, Take 1 tablet by mouth once daily, Disp: 90 tablet, Rfl: 1  •  budesonide (ENTOCORT EC) 3 MG capsule, Take 2 capsules by mouth once daily, Disp: 60 capsule, Rfl: 11  •  carvedilol (COREG) 25 mg tablet, Take 25 mg by mouth 2 (two) times a day, Disp: , Rfl:   •  cloNIDine (CATAPRES) 0.1 mg tablet, Take 2 tablets by mouth twice daily, Disp: 120 tablet, Rfl: 5  •  clopidogrel (PLAVIX) 75 mg tablet, Take 75 mg by mouth daily Hold for 5 days-LD to be 6/15, Disp: , Rfl:   •  Continuous Blood Gluc  (FreeStyle [place] 2 [place]) CORNEL, Use 1 Device every morning, Disp: 1 each, Rfl: 0  •  glucose blood ([employer]S Glucose Meter Test Strips) test strip, Use 1 each 2 (two) times a day Use as instructed DX E11.65, Disp: 200 each, Rfl: 2  •  Insulin Glargine Solostar (Lantus SoloStar) 100 UNIT/ML SOPN, Inject 30 units at bedtime, Disp: 15 mL, Rfl: 3  •  insulin lispro (HumaLOG) 100 units/mL injection pen, Inject 4 units with breakfast and lunch. Inject 7 units with dinner. Use sliding scale to correct  high blood sugars. Max 30 units daily., Disp: 15 mL, Rfl: 2  •  Insulin Pen Needle (BD Pen Needle Bridgette 2nd Gen) 32G X 4 MM MISC, Inject under the skin 4 (four) times daily (after meals and at bedtime), Disp: 400 each, Rfl: 3  •  isosorbide dinitrate (ISORDIL) 30 mg tablet, Take 1 tablet by mouth twice daily, Disp: 180 tablet, Rfl: 1  •  pantoprazole (PROTONIX) 40 mg tablet, Take 1 tablet by mouth twice daily, Disp: 60 tablet, Rfl: 5  •  pregabalin (LYRICA) 100 mg capsule, Take 1 capsule (100 mg total) by mouth 2 (two) times a day, Disp: 60 capsule, Rfl: 5  •  sucralfate (CARAFATE) 1 g tablet, Take 1 tablet (1 g total) by mouth 2 (two) times a day before meals, Disp: 120 tablet, Rfl: 0  •  tolterodine (DETROL LA) 4 mg 24 hr capsule, Take 1 capsule by mouth once daily, Disp: 90 capsule, Rfl: 1  •  valsartan (DIOVAN) 320 MG tablet, Take 1 tablet by mouth once daily, Disp: 90 tablet, Rfl: 1  •  amoxicillin (AMOXIL) 500 mg capsule, take 1 capsule by mouth three times daily for 7 days (Patient not taking: Reported on 5/6/2024), Disp: , Rfl:   •  ondansetron (ZOFRAN-ODT) 4 mg disintegrating tablet, Take 1 tablet (4 mg total) by mouth every 6 (six) hours as needed for nausea or vomiting for up to 3 days, Disp: 12 tablet, Rfl: 0    Allergies   Allergen Reactions   • Other Rash     Latex tape       OBJECTIVE  Vitals:   Vitals:    06/03/24 0935   BP: 110/72   BP Location: Left arm   Patient Position: Sitting   Cuff Size: Standard   Pulse: 66   Resp: 16   Temp: 98.2 °F (36.8 °C)   TempSrc: Temporal   SpO2: 97%   Weight: 61.7 kg (136 lb)   Height: 5' (1.524 m)         Physical Exam  Vitals reviewed.   Constitutional:       Appearance: She is well-developed.   HENT:      Head: Normocephalic and atraumatic.   Eyes:      Extraocular Movements: EOM normal.      Conjunctiva/sclera: Conjunctivae normal.      Pupils: Pupils are equal, round, and reactive to light.   Neck:      Comments: Over the neck  Cardiovascular:      Rate and  Rhythm: Normal rate and regular rhythm.      Heart sounds: Normal heart sounds, S1 normal and S2 normal. No murmur heard.  Pulmonary:      Effort: Pulmonary effort is normal. No respiratory distress.      Breath sounds: Normal breath sounds. No wheezing.   Musculoskeletal:         General: No edema. Normal range of motion.      Cervical back: Normal range of motion and neck supple. Tenderness present.      Comments: Right foot swelling specifically over the second and third digit  Multiple calluses would like to be seen by podiatry   Skin:     General: Skin is warm.   Neurological:      Mental Status: She is alert and oriented to person, place, and time.   Psychiatric:         Mood and Affect: Mood and affect normal.         Speech: Speech normal.         Behavior: Behavior normal.         Thought Content: Thought content normal.         Judgment: Judgment normal.                    Maria Elena Denny MD,   [facility]  6/7/2024

## 2024-06-08 NOTE — ED PROVIDER NOTES
History  Chief Complaint   Patient presents with    Cardiac Arrest     Patient arrives with Medics who have a 25 minute downtime PTA, patient was with EMS when she had a witnessed cardiac arrest, the initial dispatch call was for weakness. Pre hospital 3 of epi, 50 of bicarb, 500 of fluids. Not in shockable rhythm for medics remained in asystole.      74-year-old female presents as a cardiac arrest CPR in progress.  Reportedly she was in the bathtub and could not get out of it because of some weakness so her neighbor called 911 when BLS squad arrived they pulled her out of the bathtub following which she syncopized and lost pulses BLS started CPR right away ALS arrived they intubated her gave her IO as well as 4 rounds of epi and 1 bicarb.  Initial rhythm was asystole followed by PEA but at no point pulses were returned.  Downtime was at least 25 to 30 minutes.      History provided by:  EMS personnel   used: No    Cardiac Arrest  Witnessed by:  Healthcare provider      Prior to Admission Medications   Prescriptions Last Dose Informant Patient Reported? Taking?   Continuous Blood Gluc  (FreeStyle [place] 2 [place]) CORNEL   No No   Sig: Use 1 Device every morning   Insulin Pen Needle (BD Pen Needle Bridgette 2nd Gen) 32G X 4 MM MISC   No No   Sig: Inject under the skin 4 (four) times daily (after meals and at bedtime)   amoxicillin (AMOXIL) 500 mg capsule   Yes No   Sig: take 1 capsule by mouth three times daily for 7 days   Patient not taking: Reported on 5/6/2024   aspirin (ECOTRIN LOW STRENGTH) 81 mg EC tablet   Yes No   Sig: Take 81 mg by mouth daily   carvedilol (COREG) 25 mg tablet   Yes No   Sig: Take 25 mg by mouth 2 (two) times a day   clopidogrel (PLAVIX) 75 mg tablet   Yes No   Sig: Take 75 mg by mouth daily Hold for 5 days-LD to be 6/15   ondansetron (ZOFRAN-ODT) 4 mg disintegrating tablet   No No   Sig: Take 1 tablet (4 mg total) by mouth every 6 (six) hours as needed for nausea or  vomiting for up to 3 days      Facility-Administered Medications: None       Past Medical History:   Diagnosis Date    Colitis     Coronary artery disease     Diabetes mellitus (HCC)     Esophageal ulcer     Kidney stone     Neuropathy        Past Surgical History:   Procedure Laterality Date    ANKLE ARTHROPLASTY Bilateral     bilat hardware post fx surgeries    COLONOSCOPY      CORONARY ANGIOPLASTY WITH STENT PLACEMENT  2019    x2 procedures total 3 stents    LITHOTRIPSY      OVARIAN CYST SURGERY Right        Family History   Problem Relation Age of Onset    Stroke Mother     Heart disease Father      I have reviewed and agree with the history as documented.    E-Cigarette/Vaping    E-Cigarette Use Never User      E-Cigarette/Vaping Substances    Nicotine No     THC No     CBD No     Flavoring No     Other No     Unknown No      Social History     Tobacco Use    Smoking status: Former     Current packs/day: 0.00     Types: Cigarettes     Start date:      Quit date:      Years since quittin.4    Smokeless tobacco: Never   Vaping Use    Vaping status: Never Used   Substance Use Topics    Alcohol use: Not Currently    Drug use: Never       Review of Systems   Unable to perform ROS: Patient unresponsive   Constitutional: Negative.    HENT: Negative.     Eyes: Negative.    Respiratory: Negative.     Cardiovascular: Negative.    Gastrointestinal: Negative.    Endocrine: Negative.    Genitourinary: Negative.    Musculoskeletal: Negative.    Skin: Negative.    Allergic/Immunologic: Negative.    Neurological: Negative.    Hematological: Negative.    Psychiatric/Behavioral: Negative.     All other systems reviewed and are negative.      Physical Exam  Physical Exam  Vitals and nursing note reviewed.   Constitutional:       Appearance: Normal appearance.   HENT:      Head: Normocephalic and atraumatic.      Nose: Nose normal.   Eyes:      Comments: Pupils are fixed and dilated   Cardiovascular:      Comments: No  pulses no cardiac activity  Pulmonary:      Comments: Patient being bagged with positive breath sounds  Abdominal:      General: Abdomen is flat.   Musculoskeletal:      Cervical back: Neck supple.   Skin:     General: Skin is warm.      Capillary Refill: Capillary refill takes less than 2 seconds.   Neurological:      Mental Status: She is alert.      Comments: GCS 3 T intubated         Vital Signs  ED Triage Vitals   Temp Pulse Resp BP SpO2   -- -- -- -- --      Temp src Heart Rate Source Patient Position - Orthostatic VS BP Location FiO2 (%)   -- -- -- -- --      Pain Score       --           There were no vitals filed for this visit.      Visual Acuity      ED Medications  Medications   EPINEPHrine (ADRENALIN) injection (1 mg Intravenous Given 6/7/24 1955)   sodium bicarbonate 50 mEq/50 mL injection (50 mEq Intravenous Given 6/1950)       Diagnostic Studies  Results Reviewed       None                   No orders to display              Procedures  CriticalCare Time    Date/Time: 6/8/2024 9:26 PM    Performed by: Joya Moore DO  Authorized by: Joya Moore DO    Critical care provider statement:     Critical care time (minutes):  35    Critical care time was exclusive of:  Separately billable procedures and treating other patients    Critical care was necessary to treat or prevent imminent or life-threatening deterioration of the following conditions:  Circulatory failure    Critical care was time spent personally by me on the following activities:  Blood draw for specimens, obtaining history from patient or surrogate, development of treatment plan with patient or surrogate, discussions with consultants, evaluation of patient's response to treatment, examination of patient, interpretation of cardiac output measurements, ordering and performing treatments and interventions, ordering and review of laboratory studies, ordering and review of radiographic studies, re-evaluation of patient's condition and  review of old charts    I assumed direction of critical care for this patient from another provider in my specialty: no             ED Course                                             Medical Decision Making  Multiple rounds of epi and bicarb also given after the patient arrived to the ED in addition to the medications provided by the EMS.  CPR continued with the Jose machine.  However at no point pulses did not return bedside cardiac ultrasound did not show any cardiac activity patient pronounced dead please refer to the nurses notes for further details.  Daughter updated with the ED course and her mother's demise patient's daughter expressed her condolences however could not come to the ED.    Amount and/or Complexity of Data Reviewed  External Data Reviewed: notes.    Risk  Prescription drug management.             Disposition  Final diagnoses:   Cardiac arrest (HCC)     Time reflects when diagnosis was documented in both MDM as applicable and the Disposition within this note       Time User Action Codes Description Comment    2024  9:28 PM Joya Moore Add [I46.9] Cardiac arrest (HCC)           ED Disposition       ED Disposition       Condition   --    Date/Time     9:17 PM    Comment   Called by Dr. Moore at 1956.              Follow-up Information    None       Date, Time and Cause of Death    Date of Death: 26  Time of Death:  7:56 PM  Preliminary Cause of Death: Cardiac arrest       Discharge Medication List as of 2024  9:18 PM        CONTINUE these medications which have NOT CHANGED    Details   amoxicillin (AMOXIL) 500 mg capsule take 1 capsule by mouth three times daily for 7 days, Historical Med      aspirin (ECOTRIN LOW STRENGTH) 81 mg EC tablet Take 81 mg by mouth daily, Historical Med      carvedilol (COREG) 25 mg tablet Take 25 mg by mouth 2 (two) times a day, Starting Thu 2021, Historical Med      clopidogrel (PLAVIX) 75 mg tablet Take 75 mg by mouth daily  Hold for 5 days-LD to be 6/15, Historical Med      ondansetron (ZOFRAN-ODT) 4 mg disintegrating tablet Take 1 tablet (4 mg total) by mouth every 6 (six) hours as needed for nausea or vomiting for up to 3 days, Starting Sun 9/24/2023, Until Fri 11/24/2023 at 2359, Normal      atorvastatin (LIPITOR) 40 mg tablet Take 1 tablet by mouth once daily, Starting Wed 4/10/2024, Normal      budesonide (ENTOCORT EC) 3 MG capsule Take 2 capsules by mouth once daily, Normal      cloNIDine (CATAPRES) 0.1 mg tablet Take 2 tablets by mouth twice daily, Starting Wed 5/1/2024, Normal      Continuous Blood Gluc  (FreeStyle [place] 2 [place]) CORNEL Use 1 Device every morning, Starting Thu 11/3/2022, Normal      glucose blood ([employer] Glucose Meter Test Strips) test strip Use 1 each 2 (two) times a day Use as instructed DX E11.65, Starting Wed 1/11/2023, Normal      Insulin Glargine Solostar (Lantus SoloStar) 100 UNIT/ML SOPN Inject 30 units at bedtime, Normal      insulin lispro (HumaLOG) 100 units/mL injection pen Inject 4 units with breakfast and lunch. Inject 7 units with dinner. Use sliding scale to correct high blood sugars. Max 30 units daily., Normal      Insulin Pen Needle (BD Pen Needle Bridgette 2nd Gen) 32G X 4 MM MISC Inject under the skin 4 (four) times daily (after meals and at bedtime), Starting Fri 3/1/2024, Normal      isosorbide dinitrate (ISORDIL) 30 mg tablet Take 1 tablet by mouth twice daily, Starting Mon 2/19/2024, Normal      pantoprazole (PROTONIX) 40 mg tablet Take 1 tablet by mouth twice daily, Starting Mon 4/1/2024, Normal      pregabalin (LYRICA) 100 mg capsule Take 1 capsule (100 mg total) by mouth 2 (two) times a day, Starting Mon 5/6/2024, Normal      sucralfate (CARAFATE) 1 g tablet Take 1 tablet (1 g total) by mouth 2 (two) times a day before meals, Starting Mon 5/6/2024, Until Fri 7/5/2024, Normal      tolterodine (DETROL LA) 4 mg 24 hr capsule Take 1 capsule by mouth once daily, Starting Tue 4/30/2024,  Normal      valsartan (DIOVAN) 320 MG tablet Take 1 tablet by mouth once daily, Normal             No discharge procedures on file.    PDMP Review         Value Time User    PDMP Reviewed  Yes 11/15/2021 11:25 AM Maria Elena Bowen MD            ED Provider  Electronically Signed by             Joya Moore DO  06/08/24 [id number]